# Patient Record
Sex: FEMALE | Race: OTHER | HISPANIC OR LATINO | URBAN - METROPOLITAN AREA
[De-identification: names, ages, dates, MRNs, and addresses within clinical notes are randomized per-mention and may not be internally consistent; named-entity substitution may affect disease eponyms.]

---

## 2017-11-02 ENCOUNTER — EMERGENCY (EMERGENCY)
Facility: HOSPITAL | Age: 67
LOS: 1 days | Discharge: HOME | End: 2017-11-02
Attending: EMERGENCY MEDICINE
Payer: MEDICARE

## 2017-11-02 VITALS
DIASTOLIC BLOOD PRESSURE: 113 MMHG | OXYGEN SATURATION: 99 % | RESPIRATION RATE: 20 BRPM | HEART RATE: 76 BPM | TEMPERATURE: 98 F | WEIGHT: 149.91 LBS | SYSTOLIC BLOOD PRESSURE: 193 MMHG

## 2017-11-02 PROCEDURE — 99284 EMERGENCY DEPT VISIT MOD MDM: CPT

## 2017-11-02 RX ORDER — TETANUS TOXOID, REDUCED DIPHTHERIA TOXOID AND ACELLULAR PERTUSSIS VACCINE, ADSORBED 5; 2.5; 8; 8; 2.5 [IU]/.5ML; [IU]/.5ML; UG/.5ML; UG/.5ML; UG/.5ML
0.5 SUSPENSION INTRAMUSCULAR ONCE
Qty: 0 | Refills: 0 | Status: COMPLETED | OUTPATIENT
Start: 2017-11-02 | End: 2017-11-02

## 2017-11-02 RX ORDER — OXYCODONE AND ACETAMINOPHEN 5; 325 MG/1; MG/1
1 TABLET ORAL EVERY 4 HOURS
Qty: 0 | Refills: 0 | Status: DISCONTINUED | OUTPATIENT
Start: 2017-11-02 | End: 2017-11-02

## 2017-11-02 RX ADMIN — OXYCODONE AND ACETAMINOPHEN 1 TABLET(S): 5; 325 TABLET ORAL at 23:13

## 2017-11-02 RX ADMIN — TETANUS TOXOID, REDUCED DIPHTHERIA TOXOID AND ACELLULAR PERTUSSIS VACCINE, ADSORBED 0.5 MILLILITER(S): 5; 2.5; 8; 8; 2.5 SUSPENSION INTRAMUSCULAR at 22:17

## 2017-11-02 RX ADMIN — OXYCODONE AND ACETAMINOPHEN 1 TABLET(S): 5; 325 TABLET ORAL at 23:28

## 2017-11-02 NOTE — ED STATDOCS - OBJECTIVE STATEMENT
This is a 68 y/o F pt a pertinent PMHx of HTN presents to the ED c/o L hand lacerations s/p dog bite today. PT reports that the location of the wound is "numb." She did not know the dog, it attacked her from the street. She did not see or look whether or not the dog had a collar on. Admits that she has not taken her HTN medication for tonight, cannot report the medication. Pt is R hand dominant. She has pain when removing the dressing and on movement. Her Tetanus shot is not UTD. Allergic to Penicillin.

## 2017-11-02 NOTE — ED ADULT NURSE NOTE - OBJECTIVE STATEMENT
pt a&ox3 c/o pain to left wrist s/p reported dog bite tonight on street, bandage noted to Left wrist, bleeding controlled. MAEx4, rr even and unlabored, in no apparent distress. Unknown last tetanus, updated on POC, will continue to monitor and reassess

## 2017-11-02 NOTE — ED STATDOCS - ATTENDING CONTRIBUTION TO CARE
I, Flavia Lynch, performed the initial face to face bedside interview with this patient regarding history of present illness, review of symptoms and relevant past medical, social and family history.  I completed an independent physical examination.  I was the initial provider who evaluated this patient.  The history, relevant review of systems, past medical and surgical history, medical decision making, and physical examination was documented by the scribe in my presence and I attest to the accuracy of the documentation.  I have signed out the follow up of any pending tests (i.e. labs, radiological studies) to the ACP.  I have communicated the patient’s plan of care and disposition with the ACP.

## 2017-11-02 NOTE — ED STATDOCS - LACERATION LENGTH DESCRIPTION FT
abrasion at the 2nd and 3rd MCP, with a puncture wound. 2cm laceration to the palm of her hand. Large 5cm laceration to the dorsal aspect of the hand with the exposure of fat and muscle.

## 2017-11-03 PROCEDURE — 99283 EMERGENCY DEPT VISIT LOW MDM: CPT | Mod: 25

## 2017-11-03 PROCEDURE — 90471 IMMUNIZATION ADMIN: CPT

## 2017-11-03 PROCEDURE — 90715 TDAP VACCINE 7 YRS/> IM: CPT

## 2017-11-03 PROCEDURE — T1013: CPT

## 2017-11-03 RX ORDER — CIPROFLOXACIN LACTATE 400MG/40ML
500 VIAL (ML) INTRAVENOUS ONCE
Qty: 0 | Refills: 0 | Status: COMPLETED | OUTPATIENT
Start: 2017-11-03 | End: 2017-11-03

## 2017-11-03 RX ORDER — ONDANSETRON 8 MG/1
4 TABLET, FILM COATED ORAL ONCE
Qty: 0 | Refills: 0 | Status: COMPLETED | OUTPATIENT
Start: 2017-11-03 | End: 2017-11-03

## 2017-11-03 RX ORDER — CIPROFLOXACIN LACTATE 400MG/40ML
1 VIAL (ML) INTRAVENOUS
Qty: 20 | Refills: 0 | OUTPATIENT
Start: 2017-11-03 | End: 2017-11-13

## 2017-11-03 RX ADMIN — Medication 300 MILLIGRAM(S): at 01:17

## 2017-11-03 RX ADMIN — ONDANSETRON 4 MILLIGRAM(S): 8 TABLET, FILM COATED ORAL at 01:19

## 2017-11-03 RX ADMIN — Medication 500 MILLIGRAM(S): at 01:17

## 2017-11-04 ENCOUNTER — INPATIENT (INPATIENT)
Facility: HOSPITAL | Age: 67
LOS: 3 days | Discharge: HOME | DRG: 603 | End: 2017-11-08
Attending: INTERNAL MEDICINE | Admitting: INTERNAL MEDICINE
Payer: MEDICARE

## 2017-11-04 VITALS — WEIGHT: 149.91 LBS | HEIGHT: 64 IN

## 2017-11-04 DIAGNOSIS — I10 ESSENTIAL (PRIMARY) HYPERTENSION: ICD-10-CM

## 2017-11-04 DIAGNOSIS — S41.152S: ICD-10-CM

## 2017-11-04 DIAGNOSIS — L03.114 CELLULITIS OF LEFT UPPER LIMB: ICD-10-CM

## 2017-11-04 DIAGNOSIS — L03.90 CELLULITIS, UNSPECIFIED: ICD-10-CM

## 2017-11-04 LAB
ANION GAP SERPL CALC-SCNC: 11 MMOL/L — SIGNIFICANT CHANGE UP (ref 5–17)
BUN SERPL-MCNC: 14 MG/DL — SIGNIFICANT CHANGE UP (ref 8–20)
CALCIUM SERPL-MCNC: 9 MG/DL — SIGNIFICANT CHANGE UP (ref 8.6–10.2)
CHLORIDE SERPL-SCNC: 104 MMOL/L — SIGNIFICANT CHANGE UP (ref 98–107)
CO2 SERPL-SCNC: 29 MMOL/L — SIGNIFICANT CHANGE UP (ref 22–29)
CREAT SERPL-MCNC: 0.74 MG/DL — SIGNIFICANT CHANGE UP (ref 0.5–1.3)
GLUCOSE SERPL-MCNC: 113 MG/DL — SIGNIFICANT CHANGE UP (ref 70–115)
HCT VFR BLD CALC: 37 % — SIGNIFICANT CHANGE UP (ref 37–47)
HGB BLD-MCNC: 12.1 G/DL — SIGNIFICANT CHANGE UP (ref 12–16)
LACTATE BLDV-MCNC: 1.1 MMOL/L — SIGNIFICANT CHANGE UP (ref 0.5–2)
MCHC RBC-ENTMCNC: 32.1 PG — HIGH (ref 27–31)
MCHC RBC-ENTMCNC: 32.7 G/DL — SIGNIFICANT CHANGE UP (ref 32–36)
MCV RBC AUTO: 98.1 FL — SIGNIFICANT CHANGE UP (ref 81–99)
PLATELET # BLD AUTO: 156 K/UL — SIGNIFICANT CHANGE UP (ref 150–400)
POTASSIUM SERPL-MCNC: 3.9 MMOL/L — SIGNIFICANT CHANGE UP (ref 3.5–5.3)
POTASSIUM SERPL-SCNC: 3.9 MMOL/L — SIGNIFICANT CHANGE UP (ref 3.5–5.3)
RBC # BLD: 3.77 M/UL — LOW (ref 4.4–5.2)
RBC # FLD: 12.8 % — SIGNIFICANT CHANGE UP (ref 11–15.6)
SODIUM SERPL-SCNC: 144 MMOL/L — SIGNIFICANT CHANGE UP (ref 135–145)
WBC # BLD: 7.9 K/UL — SIGNIFICANT CHANGE UP (ref 4.8–10.8)
WBC # FLD AUTO: 7.9 K/UL — SIGNIFICANT CHANGE UP (ref 4.8–10.8)

## 2017-11-04 PROCEDURE — 99223 1ST HOSP IP/OBS HIGH 75: CPT

## 2017-11-04 PROCEDURE — 99284 EMERGENCY DEPT VISIT MOD MDM: CPT

## 2017-11-04 RX ORDER — OXYCODONE HYDROCHLORIDE 5 MG/1
5 TABLET ORAL
Qty: 0 | Refills: 0 | Status: DISCONTINUED | OUTPATIENT
Start: 2017-11-04 | End: 2017-11-08

## 2017-11-04 RX ORDER — ACETAMINOPHEN 500 MG
650 TABLET ORAL EVERY 6 HOURS
Qty: 0 | Refills: 0 | Status: DISCONTINUED | OUTPATIENT
Start: 2017-11-04 | End: 2017-11-08

## 2017-11-04 RX ORDER — LOSARTAN POTASSIUM 100 MG/1
100 TABLET, FILM COATED ORAL DAILY
Qty: 0 | Refills: 0 | Status: DISCONTINUED | OUTPATIENT
Start: 2017-11-04 | End: 2017-11-08

## 2017-11-04 RX ORDER — METRONIDAZOLE 500 MG
TABLET ORAL
Qty: 0 | Refills: 0 | Status: DISCONTINUED | OUTPATIENT
Start: 2017-11-04 | End: 2017-11-04

## 2017-11-04 RX ORDER — HYDROCHLOROTHIAZIDE 25 MG
12.5 TABLET ORAL DAILY
Qty: 0 | Refills: 0 | Status: DISCONTINUED | OUTPATIENT
Start: 2017-11-04 | End: 2017-11-08

## 2017-11-04 RX ORDER — SODIUM CHLORIDE 9 MG/ML
3 INJECTION INTRAMUSCULAR; INTRAVENOUS; SUBCUTANEOUS ONCE
Qty: 0 | Refills: 0 | Status: COMPLETED | OUTPATIENT
Start: 2017-11-04 | End: 2017-11-04

## 2017-11-04 RX ORDER — MEROPENEM 1 G/30ML
1000 INJECTION INTRAVENOUS EVERY 8 HOURS
Qty: 0 | Refills: 0 | Status: DISCONTINUED | OUTPATIENT
Start: 2017-11-04 | End: 2017-11-08

## 2017-11-04 RX ORDER — VANCOMYCIN HCL 1 G
1000 VIAL (EA) INTRAVENOUS ONCE
Qty: 0 | Refills: 0 | Status: COMPLETED | OUTPATIENT
Start: 2017-11-04 | End: 2017-11-04

## 2017-11-04 RX ORDER — METRONIDAZOLE 500 MG
500 TABLET ORAL EVERY 8 HOURS
Qty: 0 | Refills: 0 | Status: DISCONTINUED | OUTPATIENT
Start: 2017-11-04 | End: 2017-11-04

## 2017-11-04 RX ORDER — METRONIDAZOLE 500 MG
500 TABLET ORAL ONCE
Qty: 0 | Refills: 0 | Status: DISCONTINUED | OUTPATIENT
Start: 2017-11-04 | End: 2017-11-04

## 2017-11-04 RX ADMIN — OXYCODONE HYDROCHLORIDE 5 MILLIGRAM(S): 5 TABLET ORAL at 20:37

## 2017-11-04 RX ADMIN — OXYCODONE HYDROCHLORIDE 5 MILLIGRAM(S): 5 TABLET ORAL at 19:47

## 2017-11-04 RX ADMIN — Medication 250 MILLIGRAM(S): at 16:28

## 2017-11-04 RX ADMIN — SODIUM CHLORIDE 3 MILLILITER(S): 9 INJECTION INTRAMUSCULAR; INTRAVENOUS; SUBCUTANEOUS at 15:48

## 2017-11-04 RX ADMIN — MEROPENEM 200 MILLIGRAM(S): 1 INJECTION INTRAVENOUS at 18:42

## 2017-11-04 RX ADMIN — MEROPENEM 200 MILLIGRAM(S): 1 INJECTION INTRAVENOUS at 21:51

## 2017-11-04 NOTE — ED ADULT TRIAGE NOTE - CHIEF COMPLAINT QUOTE
swelling to left hand. patient here 3 days ago for a dog bite had stiches, on antibiotics and today her hand is swollen

## 2017-11-04 NOTE — ED STATDOCS - ATTENDING CONTRIBUTION TO CARE
I, Sissy Arora, performed the initial face to face bedside interview with this patient regarding history of present illness, review of symptoms and relevant past medical, social and family history.  I completed an independent physical examination.  I was the initial provider who evaluated this patient. I have signed out the follow up of any pending tests (i.e. labs, radiological studies) to the ACP.  I have communicated the patient’s plan of care and disposition with the ACP.  The history, relevant review of systems, past medical and surgical history, medical decision making, and physical examination was documented by the scribe in my presence and I attest to the accuracy of the documentation.

## 2017-11-04 NOTE — ED ADULT NURSE NOTE - ED STAT RN HANDOFF DETAILS
patient awake, alert, and oriented times 3, breathing unlabored.  Ortho PA at bedside wrapping left hand.  patient aware of plan of care.  IV flushes without difficulty.

## 2017-11-04 NOTE — H&P ADULT - HISTORY OF PRESENT ILLNESS
66 yo F W/ hx HTN presents to ER after worsening of left hand swelling and erythema.  Patient was bit by stray dog 3 days prior with lacerations to hand. Seen in ER, lacerations sutured and discharged home on cipro/clinda.  Per patient, swelling worsened with decreased range of motion of hand/fingers along with subjective fevers without chills.

## 2017-11-04 NOTE — ED STATDOCS - PROGRESS NOTE DETAILS
Pt with infected left hand s/p dog bite x 2 days ago presents with worsening pain/swelling. Pt failed outpt PO abx. D/w  D/w Pt-- states that she received first shot of rabies vaccine upon last visit on 11/2/17. Pt will be due for her next shot on 11/5/17.

## 2017-11-04 NOTE — H&P ADULT - ASSESSMENT
68 yo F W/ hx HTN presents to ER after worsening of left hand swelling and erythema from dog bite.  Failed outpatient cipro/clindamycin therapy.

## 2017-11-04 NOTE — H&P ADULT - PROBLEM SELECTOR PLAN 2
see above  prophylaxis rabies not given in ER. see above  prophylaxis rabies not given in ER but tetanus given see above  prophylaxis rabies given per ER PA and next injection due 11/5  tetanus ppx given on last ER visit

## 2017-11-04 NOTE — H&P ADULT - EXTREMITIES COMMENTS
Left hand/forearm: swelling along with erythema. no fluctuance or abscess noted    unable to close hand. unable to abduct fingers but adduction intact. Left hand/forearm: swelling along with erythema. no fluctuance or abscess noted    unable to close hand. unable to abduct fingers but adduction intact.   sutures present palmar region and dorsal aspect of hand

## 2017-11-04 NOTE — ED ADULT NURSE NOTE - OBJECTIVE STATEMENT
Patient found laying in stretcher, awake, alert, and oriented times 3, breathing unlabored.  Patient states bite by a stray dog on thursday, Patient states swelling, reddness, and heat started yesterday.  Swelling, reddness, heat to left hand and wrist noted.  stitches done on thursday.

## 2017-11-04 NOTE — CONSULT NOTE ADULT - SUBJECTIVE AND OBJECTIVE BOX
Pt Name: JULIA UMANA    MRN: [id number]    Patient is a 67y Female presenting to the emergency department with a chief complaint of worsening left hand swelling (04 Nov 2017 17:02)  Patient was seen in ED after dog bite 2 days ago  Her wound were repaired and she was sent home with oral antibiotics  She returns today with worsening pain and swelling  no other complaints    HEALTH ISSUES - PROBLEM Dx:  Essential hypertension: Essential hypertension  Dog bite of arm, left, sequela: Dog bite of arm, left, sequela  Cellulitis of left upper extremity: Cellulitis of left upper extremity    REVIEW OF SYSTEMS    Musculoskeletal:	 left hand pain and swelling    ROS is otherwise negative.    PAST MEDICAL & SURGICAL HISTORY:  HTN (hypertension)  No significant past surgical history    Allergies: penicillin (Hives)    Medications: acetaminophen   Tablet 650 milliGRAM(s) Oral every 6 hours PRN  acetaminophen   Tablet. 650 milliGRAM(s) Oral every 6 hours PRN  hydrochlorothiazide 12.5 milliGRAM(s) Oral daily  losartan 100 milliGRAM(s) Oral daily  meropenem IVPB 1000 milliGRAM(s) IV Intermittent every 8 hours  oxyCODONE    IR 5 milliGRAM(s) Oral three times a day with meals PRN    FAMILY HISTORY:  No pertinent family history in first degree relatives  : non-contributory    Ambulation: Walking independently                           12.1   7.9   )-----------( 156      ( 04 Nov 2017 15:57 )             37.0     11-04    144  |  104  |  14.0  ----------------------------<  113  3.9   |  29.0  |  0.74    Ca    9.0      04 Nov 2017 15:57    PHYSICAL EXAM:    Vital Signs Last 24 Hrs  T(C): 37.1 (04 Nov 2017 18:03), Max: 37.2 (04 Nov 2017 17:37)  T(F): 98.8 (04 Nov 2017 18:03), Max: 99 (04 Nov 2017 17:37)  HR: 58 (04 Nov 2017 18:03) (58 - 66)  BP: 136/80 (04 Nov 2017 18:03) (136/80 - 188/103)  BP(mean): --  RR: 18 (04 Nov 2017 18:03) (18 - 18)  SpO2: 98% (04 Nov 2017 18:03) (98% - 100%)  Daily Height in cm: 162.56 (04 Nov 2017 13:38)    Daily     Appearance: Alert, responsive, in no acute distress.    Neurological: Sensation is grossly intact to light touch. 5/5 motor function of all extremities. No focal deficits or weaknesses found.    Vascular: 2+ distal pulses. Cap refill < 2 sec. No signs of venous insufficiency or stasis. No extremity ulcerations. No cyanosis.    Musculoskeletal:         Left Upper Extremity:  diffuse hand and digit swelling, decreased sensation to light touch palmar aspect of left 5th digit, multiple lacerations with sutures intact, no active drainage or bleeding, radial pulse intact, cap refill brisk, new xeroform and dressing applied    Imaging Studies:    left hand xrays pending    A/P:   Patient is a 67y old  Female who presents with a chief complaint of worsening hand swelling (04 Nov 2017 17:02)   found to have left hand cellulitis s/p dog bite 2 days ago    PLAN:   Discussed with Dr. Saba   * admit to medicine  * IV Abx  * Strict elevation  * No surgical intervention at this time  * Ortho to follow

## 2017-11-04 NOTE — H&P ADULT - PROBLEM SELECTOR PLAN 1
blood cultures ordered (vancomycin given by ER already)  meropenem  elevate extremity.  orthopedics consulted  ? remove sutures

## 2017-11-04 NOTE — ED STATDOCS - OBJECTIVE STATEMENT
68 y/o F pt presents to ED c/o worsening swelling  and pain to left hand. Pt was seen 2 days ago for laceration s/p  bog bite to left hand had sutures placed and given clindamycin and cipro.

## 2017-11-05 PROCEDURE — 99233 SBSQ HOSP IP/OBS HIGH 50: CPT

## 2017-11-05 PROCEDURE — 73130 X-RAY EXAM OF HAND: CPT | Mod: 26,LT

## 2017-11-05 RX ADMIN — LOSARTAN POTASSIUM 100 MILLIGRAM(S): 100 TABLET, FILM COATED ORAL at 05:31

## 2017-11-05 RX ADMIN — OXYCODONE HYDROCHLORIDE 5 MILLIGRAM(S): 5 TABLET ORAL at 01:45

## 2017-11-05 RX ADMIN — MEROPENEM 200 MILLIGRAM(S): 1 INJECTION INTRAVENOUS at 05:31

## 2017-11-05 RX ADMIN — Medication 650 MILLIGRAM(S): at 05:30

## 2017-11-05 RX ADMIN — OXYCODONE HYDROCHLORIDE 5 MILLIGRAM(S): 5 TABLET ORAL at 22:57

## 2017-11-05 RX ADMIN — OXYCODONE HYDROCHLORIDE 5 MILLIGRAM(S): 5 TABLET ORAL at 00:50

## 2017-11-05 RX ADMIN — Medication 650 MILLIGRAM(S): at 06:20

## 2017-11-05 RX ADMIN — MEROPENEM 200 MILLIGRAM(S): 1 INJECTION INTRAVENOUS at 14:47

## 2017-11-05 RX ADMIN — Medication 12.5 MILLIGRAM(S): at 05:31

## 2017-11-05 RX ADMIN — MEROPENEM 200 MILLIGRAM(S): 1 INJECTION INTRAVENOUS at 21:57

## 2017-11-05 RX ADMIN — OXYCODONE HYDROCHLORIDE 5 MILLIGRAM(S): 5 TABLET ORAL at 11:43

## 2017-11-05 RX ADMIN — OXYCODONE HYDROCHLORIDE 5 MILLIGRAM(S): 5 TABLET ORAL at 21:57

## 2017-11-05 RX ADMIN — OXYCODONE HYDROCHLORIDE 5 MILLIGRAM(S): 5 TABLET ORAL at 12:30

## 2017-11-05 NOTE — PROGRESS NOTE ADULT - SUBJECTIVE AND OBJECTIVE BOX
seen for hand cellulitis/dog bite    improving swelling/pain.  increased ROM  ros otherwise negative     MEDICATIONS  (STANDING):  hydrochlorothiazide 12.5 milliGRAM(s) Oral daily  losartan 100 milliGRAM(s) Oral daily  meropenem IVPB 1000 milliGRAM(s) IV Intermittent every 8 hours    MEDICATIONS  (PRN):  acetaminophen   Tablet 650 milliGRAM(s) Oral every 6 hours PRN For Temp greater than 38 C (100.4 F)  acetaminophen   Tablet. 650 milliGRAM(s) Oral every 6 hours PRN Mild Pain (1 - 3)  oxyCODONE    IR 5 milliGRAM(s) Oral three times a day with meals PRN Severe Pain (7 - 10)      Allergies    penicillin (Hives)    Vital Signs Last 24 Hrs  T(C): 37.1 (05 Nov 2017 08:07), Max: 37.2 (04 Nov 2017 17:37)  T(F): 98.8 (05 Nov 2017 08:07), Max: 99 (04 Nov 2017 17:37)  HR: 65 (05 Nov 2017 08:07) (55 - 66)  BP: 110/68 (05 Nov 2017 08:07) (105/68 - 188/103)  BP(mean): --  RR: 19 (05 Nov 2017 08:07) (18 - 19)  SpO2: 99% (04 Nov 2017 23:32) (98% - 100%)    PHYSICAL EXAM:    GENERAL: NAD  EXTREMITIES:  no LE edema  left hand:  intact sutures at lacerations, no drainage, minimal erythema. improving ROM (adduction/abduction of fingers along with hand grasp). improving swelling.  NERVOUS SYSTEM:  Alert & Oriented X nonfocal  PSYCH: normal mood, appropriate response.    LABS:                        12.1   7.9   )-----------( 156      ( 04 Nov 2017 15:57 )             37.0     11-04    144  |  104  |  14.0  ----------------------------<  113  3.9   |  29.0  |  0.74    Ca    9.0      04 Nov 2017 15:57            CAPILLARY BLOOD GLUCOSE            RADIOLOGY & ADDITIONAL TESTS:

## 2017-11-05 NOTE — PROGRESS NOTE ADULT - SUBJECTIVE AND OBJECTIVE BOX
Pt Name: JULIA UMANA    MRN: [id number]    Patient is a 67y Female admitted for worsening left hand swelling after dog bite 3 days ago(04 Nov 2017 17:02)  Patient seen and evaluated, resting comfortably in bed  states her left hand pain and swelling are improving  no new complaints    Vital Signs Last 24 Hrs  T(C): 37.1 (05 Nov 2017 08:07), Max: 37.2 (04 Nov 2017 17:37)  T(F): 98.8 (05 Nov 2017 08:07), Max: 99 (04 Nov 2017 17:37)  HR: 65 (05 Nov 2017 08:07) (55 - 66)  BP: 110/68 (05 Nov 2017 08:07) (105/68 - 188/103)  BP(mean): --  RR: 19 (05 Nov 2017 08:07) (18 - 19)  SpO2: 99% (04 Nov 2017 23:32) (98% - 100%)    Appearance: Alert, responsive, in no acute distress.    Left Upper Extremity:  diffuse hand and digit swelling improving, multiple lacerations with sutures intact- healing well, no active drainage or bleeding, sensation to light touch intact to hand and all digits, radial pulse intact, cap refill brisk, new dressing applied    Imaging Studies:    left hand xrays pending    A/P:   Patient is a 67y old  Female who presents with a chief complaint of worsening hand swelling (04 Nov 2017 17:02)   found to have left hand cellulitis s/p dog bite 2 days ago- improving    PLAN:     * Continue IV Abx  * continue strict elevation   * Ortho to follow

## 2017-11-05 NOTE — PROGRESS NOTE ADULT - PROBLEM SELECTOR PLAN 1
blood cultures ordered (vancomycin given by ER already)  meropenem  elevate extremity.  orthopedics following  SLOW IMPROVEMENT

## 2017-11-06 PROCEDURE — 99232 SBSQ HOSP IP/OBS MODERATE 35: CPT

## 2017-11-06 RX ADMIN — MEROPENEM 200 MILLIGRAM(S): 1 INJECTION INTRAVENOUS at 21:12

## 2017-11-06 RX ADMIN — Medication 650 MILLIGRAM(S): at 21:12

## 2017-11-06 RX ADMIN — MEROPENEM 200 MILLIGRAM(S): 1 INJECTION INTRAVENOUS at 15:11

## 2017-11-06 RX ADMIN — Medication 650 MILLIGRAM(S): at 11:02

## 2017-11-06 RX ADMIN — OXYCODONE HYDROCHLORIDE 5 MILLIGRAM(S): 5 TABLET ORAL at 21:43

## 2017-11-06 RX ADMIN — LOSARTAN POTASSIUM 100 MILLIGRAM(S): 100 TABLET, FILM COATED ORAL at 05:16

## 2017-11-06 RX ADMIN — Medication 650 MILLIGRAM(S): at 13:12

## 2017-11-06 RX ADMIN — Medication 12.5 MILLIGRAM(S): at 05:16

## 2017-11-06 RX ADMIN — OXYCODONE HYDROCHLORIDE 5 MILLIGRAM(S): 5 TABLET ORAL at 15:22

## 2017-11-06 RX ADMIN — MEROPENEM 200 MILLIGRAM(S): 1 INJECTION INTRAVENOUS at 05:16

## 2017-11-06 RX ADMIN — Medication 650 MILLIGRAM(S): at 21:42

## 2017-11-06 RX ADMIN — OXYCODONE HYDROCHLORIDE 5 MILLIGRAM(S): 5 TABLET ORAL at 21:13

## 2017-11-06 NOTE — PROGRESS NOTE ADULT - SUBJECTIVE AND OBJECTIVE BOX
Pt Name: JULIA UMANA    MRN: [id number]      Patient is a 67F being followed for left hand erythema/swelling s/p dog bite x 4 days ago. Pt found resting comfortably in bed in NAD at this time. Pts pain controlled with medications, no issues over night. Pt reports improvement of pain/swelling. Pt denies fever, chills, c/p, sob, abdominal pain, n/v, numbness/tingling and has no other complaints.      Allergies: penicillin (Hives)      Medications: acetaminophen   Tablet 650 milliGRAM(s) Oral every 6 hours PRN  acetaminophen   Tablet. 650 milliGRAM(s) Oral every 6 hours PRN  hydrochlorothiazide 12.5 milliGRAM(s) Oral daily  losartan 100 milliGRAM(s) Oral daily  meropenem IVPB 1000 milliGRAM(s) IV Intermittent every 8 hours  oxyCODONE    IR 5 milliGRAM(s) Oral three times a day with meals PRN                              12.1   7.9   )-----------( 156      ( 04 Nov 2017 15:57 )             37.0     11-04    144  |  104  |  14.0  ----------------------------<  113  3.9   |  29.0  |  0.74    Ca    9.0      04 Nov 2017 15:57        PHYSICAL EXAM:    Vital Signs Last 24 Hrs  T(C): 37.2 (05 Nov 2017 19:06), Max: 37.2 (05 Nov 2017 19:06)  T(F): 98.9 (05 Nov 2017 19:06), Max: 98.9 (05 Nov 2017 19:06)  HR: 54 (05 Nov 2017 15:40) (54 - 65)  BP: 115/74 (05 Nov 2017 15:40) (110/68 - 115/74)  BP(mean): --  RR: 18 (05 Nov 2017 15:40) (18 - 19)  SpO2: 99% (05 Nov 2017 15:40) (99% - 99%)  Daily     Daily     Appearance: Alert, responsive, in no acute distress.    Neurological: Sensation is grossly intact to light touch.    Skin: +mild redness/swelling of the left hand. No streaking redness up arm at this time. + sutured lacerations of the left hand dorsum/palm with no active bleeding/drainage noted.    Dressing: Clean, dry and intact without staining.    Vascular: 2+ distal pulses. Cap refill < 2 sec. No signs of venous   insufficiency   or stasis. No extremity ulcerations. No cyanosis.    Musculoskeletal:         Left Upper Extremity: Limited ROM of all digits/hand  secondary to pain/swelling. Elevation pillow in place.       Right Upper Extremity:  + NROM. Non-tender. No signs of trauma.        Left Lower Extremity:  + NROM. Non-tender. No signs of trauma.        Right Lower Extremity:  + NROM. Non-tender. No signs of trauma.       A/P:  Pt is a  67y Female with left hand redness/swelling s/p dog bite x 4 days ago    PLAN:   * Continue IV Abx  * Strict elevation   * Ortho to follow Pt Name: JULIA UMANA    MRN: [id number]      Patient is a 67F being followed for left hand erythema/swelling s/p dog bite x 4 days ago. Pt found resting comfortably in bed in NAD at this time. Pts pain controlled with medications, no issues over night. Pt reports improvement of pain/swelling. Pt denies fever, chills, c/p, sob, abdominal pain, n/v, numbness/tingling and has no other complaints.      Allergies: penicillin (Hives)      Medications: acetaminophen   Tablet 650 milliGRAM(s) Oral every 6 hours PRN  acetaminophen   Tablet. 650 milliGRAM(s) Oral every 6 hours PRN  hydrochlorothiazide 12.5 milliGRAM(s) Oral daily  losartan 100 milliGRAM(s) Oral daily  meropenem IVPB 1000 milliGRAM(s) IV Intermittent every 8 hours  oxyCODONE    IR 5 milliGRAM(s) Oral three times a day with meals PRN                              12.1   7.9   )-----------( 156      ( 04 Nov 2017 15:57 )             37.0     11-04    144  |  104  |  14.0  ----------------------------<  113  3.9   |  29.0  |  0.74    Ca    9.0      04 Nov 2017 15:57        PHYSICAL EXAM:    Vital Signs Last 24 Hrs  T(C): 37.2 (05 Nov 2017 19:06), Max: 37.2 (05 Nov 2017 19:06)  T(F): 98.9 (05 Nov 2017 19:06), Max: 98.9 (05 Nov 2017 19:06)  HR: 54 (05 Nov 2017 15:40) (54 - 65)  BP: 115/74 (05 Nov 2017 15:40) (110/68 - 115/74)  BP(mean): --  RR: 18 (05 Nov 2017 15:40) (18 - 19)  SpO2: 99% (05 Nov 2017 15:40) (99% - 99%)  Daily     Daily     Appearance: Alert, responsive, in no acute distress.    Neurological: Sensation is grossly intact to light touch.    Skin: +mild redness/swelling of the left hand. No streaking redness up arm at this time. + sutured lacerations of the left hand dorsum/palm with no active bleeding/drainage noted.    Dressing: Clean, dry and intact without staining.    Vascular: 2+ distal pulses. Cap refill < 2 sec. No signs of venous   insufficiency   or stasis. No extremity ulcerations. No cyanosis.    Musculoskeletal:         Left Upper Extremity: Limited ROM of all digits/hand  secondary to pain/swelling. Elevation pillow in place.       Right Upper Extremity:  + NROM. Non-tender. No signs of trauma.        Left Lower Extremity:  + NROM. Non-tender. No signs of trauma.        Right Lower Extremity:  + NROM. Non-tender. No signs of trauma.     Imaging:  < from: Xray Hand 3 Views, Left (11.05.17 @ 11:24) >   EXAM:  HAND-LEFT                          PROCEDURE DATE:  11/05/2017          INTERPRETATION:  Radiographs of the left hand         CLINICAL INFORMATION:  Injury with  Pain.    TECHNIQUE:  Frontal, oblique and lateral views of the hand were obtained.    FINDINGS:   No prior examinations are available for review.    The osseous structures of the hand are intact, without fracture or   malalignment.   Joint spaces appear intact.   Dorsal soft tissue swelling   noted without underlying radiopaqueforeign body. Air lucency within the   dorsum of the hand percutaneous soft tissues following dogbite injury.   IMPRESSION: Soft tissue swelling.   No acute radiographic osseous   pathology..                         TRACEY DE LA ROSA M.D., ATTENDING RADIOLOGIST  This document has been electronically signed. Nov 5 2017 12:46PM        < end of copied text >        A/P:  Pt is a  67y Female with left hand redness/swelling s/p dog bite x 4 days ago    PLAN:   * Continue IV Abx  * Strict elevation   * Ortho to follow

## 2017-11-06 NOTE — PROGRESS NOTE ADULT - SUBJECTIVE AND OBJECTIVE BOX
seen for dog bite/hand cellulitis    improved swelling but increased pain. advised tylenol use  ROS otherwise negative    MEDICATIONS  (STANDING):  hydrochlorothiazide 12.5 milliGRAM(s) Oral daily  losartan 100 milliGRAM(s) Oral daily  meropenem IVPB 1000 milliGRAM(s) IV Intermittent every 8 hours    MEDICATIONS  (PRN):  acetaminophen   Tablet 650 milliGRAM(s) Oral every 6 hours PRN For Temp greater than 38 C (100.4 F)  acetaminophen   Tablet. 650 milliGRAM(s) Oral every 6 hours PRN Mild Pain (1 - 3)  oxyCODONE    IR 5 milliGRAM(s) Oral three times a day with meals PRN Severe Pain (7 - 10)      Allergies    penicillin (Hives)    Vital Signs Last 24 Hrs  T(C): 37.1 (06 Nov 2017 08:59), Max: 37.2 (05 Nov 2017 19:06)  T(F): 98.7 (06 Nov 2017 08:59), Max: 98.9 (05 Nov 2017 19:06)  HR: 58 (06 Nov 2017 08:59) (54 - 58)  BP: 106/62 (06 Nov 2017 08:59) (106/62 - 115/74)  BP(mean): --  RR: 18 (06 Nov 2017 08:59) (18 - 18)  SpO2: 96% (06 Nov 2017 08:59) (96% - 99%)    PHYSICAL EXAM:    GENERAL: NAD  MSK: left hand bandaged. improved swelling and ROM.    dressing in place, wounds not examined (see ortho note for exam)  NERVOUS SYSTEM:  Alert & Oriented X3, nonfocal  PSYCH: normal mood, appropriate response.    LABS:                        12.1   7.9   )-----------( 156      ( 04 Nov 2017 15:57 )             37.0     11-04    144  |  104  |  14.0  ----------------------------<  113  3.9   |  29.0  |  0.74    Ca    9.0      04 Nov 2017 15:57            CAPILLARY BLOOD GLUCOSE            RADIOLOGY & ADDITIONAL TESTS:

## 2017-11-06 NOTE — PROGRESS NOTE ADULT - PROBLEM SELECTOR PLAN 1
blood cultures ordered (vancomycin given by ER already)  meropenem x 24 -48 hrs more then levaquin/flagyl  elevate extremity.  orthopedics following  SLOW IMPROVEMENT

## 2017-11-06 NOTE — PROGRESS NOTE ADULT - ASSESSMENT
66 yo F W/ hx HTN presents to ER after worsening of left hand swelling and erythema from dog bite.  Failed outpatient cipro/clindamycin therapy.

## 2017-11-07 PROCEDURE — 99233 SBSQ HOSP IP/OBS HIGH 50: CPT

## 2017-11-07 RX ADMIN — Medication 12.5 MILLIGRAM(S): at 06:48

## 2017-11-07 RX ADMIN — Medication 650 MILLIGRAM(S): at 15:57

## 2017-11-07 RX ADMIN — Medication 650 MILLIGRAM(S): at 16:45

## 2017-11-07 RX ADMIN — MEROPENEM 200 MILLIGRAM(S): 1 INJECTION INTRAVENOUS at 06:50

## 2017-11-07 RX ADMIN — MEROPENEM 200 MILLIGRAM(S): 1 INJECTION INTRAVENOUS at 22:00

## 2017-11-07 RX ADMIN — Medication 650 MILLIGRAM(S): at 06:49

## 2017-11-07 RX ADMIN — LOSARTAN POTASSIUM 100 MILLIGRAM(S): 100 TABLET, FILM COATED ORAL at 06:48

## 2017-11-07 RX ADMIN — MEROPENEM 200 MILLIGRAM(S): 1 INJECTION INTRAVENOUS at 13:41

## 2017-11-07 NOTE — PROGRESS NOTE ADULT - SUBJECTIVE AND OBJECTIVE BOX
seen for dog bite/cellulitis    no acute complaints  via     + poor sleep.  pain better  ROS negative     MEDICATIONS  (STANDING):  hydrochlorothiazide 12.5 milliGRAM(s) Oral daily  losartan 100 milliGRAM(s) Oral daily  meropenem IVPB 1000 milliGRAM(s) IV Intermittent every 8 hours    MEDICATIONS  (PRN):  acetaminophen   Tablet 650 milliGRAM(s) Oral every 6 hours PRN For Temp greater than 38 C (100.4 F)  acetaminophen   Tablet. 650 milliGRAM(s) Oral every 6 hours PRN Mild Pain (1 - 3)  oxyCODONE    IR 5 milliGRAM(s) Oral three times a day with meals PRN Severe Pain (7 - 10)      Allergies    penicillin (Hives)    Vital Signs Last 24 Hrs  T(C): 37.1 (07 Nov 2017 08:45), Max: 37.1 (07 Nov 2017 08:45)  T(F): 98.8 (07 Nov 2017 08:45), Max: 98.8 (07 Nov 2017 08:45)  HR: 45 (07 Nov 2017 08:45) (45 - 56)  BP: 112/64 (07 Nov 2017 08:45) (112/64 - 129/75)  BP(mean): --  RR: 18 (07 Nov 2017 08:45) (18 - 18)  SpO2: 98% (07 Nov 2017 08:45) (98% - 100%)    PHYSICAL EXAM:    GENERAL: NAD  CHEST/LUNG: Clear to percussion bilaterally  HEART: Regular rate and rhythm; S1 S2  ABDOMEN: Soft, Nontender, Nondistended; Bowel sounds present  EXTREMITIES:  no LE edema    left hand bandged. see ortho note for exam      LABS:                CAPILLARY BLOOD GLUCOSE            RADIOLOGY & ADDITIONAL TESTS:

## 2017-11-07 NOTE — PROGRESS NOTE ADULT - SUBJECTIVE AND OBJECTIVE BOX
Ortho Post Op Check    Name: JULIA UMANA    MR #: [id number]    Procedure: Er sutured left hand and wrist dog bite lacerations 11/2/2017  Patient returned to ER 2 days later for left hand increase pain and swelling    PAST MEDICAL & SURGICAL HISTORY:  HTN (hypertension)  No significant past surgical history      Denies CP, SOB, N/V, numbness/tingling     General Exam:  Vital Signs Last 24 Hrs  T(C): 36.9 (06 Nov 2017 23:19), Max: 37.1 (06 Nov 2017 08:59)  T(F): 98.5 (06 Nov 2017 23:19), Max: 98.7 (06 Nov 2017 08:59)  HR: 45 (06 Nov 2017 23:19) (45 - 58)  BP: 129/75 (06 Nov 2017 23:19) (106/62 - 129/75)  BP(mean): --  RR: 18 (06 Nov 2017 23:19) (18 - 18)  SpO2: 99% (06 Nov 2017 23:19) (96% - 100%)    General: Pt Alert and oriented, NAD, controlled pain.  Left hand Dressings falling off. I removed the remaining  to reveal lacerations x 3 sutures intact. No bleeding or drainage noted.   Left hand dorsally has erythema and swelling. ROM all digits limited due to swelling and pain. Full extension, flexion of MCPs, PIPs limited to 30 degrees   Pulses: 2+ radial pulse. Cap refill < 2 sec.  Sensation: Grossly intact to light touch without deficit.        MEDICATIONS  (STANDING):  hydrochlorothiazide 12.5 milliGRAM(s) Oral daily  losartan 100 milliGRAM(s) Oral daily  meropenem IVPB 1000 milliGRAM(s) IV Intermittent every 8 hours    MEDICATIONS  (PRN):  acetaminophen   Tablet 650 milliGRAM(s) Oral every 6 hours PRN For Temp greater than 38 C (100.4 F)  acetaminophen   Tablet. 650 milliGRAM(s) Oral every 6 hours PRN Mild Pain (1 - 3)  oxyCODONE    IR 5 milliGRAM(s) Oral three times a day with meals PRN Severe Pain (7 - 10)      A/P: 67yFemale s/p dog bite. Sutured by ER 11/2/2017   - Stable  - Pain Control  - abx: as per primary care team  - Weight bearing status: Ambulate as tolerated   - Will continue to follow

## 2017-11-08 ENCOUNTER — TRANSCRIPTION ENCOUNTER (OUTPATIENT)
Age: 67
End: 2017-11-08

## 2017-11-08 VITALS
DIASTOLIC BLOOD PRESSURE: 83 MMHG | SYSTOLIC BLOOD PRESSURE: 138 MMHG | OXYGEN SATURATION: 96 % | TEMPERATURE: 99 F | HEART RATE: 61 BPM | RESPIRATION RATE: 18 BRPM

## 2017-11-08 PROCEDURE — 96374 THER/PROPH/DIAG INJ IV PUSH: CPT

## 2017-11-08 PROCEDURE — 99285 EMERGENCY DEPT VISIT HI MDM: CPT | Mod: 25

## 2017-11-08 PROCEDURE — 83605 ASSAY OF LACTIC ACID: CPT

## 2017-11-08 PROCEDURE — 80048 BASIC METABOLIC PNL TOTAL CA: CPT

## 2017-11-08 PROCEDURE — 73130 X-RAY EXAM OF HAND: CPT

## 2017-11-08 PROCEDURE — T1013: CPT

## 2017-11-08 PROCEDURE — 99233 SBSQ HOSP IP/OBS HIGH 50: CPT

## 2017-11-08 PROCEDURE — 36415 COLL VENOUS BLD VENIPUNCTURE: CPT

## 2017-11-08 PROCEDURE — 85027 COMPLETE CBC AUTOMATED: CPT

## 2017-11-08 RX ORDER — ACETAMINOPHEN 500 MG
2 TABLET ORAL
Qty: 0 | Refills: 0 | COMMUNITY
Start: 2017-11-08

## 2017-11-08 RX ORDER — METRONIDAZOLE 500 MG
1 TABLET ORAL
Qty: 15 | Refills: 0 | OUTPATIENT
Start: 2017-11-08 | End: 2017-11-13

## 2017-11-08 RX ADMIN — MEROPENEM 200 MILLIGRAM(S): 1 INJECTION INTRAVENOUS at 05:35

## 2017-11-08 RX ADMIN — Medication 12.5 MILLIGRAM(S): at 05:35

## 2017-11-08 RX ADMIN — LOSARTAN POTASSIUM 100 MILLIGRAM(S): 100 TABLET, FILM COATED ORAL at 05:35

## 2017-11-08 NOTE — DISCHARGE NOTE ADULT - CARE PLAN
Principal Discharge DX:	Dog bite of arm, left, sequela  Goal:	resolve infection  Instructions for follow-up, activity and diet:	Call Dr Saba for follow-up within 5-7 days. Call MD for any temperature greater than 101, any numbness or tingling in operated extremity, any reddness or drainage from wound. Continue ABX as per primary care team/ Continue ROM all digits. May wash hand with antibacterial soap  Secondary Diagnosis:	Cellulitis Principal Discharge DX:	Dog bite of arm, left, sequela  Goal:	resolve infection  Instructions for follow-up, activity and diet:	Call Dr Saba for follow-up within 5-7 days. Call MD for any temperature greater than 101, any numbness or tingling in operated extremity, any redness or drainage from wound. Continue antibiotics as per primary care team/ Continue range of motion all digits. May wash hand with antibacterial soap  Secondary Diagnosis:	Cellulitis  Instructions for follow-up, activity and diet:	see above  Secondary Diagnosis:	Essential hypertension  Instructions for follow-up, activity and diet:	home medications  stable

## 2017-11-08 NOTE — PROGRESS NOTE ADULT - SUBJECTIVE AND OBJECTIVE BOX
Ortho Post Op Check    Name: JULIA UMANA    MR #: [id number]    Procedure: Dog Bites to left hand and wrist. Sutured by ER 11/2/2017    PAST MEDICAL & SURGICAL HISTORY:  HTN (hypertension)  No significant past surgical history    Pt comfortable without complaints, pain controlled  Denies CP, SOB, N/V, numbness/tingling     General Exam:  Vital Signs Last 24 Hrs  T(C): 36.8 (08 Nov 2017 00:23), Max: 37.2 (07 Nov 2017 16:41)  T(F): 98.3 (08 Nov 2017 00:23), Max: 99 (07 Nov 2017 16:41)  HR: 56 (08 Nov 2017 00:23) (55 - 56)  BP: 133/81 (08 Nov 2017 00:23) (133/81 - 153/98)  BP(mean): --  RR: 18 (08 Nov 2017 00:23) (18 - 18)  SpO2: 98% (08 Nov 2017 00:23) (98% - 98%)    General: Pt Alert and oriented, NAD, controlled pain.  Left hand/ wrist Dressings removed to reveal sutured areas to be C/D/I. No bleeding or drainage noted. No erythema  Pulses: 2+ radial pulse. Cap refill < 2 sec.  Sensation: Grossly intact to light touch without deficit.  Motor: FUll ROM of left wrist. Improved ROM all digits left hand. Swelling has decreased. Sensation intact. Brisk cap refill       MEDICATIONS  (STANDING):  hydrochlorothiazide 12.5 milliGRAM(s) Oral daily  losartan 100 milliGRAM(s) Oral daily  meropenem IVPB 1000 milliGRAM(s) IV Intermittent every 8 hours    MEDICATIONS  (PRN):  acetaminophen   Tablet 650 milliGRAM(s) Oral every 6 hours PRN For Temp greater than 38 C (100.4 F)  acetaminophen   Tablet. 650 milliGRAM(s) Oral every 6 hours PRN Mild Pain (1 - 3)  oxyCODONE    IR 5 milliGRAM(s) Oral three times a day with meals PRN Severe Pain (7 - 10)    A/P: 67yFemale with multiple dog bites to left hand and wrist sutured by ER on 11/2/2017  - Stable  - Pain Control  - DVT ppx: SCDs and ambulation   - abx: Meropenem  - Weight bearing status: WBAT  - Ortho stable for Dc with PO abx if ok with med team   - Follow up with Dr Saba within 5 days

## 2017-11-08 NOTE — DISCHARGE NOTE ADULT - MEDICATION SUMMARY - MEDICATIONS TO STOP TAKING
I will STOP taking the medications listed below when I get home from the hospital:    clindamycin 300 mg oral capsule  -- 1 cap(s) by mouth 4 times a day   -- Finish all this medication unless otherwise directed by prescriber.  Medication should be taken with plenty of water.    ciprofloxacin 500 mg oral tablet  -- 1 tab(s) by mouth 2 times a day   -- Avoid prolonged or excessive exposure to direct and/or artificial sunlight while taking this medication.  Check with your doctor before becoming pregnant.  Do not take dairy products, antacids, or iron preparations within one hour of this medication.  Finish all this medication unless otherwise directed by prescriber.  Medication should be taken with plenty of water.

## 2017-11-08 NOTE — DISCHARGE NOTE ADULT - HOSPITAL COURSE
66 yo F W/ hx HTN presents to ER after worsening of left hand swelling and erythema from dog bite.  Failed outpatient cipro/clindamycin therapy.  Patient treated with meropenem with improvement in swelling, range of motion and erythema of left hand. seen by orthopedics with no surgical intervention warranted.  stable for discharge home.  finish course of oral antibiotics.  d/c planning 35 min.

## 2017-11-08 NOTE — DISCHARGE NOTE ADULT - MEDICATION SUMMARY - MEDICATIONS TO TAKE
I will START or STAY ON the medications listed below when I get home from the hospital:    Flagyl 500 mg oral tablet  -- 1 tab(s) by mouth 3 times a day   -- Do not drink alcoholic beverages when taking this medication.  Finish all this medication unless otherwise directed by prescriber.  May discolor urine or feces.    -- Indication: For Dog bite of arm, left, sequela    acetaminophen 325 mg oral tablet  -- 2 tab(s) by mouth every 6 hours, As needed, Mild Pain (1 - 3)  -- Indication: For pain    acetaminophen 325 mg oral tablet  -- 2 tab(s) by mouth every 6 hours, As needed, For Temp greater than 38 C (100.4 F)  -- Indication: For fevert    losartan-hydrochlorothiazide 100mg-12.5mg oral tablet  -- 1 tab(s) by mouth once a day  -- Indication: For HTN (hypertension)    Levaquin 750 mg oral tablet  -- 1 tab(s) by mouth once a day   -- Avoid prolonged or excessive exposure to direct and/or artificial sunlight while taking this medication.  Do not take dairy products, antacids, or iron preparations within one hour of this medication.  Finish all this medication unless otherwise directed by prescriber.  May cause drowsiness or dizziness.  Medication should be taken with plenty of water.    -- Indication: For Dog bite of arm, left, sequela

## 2017-11-08 NOTE — DISCHARGE NOTE ADULT - CARE PROVIDER_API CALL
Sha Saba), Plastic Surgery  [address] [place] [address]  Phone: [phone]  Fax: [phone] hSa Saba), Plastic Surgery  [address] [place] [address]  Phone: [phone]  Fax: [phone]    Edda Fair), Family Medicine  [address] [place] [address]  Phone: [phone]  Fax: [phone]

## 2017-11-08 NOTE — DISCHARGE NOTE ADULT - PATIENT PORTAL LINK FT
“You can access the Follow[employer] Patient Portal, offered by [facility], by registering with the following website: [email or web address]”

## 2017-11-08 NOTE — DISCHARGE NOTE ADULT - PLAN OF CARE
resolve infection Call Dr Saba for follow-up within 5-7 days. Call MD for any temperature greater than 101, any numbness or tingling in operated extremity, any reddness or drainage from wound. Continue ABX as per primary care team/ Continue ROM all digits. May wash hand with antibacterial soap Call Dr Saba for follow-up within 5-7 days. Call MD for any temperature greater than 101, any numbness or tingling in operated extremity, any redness or drainage from wound. Continue antibiotics as per primary care team/ Continue range of motion all digits. May wash hand with antibacterial soap see above home medications  stable

## 2017-12-05 ENCOUNTER — OUTPATIENT (OUTPATIENT)
Dept: OUTPATIENT SERVICES | Facility: HOSPITAL | Age: 67
LOS: 1 days | End: 2017-12-05
Payer: MEDICARE

## 2017-12-05 PROBLEM — I10 ESSENTIAL (PRIMARY) HYPERTENSION: Chronic | Status: ACTIVE | Noted: 2017-11-04

## 2017-12-07 DIAGNOSIS — M79.642 PAIN IN LEFT HAND: ICD-10-CM

## 2017-12-07 DIAGNOSIS — S61.459D: ICD-10-CM

## 2017-12-07 DIAGNOSIS — M25.642 STIFFNESS OF LEFT HAND, NOT ELSEWHERE CLASSIFIED: ICD-10-CM

## 2018-02-08 ENCOUNTER — RESULT REVIEW (OUTPATIENT)
Age: 68
End: 2018-02-08

## 2018-02-13 PROCEDURE — 97750 PHYSICAL PERFORMANCE TEST: CPT

## 2018-02-13 PROCEDURE — 97110 THERAPEUTIC EXERCISES: CPT

## 2018-02-13 PROCEDURE — 97140 MANUAL THERAPY 1/> REGIONS: CPT

## 2018-02-13 PROCEDURE — 97167 OT EVAL HIGH COMPLEX 60 MIN: CPT

## 2018-02-16 ENCOUNTER — RESULT REVIEW (OUTPATIENT)
Age: 68
End: 2018-02-16

## 2018-03-19 ENCOUNTER — OUTPATIENT (OUTPATIENT)
Dept: OUTPATIENT SERVICES | Facility: HOSPITAL | Age: 68
LOS: 1 days | Discharge: HOME | End: 2018-03-19

## 2018-03-19 DIAGNOSIS — C49.9 MALIGNANT NEOPLASM OF CONNECTIVE AND SOFT TISSUE, UNSPECIFIED: ICD-10-CM

## 2018-03-22 ENCOUNTER — APPOINTMENT (OUTPATIENT)
Dept: HEMATOLOGY ONCOLOGY | Facility: CLINIC | Age: 68
End: 2018-03-22

## 2018-03-24 ENCOUNTER — OUTPATIENT (OUTPATIENT)
Dept: OUTPATIENT SERVICES | Facility: HOSPITAL | Age: 68
LOS: 1 days | End: 2018-03-24
Payer: MEDICARE

## 2018-03-24 DIAGNOSIS — Z51.89 ENCOUNTER FOR OTHER SPECIFIED AFTERCARE: ICD-10-CM

## 2018-03-24 DIAGNOSIS — M79.642 PAIN IN LEFT HAND: ICD-10-CM

## 2018-03-24 DIAGNOSIS — S61.459D: ICD-10-CM

## 2018-05-03 ENCOUNTER — OUTPATIENT (OUTPATIENT)
Dept: OUTPATIENT SERVICES | Facility: HOSPITAL | Age: 68
LOS: 1 days | Discharge: HOME | End: 2018-05-03

## 2018-05-03 DIAGNOSIS — C49.9 MALIGNANT NEOPLASM OF CONNECTIVE AND SOFT TISSUE, UNSPECIFIED: ICD-10-CM

## 2018-05-10 ENCOUNTER — OUTPATIENT (OUTPATIENT)
Dept: OUTPATIENT SERVICES | Facility: HOSPITAL | Age: 68
LOS: 1 days | Discharge: HOME | End: 2018-05-10

## 2018-05-10 DIAGNOSIS — C49.9 MALIGNANT NEOPLASM OF CONNECTIVE AND SOFT TISSUE, UNSPECIFIED: ICD-10-CM

## 2018-05-14 ENCOUNTER — APPOINTMENT (OUTPATIENT)
Dept: HEMATOLOGY ONCOLOGY | Facility: CLINIC | Age: 68
End: 2018-05-14
Payer: MEDICARE

## 2018-05-14 VITALS
DIASTOLIC BLOOD PRESSURE: 102 MMHG | OXYGEN SATURATION: 99 % | HEIGHT: 60.16 IN | TEMPERATURE: 99.1 F | WEIGHT: 144.4 LBS | BODY MASS INDEX: 27.98 KG/M2 | HEART RATE: 63 BPM | SYSTOLIC BLOOD PRESSURE: 182 MMHG | RESPIRATION RATE: 16 BRPM

## 2018-05-14 DIAGNOSIS — Z78.9 OTHER SPECIFIED HEALTH STATUS: ICD-10-CM

## 2018-05-14 PROCEDURE — 99205 OFFICE O/P NEW HI 60 MIN: CPT

## 2018-05-15 ENCOUNTER — APPOINTMENT (OUTPATIENT)
Dept: HEMATOLOGY ONCOLOGY | Facility: CLINIC | Age: 68
End: 2018-05-15

## 2018-06-15 ENCOUNTER — APPOINTMENT (OUTPATIENT)
Dept: ORTHOPEDIC SURGERY | Facility: CLINIC | Age: 68
End: 2018-06-15
Payer: MEDICARE

## 2018-06-15 VITALS
SYSTOLIC BLOOD PRESSURE: 174 MMHG | BODY MASS INDEX: 24.75 KG/M2 | DIASTOLIC BLOOD PRESSURE: 89 MMHG | HEART RATE: 48 BPM | WEIGHT: 145 LBS | HEIGHT: 64 IN

## 2018-06-15 PROCEDURE — 99205 OFFICE O/P NEW HI 60 MIN: CPT

## 2018-06-21 ENCOUNTER — OUTPATIENT (OUTPATIENT)
Dept: OUTPATIENT SERVICES | Facility: HOSPITAL | Age: 68
LOS: 1 days | Discharge: HOME | End: 2018-06-21

## 2018-06-21 DIAGNOSIS — C49.9 MALIGNANT NEOPLASM OF CONNECTIVE AND SOFT TISSUE, UNSPECIFIED: ICD-10-CM

## 2018-06-25 ENCOUNTER — APPOINTMENT (OUTPATIENT)
Dept: HEMATOLOGY ONCOLOGY | Facility: CLINIC | Age: 68
End: 2018-06-25
Payer: MEDICARE

## 2018-07-02 PROCEDURE — 97110 THERAPEUTIC EXERCISES: CPT

## 2018-07-02 PROCEDURE — 97140 MANUAL THERAPY 1/> REGIONS: CPT

## 2018-07-02 PROCEDURE — 97750 PHYSICAL PERFORMANCE TEST: CPT

## 2018-07-05 ENCOUNTER — APPOINTMENT (OUTPATIENT)
Dept: HEMATOLOGY ONCOLOGY | Facility: CLINIC | Age: 68
End: 2018-07-05
Payer: MEDICARE

## 2018-07-05 VITALS
DIASTOLIC BLOOD PRESSURE: 82 MMHG | HEART RATE: 50 BPM | RESPIRATION RATE: 17 BRPM | OXYGEN SATURATION: 97 % | SYSTOLIC BLOOD PRESSURE: 130 MMHG | BODY MASS INDEX: 24.6 KG/M2 | WEIGHT: 143.3 LBS

## 2018-07-05 PROCEDURE — 99215 OFFICE O/P EST HI 40 MIN: CPT

## 2018-07-09 ENCOUNTER — OUTPATIENT (OUTPATIENT)
Dept: OUTPATIENT SERVICES | Facility: HOSPITAL | Age: 68
LOS: 1 days | Discharge: HOME | End: 2018-07-09

## 2018-07-09 ENCOUNTER — APPOINTMENT (OUTPATIENT)
Dept: ORTHOPEDIC SURGERY | Facility: CLINIC | Age: 68
End: 2018-07-09
Payer: MEDICARE

## 2018-07-09 PROCEDURE — 99214 OFFICE O/P EST MOD 30 MIN: CPT

## 2018-07-11 ENCOUNTER — APPOINTMENT (OUTPATIENT)
Dept: RADIATION ONCOLOGY | Facility: CLINIC | Age: 68
End: 2018-07-11
Payer: MEDICARE

## 2018-07-11 VITALS
BODY MASS INDEX: 24.33 KG/M2 | OXYGEN SATURATION: 100 % | SYSTOLIC BLOOD PRESSURE: 173 MMHG | HEIGHT: 64 IN | DIASTOLIC BLOOD PRESSURE: 81 MMHG | WEIGHT: 142.53 LBS | HEART RATE: 47 BPM | TEMPERATURE: 98.2 F | RESPIRATION RATE: 17 BRPM

## 2018-07-11 PROCEDURE — 99204 OFFICE O/P NEW MOD 45 MIN: CPT | Mod: 25

## 2018-07-12 ENCOUNTER — FORM ENCOUNTER (OUTPATIENT)
Age: 68
End: 2018-07-12

## 2018-07-13 ENCOUNTER — APPOINTMENT (OUTPATIENT)
Dept: CT IMAGING | Facility: CLINIC | Age: 68
End: 2018-07-13
Payer: MEDICARE

## 2018-07-13 ENCOUNTER — OUTPATIENT (OUTPATIENT)
Dept: OUTPATIENT SERVICES | Facility: HOSPITAL | Age: 68
LOS: 1 days | End: 2018-07-13
Payer: MEDICARE

## 2018-07-13 DIAGNOSIS — C49.9 MALIGNANT NEOPLASM OF CONNECTIVE AND SOFT TISSUE, UNSPECIFIED: ICD-10-CM

## 2018-07-13 PROCEDURE — 82565 ASSAY OF CREATININE: CPT

## 2018-07-13 PROCEDURE — 73706 CT ANGIO LWR EXTR W/O&W/DYE: CPT | Mod: 26,LT

## 2018-07-13 PROCEDURE — 73706 CT ANGIO LWR EXTR W/O&W/DYE: CPT

## 2018-07-25 ENCOUNTER — APPOINTMENT (OUTPATIENT)
Dept: PLASTIC SURGERY | Facility: CLINIC | Age: 68
End: 2018-07-25
Payer: MEDICARE

## 2018-07-25 VITALS — DIASTOLIC BLOOD PRESSURE: 91 MMHG | HEART RATE: 51 BPM | SYSTOLIC BLOOD PRESSURE: 158 MMHG | TEMPERATURE: 98.1 F

## 2018-07-25 VITALS — HEIGHT: 59 IN | BODY MASS INDEX: 28.63 KG/M2 | WEIGHT: 142 LBS

## 2018-07-25 PROCEDURE — 99203 OFFICE O/P NEW LOW 30 MIN: CPT

## 2018-07-26 ENCOUNTER — APPOINTMENT (OUTPATIENT)
Dept: VASCULAR SURGERY | Facility: CLINIC | Age: 68
End: 2018-07-26

## 2018-08-02 ENCOUNTER — APPOINTMENT (OUTPATIENT)
Dept: VASCULAR SURGERY | Facility: CLINIC | Age: 68
End: 2018-08-02
Payer: MEDICARE

## 2018-08-02 VITALS
HEART RATE: 41 BPM | DIASTOLIC BLOOD PRESSURE: 66 MMHG | SYSTOLIC BLOOD PRESSURE: 166 MMHG | TEMPERATURE: 99.1 F | BODY MASS INDEX: 27.01 KG/M2 | WEIGHT: 134 LBS | HEIGHT: 59 IN

## 2018-08-02 VITALS — DIASTOLIC BLOOD PRESSURE: 96 MMHG | HEART RATE: 42 BPM | SYSTOLIC BLOOD PRESSURE: 168 MMHG

## 2018-08-02 PROCEDURE — 99203 OFFICE O/P NEW LOW 30 MIN: CPT

## 2018-08-02 PROCEDURE — 93970 EXTREMITY STUDY: CPT

## 2018-08-06 ENCOUNTER — APPOINTMENT (OUTPATIENT)
Dept: ORTHOPEDIC SURGERY | Facility: CLINIC | Age: 68
End: 2018-08-06
Payer: MEDICARE

## 2018-08-06 DIAGNOSIS — Z78.9 OTHER SPECIFIED HEALTH STATUS: ICD-10-CM

## 2018-08-06 PROCEDURE — 99214 OFFICE O/P EST MOD 30 MIN: CPT

## 2018-08-16 PROBLEM — Z78.9 EXERCISES OCCASIONALLY: Status: ACTIVE | Noted: 2018-06-15

## 2018-08-16 PROBLEM — Z78.9 DOES NOT USE ILLICIT DRUGS: Status: ACTIVE | Noted: 2018-06-15

## 2018-08-16 PROBLEM — Z78.9 DENIES ALCOHOL CONSUMPTION: Status: ACTIVE | Noted: 2018-06-15

## 2018-09-05 ENCOUNTER — OUTPATIENT (OUTPATIENT)
Dept: OUTPATIENT SERVICES | Facility: HOSPITAL | Age: 68
LOS: 1 days | End: 2018-09-05
Payer: MEDICARE

## 2018-09-05 VITALS
TEMPERATURE: 97 F | HEIGHT: 59.5 IN | HEART RATE: 56 BPM | DIASTOLIC BLOOD PRESSURE: 70 MMHG | OXYGEN SATURATION: 98 % | SYSTOLIC BLOOD PRESSURE: 138 MMHG | WEIGHT: 136.03 LBS | RESPIRATION RATE: 14 BRPM

## 2018-09-05 DIAGNOSIS — C49.9 MALIGNANT NEOPLASM OF CONNECTIVE AND SOFT TISSUE, UNSPECIFIED: ICD-10-CM

## 2018-09-05 DIAGNOSIS — I10 ESSENTIAL (PRIMARY) HYPERTENSION: ICD-10-CM

## 2018-09-05 LAB
ALBUMIN SERPL ELPH-MCNC: 4 G/DL — SIGNIFICANT CHANGE UP (ref 3.3–5)
ALP SERPL-CCNC: 60 U/L — SIGNIFICANT CHANGE UP (ref 40–120)
ALT FLD-CCNC: 12 U/L — SIGNIFICANT CHANGE UP (ref 4–33)
AST SERPL-CCNC: 15 U/L — SIGNIFICANT CHANGE UP (ref 4–32)
BILIRUB SERPL-MCNC: 0.5 MG/DL — SIGNIFICANT CHANGE UP (ref 0.2–1.2)
BLD GP AB SCN SERPL QL: NEGATIVE — SIGNIFICANT CHANGE UP
BUN SERPL-MCNC: 13 MG/DL — SIGNIFICANT CHANGE UP (ref 7–23)
CALCIUM SERPL-MCNC: 9.8 MG/DL — SIGNIFICANT CHANGE UP (ref 8.4–10.5)
CHLORIDE SERPL-SCNC: 103 MMOL/L — SIGNIFICANT CHANGE UP (ref 98–107)
CO2 SERPL-SCNC: 27 MMOL/L — SIGNIFICANT CHANGE UP (ref 22–31)
CREAT SERPL-MCNC: 0.83 MG/DL — SIGNIFICANT CHANGE UP (ref 0.5–1.3)
GLUCOSE SERPL-MCNC: 83 MG/DL — SIGNIFICANT CHANGE UP (ref 70–99)
HCT VFR BLD CALC: 40 % — SIGNIFICANT CHANGE UP (ref 34.5–45)
HGB BLD-MCNC: 13.3 G/DL — SIGNIFICANT CHANGE UP (ref 11.5–15.5)
MCHC RBC-ENTMCNC: 32.7 PG — SIGNIFICANT CHANGE UP (ref 27–34)
MCHC RBC-ENTMCNC: 33.3 % — SIGNIFICANT CHANGE UP (ref 32–36)
MCV RBC AUTO: 98.3 FL — SIGNIFICANT CHANGE UP (ref 80–100)
NRBC # FLD: 0 — SIGNIFICANT CHANGE UP
PLATELET # BLD AUTO: 178 K/UL — SIGNIFICANT CHANGE UP (ref 150–400)
PMV BLD: 11.2 FL — SIGNIFICANT CHANGE UP (ref 7–13)
POTASSIUM SERPL-MCNC: 3.7 MMOL/L — SIGNIFICANT CHANGE UP (ref 3.5–5.3)
POTASSIUM SERPL-SCNC: 3.7 MMOL/L — SIGNIFICANT CHANGE UP (ref 3.5–5.3)
PROT SERPL-MCNC: 7.2 G/DL — SIGNIFICANT CHANGE UP (ref 6–8.3)
RBC # BLD: 4.07 M/UL — SIGNIFICANT CHANGE UP (ref 3.8–5.2)
RBC # FLD: 12.7 % — SIGNIFICANT CHANGE UP (ref 10.3–14.5)
RH IG SCN BLD-IMP: POSITIVE — SIGNIFICANT CHANGE UP
SODIUM SERPL-SCNC: 142 MMOL/L — SIGNIFICANT CHANGE UP (ref 135–145)
WBC # BLD: 4.59 K/UL — SIGNIFICANT CHANGE UP (ref 3.8–10.5)
WBC # FLD AUTO: 4.59 K/UL — SIGNIFICANT CHANGE UP (ref 3.8–10.5)

## 2018-09-05 PROCEDURE — 93010 ELECTROCARDIOGRAM REPORT: CPT

## 2018-09-05 NOTE — H&P PST ADULT - NSANTHOSAYNRD_GEN_A_CORE
No. HOSSEIN screening performed.  STOP BANG Legend: 0-2 = LOW Risk; 3-4 = INTERMEDIATE Risk; 5-8 = HIGH Risk

## 2018-09-05 NOTE — H&P PST ADULT - NEGATIVE NEUROLOGICAL SYMPTOMS
no no paresthesias/no syncope/no tremors/no generalized seizures/no transient paralysis/no weakness/no headache/no focal seizures/no loss of sensation/no difficulty walking/no facial palsy/no vertigo/no loss of consciousness

## 2018-09-05 NOTE — H&P PST ADULT - NEGATIVE GENERAL GENITOURINARY SYMPTOMS
no urinary hesitancy/no flank pain R/no bladder infections/no dysuria/no flank pain L/no urine discoloration/no hematuria/no incontinence/no nocturia

## 2018-09-05 NOTE — H&P PST ADULT - NEGATIVE ENMT SYMPTOMS
no sinus symptoms/no ear pain/no tinnitus/no vertigo/no nasal congestion/no nasal obstruction/no nasal discharge/no nose bleeds/no dry mouth/no dysphagia/no hearing difficulty/no post-nasal discharge/no gum bleeding/no recurrent cold sores/no throat pain

## 2018-09-05 NOTE — H&P PST ADULT - NEGATIVE BREAST SYMPTOMS
no breast lump R/no breast tenderness R/no nipple discharge R/no breast tenderness L/no breast lump L/no nipple discharge L

## 2018-09-05 NOTE — H&P PST ADULT - RS GEN PE MLT RESP DETAILS PC
no rhonchi/airway patent/breath sounds equal/clear to auscultation bilaterally/no rales/no wheezes/respirations non-labored/good air movement

## 2018-09-05 NOTE — H&P PST ADULT - NEGATIVE GENERAL SYMPTOMS
no anorexia/no polydipsia/no chills/no weight loss/no fatigue/no weight gain/no polyphagia/no polyuria/no fever/no sweating

## 2018-09-05 NOTE — H&P PST ADULT - DENTITION
Denies loose teeth, denies missing teeth , denies dentures Denies loose teeth, denies missing teeth , denies dentures/normal

## 2018-09-05 NOTE — H&P PST ADULT - NEGATIVE OPHTHALMOLOGIC SYMPTOMS
no discharge L/no irritation L/no blurred vision R/no pain L/no blurred vision L/no lacrimation R/no pain R/no discharge R/no irritation R/no loss of vision L/no lacrimation L/no diplopia/no photophobia/no loss of vision R

## 2018-09-05 NOTE — H&P PST ADULT - NEGATIVE MUSCULOSKELETAL SYMPTOMS
no muscle weakness/no arthritis/no myalgia/no back pain/no arthralgia/no stiffness/no neck pain/no arm pain L/no arm pain R/no leg pain R/no joint swelling/no muscle cramps/no leg pain L

## 2018-09-05 NOTE — H&P PST ADULT - PROBLEM SELECTOR PLAN 1
Patient is scheduled for Radical resection left  thigh mass scheduled on 9/17/2018.    Preop instructions, pepcid, surgical scrub provided. Pt stated understanding.    Patient has a left mass- ( Puff states - Radical resection right thigh mass) Contacted Surgical Coordinator Bharti and Lynn to inform of the correct Sight .Left thigh mass.  [phone]    EKG- abnormal from today 9/5/2018- Comparison EKG  in chart, last Echo in chart.   Pending last stress, pending labs.   Pending Medical Evaluation - Patient with history of Hypertension.

## 2018-09-05 NOTE — H&P PST ADULT - ATTENDING COMMENTS
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  Mina Hankins MD  Musculoskeletal Oncology  [phone]

## 2018-09-05 NOTE — H&P PST ADULT - NEGATIVE GASTROINTESTINAL SYMPTOMS
no nausea/no melena/no jaundice/no constipation/no change in bowel habits/no diarrhea/no abdominal pain/no vomiting

## 2018-09-05 NOTE — H&P PST ADULT - PROBLEM SELECTOR PLAN 2
Patient was instructed to take Losartan - Hydrochlorothiazide in the Am of surgery with a sip of water,

## 2018-09-05 NOTE — H&P PST ADULT - HISTORY OF PRESENT ILLNESS
67 year old female with a history of Hypertension. Patient with a right thigh mass, patient is s/p biopsey with abnormal findings. Patient was referred to Dr. Hankins for an evaluation. Patient is scheduled for Radical resection right thigh mass scheduled on 9/17/2018.     - ID number [id number]- Destiny reed 67 year old female with a history of Hypertension. Patient with a right thigh mass, patient is s/p biopsey with abnormal findings. Patient was referred to Dr. Hankins for an evaluation. Patient is scheduled for Radical resection left  thigh mass scheduled on 9/17/2018.     - ID number [id number]- Destiny reed 67 year old female with a history of Hypertension. Patient with a left thigh mass, patient is s/p biopsey with abnormal findings. Patient was referred to Dr. Hankins for an evaluation. Patient is scheduled for Radical resection left  thigh mass scheduled on 9/17/2018.     - ID number [id number]- Destiny reed

## 2018-09-05 NOTE — H&P PST ADULT - PMH
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

## 2018-09-14 NOTE — ASU PATIENT PROFILE, ADULT - LANGUAGE ASSISTANCE NEEDED
Yes-Patient/Caregiver accepts free interpretation services... Yes-Patient/Caregiver accepts free interpretation services.../in ASU sunday Pierre

## 2018-09-16 ENCOUNTER — TRANSCRIPTION ENCOUNTER (OUTPATIENT)
Age: 68
End: 2018-09-16

## 2018-09-16 PROBLEM — C49.9 MALIGNANT NEOPLASM OF CONNECTIVE AND SOFT TISSUE, UNSPECIFIED: Chronic | Status: ACTIVE | Noted: 2018-09-05

## 2018-09-17 ENCOUNTER — INPATIENT (INPATIENT)
Facility: HOSPITAL | Age: 68
LOS: 35 days | Discharge: NURSING, REHAB OR LONG-TERM CARE | End: 2018-10-23
Attending: PLASTIC SURGERY | Admitting: PLASTIC SURGERY
Payer: MEDICARE

## 2018-09-17 ENCOUNTER — APPOINTMENT (OUTPATIENT)
Dept: ORTHOPEDIC SURGERY | Facility: HOSPITAL | Age: 68
End: 2018-09-17

## 2018-09-17 VITALS
DIASTOLIC BLOOD PRESSURE: 90 MMHG | RESPIRATION RATE: 16 BRPM | TEMPERATURE: 98 F | OXYGEN SATURATION: 100 % | WEIGHT: 136.03 LBS | SYSTOLIC BLOOD PRESSURE: 174 MMHG | HEART RATE: 47 BPM | HEIGHT: 59.5 IN

## 2018-09-17 DIAGNOSIS — I83.93 ASYMPTOMATIC VARICOSE VEINS OF BILATERAL LOWER EXTREMITIES: ICD-10-CM

## 2018-09-17 LAB
ALBUMIN SERPL ELPH-MCNC: 1.8 G/DL — LOW (ref 3.3–5)
ALP SERPL-CCNC: 25 U/L — LOW (ref 40–120)
ALT FLD-CCNC: 5 U/L — SIGNIFICANT CHANGE UP (ref 4–33)
APTT BLD: 135.8 SEC — CRITICAL HIGH (ref 27.5–37.4)
AST SERPL-CCNC: 11 U/L — SIGNIFICANT CHANGE UP (ref 4–32)
BASE EXCESS BLDA CALC-SCNC: -1.1 MMOL/L — SIGNIFICANT CHANGE UP
BASE EXCESS BLDA CALC-SCNC: -2.8 MMOL/L — SIGNIFICANT CHANGE UP
BASE EXCESS BLDA CALC-SCNC: -3.5 MMOL/L — SIGNIFICANT CHANGE UP
BASE EXCESS BLDA CALC-SCNC: -5.2 MMOL/L — SIGNIFICANT CHANGE UP
BASE EXCESS BLDA CALC-SCNC: -5.7 MMOL/L — SIGNIFICANT CHANGE UP
BASE EXCESS BLDA CALC-SCNC: 0.3 MMOL/L — SIGNIFICANT CHANGE UP
BILIRUB SERPL-MCNC: 0.9 MG/DL — SIGNIFICANT CHANGE UP (ref 0.2–1.2)
BUN SERPL-MCNC: 12 MG/DL — SIGNIFICANT CHANGE UP (ref 7–23)
CA-I BLDA-SCNC: 1.13 MMOL/L — LOW (ref 1.15–1.29)
CA-I BLDA-SCNC: 1.13 MMOL/L — LOW (ref 1.15–1.29)
CA-I BLDA-SCNC: 1.14 MMOL/L — LOW (ref 1.15–1.29)
CA-I BLDA-SCNC: 1.15 MMOL/L — SIGNIFICANT CHANGE UP (ref 1.15–1.29)
CA-I BLDA-SCNC: 1.18 MMOL/L — SIGNIFICANT CHANGE UP (ref 1.15–1.29)
CA-I BLDA-SCNC: 1.69 MMOL/L — HIGH (ref 1.15–1.29)
CALCIUM SERPL-MCNC: 7.4 MG/DL — LOW (ref 8.4–10.5)
CHLORIDE SERPL-SCNC: 111 MMOL/L — HIGH (ref 98–107)
CK MB BLD-MCNC: 3.51 NG/ML — SIGNIFICANT CHANGE UP (ref 1–4.7)
CK SERPL-CCNC: 255 U/L — HIGH (ref 25–170)
CO2 SERPL-SCNC: 18 MMOL/L — LOW (ref 22–31)
CREAT SERPL-MCNC: 0.63 MG/DL — SIGNIFICANT CHANGE UP (ref 0.5–1.3)
FIBRINOGEN PPP-MCNC: 199.8 MG/DL — LOW (ref 310–510)
GLUCOSE BLDA-MCNC: 120 MG/DL — HIGH (ref 70–99)
GLUCOSE BLDA-MCNC: 176 MG/DL — HIGH (ref 70–99)
GLUCOSE BLDA-MCNC: 226 MG/DL — HIGH (ref 70–99)
GLUCOSE BLDA-MCNC: 228 MG/DL — HIGH (ref 70–99)
GLUCOSE BLDA-MCNC: 239 MG/DL — HIGH (ref 70–99)
GLUCOSE BLDA-MCNC: 96 MG/DL — SIGNIFICANT CHANGE UP (ref 70–99)
GLUCOSE SERPL-MCNC: 248 MG/DL — HIGH (ref 70–99)
HCO3 BLDA-SCNC: 20 MMOL/L — LOW (ref 22–26)
HCO3 BLDA-SCNC: 20 MMOL/L — LOW (ref 22–26)
HCO3 BLDA-SCNC: 22 MMOL/L — SIGNIFICANT CHANGE UP (ref 22–26)
HCO3 BLDA-SCNC: 22 MMOL/L — SIGNIFICANT CHANGE UP (ref 22–26)
HCO3 BLDA-SCNC: 24 MMOL/L — SIGNIFICANT CHANGE UP (ref 22–26)
HCO3 BLDA-SCNC: 25 MMOL/L — SIGNIFICANT CHANGE UP (ref 22–26)
HCT VFR BLD CALC: 27.2 % — LOW (ref 34.5–45)
HCT VFR BLDA CALC: 27.2 % — LOW (ref 34.5–46.5)
HCT VFR BLDA CALC: 29.9 % — LOW (ref 34.5–46.5)
HCT VFR BLDA CALC: 30.6 % — LOW (ref 34.5–46.5)
HCT VFR BLDA CALC: 32.5 % — LOW (ref 34.5–46.5)
HCT VFR BLDA CALC: 34.9 % — SIGNIFICANT CHANGE UP (ref 34.5–46.5)
HCT VFR BLDA CALC: 37.4 % — SIGNIFICANT CHANGE UP (ref 34.5–46.5)
HGB BLD-MCNC: 9.3 G/DL — LOW (ref 11.5–15.5)
HGB BLDA-MCNC: 10.5 G/DL — LOW (ref 11.5–15.5)
HGB BLDA-MCNC: 11.3 G/DL — LOW (ref 11.5–15.5)
HGB BLDA-MCNC: 12.1 G/DL — SIGNIFICANT CHANGE UP (ref 11.5–15.5)
HGB BLDA-MCNC: 8.8 G/DL — LOW (ref 11.5–15.5)
HGB BLDA-MCNC: 9.7 G/DL — LOW (ref 11.5–15.5)
HGB BLDA-MCNC: 9.9 G/DL — LOW (ref 11.5–15.5)
INR BLD: 1.48 — HIGH (ref 0.88–1.17)
LACTATE BLDA-SCNC: 4.6 MMOL/L — CRITICAL HIGH (ref 0.5–2)
MAGNESIUM SERPL-MCNC: 1.4 MG/DL — LOW (ref 1.6–2.6)
MCHC RBC-ENTMCNC: 31.3 PG — SIGNIFICANT CHANGE UP (ref 27–34)
MCHC RBC-ENTMCNC: 34.2 % — SIGNIFICANT CHANGE UP (ref 32–36)
MCV RBC AUTO: 91.6 FL — SIGNIFICANT CHANGE UP (ref 80–100)
NRBC # FLD: 0 — SIGNIFICANT CHANGE UP
PCO2 BLDA: 33 MMHG — SIGNIFICANT CHANGE UP (ref 32–48)
PCO2 BLDA: 34 MMHG — SIGNIFICANT CHANGE UP (ref 32–48)
PCO2 BLDA: 35 MMHG — SIGNIFICANT CHANGE UP (ref 32–48)
PCO2 BLDA: 35 MMHG — SIGNIFICANT CHANGE UP (ref 32–48)
PCO2 BLDA: 36 MMHG — SIGNIFICANT CHANGE UP (ref 32–48)
PCO2 BLDA: 36 MMHG — SIGNIFICANT CHANGE UP (ref 32–48)
PH BLDA: 7.35 PH — SIGNIFICANT CHANGE UP (ref 7.35–7.45)
PH BLDA: 7.36 PH — SIGNIFICANT CHANGE UP (ref 7.35–7.45)
PH BLDA: 7.39 PH — SIGNIFICANT CHANGE UP (ref 7.35–7.45)
PH BLDA: 7.41 PH — SIGNIFICANT CHANGE UP (ref 7.35–7.45)
PH BLDA: 7.43 PH — SIGNIFICANT CHANGE UP (ref 7.35–7.45)
PH BLDA: 7.45 PH — SIGNIFICANT CHANGE UP (ref 7.35–7.45)
PHOSPHATE SERPL-MCNC: 4 MG/DL — SIGNIFICANT CHANGE UP (ref 2.5–4.5)
PLATELET # BLD AUTO: 108 K/UL — LOW (ref 150–400)
PMV BLD: 11.6 FL — SIGNIFICANT CHANGE UP (ref 7–13)
PO2 BLDA: 147 MMHG — HIGH (ref 83–108)
PO2 BLDA: 213 MMHG — HIGH (ref 83–108)
PO2 BLDA: 232 MMHG — HIGH (ref 83–108)
PO2 BLDA: 275 MMHG — HIGH (ref 83–108)
PO2 BLDA: 278 MMHG — HIGH (ref 83–108)
PO2 BLDA: 282 MMHG — HIGH (ref 83–108)
POTASSIUM BLDA-SCNC: 3.5 MMOL/L — SIGNIFICANT CHANGE UP (ref 3.4–4.5)
POTASSIUM BLDA-SCNC: 3.7 MMOL/L — SIGNIFICANT CHANGE UP (ref 3.4–4.5)
POTASSIUM BLDA-SCNC: 4 MMOL/L — SIGNIFICANT CHANGE UP (ref 3.4–4.5)
POTASSIUM BLDA-SCNC: 4 MMOL/L — SIGNIFICANT CHANGE UP (ref 3.4–4.5)
POTASSIUM BLDA-SCNC: 4.1 MMOL/L — SIGNIFICANT CHANGE UP (ref 3.4–4.5)
POTASSIUM BLDA-SCNC: 4.3 MMOL/L — SIGNIFICANT CHANGE UP (ref 3.4–4.5)
POTASSIUM SERPL-MCNC: 4.6 MMOL/L — SIGNIFICANT CHANGE UP (ref 3.5–5.3)
POTASSIUM SERPL-SCNC: 4.6 MMOL/L — SIGNIFICANT CHANGE UP (ref 3.5–5.3)
PROT SERPL-MCNC: 3.1 G/DL — LOW (ref 6–8.3)
PROTHROM AB SERPL-ACNC: 17.1 SEC — HIGH (ref 9.8–13.1)
RBC # BLD: 2.97 M/UL — LOW (ref 3.8–5.2)
RBC # FLD: 16.1 % — HIGH (ref 10.3–14.5)
RH IG SCN BLD-IMP: POSITIVE — SIGNIFICANT CHANGE UP
SAO2 % BLDA: 98.7 % — SIGNIFICANT CHANGE UP (ref 95–99)
SAO2 % BLDA: 99.4 % — HIGH (ref 95–99)
SAO2 % BLDA: 99.5 % — HIGH (ref 95–99)
SAO2 % BLDA: 99.5 % — HIGH (ref 95–99)
SAO2 % BLDA: 99.6 % — HIGH (ref 95–99)
SAO2 % BLDA: 99.6 % — HIGH (ref 95–99)
SODIUM BLDA-SCNC: 132 MMOL/L — LOW (ref 136–146)
SODIUM BLDA-SCNC: 135 MMOL/L — LOW (ref 136–146)
SODIUM BLDA-SCNC: 136 MMOL/L — SIGNIFICANT CHANGE UP (ref 136–146)
SODIUM BLDA-SCNC: 136 MMOL/L — SIGNIFICANT CHANGE UP (ref 136–146)
SODIUM BLDA-SCNC: 138 MMOL/L — SIGNIFICANT CHANGE UP (ref 136–146)
SODIUM BLDA-SCNC: 139 MMOL/L — SIGNIFICANT CHANGE UP (ref 136–146)
SODIUM SERPL-SCNC: 139 MMOL/L — SIGNIFICANT CHANGE UP (ref 135–145)
TROPONIN T, HIGH SENSITIVITY: 26 NG/L — SIGNIFICANT CHANGE UP (ref ?–14)
WBC # BLD: 14.2 K/UL — HIGH (ref 3.8–10.5)
WBC # FLD AUTO: 14.2 K/UL — HIGH (ref 3.8–10.5)

## 2018-09-17 PROCEDURE — 88309 TISSUE EXAM BY PATHOLOGIST: CPT | Mod: 26

## 2018-09-17 PROCEDURE — 27059 RESECT HIP/PELV TUM 5 CM/>: CPT | Mod: LT

## 2018-09-17 PROCEDURE — 88331 PATH CONSLTJ SURG 1 BLK 1SPC: CPT | Mod: 26

## 2018-09-17 PROCEDURE — 93010 ELECTROCARDIOGRAM REPORT: CPT

## 2018-09-17 PROCEDURE — 88305 TISSUE EXAM BY PATHOLOGIST: CPT | Mod: 26

## 2018-09-17 PROCEDURE — 71045 X-RAY EXAM CHEST 1 VIEW: CPT | Mod: 26

## 2018-09-17 PROCEDURE — 99222 1ST HOSP IP/OBS MODERATE 55: CPT

## 2018-09-17 RX ORDER — CHLORHEXIDINE GLUCONATE 213 G/1000ML
1 SOLUTION TOPICAL
Qty: 0 | Refills: 0 | Status: DISCONTINUED | OUTPATIENT
Start: 2018-09-17 | End: 2018-09-20

## 2018-09-17 RX ORDER — FENTANYL CITRATE 50 UG/ML
25 INJECTION INTRAVENOUS
Qty: 0 | Refills: 0 | Status: DISCONTINUED | OUTPATIENT
Start: 2018-09-17 | End: 2018-09-17

## 2018-09-17 RX ORDER — INSULIN LISPRO 100/ML
VIAL (ML) SUBCUTANEOUS EVERY 6 HOURS
Qty: 0 | Refills: 0 | Status: DISCONTINUED | OUTPATIENT
Start: 2018-09-17 | End: 2018-09-20

## 2018-09-17 RX ORDER — SODIUM CHLORIDE 9 MG/ML
1000 INJECTION, SOLUTION INTRAVENOUS ONCE
Qty: 0 | Refills: 0 | Status: COMPLETED | OUTPATIENT
Start: 2018-09-17 | End: 2018-09-17

## 2018-09-17 RX ORDER — CHLORHEXIDINE GLUCONATE 213 G/1000ML
15 SOLUTION TOPICAL
Qty: 0 | Refills: 0 | Status: DISCONTINUED | OUTPATIENT
Start: 2018-09-17 | End: 2018-09-20

## 2018-09-17 RX ORDER — SODIUM CHLORIDE 9 MG/ML
1000 INJECTION, SOLUTION INTRAVENOUS
Qty: 0 | Refills: 0 | Status: DISCONTINUED | OUTPATIENT
Start: 2018-09-17 | End: 2018-09-20

## 2018-09-17 RX ORDER — HYDROMORPHONE HYDROCHLORIDE 2 MG/ML
0.5 INJECTION INTRAMUSCULAR; INTRAVENOUS; SUBCUTANEOUS
Qty: 0 | Refills: 0 | Status: DISCONTINUED | OUTPATIENT
Start: 2018-09-17 | End: 2018-09-17

## 2018-09-17 RX ORDER — PHENYLEPHRINE HYDROCHLORIDE 10 MG/ML
0.1 INJECTION INTRAVENOUS
Qty: 160 | Refills: 0 | Status: DISCONTINUED | OUTPATIENT
Start: 2018-09-17 | End: 2018-09-18

## 2018-09-17 RX ORDER — FENTANYL CITRATE 50 UG/ML
0.5 INJECTION INTRAVENOUS
Qty: 2500 | Refills: 0 | Status: DISCONTINUED | OUTPATIENT
Start: 2018-09-17 | End: 2018-09-19

## 2018-09-17 RX ORDER — MAGNESIUM SULFATE 500 MG/ML
2 VIAL (ML) INJECTION ONCE
Qty: 0 | Refills: 0 | Status: COMPLETED | OUTPATIENT
Start: 2018-09-17 | End: 2018-09-17

## 2018-09-17 RX ORDER — FENTANYL CITRATE 50 UG/ML
50 INJECTION INTRAVENOUS ONCE
Qty: 0 | Refills: 0 | Status: DISCONTINUED | OUTPATIENT
Start: 2018-09-17 | End: 2018-09-19

## 2018-09-17 RX ORDER — ONDANSETRON 8 MG/1
4 TABLET, FILM COATED ORAL ONCE
Qty: 0 | Refills: 0 | Status: DISCONTINUED | OUTPATIENT
Start: 2018-09-17 | End: 2018-09-17

## 2018-09-17 RX ORDER — MIDAZOLAM HYDROCHLORIDE 1 MG/ML
2 INJECTION, SOLUTION INTRAMUSCULAR; INTRAVENOUS ONCE
Qty: 0 | Refills: 0 | Status: DISCONTINUED | OUTPATIENT
Start: 2018-09-17 | End: 2018-09-18

## 2018-09-17 RX ADMIN — Medication 50 GRAM(S): at 23:45

## 2018-09-17 RX ADMIN — SODIUM CHLORIDE 2000 MILLILITER(S): 9 INJECTION, SOLUTION INTRAVENOUS at 22:20

## 2018-09-17 NOTE — BRIEF OPERATIVE NOTE - PROCEDURE
<<-----Click on this checkbox to enter Procedure Iliac popliteal bypass  09/17/2018    Active  [id number]

## 2018-09-17 NOTE — CHART NOTE - NSCHARTNOTEFT_GEN_A_CORE
67 year old F pt with PMH of HTN presented for L inguinal mass resection. Cardiology was called for urgent consult in the setting of bigeminy with ventricular rate of 30-40s  likely driven 2/2 catecholamine surge s/p left Iliac popliteal bypass in SICU. On phenyl epinephrine drip @ 35mcg with stable vital sign      ROS: pt sedated  PHYSICAL EXAM:    Cardiovascular: Normal S1 S2, No JVD, No murmurs, No edema  Respiratory: b/l lungs clear on mechanical ventilator 		  Skin: cool to touch  Extremities: left leg ace bandage wrap with JEREMY x2    HsT: 26  EKG : NSR with TWI in V3-V5( seen in pervious EKG)    Plan: continue care as per primary team  will continue to follow

## 2018-09-17 NOTE — CONSULT NOTE ADULT - SUBJECTIVE AND OBJECTIVE BOX
Patient seen and evaluated @   Chief Complaint:     HPI: 67 year old F pt with PMH of HTN presented for L inguinal mass resection. Cardiology was called for urgent consult in the setting of bigeminy with ventricular rate of 30-40s. Pt endorsed not taking her BP meds since 9/14 but otherwise noted feeling fine at her baseline. Of note, pt was medically cleared from outpatient office. Last stress test in 8/2016 was noted with normal findings with no signs of ischemia.    EKGs were reviewed showing bigeminy. When seen in the ED, pt is stable in sinus bradycardia with VR of 40-50s.    PMH:   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, unspecified  HTN (hypertension)    PSH:   No significant past surgical history    Medications:     Allergies:  penicillin (Hives)    FAMILY HISTORY:  No pertinent family history in first degree relatives    Social History:  Smoking:  Alcohol:  Drugs:    Review of Systems:  Constitutional: [ ] Fever [ ] Chills [ ] Fatigue [ ] Weight change   HEENT: [ ] Blurred vision [ ] Eye Pain [ ] Headache [ ] Runny nose [ ] Sore Throat   Respiratory: [ ] Cough [ ] Wheezing [ ] Shortness of breath  Cardiovascular: [ ] Chest Pain [ ] Palpitations [ ] SANCHEZ [ ] PND [ ] Orthopnea  Gastrointestinal: [ ] Abdominal Pain [ ] Diarrhea [ ] Constipation [ ] Hemorrhoids [ ] Nausea [ ] Vomiting  Genitourinary: [ ] Nocturia [ ] Dysuria [ ] Incontinence  Extremities: [ ] Swelling [ ] Joint Pain  Neurologic: [ ] Focal deficit [ ] Paresthesias [ ] Syncope  Lymphatic: [ ] Swelling [ ] Lymphadenopathy   Skin: [ ] Rash [ ] Ecchymoses [ ] Wounds [ ] Lesions  Psychiatry: [ ] Depression [ ] Suicidal/Homicidal Ideation [ ] Anxiety [ ] Sleep Disturbances  [ ] 10 point review of systems is otherwise negative except as mentioned above            [ ]Unable to obtain    Physical Exam:  T(C): 36.8 (09-17-18 @ 11:41), Max: 36.8 (09-17-18 @ 11:41)  HR: 47 (09-17-18 @ 11:41) (47 - 47)  BP: 174/90 (09-17-18 @ 11:41) (174/90 - 174/90)  RR: 16 (09-17-18 @ 11:41) (16 - 16)  SpO2: 100% (09-17-18 @ 11:41) (100% - 100%)  Wt(kg): --    Daily Height in cm: 151.13 (17 Sep 2018 11:41)    Daily     Appearance: NAD  Eyes: PERRL, EOMI  HENT: Normal oral muscosa, NC/AT  Cardiovascular: normal S1 and S2, RRR, no m/r/g, no edema, normal JVP  Procedural Access Site:  No hematoma, non-tender to palpation, 2+ pulses distally, no bruit, no ecchymosis  Respiratory: Clear to auscultation bilaterally  Gastrointestinal: Soft, L inguinal mass noted, non-tender, non-distended, BS+  Musculoskeletal: No clubbing, no joint deformity   Neurologic: Non-focal  Lymphatic: No lymphadenopathy  Psychiatry: AAOx3, mood & affect appropriate  Skin: No rashes, no ecchymoses, no cyanosis    Cardiovascular Diagnostic Testing:  ECG:    Echo:    Stress Testing:    Cath:    Interpretation of Telemetry:    Imaging:    Labs:

## 2018-09-17 NOTE — BRIEF OPERATIVE NOTE - PROCEDURE
<<-----Click on this checkbox to enter Procedure Excision of mass from thigh  09/17/2018  left  Active  RSTOCKTON

## 2018-09-17 NOTE — BRIEF OPERATIVE NOTE - COMMENTS
See full operative reports by Orthopedic Surgery for full details and Plastic Surgery for additional details

## 2018-09-17 NOTE — CONSULT NOTE ADULT - ASSESSMENT
A/P: 67 year old F pt with PMH of HTN presented for L inguinal mass resection, consulted for bigeminy.    - clinically stable with no cardiac sx  - bigeminal rhythm likely driven 2/2 catecholamine surge  - no further cardiac intervention at this time  - will continue to follow pt post-op, please call with questions    Bill Gallardo, #[id number]  Cardiology Fellow

## 2018-09-18 LAB
ALBUMIN SERPL ELPH-MCNC: 1.9 G/DL — LOW (ref 3.3–5)
ALP SERPL-CCNC: 26 U/L — LOW (ref 40–120)
ALT FLD-CCNC: 12 U/L — SIGNIFICANT CHANGE UP (ref 4–33)
ANISOCYTOSIS BLD QL: SLIGHT — SIGNIFICANT CHANGE UP
APTT BLD: 30.6 SEC — SIGNIFICANT CHANGE UP (ref 27.5–37.4)
APTT BLD: 31.2 SEC — SIGNIFICANT CHANGE UP (ref 27.5–37.4)
APTT BLD: 39.8 SEC — HIGH (ref 27.5–37.4)
AST SERPL-CCNC: 25 U/L — SIGNIFICANT CHANGE UP (ref 4–32)
BASE EXCESS BLDA CALC-SCNC: -10 MMOL/L — SIGNIFICANT CHANGE UP
BASE EXCESS BLDA CALC-SCNC: -10.2 MMOL/L — SIGNIFICANT CHANGE UP
BASE EXCESS BLDA CALC-SCNC: -12.1 MMOL/L — SIGNIFICANT CHANGE UP
BASE EXCESS BLDA CALC-SCNC: -2 MMOL/L — SIGNIFICANT CHANGE UP
BASE EXCESS BLDA CALC-SCNC: -6.7 MMOL/L — SIGNIFICANT CHANGE UP
BASE EXCESS BLDA CALC-SCNC: -9.4 MMOL/L — SIGNIFICANT CHANGE UP
BASE EXCESS BLDV CALC-SCNC: -9 MMOL/L — SIGNIFICANT CHANGE UP
BASE EXCESS BLDV CALC-SCNC: -9.3 MMOL/L — SIGNIFICANT CHANGE UP
BASOPHILS # BLD AUTO: 0.03 K/UL — SIGNIFICANT CHANGE UP (ref 0–0.2)
BASOPHILS NFR BLD AUTO: 0.2 % — SIGNIFICANT CHANGE UP (ref 0–2)
BASOPHILS NFR SPEC: 0 % — SIGNIFICANT CHANGE UP (ref 0–2)
BILIRUB SERPL-MCNC: 0.8 MG/DL — SIGNIFICANT CHANGE UP (ref 0.2–1.2)
BLASTS # FLD: 0 % — SIGNIFICANT CHANGE UP (ref 0–0)
BUN SERPL-MCNC: 14 MG/DL — SIGNIFICANT CHANGE UP (ref 7–23)
BUN SERPL-MCNC: 17 MG/DL — SIGNIFICANT CHANGE UP (ref 7–23)
BUN SERPL-MCNC: 19 MG/DL — SIGNIFICANT CHANGE UP (ref 7–23)
BUN SERPL-MCNC: 19 MG/DL — SIGNIFICANT CHANGE UP (ref 7–23)
CA-I BLDA-SCNC: 1.05 MMOL/L — LOW (ref 1.15–1.29)
CA-I BLDA-SCNC: 1.07 MMOL/L — LOW (ref 1.15–1.29)
CA-I BLDA-SCNC: 1.24 MMOL/L — SIGNIFICANT CHANGE UP (ref 1.15–1.29)
CALCIUM SERPL-MCNC: 6.6 MG/DL — LOW (ref 8.4–10.5)
CALCIUM SERPL-MCNC: 7.2 MG/DL — LOW (ref 8.4–10.5)
CALCIUM SERPL-MCNC: 7.2 MG/DL — LOW (ref 8.4–10.5)
CALCIUM SERPL-MCNC: 7.4 MG/DL — LOW (ref 8.4–10.5)
CHLORIDE BLDA-SCNC: 112 MMOL/L — HIGH (ref 96–108)
CHLORIDE SERPL-SCNC: 107 MMOL/L — SIGNIFICANT CHANGE UP (ref 98–107)
CHLORIDE SERPL-SCNC: 108 MMOL/L — HIGH (ref 98–107)
CHLORIDE SERPL-SCNC: 108 MMOL/L — HIGH (ref 98–107)
CHLORIDE SERPL-SCNC: 109 MMOL/L — HIGH (ref 98–107)
CK SERPL-CCNC: 1173 U/L — HIGH (ref 25–170)
CK SERPL-CCNC: 1907 U/L — HIGH (ref 25–170)
CK SERPL-CCNC: 2127 U/L — HIGH (ref 25–170)
CK SERPL-CCNC: 916 U/L — HIGH (ref 25–170)
CO2 SERPL-SCNC: 14 MMOL/L — LOW (ref 22–31)
CO2 SERPL-SCNC: 14 MMOL/L — LOW (ref 22–31)
CO2 SERPL-SCNC: 15 MMOL/L — LOW (ref 22–31)
CO2 SERPL-SCNC: 18 MMOL/L — LOW (ref 22–31)
CREAT SERPL-MCNC: 0.96 MG/DL — SIGNIFICANT CHANGE UP (ref 0.5–1.3)
CREAT SERPL-MCNC: 1.24 MG/DL — SIGNIFICANT CHANGE UP (ref 0.5–1.3)
CREAT SERPL-MCNC: 1.44 MG/DL — HIGH (ref 0.5–1.3)
CREAT SERPL-MCNC: 1.66 MG/DL — HIGH (ref 0.5–1.3)
EOSINOPHIL # BLD AUTO: 0.01 K/UL — SIGNIFICANT CHANGE UP (ref 0–0.5)
EOSINOPHIL NFR BLD AUTO: 0.1 % — SIGNIFICANT CHANGE UP (ref 0–6)
EOSINOPHIL NFR FLD: 0 % — SIGNIFICANT CHANGE UP (ref 0–6)
FIBRINOGEN PPP-MCNC: 353.1 MG/DL — SIGNIFICANT CHANGE UP (ref 310–510)
GIANT PLATELETS BLD QL SMEAR: PRESENT — SIGNIFICANT CHANGE UP
GLUCOSE BLDA-MCNC: 137 MG/DL — HIGH (ref 70–99)
GLUCOSE BLDA-MCNC: 183 MG/DL — HIGH (ref 70–99)
GLUCOSE BLDA-MCNC: 220 MG/DL — HIGH (ref 70–99)
GLUCOSE BLDA-MCNC: 226 MG/DL — HIGH (ref 70–99)
GLUCOSE BLDA-MCNC: 232 MG/DL — HIGH (ref 70–99)
GLUCOSE BLDA-MCNC: 99 MG/DL — SIGNIFICANT CHANGE UP (ref 70–99)
GLUCOSE SERPL-MCNC: 137 MG/DL — HIGH (ref 70–99)
GLUCOSE SERPL-MCNC: 220 MG/DL — HIGH (ref 70–99)
GLUCOSE SERPL-MCNC: 228 MG/DL — HIGH (ref 70–99)
GLUCOSE SERPL-MCNC: 237 MG/DL — HIGH (ref 70–99)
HCO3 BLDA-SCNC: 15 MMOL/L — LOW (ref 22–26)
HCO3 BLDA-SCNC: 16 MMOL/L — LOW (ref 22–26)
HCO3 BLDA-SCNC: 16 MMOL/L — LOW (ref 22–26)
HCO3 BLDA-SCNC: 17 MMOL/L — LOW (ref 22–26)
HCO3 BLDA-SCNC: 19 MMOL/L — LOW (ref 22–26)
HCO3 BLDA-SCNC: 23 MMOL/L — SIGNIFICANT CHANGE UP (ref 22–26)
HCO3 BLDV-SCNC: 16 MMOL/L — LOW (ref 20–27)
HCO3 BLDV-SCNC: 16 MMOL/L — LOW (ref 20–27)
HCT VFR BLD CALC: 20.6 % — CRITICAL LOW (ref 34.5–45)
HCT VFR BLD CALC: 25.2 % — LOW (ref 34.5–45)
HCT VFR BLD CALC: 25.5 % — LOW (ref 34.5–45)
HCT VFR BLD CALC: 27 % — LOW (ref 34.5–45)
HCT VFR BLD CALC: 31.5 % — LOW (ref 34.5–45)
HCT VFR BLDA CALC: 23.7 % — LOW (ref 34.5–46.5)
HCT VFR BLDA CALC: 26.9 % — LOW (ref 34.5–46.5)
HCT VFR BLDA CALC: 28.6 % — LOW (ref 34.5–46.5)
HCT VFR BLDA CALC: 28.6 % — LOW (ref 34.5–46.5)
HCT VFR BLDA CALC: 28.8 % — LOW (ref 34.5–46.5)
HCT VFR BLDA CALC: 35.2 % — SIGNIFICANT CHANGE UP (ref 34.5–46.5)
HGB BLD-MCNC: 10.9 G/DL — LOW (ref 11.5–15.5)
HGB BLD-MCNC: 7.5 G/DL — LOW (ref 11.5–15.5)
HGB BLD-MCNC: 8.7 G/DL — LOW (ref 11.5–15.5)
HGB BLD-MCNC: 8.8 G/DL — LOW (ref 11.5–15.5)
HGB BLD-MCNC: 9.1 G/DL — LOW (ref 11.5–15.5)
HGB BLDA-MCNC: 11.4 G/DL — LOW (ref 11.5–15.5)
HGB BLDA-MCNC: 7.6 G/DL — LOW (ref 11.5–15.5)
HGB BLDA-MCNC: 8.7 G/DL — LOW (ref 11.5–15.5)
HGB BLDA-MCNC: 9.2 G/DL — LOW (ref 11.5–15.5)
HGB BLDA-MCNC: 9.2 G/DL — LOW (ref 11.5–15.5)
HGB BLDA-MCNC: 9.3 G/DL — LOW (ref 11.5–15.5)
HYPOCHROMIA BLD QL: SIGNIFICANT CHANGE UP
IMM GRANULOCYTES # BLD AUTO: 0.05 # — SIGNIFICANT CHANGE UP
IMM GRANULOCYTES NFR BLD AUTO: 0.4 % — SIGNIFICANT CHANGE UP (ref 0–1.5)
INR BLD: 1.22 — HIGH (ref 0.88–1.17)
INR BLD: 1.39 — HIGH (ref 0.88–1.17)
INR BLD: 1.44 — HIGH (ref 0.88–1.17)
LACTATE BLDA-SCNC: 2.9 MMOL/L — HIGH (ref 0.5–2)
LACTATE BLDA-SCNC: 4.5 MMOL/L — CRITICAL HIGH (ref 0.5–2)
LACTATE BLDA-SCNC: 5.9 MMOL/L — CRITICAL HIGH (ref 0.5–2)
LACTATE BLDA-SCNC: 6 MMOL/L — CRITICAL HIGH (ref 0.5–2)
LACTATE BLDA-SCNC: 6.4 MMOL/L — CRITICAL HIGH (ref 0.5–2)
LACTATE BLDA-SCNC: 7.1 MMOL/L — CRITICAL HIGH (ref 0.5–2)
LYMPHOCYTES # BLD AUTO: 1.23 K/UL — SIGNIFICANT CHANGE UP (ref 1–3.3)
LYMPHOCYTES # BLD AUTO: 9.6 % — LOW (ref 13–44)
LYMPHOCYTES NFR SPEC AUTO: 5.6 % — LOW (ref 13–44)
MACROCYTES BLD QL: SLIGHT — SIGNIFICANT CHANGE UP
MAGNESIUM SERPL-MCNC: 2.3 MG/DL — SIGNIFICANT CHANGE UP (ref 1.6–2.6)
MAGNESIUM SERPL-MCNC: 2.6 MG/DL — SIGNIFICANT CHANGE UP (ref 1.6–2.6)
MAGNESIUM SERPL-MCNC: 2.7 MG/DL — HIGH (ref 1.6–2.6)
MAGNESIUM SERPL-MCNC: 2.7 MG/DL — HIGH (ref 1.6–2.6)
MCHC RBC-ENTMCNC: 30.3 PG — SIGNIFICANT CHANGE UP (ref 27–34)
MCHC RBC-ENTMCNC: 31 PG — SIGNIFICANT CHANGE UP (ref 27–34)
MCHC RBC-ENTMCNC: 31.4 PG — SIGNIFICANT CHANGE UP (ref 27–34)
MCHC RBC-ENTMCNC: 31.5 PG — SIGNIFICANT CHANGE UP (ref 27–34)
MCHC RBC-ENTMCNC: 31.6 PG — SIGNIFICANT CHANGE UP (ref 27–34)
MCHC RBC-ENTMCNC: 33.7 % — SIGNIFICANT CHANGE UP (ref 32–36)
MCHC RBC-ENTMCNC: 34.1 % — SIGNIFICANT CHANGE UP (ref 32–36)
MCHC RBC-ENTMCNC: 34.6 % — SIGNIFICANT CHANGE UP (ref 32–36)
MCHC RBC-ENTMCNC: 34.9 % — SIGNIFICANT CHANGE UP (ref 32–36)
MCHC RBC-ENTMCNC: 36.4 % — HIGH (ref 32–36)
MCV RBC AUTO: 85.1 FL — SIGNIFICANT CHANGE UP (ref 80–100)
MCV RBC AUTO: 86.9 FL — SIGNIFICANT CHANGE UP (ref 80–100)
MCV RBC AUTO: 91 FL — SIGNIFICANT CHANGE UP (ref 80–100)
MCV RBC AUTO: 92.7 FL — SIGNIFICANT CHANGE UP (ref 80–100)
MCV RBC AUTO: 93.1 FL — SIGNIFICANT CHANGE UP (ref 80–100)
METAMYELOCYTES # FLD: 0 % — SIGNIFICANT CHANGE UP (ref 0–1)
MONOCYTES # BLD AUTO: 0.89 K/UL — SIGNIFICANT CHANGE UP (ref 0–0.9)
MONOCYTES NFR BLD AUTO: 6.9 % — SIGNIFICANT CHANGE UP (ref 2–14)
MONOCYTES NFR BLD: 1.8 % — LOW (ref 2–9)
MYELOCYTES NFR BLD: 0 % — SIGNIFICANT CHANGE UP (ref 0–0)
NEUTROPHIL AB SER-ACNC: 92.6 % — HIGH (ref 43–77)
NEUTROPHILS # BLD AUTO: 10.62 K/UL — HIGH (ref 1.8–7.4)
NEUTROPHILS NFR BLD AUTO: 82.8 % — HIGH (ref 43–77)
NEUTS BAND # BLD: 0 % — SIGNIFICANT CHANGE UP (ref 0–6)
NRBC # FLD: 0 — SIGNIFICANT CHANGE UP
OTHER - HEMATOLOGY %: 0 — SIGNIFICANT CHANGE UP
PCO2 BLDA: 35 MMHG — SIGNIFICANT CHANGE UP (ref 32–48)
PCO2 BLDA: 35 MMHG — SIGNIFICANT CHANGE UP (ref 32–48)
PCO2 BLDA: 37 MMHG — SIGNIFICANT CHANGE UP (ref 32–48)
PCO2 BLDA: 37 MMHG — SIGNIFICANT CHANGE UP (ref 32–48)
PCO2 BLDA: 38 MMHG — SIGNIFICANT CHANGE UP (ref 32–48)
PCO2 BLDA: 42 MMHG — SIGNIFICANT CHANGE UP (ref 32–48)
PCO2 BLDV: 51 MMHG — SIGNIFICANT CHANGE UP (ref 41–51)
PCO2 BLDV: 52 MMHG — HIGH (ref 41–51)
PH BLDA: 7.17 PH — CRITICAL LOW (ref 7.35–7.45)
PH BLDA: 7.25 PH — LOW (ref 7.35–7.45)
PH BLDA: 7.25 PH — LOW (ref 7.35–7.45)
PH BLDA: 7.26 PH — LOW (ref 7.35–7.45)
PH BLDA: 7.33 PH — LOW (ref 7.35–7.45)
PH BLDA: 7.41 PH — SIGNIFICANT CHANGE UP (ref 7.35–7.45)
PH BLDV: 7.17 PH — CRITICAL LOW (ref 7.32–7.43)
PH BLDV: 7.17 PH — CRITICAL LOW (ref 7.32–7.43)
PHOSPHATE SERPL-MCNC: 3.7 MG/DL — SIGNIFICANT CHANGE UP (ref 2.5–4.5)
PHOSPHATE SERPL-MCNC: 4.7 MG/DL — HIGH (ref 2.5–4.5)
PHOSPHATE SERPL-MCNC: 4.9 MG/DL — HIGH (ref 2.5–4.5)
PHOSPHATE SERPL-MCNC: 6.7 MG/DL — HIGH (ref 2.5–4.5)
PLATELET # BLD AUTO: 109 K/UL — LOW (ref 150–400)
PLATELET # BLD AUTO: 54 K/UL — LOW (ref 150–400)
PLATELET # BLD AUTO: 58 K/UL — LOW (ref 150–400)
PLATELET # BLD AUTO: 77 K/UL — LOW (ref 150–400)
PLATELET # BLD AUTO: 87 K/UL — LOW (ref 150–400)
PLATELET COUNT - ESTIMATE: SIGNIFICANT CHANGE UP
PMV BLD: 11.9 FL — SIGNIFICANT CHANGE UP (ref 7–13)
PMV BLD: 12 FL — SIGNIFICANT CHANGE UP (ref 7–13)
PMV BLD: 12.1 FL — SIGNIFICANT CHANGE UP (ref 7–13)
PMV BLD: 12.2 FL — SIGNIFICANT CHANGE UP (ref 7–13)
PMV BLD: 12.5 FL — SIGNIFICANT CHANGE UP (ref 7–13)
PO2 BLDA: 145 MMHG — HIGH (ref 83–108)
PO2 BLDA: 146 MMHG — HIGH (ref 83–108)
PO2 BLDA: 148 MMHG — HIGH (ref 83–108)
PO2 BLDA: 167 MMHG — HIGH (ref 83–108)
PO2 BLDA: 171 MMHG — HIGH (ref 83–108)
PO2 BLDA: 186 MMHG — HIGH (ref 83–108)
PO2 BLDV: 35 MMHG — SIGNIFICANT CHANGE UP (ref 35–40)
PO2 BLDV: 36 MMHG — SIGNIFICANT CHANGE UP (ref 35–40)
POLYCHROMASIA BLD QL SMEAR: SLIGHT — SIGNIFICANT CHANGE UP
POTASSIUM BLDA-SCNC: 3.7 MMOL/L — SIGNIFICANT CHANGE UP (ref 3.4–4.5)
POTASSIUM BLDA-SCNC: 3.8 MMOL/L — SIGNIFICANT CHANGE UP (ref 3.4–4.5)
POTASSIUM BLDA-SCNC: 4 MMOL/L — SIGNIFICANT CHANGE UP (ref 3.4–4.5)
POTASSIUM BLDA-SCNC: 4.1 MMOL/L — SIGNIFICANT CHANGE UP (ref 3.4–4.5)
POTASSIUM BLDA-SCNC: 4.2 MMOL/L — SIGNIFICANT CHANGE UP (ref 3.4–4.5)
POTASSIUM BLDA-SCNC: 4.7 MMOL/L — HIGH (ref 3.4–4.5)
POTASSIUM SERPL-MCNC: 4 MMOL/L — SIGNIFICANT CHANGE UP (ref 3.5–5.3)
POTASSIUM SERPL-MCNC: 4.4 MMOL/L — SIGNIFICANT CHANGE UP (ref 3.5–5.3)
POTASSIUM SERPL-MCNC: 4.5 MMOL/L — SIGNIFICANT CHANGE UP (ref 3.5–5.3)
POTASSIUM SERPL-MCNC: 4.8 MMOL/L — SIGNIFICANT CHANGE UP (ref 3.5–5.3)
POTASSIUM SERPL-SCNC: 4 MMOL/L — SIGNIFICANT CHANGE UP (ref 3.5–5.3)
POTASSIUM SERPL-SCNC: 4.4 MMOL/L — SIGNIFICANT CHANGE UP (ref 3.5–5.3)
POTASSIUM SERPL-SCNC: 4.5 MMOL/L — SIGNIFICANT CHANGE UP (ref 3.5–5.3)
POTASSIUM SERPL-SCNC: 4.8 MMOL/L — SIGNIFICANT CHANGE UP (ref 3.5–5.3)
PROMYELOCYTES # FLD: 0 % — SIGNIFICANT CHANGE UP (ref 0–0)
PROT SERPL-MCNC: 3.4 G/DL — LOW (ref 6–8.3)
PROTHROM AB SERPL-ACNC: 14.1 SEC — HIGH (ref 9.8–13.1)
PROTHROM AB SERPL-ACNC: 15.5 SEC — HIGH (ref 9.8–13.1)
PROTHROM AB SERPL-ACNC: 16.1 SEC — HIGH (ref 9.8–13.1)
RBC # BLD: 2.42 M/UL — LOW (ref 3.8–5.2)
RBC # BLD: 2.75 M/UL — LOW (ref 3.8–5.2)
RBC # BLD: 2.9 M/UL — LOW (ref 3.8–5.2)
RBC # BLD: 2.9 M/UL — LOW (ref 3.8–5.2)
RBC # BLD: 3.46 M/UL — LOW (ref 3.8–5.2)
RBC # FLD: 16.6 % — HIGH (ref 10.3–14.5)
RBC # FLD: 16.9 % — HIGH (ref 10.3–14.5)
RBC # FLD: 17.2 % — HIGH (ref 10.3–14.5)
RBC # FLD: 17.2 % — HIGH (ref 10.3–14.5)
RBC # FLD: 17.4 % — HIGH (ref 10.3–14.5)
SAO2 % BLDA: 98.2 % — SIGNIFICANT CHANGE UP (ref 95–99)
SAO2 % BLDA: 98.3 % — SIGNIFICANT CHANGE UP (ref 95–99)
SAO2 % BLDA: 98.7 % — SIGNIFICANT CHANGE UP (ref 95–99)
SAO2 % BLDA: 99.1 % — HIGH (ref 95–99)
SAO2 % BLDA: 99.2 % — HIGH (ref 95–99)
SAO2 % BLDA: 99.3 % — HIGH (ref 95–99)
SAO2 % BLDV: 54.5 % — LOW (ref 60–85)
SAO2 % BLDV: 57.6 % — LOW (ref 60–85)
SODIUM BLDA-SCNC: 133 MMOL/L — LOW (ref 136–146)
SODIUM BLDA-SCNC: 134 MMOL/L — LOW (ref 136–146)
SODIUM BLDA-SCNC: 135 MMOL/L — LOW (ref 136–146)
SODIUM BLDA-SCNC: 135 MMOL/L — LOW (ref 136–146)
SODIUM SERPL-SCNC: 138 MMOL/L — SIGNIFICANT CHANGE UP (ref 135–145)
SODIUM SERPL-SCNC: 138 MMOL/L — SIGNIFICANT CHANGE UP (ref 135–145)
SODIUM SERPL-SCNC: 140 MMOL/L — SIGNIFICANT CHANGE UP (ref 135–145)
SODIUM SERPL-SCNC: 140 MMOL/L — SIGNIFICANT CHANGE UP (ref 135–145)
TROPONIN T, HIGH SENSITIVITY: 36 NG/L — SIGNIFICANT CHANGE UP (ref ?–14)
VARIANT LYMPHS # BLD: 0 % — SIGNIFICANT CHANGE UP
WBC # BLD: 12.83 K/UL — HIGH (ref 3.8–10.5)
WBC # BLD: 13.39 K/UL — HIGH (ref 3.8–10.5)
WBC # BLD: 16.02 K/UL — HIGH (ref 3.8–10.5)
WBC # BLD: 16.35 K/UL — HIGH (ref 3.8–10.5)
WBC # BLD: 18.05 K/UL — HIGH (ref 3.8–10.5)
WBC # FLD AUTO: 12.83 K/UL — HIGH (ref 3.8–10.5)
WBC # FLD AUTO: 13.39 K/UL — HIGH (ref 3.8–10.5)
WBC # FLD AUTO: 16.02 K/UL — HIGH (ref 3.8–10.5)
WBC # FLD AUTO: 16.35 K/UL — HIGH (ref 3.8–10.5)
WBC # FLD AUTO: 18.05 K/UL — HIGH (ref 3.8–10.5)

## 2018-09-18 PROCEDURE — 71045 X-RAY EXAM CHEST 1 VIEW: CPT | Mod: 26,77

## 2018-09-18 PROCEDURE — 93010 ELECTROCARDIOGRAM REPORT: CPT

## 2018-09-18 PROCEDURE — 99292 CRITICAL CARE ADDL 30 MIN: CPT

## 2018-09-18 PROCEDURE — 93306 TTE W/DOPPLER COMPLETE: CPT | Mod: 26

## 2018-09-18 PROCEDURE — 71045 X-RAY EXAM CHEST 1 VIEW: CPT | Mod: 26,76

## 2018-09-18 PROCEDURE — 99291 CRITICAL CARE FIRST HOUR: CPT

## 2018-09-18 RX ORDER — PANTOPRAZOLE SODIUM 20 MG/1
40 TABLET, DELAYED RELEASE ORAL ONCE
Qty: 0 | Refills: 0 | Status: COMPLETED | OUTPATIENT
Start: 2018-09-18 | End: 2018-09-18

## 2018-09-18 RX ORDER — NOREPINEPHRINE BITARTRATE/D5W 8 MG/250ML
0.05 PLASTIC BAG, INJECTION (ML) INTRAVENOUS
Qty: 16 | Refills: 0 | Status: DISCONTINUED | OUTPATIENT
Start: 2018-09-18 | End: 2018-09-19

## 2018-09-18 RX ORDER — SODIUM CHLORIDE 9 MG/ML
1000 INJECTION, SOLUTION INTRAVENOUS ONCE
Qty: 0 | Refills: 0 | Status: COMPLETED | OUTPATIENT
Start: 2018-09-18 | End: 2018-09-18

## 2018-09-18 RX ORDER — EPINEPHRINE 0.3 MG/.3ML
0.01 INJECTION INTRAMUSCULAR; SUBCUTANEOUS
Qty: 4 | Refills: 0 | Status: DISCONTINUED | OUTPATIENT
Start: 2018-09-18 | End: 2018-09-18

## 2018-09-18 RX ORDER — SODIUM CHLORIDE 9 MG/ML
500 INJECTION, SOLUTION INTRAVENOUS ONCE
Qty: 0 | Refills: 0 | Status: COMPLETED | OUTPATIENT
Start: 2018-09-18 | End: 2018-09-18

## 2018-09-18 RX ORDER — MAGNESIUM SULFATE 500 MG/ML
2 VIAL (ML) INJECTION ONCE
Qty: 0 | Refills: 0 | Status: COMPLETED | OUTPATIENT
Start: 2018-09-18 | End: 2018-09-18

## 2018-09-18 RX ORDER — PANTOPRAZOLE SODIUM 20 MG/1
40 TABLET, DELAYED RELEASE ORAL AT BEDTIME
Qty: 0 | Refills: 0 | Status: DISCONTINUED | OUTPATIENT
Start: 2018-09-18 | End: 2018-10-02

## 2018-09-18 RX ORDER — VASOPRESSIN 20 [USP'U]/ML
0.04 INJECTION INTRAVENOUS
Qty: 100 | Refills: 0 | Status: DISCONTINUED | OUTPATIENT
Start: 2018-09-18 | End: 2018-09-19

## 2018-09-18 RX ORDER — DOBUTAMINE HCL 250MG/20ML
2.5 VIAL (ML) INTRAVENOUS
Qty: 500 | Refills: 0 | Status: DISCONTINUED | OUTPATIENT
Start: 2018-09-18 | End: 2018-09-18

## 2018-09-18 RX ORDER — SODIUM CHLORIDE 9 MG/ML
1000 INJECTION, SOLUTION INTRAVENOUS ONCE
Qty: 0 | Refills: 0 | Status: DISCONTINUED | OUTPATIENT
Start: 2018-09-18 | End: 2018-09-19

## 2018-09-18 RX ORDER — HEPARIN SODIUM 5000 [USP'U]/ML
5000 INJECTION INTRAVENOUS; SUBCUTANEOUS EVERY 8 HOURS
Qty: 0 | Refills: 0 | Status: DISCONTINUED | OUTPATIENT
Start: 2018-09-18 | End: 2018-09-21

## 2018-09-18 RX ADMIN — CHLORHEXIDINE GLUCONATE 15 MILLILITER(S): 213 SOLUTION TOPICAL at 06:02

## 2018-09-18 RX ADMIN — HEPARIN SODIUM 5000 UNIT(S): 5000 INJECTION INTRAVENOUS; SUBCUTANEOUS at 14:25

## 2018-09-18 RX ADMIN — EPINEPHRINE 2.31 MICROGRAM(S)/KG/MIN: 0.3 INJECTION INTRAMUSCULAR; SUBCUTANEOUS at 03:56

## 2018-09-18 RX ADMIN — MIDAZOLAM HYDROCHLORIDE 2 MILLIGRAM(S): 1 INJECTION, SOLUTION INTRAMUSCULAR; INTRAVENOUS at 00:01

## 2018-09-18 RX ADMIN — FENTANYL CITRATE 0.5 MICROGRAM(S)/KG/HR: 50 INJECTION INTRAVENOUS at 00:02

## 2018-09-18 RX ADMIN — SODIUM CHLORIDE 1000 MILLILITER(S): 9 INJECTION, SOLUTION INTRAVENOUS at 12:05

## 2018-09-18 RX ADMIN — SODIUM CHLORIDE 1000 MILLILITER(S): 9 INJECTION, SOLUTION INTRAVENOUS at 11:30

## 2018-09-18 RX ADMIN — FENTANYL CITRATE 3.08 MICROGRAM(S)/KG/HR: 50 INJECTION INTRAVENOUS at 19:36

## 2018-09-18 RX ADMIN — SODIUM CHLORIDE 150 MILLILITER(S): 9 INJECTION, SOLUTION INTRAVENOUS at 18:27

## 2018-09-18 RX ADMIN — PANTOPRAZOLE SODIUM 40 MILLIGRAM(S): 20 TABLET, DELAYED RELEASE ORAL at 22:15

## 2018-09-18 RX ADMIN — HEPARIN SODIUM 5000 UNIT(S): 5000 INJECTION INTRAVENOUS; SUBCUTANEOUS at 22:15

## 2018-09-18 RX ADMIN — CHLORHEXIDINE GLUCONATE 1 APPLICATION(S): 213 SOLUTION TOPICAL at 18:27

## 2018-09-18 RX ADMIN — FENTANYL CITRATE 3.08 MICROGRAM(S)/KG/HR: 50 INJECTION INTRAVENOUS at 00:01

## 2018-09-18 RX ADMIN — SODIUM CHLORIDE 100 MILLILITER(S): 9 INJECTION, SOLUTION INTRAVENOUS at 00:02

## 2018-09-18 RX ADMIN — Medication 2.89 MICROGRAM(S)/KG/MIN: at 19:36

## 2018-09-18 RX ADMIN — SODIUM CHLORIDE 2000 MILLILITER(S): 9 INJECTION, SOLUTION INTRAVENOUS at 01:00

## 2018-09-18 RX ADMIN — Medication 4: at 00:45

## 2018-09-18 RX ADMIN — Medication 50 GRAM(S): at 03:56

## 2018-09-18 RX ADMIN — PHENYLEPHRINE HYDROCHLORIDE 1.16 MICROGRAM(S)/KG/MIN: 10 INJECTION INTRAVENOUS at 00:02

## 2018-09-18 RX ADMIN — HEPARIN SODIUM 5000 UNIT(S): 5000 INJECTION INTRAVENOUS; SUBCUTANEOUS at 06:02

## 2018-09-18 RX ADMIN — VASOPRESSIN 2.4 UNIT(S)/MIN: 20 INJECTION INTRAVENOUS at 03:56

## 2018-09-18 RX ADMIN — PANTOPRAZOLE SODIUM 40 MILLIGRAM(S): 20 TABLET, DELAYED RELEASE ORAL at 12:28

## 2018-09-18 RX ADMIN — SODIUM CHLORIDE 100 MILLILITER(S): 9 INJECTION, SOLUTION INTRAVENOUS at 12:31

## 2018-09-18 RX ADMIN — SODIUM CHLORIDE 150 MILLILITER(S): 9 INJECTION, SOLUTION INTRAVENOUS at 19:36

## 2018-09-18 RX ADMIN — EPINEPHRINE 2.31 MICROGRAM(S)/KG/MIN: 0.3 INJECTION INTRAMUSCULAR; SUBCUTANEOUS at 07:00

## 2018-09-18 RX ADMIN — SODIUM CHLORIDE 2000 MILLILITER(S): 9 INJECTION, SOLUTION INTRAVENOUS at 02:00

## 2018-09-18 RX ADMIN — Medication 4: at 06:58

## 2018-09-18 RX ADMIN — CHLORHEXIDINE GLUCONATE 15 MILLILITER(S): 213 SOLUTION TOPICAL at 18:54

## 2018-09-18 RX ADMIN — Medication 2: at 12:29

## 2018-09-18 NOTE — PROGRESS NOTE ADULT - ASSESSMENT
JULIA UMANA is a 67y Female PMH HTN, presented for scheduled surgical resection of left inguinal mass, intra-op course c/b bigeminy post-operatively pt left intubated, intubated.    Neuro: Sedated on fentanyl gtt  - Continue fentanyl gtt, wean as tolerated  - RASS -1    Resp: Intubated - 16/350/5/40  - F/u ABG, adjust vent as tolerated    Cardiovascular: Bigeminy at start of case  - Seen by EP: NTD  - Post-op echo improved from pre-operative echo  - Cardiac enzymes ordered  - Transition from Ari to Levo  - Vascular checks q1hr    Gastrointestinal:   - Continue NPO  - Await return of bowel function    /Renal:  - Engel in place, monitor I/Os.   - Monitor drain output (JEREMY x3)    Heme:   - Monitor cbc q4h  - Fibrinogen levels decreased, prolonged PTT - f/u repeat labs    ID: No acute issues  - Monitor for fever, leukocytosis    Endo:  - No acute issues JULIA UMANA is a 67y Female PMH HTN, presented for scheduled surgical resection of left inguinal mass, intra-op course c/b bigeminy post-operatively pt left intubated, intubated.    Neuro: Sedated on fentanyl gtt  - Continue fentanyl gtt, wean as tolerated  - RASS -1    Resp: Intubated - 16/350/5/40  -continue to monitor ABGs    Cardiovascular: Bigeminy at start of case, undifferentiated shock requiring pressors  - requiring levo/epi/vaso this AM, will furthur rescuscitate and wean pressors as tolerated  - Seen by EP: NTD  - Post-op echo improved from pre-operative echo  - Cardiac enzymes ordered  - Transition from Ari to Levo  - Vascular checks q1hr    Gastrointestinal:   - Continue NPO  - protonix  - Await return of bowel function    /Renal: Oliguria, worsening DAYTON   - normosol bolus x 2L, c/w LR at 100 cc/hr   - Engel in place, monitor I/Os.   - Monitor drain output (JEREMY x3)    Heme:   - Monitor cbc q4h  - Fibrinogen levels decreased, prolonged PTT - f/u repeat labs      ID: No acute issues  - Monitor for fever, leukocytosis    Endo:  - No acute issues JULIA UMANA is a 67y Female PMH HTN, presented for scheduled surgical resection of left inguinal mass, intra-op course c/b bigeminy post-operatively pt left intubated, intubated.    Neuro: Sedated on fentanyl gtt  - Continue fentanyl gtt, wean as tolerated  - RASS -1    Resp: Intubated - 16/350/5/40  -continue to monitor ABGs    Cardiovascular: Bigeminy at start of case, undifferentiated shock requiring pressors  - requiring levo/epi/vaso this AM, will furthur rescuscitate and wean pressors as tolerated  - Seen by EP: NTD  - Post-op echo improved from pre-operative echo  - Cardiac enzymes uptrending, f/u with TTE and cardiology recommendations  - Post op EKG with q waves III, avF, without ZULLY/ depressions   - Vascular checks q1hr    Gastrointestinal:   - Continue NPO  - protonix  - Await return of bowel function    /Renal: Oliguria, worsening DAYTON   - normosol bolus x 2L, c/w LR at 100 cc/hr   - Engel in place, monitor I/Os.   - Monitor drain output (JEREMY x3)    Heme:   - Monitor cbc q4h  - Fibrinogen levels decreased, prolonged PTT - f/u repeat labs      ID: No acute issues  - Monitor for fever, leukocytosis    Endo:  - No acute issues

## 2018-09-18 NOTE — PROGRESS NOTE ADULT - SUBJECTIVE AND OBJECTIVE BOX
HISTORY OF PRESENT ILLNESS:  JULIA UMANA is a 67y Female PMH HTN, presented for scheduled surgical resection of left inguinal mass. Patient underwent enbloc resection of inguinal mass, left femoral to below knee popliteal bypass, venous resection, reconstruction with rectus abdominus myocutaneous flap. At start of case patient was noted to have bigeminy with ventricular rate of 30s-40s. EBL 1500cc, received 3u pRBC. Post-op patient left intubated, sedated and brought to SICU for close hemodynamic monitoring.     PAST MEDICAL HISTORY: 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, unspecified  HTN (hypertension)      PAST SURGICAL HISTORY: No significant past surgical history      FAMILY HISTORY: No pertinent family history in first degree relatives      SOCIAL HISTORY: Welsh speaking    CODE STATUS: FULL    HOME MEDICATIONS:    ALLERGIES: penicillin (Hives)      VITAL SIGNS:  ICU Vital Signs Last 24 Hrs  T(C): 36.9 (17 Sep 2018 22:00), Max: 36.9 (17 Sep 2018 22:00)  T(F): 98.4 (17 Sep 2018 22:00), Max: 98.4 (17 Sep 2018 22:00)  HR: 56 (18 Sep 2018 00:38) (47 - 93)  BP: 69/50 (17 Sep 2018 22:07) (69/50 - 174/90)  BP(mean): 54 (17 Sep 2018 22:07) (54 - 77)  ABP: 92/60 (18 Sep 2018 00:38) (78/56 - 126/83)  ABP(mean): 72 (18 Sep 2018 00:38) (63 - 98)  RR: 0 (18 Sep 2018 00:38) (0 - 16)  SpO2: 100% (18 Sep 2018 00:38) (100% - 100%)      NEURO  Exam: RASS -1, neurologically intact  Meds:fentaNYL    Injectable 50 MICROGram(s) IV Push once  fentaNYL   Infusion 0.5 MICROgram(s)/kG/Hr IV Continuous <Continuous>      RESPIRATORY  Mechanical Ventilation: Mode: AC/ CMV (Assist Control/ Continuous Mandatory Ventilation), RR (machine): 16, RR (patient): 16, TV (machine): 350, FiO2: 40, PEEP: 5, MAP: 8.3, PIP: 15  ABG - ( 17 Sep 2018 22:20 )  pH: 7.36  /  pCO2: 35    /  pO2: 147   / HCO3: 20    / Base Excess: -5.7  /  SaO2: 98.7    Lactate: 4.6              Exam: Intubated on 350/16/5/40  Meds:  CARDIOVASCULAR    Exam:  Cardiac Rhythm: Regular  Meds:phenylephrine    Infusion 0.1 MICROgram(s)/kG/Min IV Continuous <Continuous>      GI/NUTRITION  Exam: Soft, NT/ND. Incisions dressed, c/d/i. JEREMY x2, serosanguinous output  Diet: NPO  Meds:    GENITOURINARY/RENAL  Meds:lactated ringers. 1000 milliLiter(s) IV Continuous <Continuous>      Weight (kg): 61.7 (09-17 @ 11:41)  09-17    139  |  111<H>  |  12  ----------------------------<  248<H>  4.6   |  18<L>  |  0.63    Ca    7.4<L>      17 Sep 2018 22:20  Phos  4.0     09-17  Mg     1.4     09-17    TPro  3.1<L>  /  Alb  1.8<L>  /  TBili  0.9  /  DBili  x   /  AST  11  /  ALT  5   /  AlkPhos  25<L>  09-17    [X] Engel catheter, indication: urine output monitoring in critically ill patient    HEMATOLOGIC  [ ] VTE Prophylaxis:                          9.3    14.20 )-----------( 108      ( 17 Sep 2018 22:20 )             27.2     PT/INR - ( 17 Sep 2018 22:20 )   PT: 17.1 SEC;   INR: 1.48          PTT - ( 17 Sep 2018 22:20 )  PTT:135.8 SEC  Transfusion: [ ] PRBC	[ ] Platelets	[ ] FFP	[ ] Cryoprecipitate      INFECTIOUS DISEASES  Meds:  RECENT CULTURES:      ENDOCRINE  Meds:insulin lispro (HumaLOG) corrective regimen sliding scale   SubCutaneous every 6 hours    CAPILLARY BLOOD GLUCOSE      POCT Blood Glucose.: 244 mg/dL (17 Sep 2018 23:58)      PATIENT CARE ACCESS DEVICES:  [ ] Peripheral IV  [x] Central Venous Line	[ ] R	[ ] L	[ ] IJ	[ ] Fem	[ ] SC	Placed:   [x] Arterial Line		[ ] R	[ ] L	[ ] Fem	[ ] Rad	[ ] Ax	Placed:   [ ] PICC:					[ ] Mediport  [x] Urinary Catheter, Date Placed:   [x] Necessity of urinary, arterial, and venous catheters discussed    OTHER MEDICATIONS: chlorhexidine 0.12% Liquid 15 milliLiter(s) Swish and Spit two times a day  chlorhexidine 4% Liquid 1 Application(s) Topical <User Schedule>      IMAGING STUDIES:  CT Angio Lower Extremity w/ IV Cont, Left (07.13.18 @ 10:39)  The abdominal aorta shows no evidenceof stenosis or   aneurysm formation.  The iliac arteries show no evidence of   hemodynamically significant stenosis.    There is a large, soft tissue mass located anteriorly in the left groin   arising below the left anterior superior iliac spine andextending down   to the level of the proximal femur. It shows homogeneous attenuation   similar to adjacent muscle and it measures approximately 10.8 x 9.0 x   15.2 cm. Arterial branches of the left profunda femoral artery course   through it. The common femoral artery, profunda femoral artery and   proximal superficial femoral artery are not occluded or narrowed,   although they are encased by the posterior portion of the mass. There is   no evidence of bony erosion of adjacent bony structures nor deep pelvic   lymphadenopathy. However, there is diffuse skin thickening and   subcutaneous edema of the left leg from the groin downward which is   primarily located anteriorly in the upper leg, but then progresses to be   circumferentially distributed in the lower most cuts near the knee.   Integrity of the left leg venous structures cannot be evaluated in this   arterial phase scan.    The visualized right leg runoff shows normal superficial femoral artery   and above-knee popliteal artery. There is decreased density of arterial   contrast from the right knee downward compared with the left. It is   difficult to determine whether this represents compromised distal flow on   the right or enhanced distal flow on the left. Superficial venous   varicosities are seen posterior to the right knee and along the medial   right thigh.    The visualized left leg runoff shows normal superficial femoral artery,   popliteal artery and proximal anterior tibial artery.

## 2018-09-18 NOTE — CHART NOTE - NSCHARTNOTEFT_GEN_A_CORE
Post-operative Check    SUBJECTIVE: No acute events in the immediate post-operative period. Patient remains intubated.  OBJECTIVE:  T(C): 35.6 (09-18-18 @ 04:00), Max: 36.9 (09-17-18 @ 22:00)  HR: 94 (09-18-18 @ 06:10) (47 - 94)  BP: 69/50 (09-17-18 @ 22:07) (69/50 - 174/90)  RR: 20 (09-18-18 @ 06:10) (0 - 20)  SpO2: 100% (09-18-18 @ 06:10) (100% - 100%)      09-17-18 @ 07:01  -  09-18-18 @ 06:40  --------------------------------------------------------  IN: 2396.4 mL / OUT: 700 mL / NET: 1696.4 mL        Physical Exam:   Gen: NAD  Resp: Intubated  LLE dressing c/d/i.  2 LLE drains and 1 RLE drain    ASSESSMENT:   JULIA UMANA is a 67y Female POD#0 s/p Iliac popliteal bypass and excision of mass from thigh.     PLAN:  - c/w mechanical ventilation  - DVT ppx with SQH  -f/u labs  - SICU management

## 2018-09-18 NOTE — PROGRESS NOTE ADULT - SUBJECTIVE AND OBJECTIVE BOX
seen and examined.  requiring mult pressors.  low CO.  The foot remains warm, dopplerable signals.      ICU Vital Signs Last 24 Hrs  T(C): 37.1 (18 Sep 2018 20:00), Max: 37.1 (18 Sep 2018 20:00)  T(F): 98.7 (18 Sep 2018 20:00), Max: 98.7 (18 Sep 2018 20:00)  HR: 54 (18 Sep 2018 23:41) (49 - 97)  BP: 106/57 (18 Sep 2018 23:00) (85/58 - 147/95)  BP(mean): 69 (18 Sep 2018 23:00) (63 - 107)  ABP: 107/57 (18 Sep 2018 23:00) (85/62 - 165/106)  ABP(mean): 74 (18 Sep 2018 23:00) (68 - 115)  RR: 20 (18 Sep 2018 23:00) (0 - 27)  SpO2: 99% (18 Sep 2018 23:41) (99% - 100%)                          7.5    12.83 )-----------( 54       ( 18 Sep 2018 18:57 )             20.6   09-18    138  |  107  |  19  ----------------------------<  137<H>  4.8   |  18<L>  |  1.44<H>    Ca    6.6<L>      18 Sep 2018 14:15  Phos  3.7     09-18  Mg     2.3     09-18    TPro  3.4<L>  /  Alb  1.9<L>  /  TBili  0.8  /  DBili  x   /  AST  25  /  ALT  12  /  AlkPhos  26<L>  09-18

## 2018-09-18 NOTE — PROGRESS NOTE ADULT - SUBJECTIVE AND OBJECTIVE BOX
HOSPITAL DAY: ***  POD #: ***    SUBJECTIVE:  No acute overnight events.  [Pain improved/same]  [N/V/D]  [Flatus/BM]  [Eating]  [Ambulating]    OBJECTIVE:  Vital Signs Last 24 Hrs  T(C): 36.8 (18 Sep 2018 08:00), Max: 36.9 (17 Sep 2018 22:00)  T(F): 98.3 (18 Sep 2018 08:00), Max: 98.4 (17 Sep 2018 22:00)  HR: 83 (18 Sep 2018 09:40) (47 - 97)  BP: 147/95 (18 Sep 2018 09:40) (69/50 - 174/90)  BP(mean): 107 (18 Sep 2018 09:40) (54 - 107)  RR: 20 (18 Sep 2018 09:40) (0 - 20)  SpO2: 100% (18 Sep 2018 09:40) (100% - 100%)    I&O's Detail    17 Sep 2018 07:01  -  18 Sep 2018 07:00  --------------------------------------------------------  IN:    EPINEPHrine Infusion: 18.5 mL    EPINEPHrine Infusion: 120.2 mL    fentaNYL  Infusion: 123 mL    IV PiggyBack: 100 mL    Lactated Ringers IV Bolus: 1000 mL    lactated ringers.: 1000 mL    norepinephrine Infusion: 73.9 mL    phenylephrine   Infusion: 132 mL    vasopressin Infusion: 2.4 mL  Total IN: 2570 mL    OUT:    Bulb: 90 mL    Bulb: 60 mL    Bulb: 410 mL    Indwelling Catheter - Urethral: 140 mL  Total OUT: 700 mL    Total NET: 1870 mL      18 Sep 2018 07:01  -  18 Sep 2018 09:55  --------------------------------------------------------  IN:  Total IN: 0 mL    OUT:    Indwelling Catheter - Urethral: 5 mL  Total OUT: 5 mL    Total NET: -5 mL          Inpatient Medications:  MEDICATIONS  (STANDING):  chlorhexidine 0.12% Liquid 15 milliLiter(s) Swish and Spit two times a day  chlorhexidine 4% Liquid 1 Application(s) Topical <User Schedule>  EPINEPHrine    Infusion 0.01 MICROgram(s)/kG/Min (2.314 mL/Hr) IV Continuous <Continuous>  fentaNYL    Injectable 50 MICROGram(s) IV Push once  fentaNYL   Infusion 0.5 MICROgram(s)/kG/Hr (3.085 mL/Hr) IV Continuous <Continuous>  heparin  Injectable 5000 Unit(s) SubCutaneous every 8 hours  insulin lispro (HumaLOG) corrective regimen sliding scale   SubCutaneous every 6 hours  lactated ringers. 1000 milliLiter(s) (100 mL/Hr) IV Continuous <Continuous>  norepinephrine Infusion 0.05 MICROgram(s)/kG/Min (2.892 mL/Hr) IV Continuous <Continuous>  vasopressin Infusion 0.04 Unit(s)/Min (2.4 mL/Hr) IV Continuous <Continuous>    MEDICATIONS  (PRN):      Physical Examination:  GEN: NAD  NEURO: sedated  PULM: ventilated, symmetric chest rise bilaterally  CV: regular rate, peripheral pulses intact  ABD: soft, ND, dressings in place; JEREMY drains x2 with serosanguinous output  EXTR: LLE wrapped from ankle to thigh; dopplerable left DP signal, right DP palpable    LABS:                        8.7    16.35 )-----------( 87       ( 18 Sep 2018 05:30 )             25.5       09-18    138  |  108<H>  |  17  ----------------------------<  228<H>  4.0   |  14<L>  |  1.24    Ca    7.2<L>      18 Sep 2018 05:30  Phos  4.9     09-18  Mg     2.7     09-18    TPro  3.4<L>  /  Alb  1.9<L>  /  TBili  0.8  /  DBili  x   /  AST  25  /  ALT  12  /  AlkPhos  26<L>  09-18    CULTURES: none    IMAGING:  CXR: ET tube tip 2.4 cm above the mallory on most recent image.    Right IJ line with tip in SVC. No pneumothorax.    Prominent right hilum, of indeterminate nature. Correlation with CT scan   of the chest can be performed for further evaluation, if felt to be   clinically indicated.    Left basilar subsegmental atelectasis. PROGRESS NOTE - VASCULAR SURGERY    POD 1 -     SUBJECTIVE:  No acute overnight events. Increasing pressor requirements.    OBJECTIVE:  Vital Signs Last 24 Hrs  T(C): 36.8 (18 Sep 2018 08:00), Max: 36.9 (17 Sep 2018 22:00)  T(F): 98.3 (18 Sep 2018 08:00), Max: 98.4 (17 Sep 2018 22:00)  HR: 83 (18 Sep 2018 09:40) (47 - 97)  BP: 147/95 (18 Sep 2018 09:40) (69/50 - 174/90)  BP(mean): 107 (18 Sep 2018 09:40) (54 - 107)  RR: 20 (18 Sep 2018 09:40) (0 - 20)  SpO2: 100% (18 Sep 2018 09:40) (100% - 100%)    I&O's Detail    17 Sep 2018 07:01  -  18 Sep 2018 07:00  --------------------------------------------------------  IN:    EPINEPHrine Infusion: 18.5 mL    EPINEPHrine Infusion: 120.2 mL    fentaNYL  Infusion: 123 mL    IV PiggyBack: 100 mL    Lactated Ringers IV Bolus: 1000 mL    lactated ringers.: 1000 mL    norepinephrine Infusion: 73.9 mL    phenylephrine   Infusion: 132 mL    vasopressin Infusion: 2.4 mL  Total IN: 2570 mL    OUT:    Bulb: 90 mL    Bulb: 60 mL    Bulb: 410 mL    Indwelling Catheter - Urethral: 140 mL  Total OUT: 700 mL    Total NET: 1870 mL      18 Sep 2018 07:01  -  18 Sep 2018 09:55  --------------------------------------------------------  IN:  Total IN: 0 mL    OUT:    Indwelling Catheter - Urethral: 5 mL  Total OUT: 5 mL    Total NET: -5 mL          Inpatient Medications:  MEDICATIONS  (STANDING):  chlorhexidine 0.12% Liquid 15 milliLiter(s) Swish and Spit two times a day  chlorhexidine 4% Liquid 1 Application(s) Topical <User Schedule>  EPINEPHrine    Infusion 0.01 MICROgram(s)/kG/Min (2.314 mL/Hr) IV Continuous <Continuous>  fentaNYL    Injectable 50 MICROGram(s) IV Push once  fentaNYL   Infusion 0.5 MICROgram(s)/kG/Hr (3.085 mL/Hr) IV Continuous <Continuous>  heparin  Injectable 5000 Unit(s) SubCutaneous every 8 hours  insulin lispro (HumaLOG) corrective regimen sliding scale   SubCutaneous every 6 hours  lactated ringers. 1000 milliLiter(s) (100 mL/Hr) IV Continuous <Continuous>  norepinephrine Infusion 0.05 MICROgram(s)/kG/Min (2.892 mL/Hr) IV Continuous <Continuous>  vasopressin Infusion 0.04 Unit(s)/Min (2.4 mL/Hr) IV Continuous <Continuous>    MEDICATIONS  (PRN):      Physical Examination:  GEN: NAD  NEURO: sedated  PULM: ventilated, symmetric chest rise bilaterally  CV: regular rate, peripheral pulses intact  ABD: soft, ND, dressings in place; JEREMY drains x2 with serosanguinous output  EXTR: LLE wrapped from ankle to thigh; dopplerable left DP signal, right DP palpable    LABS:                        8.7    16.35 )-----------( 87       ( 18 Sep 2018 05:30 )             25.5       09-18    138  |  108<H>  |  17  ----------------------------<  228<H>  4.0   |  14<L>  |  1.24    Ca    7.2<L>      18 Sep 2018 05:30  Phos  4.9     09-18  Mg     2.7     09-18    TPro  3.4<L>  /  Alb  1.9<L>  /  TBili  0.8  /  DBili  x   /  AST  25  /  ALT  12  /  AlkPhos  26<L>  09-18    CULTURES: none    IMAGING:  CXR: ET tube tip 2.4 cm above the mallory on most recent image.    Right IJ line with tip in SVC. No pneumothorax.    Prominent right hilum, of indeterminate nature. Correlation with CT scan   of the chest can be performed for further evaluation, if felt to be   clinically indicated.    Left basilar subsegmental atelectasis.

## 2018-09-18 NOTE — CONSULT NOTE ADULT - SUBJECTIVE AND OBJECTIVE BOX
HISTORY OF PRESENT ILLNESS:  JULIA UMANA is a 67y Female PMH HTN, presented for scheduled surgical resection of left inguinal mass. Patient underwent enbloc resection of inguinal mass, left femoral to below knee popliteal bypass, venous resection, reconstruction with rectus abdominus myocutaneous flap. At start of case patient was noted to have bigeminy with ventricular rate of 30s-40s. EBL 1500cc, received 3u pRBC. Post-op patient left intubated, sedated and brought to SICU for close hemodynamic monitoring.     PAST MEDICAL HISTORY: 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, unspecified  HTN (hypertension)      PAST SURGICAL HISTORY: No significant past surgical history      FAMILY HISTORY: No pertinent family history in first degree relatives      SOCIAL HISTORY: Slovenian speaking    CODE STATUS: FULL    HOME MEDICATIONS:    ALLERGIES: penicillin (Hives)      VITAL SIGNS:  ICU Vital Signs Last 24 Hrs  T(C): 36.9 (17 Sep 2018 22:00), Max: 36.9 (17 Sep 2018 22:00)  T(F): 98.4 (17 Sep 2018 22:00), Max: 98.4 (17 Sep 2018 22:00)  HR: 56 (18 Sep 2018 00:38) (47 - 93)  BP: 69/50 (17 Sep 2018 22:07) (69/50 - 174/90)  BP(mean): 54 (17 Sep 2018 22:07) (54 - 77)  ABP: 92/60 (18 Sep 2018 00:38) (78/56 - 126/83)  ABP(mean): 72 (18 Sep 2018 00:38) (63 - 98)  RR: 0 (18 Sep 2018 00:38) (0 - 16)  SpO2: 100% (18 Sep 2018 00:38) (100% - 100%)      NEURO  Exam: RASS -1, neurologically intact  Meds:fentaNYL    Injectable 50 MICROGram(s) IV Push once  fentaNYL   Infusion 0.5 MICROgram(s)/kG/Hr IV Continuous <Continuous>      RESPIRATORY  Mechanical Ventilation: Mode: AC/ CMV (Assist Control/ Continuous Mandatory Ventilation), RR (machine): 16, RR (patient): 16, TV (machine): 350, FiO2: 40, PEEP: 5, MAP: 8.3, PIP: 15  ABG - ( 17 Sep 2018 22:20 )  pH: 7.36  /  pCO2: 35    /  pO2: 147   / HCO3: 20    / Base Excess: -5.7  /  SaO2: 98.7    Lactate: 4.6              Exam: Intubated on 350/16/5/40  Meds:  CARDIOVASCULAR    Exam:  Cardiac Rhythm: Regular  Meds:phenylephrine    Infusion 0.1 MICROgram(s)/kG/Min IV Continuous <Continuous>      GI/NUTRITION  Exam: Soft, NT/ND. Incisions dressed, c/d/i. JEREMY x2, serosanguinous output  Diet: NPO  Meds:    GENITOURINARY/RENAL  Meds:lactated ringers. 1000 milliLiter(s) IV Continuous <Continuous>      Weight (kg): 61.7 (09-17 @ 11:41)  09-17    139  |  111<H>  |  12  ----------------------------<  248<H>  4.6   |  18<L>  |  0.63    Ca    7.4<L>      17 Sep 2018 22:20  Phos  4.0     09-17  Mg     1.4     09-17    TPro  3.1<L>  /  Alb  1.8<L>  /  TBili  0.9  /  DBili  x   /  AST  11  /  ALT  5   /  AlkPhos  25<L>  09-17    [X] Engel catheter, indication: urine output monitoring in critically ill patient    HEMATOLOGIC  [ ] VTE Prophylaxis:                          9.3    14.20 )-----------( 108      ( 17 Sep 2018 22:20 )             27.2     PT/INR - ( 17 Sep 2018 22:20 )   PT: 17.1 SEC;   INR: 1.48          PTT - ( 17 Sep 2018 22:20 )  PTT:135.8 SEC  Transfusion: [ ] PRBC	[ ] Platelets	[ ] FFP	[ ] Cryoprecipitate      INFECTIOUS DISEASES  Meds:  RECENT CULTURES:      ENDOCRINE  Meds:insulin lispro (HumaLOG) corrective regimen sliding scale   SubCutaneous every 6 hours    CAPILLARY BLOOD GLUCOSE      POCT Blood Glucose.: 244 mg/dL (17 Sep 2018 23:58)      PATIENT CARE ACCESS DEVICES:  [ ] Peripheral IV  [x] Central Venous Line	[ ] R	[ ] L	[ ] IJ	[ ] Fem	[ ] SC	Placed:   [x] Arterial Line		[ ] R	[ ] L	[ ] Fem	[ ] Rad	[ ] Ax	Placed:   [ ] PICC:					[ ] Mediport  [x] Urinary Catheter, Date Placed:   [x] Necessity of urinary, arterial, and venous catheters discussed    OTHER MEDICATIONS: chlorhexidine 0.12% Liquid 15 milliLiter(s) Swish and Spit two times a day  chlorhexidine 4% Liquid 1 Application(s) Topical <User Schedule>      IMAGING STUDIES:  CT Angio Lower Extremity w/ IV Cont, Left (07.13.18 @ 10:39)  The abdominal aorta shows no evidenceof stenosis or   aneurysm formation.  The iliac arteries show no evidence of   hemodynamically significant stenosis.    There is a large, soft tissue mass located anteriorly in the left groin   arising below the left anterior superior iliac spine andextending down   to the level of the proximal femur. It shows homogeneous attenuation   similar to adjacent muscle and it measures approximately 10.8 x 9.0 x   15.2 cm. Arterial branches of the left profunda femoral artery course   through it. The common femoral artery, profunda femoral artery and   proximal superficial femoral artery are not occluded or narrowed,   although they are encased by the posterior portion of the mass. There is   no evidence of bony erosion of adjacent bony structures nor deep pelvic   lymphadenopathy. However, there is diffuse skin thickening and   subcutaneous edema of the left leg from the groin downward which is   primarily located anteriorly in the upper leg, but then progresses to be   circumferentially distributed in the lower most cuts near the knee.   Integrity of the left leg venous structures cannot be evaluated in this   arterial phase scan.    The visualized right leg runoff shows normal superficial femoral artery   and above-knee popliteal artery. There is decreased density of arterial   contrast from the right knee downward compared with the left. It is   difficult to determine whether this represents compromised distal flow on   the right or enhanced distal flow on the left. Superficial venous   varicosities are seen posterior to the right knee and along the medial   right thigh.    The visualized left leg runoff shows normal superficial femoral artery,   popliteal artery and proximal anterior tibial artery.

## 2018-09-18 NOTE — PROGRESS NOTE ADULT - SUBJECTIVE AND OBJECTIVE BOX
Pt intubated requiring pressors overnight.      PE:  Vital Signs Last 24 Hrs  T(C): 35.6 (18 Sep 2018 04:00), Max: 36.9 (17 Sep 2018 22:00)  T(F): 96 (18 Sep 2018 04:00), Max: 98.4 (17 Sep 2018 22:00)  HR: 94 (18 Sep 2018 06:10) (47 - 94)  BP: 69/50 (17 Sep 2018 22:07) (69/50 - 174/90)  BP(mean): 54 (17 Sep 2018 22:07) (54 - 77)  RR: 20 (18 Sep 2018 06:10) (0 - 20)  SpO2: 100% (18 Sep 2018 06:10) (100% - 100%)  Gen: No acute distress  LLE: dressing c/d/i  HV appropriate  motor/sens unable to assess  Dopp pulses    Labs:                        8.7    16.35 )-----------( 87       ( 18 Sep 2018 05:30 )             25.5   09-18    138  |  108<H>  |  17  ----------------------------<  228<H>  4.0   |  14<L>  |  1.24    Ca    7.2<L>      18 Sep 2018 05:30  Phos  4.9     09-18  Mg     2.7     09-18    TPro  3.4<L>  /  Alb  1.9<L>  /  TBili  0.8  /  DBili  x   /  AST  25  /  ALT  12  /  AlkPhos  26<L>  09-18    Lact 6.0    Assessment/Plan:  67yFemale s/p L thigh excision of liposarcoma, vascular bypass, rectus flap   -FU SICU  -cont pressors  -FU vasc/PRS  -WBAT when able  -FU OR path

## 2018-09-18 NOTE — PROGRESS NOTE ADULT - ASSESSMENT
68 yo F PMH HTN presented for scheduled surgical resection of left inguinal mass with left external iliac to above knee popliteal artery bypass w/ PTFE.    NEURO: sedated on fentanyl drip.  -cont drip for RASS -1    PULM: intubated. Tolerating vent settings.  -cont full mechanical vent support  -daily ABG, CXR    CV: hx HTN, bigeminy with bradycardia during case, resolved. LLE PTFE graft with dopplerable L DP signal. Requiring vasopressors to maintain MAP. Started on epi drip overnight.  -cont levo, epi drips to maintain map >65  -cont q1h vascular checks    GI: Currently NPO.    /Renal:  - Engel in place, monitor I/Os.   - Monitor drain output (JEREMY x3)    Heme:   - Monitor cbc q4h  - Fibrinogen levels decreased, prolonged PTT - f/u repeat labs    ID: No acute issues  - Monitor for fever, leukocytosis    Endo:  - No acute issues 68 yo F PMH HTN presented for scheduled surgical resection of left inguinal mass with left external iliac to above knee popliteal artery bypass w/ PTFE.    NEURO: sedated on fentanyl drip.  -cont drip for RASS -1    PULM: intubated. Tolerating vent settings.  -cont full mechanical vent support  -daily ABG, CXR    CV: hx HTN, bigeminy with bradycardia during case, resolved. LLE PTFE graft with dopplerable L DP signal. Requiring vasopressors to maintain MAP. Started on epi drip overnight.  -cont levo, epi drips to maintain map >65  -cont q1h vascular checks  -cont to monitor JEREMY drain output    GI: Currently NPO.    /Renal: Cr increasing. Borderline urine output.  -strict I/O monitoring    Heme: H/H stable. Will be transfused 1u pRBC due to increasing pressor requirements.  -cont q4h CBC  VTE PPEx: heparin 5000 q8h    ID: afebrile, increasing leukocytosis.   -cont to monitor    Endo: no active issues.    DISPO: SICU  -appreciate SICU care

## 2018-09-18 NOTE — PROGRESS NOTE ADULT - SUBJECTIVE AND OBJECTIVE BOX
JULIA UMANA  [id number]    Subjective:    Patient seen and examined at bedside in SICU with plastic surgery team.   Patient stable in SICU care, no acute events overnight.   Remains intubated on pressor support.       Objective:  T(C): 36.8 (09-18-18 @ 08:00), Max: 36.9 (09-17-18 @ 22:00)  HR: 83 (09-18-18 @ 09:40) (47 - 97)  BP: 147/95 (09-18-18 @ 09:40) (69/50 - 174/90)  RR: 20 (09-18-18 @ 09:40) (0 - 20)  SpO2: 100% (09-18-18 @ 09:40) (100% - 100%)  Wt(kg): --   09-18    138  |  108<H>  |  17  ----------------------------<  228<H>  4.0   |  14<L>  |  1.24    Ca    7.2<L>      18 Sep 2018 05:30  Phos  4.9     09-18  Mg     2.7     09-18    TPro  3.4<L>  /  Alb  1.9<L>  /  TBili  0.8  /  DBili  x   /  AST  25  /  ALT  12  /  AlkPhos  26<L>  09-18                        8.7    16.35 )-----------( 87       ( 18 Sep 2018 05:30 )             25.5       09-17 @ 07:01  -  09-18 @ 07:00  --------------------------------------------------------  IN: 2570 mL / OUT: 700 mL / NET: 1870 mL    09-18 @ 07:01  -  09-18 @ 09:47  --------------------------------------------------------  IN: 0 mL / OUT: 5 mL / NET: -5 mL      PHYSICAL EXAM:    General: No acute distress, sedated on ventilation support.   Abdomen: Aqualcel dressing intact, no strikethrough. Incisions CDI.   LE: Left leg with ace bandage in place, no strikethrough. JEREMY X 2 with SS output.             MEDICATIONS  (STANDING):  chlorhexidine 0.12% Liquid 15 milliLiter(s) Swish and Spit two times a day  chlorhexidine 4% Liquid 1 Application(s) Topical <User Schedule>  EPINEPHrine    Infusion 0.01 MICROgram(s)/kG/Min (2.314 mL/Hr) IV Continuous <Continuous>  fentaNYL    Injectable 50 MICROGram(s) IV Push once  fentaNYL   Infusion 0.5 MICROgram(s)/kG/Hr (3.085 mL/Hr) IV Continuous <Continuous>  heparin  Injectable 5000 Unit(s) SubCutaneous every 8 hours  insulin lispro (HumaLOG) corrective regimen sliding scale   SubCutaneous every 6 hours  lactated ringers. 1000 milliLiter(s) (100 mL/Hr) IV Continuous <Continuous>  norepinephrine Infusion 0.05 MICROgram(s)/kG/Min (2.892 mL/Hr) IV Continuous <Continuous>  vasopressin Infusion 0.04 Unit(s)/Min (2.4 mL/Hr) IV Continuous <Continuous>    MEDICATIONS  (PRN):

## 2018-09-18 NOTE — PROCEDURE NOTE - PROCEDURE
<<-----Click on this checkbox to enter Procedure Central venous catheter insertion  09/18/2018    Active  CLAM4

## 2018-09-19 LAB
ALBUMIN SERPL ELPH-MCNC: 1.8 G/DL — LOW (ref 3.3–5)
ALP SERPL-CCNC: 34 U/L — LOW (ref 40–120)
ALT FLD-CCNC: 43 U/L — HIGH (ref 4–33)
APTT BLD: 29.5 SEC — SIGNIFICANT CHANGE UP (ref 27.5–37.4)
AST SERPL-CCNC: 81 U/L — HIGH (ref 4–32)
BASE EXCESS BLDA CALC-SCNC: -1.2 MMOL/L — SIGNIFICANT CHANGE UP
BASE EXCESS BLDA CALC-SCNC: 0.3 MMOL/L — SIGNIFICANT CHANGE UP
BASE EXCESS BLDA CALC-SCNC: 0.5 MMOL/L — SIGNIFICANT CHANGE UP
BILIRUB SERPL-MCNC: 0.7 MG/DL — SIGNIFICANT CHANGE UP (ref 0.2–1.2)
BLD GP AB SCN SERPL QL: NEGATIVE — SIGNIFICANT CHANGE UP
BUN SERPL-MCNC: 22 MG/DL — SIGNIFICANT CHANGE UP (ref 7–23)
BUN SERPL-MCNC: 23 MG/DL — SIGNIFICANT CHANGE UP (ref 7–23)
CA-I BLDA-SCNC: 1.05 MMOL/L — LOW (ref 1.15–1.29)
CALCIUM SERPL-MCNC: 6.9 MG/DL — LOW (ref 8.4–10.5)
CALCIUM SERPL-MCNC: 6.9 MG/DL — LOW (ref 8.4–10.5)
CHLORIDE BLDA-SCNC: 109 MMOL/L — HIGH (ref 96–108)
CHLORIDE SERPL-SCNC: 107 MMOL/L — SIGNIFICANT CHANGE UP (ref 98–107)
CHLORIDE SERPL-SCNC: 109 MMOL/L — HIGH (ref 98–107)
CK SERPL-CCNC: 2715 U/L — HIGH (ref 25–170)
CK SERPL-CCNC: 3406 U/L — HIGH (ref 25–170)
CO2 SERPL-SCNC: 21 MMOL/L — LOW (ref 22–31)
CO2 SERPL-SCNC: 22 MMOL/L — SIGNIFICANT CHANGE UP (ref 22–31)
CORTIS SERPL-MCNC: 24.5 UG/DL — HIGH (ref 2.7–18.4)
CREAT SERPL-MCNC: 1.12 MG/DL — SIGNIFICANT CHANGE UP (ref 0.5–1.3)
CREAT SERPL-MCNC: 1.19 MG/DL — SIGNIFICANT CHANGE UP (ref 0.5–1.3)
GLUCOSE BLDA-MCNC: 104 MG/DL — HIGH (ref 70–99)
GLUCOSE BLDA-MCNC: 114 MG/DL — HIGH (ref 70–99)
GLUCOSE BLDA-MCNC: 115 MG/DL — HIGH (ref 70–99)
GLUCOSE SERPL-MCNC: 114 MG/DL — HIGH (ref 70–99)
GLUCOSE SERPL-MCNC: 115 MG/DL — HIGH (ref 70–99)
HCO3 BLDA-SCNC: 24 MMOL/L — SIGNIFICANT CHANGE UP (ref 22–26)
HCO3 BLDA-SCNC: 25 MMOL/L — SIGNIFICANT CHANGE UP (ref 22–26)
HCO3 BLDA-SCNC: 25 MMOL/L — SIGNIFICANT CHANGE UP (ref 22–26)
HCT VFR BLD CALC: 25.5 % — LOW (ref 34.5–45)
HCT VFR BLD CALC: 25.8 % — LOW (ref 34.5–45)
HCT VFR BLDA CALC: 27.9 % — LOW (ref 34.5–46.5)
HCT VFR BLDA CALC: 29.3 % — LOW (ref 34.5–46.5)
HCT VFR BLDA CALC: 29.8 % — LOW (ref 34.5–46.5)
HGB BLD-MCNC: 9.1 G/DL — LOW (ref 11.5–15.5)
HGB BLD-MCNC: 9.4 G/DL — LOW (ref 11.5–15.5)
HGB BLDA-MCNC: 9 G/DL — LOW (ref 11.5–15.5)
HGB BLDA-MCNC: 9.4 G/DL — LOW (ref 11.5–15.5)
HGB BLDA-MCNC: 9.6 G/DL — LOW (ref 11.5–15.5)
INR BLD: 1.31 — HIGH (ref 0.88–1.17)
INR BLD: 1.34 — HIGH (ref 0.88–1.17)
LACTATE BLDA-SCNC: 1.2 MMOL/L — SIGNIFICANT CHANGE UP (ref 0.5–2)
LACTATE BLDA-SCNC: 1.3 MMOL/L — SIGNIFICANT CHANGE UP (ref 0.5–2)
MAGNESIUM SERPL-MCNC: 2.2 MG/DL — SIGNIFICANT CHANGE UP (ref 1.6–2.6)
MAGNESIUM SERPL-MCNC: 2.3 MG/DL — SIGNIFICANT CHANGE UP (ref 1.6–2.6)
MCHC RBC-ENTMCNC: 30.7 PG — SIGNIFICANT CHANGE UP (ref 27–34)
MCHC RBC-ENTMCNC: 31.4 PG — SIGNIFICANT CHANGE UP (ref 27–34)
MCHC RBC-ENTMCNC: 35.7 % — SIGNIFICANT CHANGE UP (ref 32–36)
MCHC RBC-ENTMCNC: 36.4 % — HIGH (ref 32–36)
MCV RBC AUTO: 86.1 FL — SIGNIFICANT CHANGE UP (ref 80–100)
MCV RBC AUTO: 86.3 FL — SIGNIFICANT CHANGE UP (ref 80–100)
NRBC # FLD: 0 — SIGNIFICANT CHANGE UP
NRBC # FLD: 0 — SIGNIFICANT CHANGE UP
PCO2 BLDA: 33 MMHG — SIGNIFICANT CHANGE UP (ref 32–48)
PCO2 BLDA: 34 MMHG — SIGNIFICANT CHANGE UP (ref 32–48)
PCO2 BLDA: 38 MMHG — SIGNIFICANT CHANGE UP (ref 32–48)
PH BLDA: 7.4 PH — SIGNIFICANT CHANGE UP (ref 7.35–7.45)
PH BLDA: 7.46 PH — HIGH (ref 7.35–7.45)
PH BLDA: 7.47 PH — HIGH (ref 7.35–7.45)
PHOSPHATE SERPL-MCNC: 2.6 MG/DL — SIGNIFICANT CHANGE UP (ref 2.5–4.5)
PHOSPHATE SERPL-MCNC: 2.8 MG/DL — SIGNIFICANT CHANGE UP (ref 2.5–4.5)
PLATELET # BLD AUTO: 47 K/UL — LOW (ref 150–400)
PLATELET # BLD AUTO: 52 K/UL — LOW (ref 150–400)
PMV BLD: 12.3 FL — SIGNIFICANT CHANGE UP (ref 7–13)
PMV BLD: 13 FL — SIGNIFICANT CHANGE UP (ref 7–13)
PO2 BLDA: 105 MMHG — SIGNIFICANT CHANGE UP (ref 83–108)
PO2 BLDA: 120 MMHG — HIGH (ref 83–108)
PO2 BLDA: 96 MMHG — SIGNIFICANT CHANGE UP (ref 83–108)
POTASSIUM BLDA-SCNC: 3.6 MMOL/L — SIGNIFICANT CHANGE UP (ref 3.4–4.5)
POTASSIUM BLDA-SCNC: 3.7 MMOL/L — SIGNIFICANT CHANGE UP (ref 3.4–4.5)
POTASSIUM BLDA-SCNC: 3.8 MMOL/L — SIGNIFICANT CHANGE UP (ref 3.4–4.5)
POTASSIUM SERPL-MCNC: 4 MMOL/L — SIGNIFICANT CHANGE UP (ref 3.5–5.3)
POTASSIUM SERPL-MCNC: 4.2 MMOL/L — SIGNIFICANT CHANGE UP (ref 3.5–5.3)
POTASSIUM SERPL-SCNC: 4 MMOL/L — SIGNIFICANT CHANGE UP (ref 3.5–5.3)
POTASSIUM SERPL-SCNC: 4.2 MMOL/L — SIGNIFICANT CHANGE UP (ref 3.5–5.3)
PROT SERPL-MCNC: 3.7 G/DL — LOW (ref 6–8.3)
PROTHROM AB SERPL-ACNC: 15.1 SEC — HIGH (ref 9.8–13.1)
PROTHROM AB SERPL-ACNC: 15.5 SEC — HIGH (ref 9.8–13.1)
RBC # BLD: 2.96 M/UL — LOW (ref 3.8–5.2)
RBC # BLD: 2.99 M/UL — LOW (ref 3.8–5.2)
RBC # FLD: 15.8 % — HIGH (ref 10.3–14.5)
RBC # FLD: 15.8 % — HIGH (ref 10.3–14.5)
RH IG SCN BLD-IMP: POSITIVE — SIGNIFICANT CHANGE UP
SAO2 % BLDA: 95.8 % — SIGNIFICANT CHANGE UP (ref 95–99)
SAO2 % BLDA: 98.2 % — SIGNIFICANT CHANGE UP (ref 95–99)
SAO2 % BLDA: 99 % — SIGNIFICANT CHANGE UP (ref 95–99)
SODIUM BLDA-SCNC: 131 MMOL/L — LOW (ref 136–146)
SODIUM BLDA-SCNC: 132 MMOL/L — LOW (ref 136–146)
SODIUM BLDA-SCNC: 135 MMOL/L — LOW (ref 136–146)
SODIUM SERPL-SCNC: 138 MMOL/L — SIGNIFICANT CHANGE UP (ref 135–145)
SODIUM SERPL-SCNC: 138 MMOL/L — SIGNIFICANT CHANGE UP (ref 135–145)
TROPONIN T, HIGH SENSITIVITY: 100 NG/L — CRITICAL HIGH (ref ?–14)
TROPONIN T, HIGH SENSITIVITY: 115 NG/L — CRITICAL HIGH (ref ?–14)
WBC # BLD: 10.61 K/UL — HIGH (ref 3.8–10.5)
WBC # BLD: 11.26 K/UL — HIGH (ref 3.8–10.5)
WBC # FLD AUTO: 10.61 K/UL — HIGH (ref 3.8–10.5)
WBC # FLD AUTO: 11.26 K/UL — HIGH (ref 3.8–10.5)

## 2018-09-19 PROCEDURE — 99292 CRITICAL CARE ADDL 30 MIN: CPT

## 2018-09-19 PROCEDURE — 36620 INSERTION CATHETER ARTERY: CPT

## 2018-09-19 PROCEDURE — 71045 X-RAY EXAM CHEST 1 VIEW: CPT | Mod: 26

## 2018-09-19 PROCEDURE — 99291 CRITICAL CARE FIRST HOUR: CPT

## 2018-09-19 PROCEDURE — 76937 US GUIDE VASCULAR ACCESS: CPT | Mod: 26

## 2018-09-19 RX ORDER — HYDROMORPHONE HYDROCHLORIDE 2 MG/ML
0.5 INJECTION INTRAMUSCULAR; INTRAVENOUS; SUBCUTANEOUS EVERY 4 HOURS
Qty: 0 | Refills: 0 | Status: DISCONTINUED | OUTPATIENT
Start: 2018-09-19 | End: 2018-09-22

## 2018-09-19 RX ORDER — INFLUENZA VIRUS VACCINE 15; 15; 15; 15 UG/.5ML; UG/.5ML; UG/.5ML; UG/.5ML
0.5 SUSPENSION INTRAMUSCULAR ONCE
Qty: 0 | Refills: 0 | Status: COMPLETED | OUTPATIENT
Start: 2018-09-19 | End: 2018-10-23

## 2018-09-19 RX ORDER — HYDROMORPHONE HYDROCHLORIDE 2 MG/ML
1 INJECTION INTRAMUSCULAR; INTRAVENOUS; SUBCUTANEOUS EVERY 4 HOURS
Qty: 0 | Refills: 0 | Status: DISCONTINUED | OUTPATIENT
Start: 2018-09-19 | End: 2018-09-22

## 2018-09-19 RX ORDER — BENZOCAINE AND MENTHOL 5; 1 G/100ML; G/100ML
1 LIQUID ORAL THREE TIMES A DAY
Qty: 0 | Refills: 0 | Status: DISCONTINUED | OUTPATIENT
Start: 2018-09-19 | End: 2018-10-20

## 2018-09-19 RX ORDER — ACETAMINOPHEN 500 MG
1000 TABLET ORAL ONCE
Qty: 0 | Refills: 0 | Status: COMPLETED | OUTPATIENT
Start: 2018-09-19 | End: 2018-09-19

## 2018-09-19 RX ADMIN — CHLORHEXIDINE GLUCONATE 1 APPLICATION(S): 213 SOLUTION TOPICAL at 18:00

## 2018-09-19 RX ADMIN — BENZOCAINE AND MENTHOL 1 LOZENGE: 5; 1 LIQUID ORAL at 19:48

## 2018-09-19 RX ADMIN — HEPARIN SODIUM 5000 UNIT(S): 5000 INJECTION INTRAVENOUS; SUBCUTANEOUS at 15:00

## 2018-09-19 RX ADMIN — Medication 1000 MILLIGRAM(S): at 20:18

## 2018-09-19 RX ADMIN — HEPARIN SODIUM 5000 UNIT(S): 5000 INJECTION INTRAVENOUS; SUBCUTANEOUS at 21:47

## 2018-09-19 RX ADMIN — CHLORHEXIDINE GLUCONATE 15 MILLILITER(S): 213 SOLUTION TOPICAL at 05:05

## 2018-09-19 RX ADMIN — PANTOPRAZOLE SODIUM 40 MILLIGRAM(S): 20 TABLET, DELAYED RELEASE ORAL at 21:47

## 2018-09-19 RX ADMIN — HEPARIN SODIUM 5000 UNIT(S): 5000 INJECTION INTRAVENOUS; SUBCUTANEOUS at 05:05

## 2018-09-19 RX ADMIN — Medication 400 MILLIGRAM(S): at 19:48

## 2018-09-19 NOTE — PROGRESS NOTE ADULT - SUBJECTIVE AND OBJECTIVE BOX
No acute events overnight. Weaning pressors and may wean to extubation.     PE:  Vital Signs Last 24 Hrs  T(C): 37.4 (19 Sep 2018 04:00), Max: 37.4 (19 Sep 2018 04:00)  T(F): 99.4 (19 Sep 2018 04:00), Max: 99.4 (19 Sep 2018 04:00)  HR: 52 (19 Sep 2018 06:00) (49 - 97)  BP: 115/56 (19 Sep 2018 00:00) (85/58 - 147/95)  BP(mean): 71 (19 Sep 2018 00:00) (63 - 107)  RR: 20 (19 Sep 2018 06:00) (18 - 27)  SpO2: 100% (19 Sep 2018 06:00) (98% - 100%)  Gen: No acute distress  LLE dressing c/d/i  JEREMY appropriate  unable to assess motor/sens  toes wwp, cap refill < s sec     Labs:                        9.1    10.61 )-----------( 52       ( 19 Sep 2018 05:00 )             25.5   09-19    138  |  107  |  22  ----------------------------<  114<H>  4.0   |  22  |  1.12    Ca    6.9<L>      19 Sep 2018 05:00  Phos  2.6     09-19  Mg     2.2     09-19    TPro  3.7<L>  /  Alb  1.8<L>  /  TBili  0.7  /  DBili  x   /  AST  81<H>  /  ALT  43<H>  /  AlkPhos  34<L>  09-19        Assessment/Plan:  67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 Weaning pressors and may wean to extubation.     PE:  Vital Signs Last 24 Hrs  T(C): 37.4 (19 Sep 2018 04:00), Max: 37.4 (19 Sep 2018 04:00)  T(F): 99.4 (19 Sep 2018 04:00), Max: 99.4 (19 Sep 2018 04:00)  HR: 52 (19 Sep 2018 06:00) (49 - 97)  BP: 115/56 (19 Sep 2018 00:00) (85/58 - 147/95)  BP(mean): 71 (19 Sep 2018 00:00) (63 - 107)  RR: 20 (19 Sep 2018 06:00) (18 - 27)  SpO2: 100% (19 Sep 2018 06:00) (98% - 100%)  Gen: No acute distress  LLE dressing c/d/i  JEREMY appropriate  unable to assess motor/sens  toes wwp, cap refill < s sec     Labs:                        9.1    10.61 )-----------( 52       ( 19 Sep 2018 05:00 )             25.5   09-19    138  |  107  |  22  ----------------------------<  114<H>  4.0   |  22  |  1.12    Ca    6.9<L>      19 Sep 2018 05:00  Phos  2.6     09-19  Mg     2.2     09-19    TPro  3.7<L>  /  Alb  1.8<L>  /  TBili  0.7  /  DBili  x   /  AST  81<H>  /  ALT  43<H>  /  AlkPhos  34<L>  09-19        Assessment/Plan:  67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

## 2018-09-19 NOTE — PROGRESS NOTE ADULT - SUBJECTIVE AND OBJECTIVE BOX
SICU AM Progress Note :    Overnight Events:    Earlier in the shift pt was given 3 L bolus , 2 units PRBC was transfused . Pt was off Vaso gtt earlier yesterday . 2 of Fent , Coming down on Leophed  0.07----> 0.05 . CI: 1.7 , CVP: 8 ,  SV: 9. Average urine output :  30-40 ml /hr .           HISTORY OF PRESENT ILLNESS:  JULIA UMANA is a 67y Female PMH HTN, presented for scheduled surgical resection of left inguinal mass. Patient underwent enbloc resection of inguinal mass, left femoral to below knee popliteal bypass, venous resection, reconstruction with rectus abdominus myocutaneous flap. At start of case patient was noted to have bigeminy with ventricular rate of 30s-40s. EBL 1500cc, received 3u pRBC. Post-op patient left intubated, sedated and brought to SICU for close hemodynamic monitoring. Now off Vaso gtt. 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, unspecified  HTN (hypertension)      PAST SURGICAL HISTORY: No significant past surgical history      FAMILY HISTORY: No pertinent family history in first degree relatives      SOCIAL HISTORY: Slovenian speaking    CODE STATUS: FULL    HOME MEDICATIONS:    ALLERGIES: penicillin (Hives)    =================================================    Neurologic Medications:  fentaNYL    Injectable 50 MICROGram(s) IV Push once  fentaNYL   Infusion 0.5 MICROgram(s)/kG/Hr IV Continuous <Continuous>    Respiratory Medications:    Cardiovascular Medications:  norepinephrine Infusion 0.05 MICROgram(s)/kG/Min IV Continuous <Continuous>    Gastrointestinal Medications:  lactated ringers Bolus 1000 milliLiter(s) IV Bolus once  lactated ringers. 1000 milliLiter(s) IV Continuous <Continuous>  pantoprazole  Injectable 40 milliGRAM(s) IV Push at bedtime    Genitourinary Medications:    Hematologic/Oncologic Medications:  heparin  Injectable 5000 Unit(s) SubCutaneous every 8 hours    Antimicrobials/Immunologic Medications:    Endocrine/Metabolic Medications:  insulin lispro (HumaLOG) corrective regimen sliding scale   SubCutaneous every 6 hours  vasopressin Infusion 0.04 Unit(s)/Min IV Continuous <Continuous>    Topical/Other Medications:  chlorhexidine 0.12% Liquid 15 milliLiter(s) Swish and Spit two times a day  chlorhexidine 4% Liquid 1 Application(s) Topical <User Schedule>    =================================================================    T(C): 36.6 (09-19-18 @ 00:00), Max: 37.1 (09-18-18 @ 20:00)  HR: 53 (09-19-18 @ 00:00) (49 - 97)  BP: 115/56 (09-19-18 @ 00:00) (85/58 - 147/95)  BP(mean): 71 (09-19-18 @ 00:00) (63 - 107)  ABP: 121/63 (09-19-18 @ 00:00) (85/65 - 165/106)  ABP(mean): 83 (09-19-18 @ 00:00) (68 - 115)  RR: 20 (09-19-18 @ 00:00) (0 - 27)  SpO2: 100% (09-19-18 @ 00:00) (99% - 100%)  Wt(kg): --  CVP(mm Hg): 8 (09-19-18 @ 00:00) (4 - 10)  CI: 1.7 (09-19-18 @ 00:00) (0.9 - 2)  CAPILLARY BLOOD GLUCOSE      POCT Blood Glucose.: 114 mg/dL (18 Sep 2018 23:55)  POCT Blood Glucose.: 111 mg/dL (18 Sep 2018 18:42)   N/A      09-17 @ 07:01  -  09-18 @ 07:00  --------------------------------------------------------  IN:    EPINEPHrine Infusion: 18.5 mL    EPINEPHrine Infusion: 120.2 mL    fentaNYL  Infusion: 123 mL    IV PiggyBack: 100 mL    Lactated Ringers IV Bolus: 1000 mL    lactated ringers.: 1000 mL    norepinephrine Infusion: 73.9 mL    phenylephrine   Infusion: 132 mL    vasopressin Infusion: 2.4 mL  Total IN: 2570 mL    OUT:    Bulb: 90 mL    Bulb: 60 mL    Bulb: 410 mL    Indwelling Catheter - Urethral: 140 mL  Total OUT: 700 mL    Total NET: 1870 mL      09-18 @ 07:01  -  09-19 @ 01:28  --------------------------------------------------------  IN:    0.9% NaCl: 2000 mL    EPINEPHrine Infusion: 141 mL    fentaNYL  Infusion: 221.4 mL    lactated ringers.: 2050 mL    norepinephrine Infusion: 109.6 mL    Packed Red Blood Cells: 300 mL    Plasma: 340 mL    vasopressin Infusion: 9.6 mL  Total IN: 5171.6 mL    OUT:    Bulb: 415 mL    Bulb: 105 mL    Bulb: 50 mL    Indwelling Catheter - Urethral: 570 mL  Total OUT: 1140 mL    Total NET: 4031.6 mL    ===================================================    PHYSICAL EXAM     NEURO  Exam: RASS -1, neurologically intact. Awake     RESPIRATORY  Exam : Coarse breath sounds b/l   Mechanical Ventilation: Mode: AC/ CMV (Assist Control/ Continuous Mandatory Ventilation), RR (machine): 16, RR (patient): 16, TV (machine): 350, FiO2: 40, PEEP: 5, MAP: 8.3, PIP: 15  ABG - ( 17 Sep 2018 22:20 )  pH: 7.36  /  pCO2: 35    /  pO2: 147   / HCO3: 20    / Base Excess: -5.7  /  SaO2: 98.7    Lactate: 4.6      Exam:  Cardiac Rhythm: S1 S 2 heard , RRR     GI/NUTRITION  Exam: Soft, NT/ND. Incisions dressed, c/d/i. JPX 3 : # 1 ( LLQ) , # 2 ( RLQ) , # 3 ( Left Leg) :  serosanguinous output  Diet: NPO      PATIENT CARE ACCESS DEVICES:  [X ] Peripheral IV  [x] Central Venous Line	[X] R	[ ] L	[X ] IJ	[ ] Fem	[ ] SC	Placed:   [x] Arterial Line		[ ] R	[ X] L	[ ] Fem	[ X] Rad	[ ] Ax	Placed: 9/19/18  [ ] PICC:					[ ] Mediport  [x] Urinary Catheter, Date Placed:   [x] Necessity of urinary, arterial, and venous catheters discussed    OTHER MEDICATIONS: chlorhexidine 0.12% Liquid 15 milliLiter(s) Swish and Spit two times a day  chlorhexidine 4% Liquid 1 Application(s) Topical <User Schedule>      IMAGING STUDIES:  CT Angio Lower Extremity w/ IV Cont, Left (07.13.18 @ 10:39)  The abdominal aorta shows no evidenceof stenosis or   aneurysm formation.  The iliac arteries show no evidence of   hemodynamically significant stenosis.    There is a large, soft tissue mass located anteriorly in the left groin   arising below the left anterior superior iliac spine andextending down   to the level of the proximal femur. It shows homogeneous attenuation   similar to adjacent muscle and it measures approximately 10.8 x 9.0 x   15.2 cm. Arterial branches of the left profunda femoral artery course   through it. The common femoral artery, profunda femoral artery and   proximal superficial femoral artery are not occluded or narrowed,   although they are encased by the posterior portion of the mass. There is   no evidence of bony erosion of adjacent bony structures nor deep pelvic   lymphadenopathy. However, there is diffuse skin thickening and   subcutaneous edema of the left leg from the groin downward which is   primarily located anteriorly in the upper leg, but then progresses to be   circumferentially distributed in the lower most cuts near the knee.   Integrity of the left leg venous structures cannot be evaluated in this   arterial phase scan.    The visualized right leg runoff shows normal superficial femoral artery   and above-knee popliteal artery. There is decreased density of arterial   contrast from the right knee downward compared with the left. It is   difficult to determine whether this represents compromised distal flow on   the right or enhanced distal flow on the left. Superficial venous   varicosities are seen posterior to the right knee and along the medial   right thigh.    The visualized left leg runoff shows normal superficial femoral artery,   popliteal artery and proximal anterior tibial artery.

## 2018-09-19 NOTE — PROGRESS NOTE ADULT - ATTENDING COMMENTS
Seen and examined, chart and note reviewed, case discussed with SICU team    Hypotension/shock  a.  Off levo, vaso gtt at this time  b.  Continue IVF resuscitation    Respiratory failure  a.  CPAP weaning trial at this time  b.  Extubate at this time    Anemia  a.  Stable  b.  Received pRBC overnight    At risk for malnutrition  a.  NPO at this time  b.  NGT in place    Spent 75 minutes in critical care
Seen and examined.  Chart and note reviewed.  Case discussed with SICU team    Hypotension/shock  a.  Administer 2L fluid bolus.  Increase maintenance fluid  b.  Wean Epi, levo, vaso (in order) as tolerated  c.  Trend lactate Q 2    Respiratory failure  a.  With adequate gas exchange at this time. Continue vent support  b.  Check ABG    CAD  a.  Trend troponin  b.  Transfuse as needed  c.  Hold ASA, plavix and statin at this time    DAYTON  a.  May require CVVH  b.  Discussed with family    Spent 105 minutes in critical care

## 2018-09-19 NOTE — PROGRESS NOTE ADULT - ASSESSMENT
JULIA UMANA is a 67y Female PMH HTN, presented for scheduled surgical resection of left inguinal mass, intra-op course c/b bigeminy post-operatively admitted to SICU for close monitoring remiqans intubated , Off Vaso , Coming down on Levo     Neuro: Sedated on fentanyl gtt  - Continue fentanyl gtt, wean as tolerated  - RASS -1    Resp: Intubated - 16/350/5/40  -continue to monitor ABGs  -SBT as tolerated     Cardiovascular: Bigeminy at start of case, undifferentiated shock requiring pressors  - requiring levo/epi/vaso this AM, will furthur rescuscitate and wean pressors as tolerated  - Seen by EP: NTD  - Post-op echo improved from pre-operative echo  - Cardiac enzymes uptrending, f/u with TTE and cardiology recommendations  - Post op EKG with q waves III, avF, without ZULLY/ depressions   - Vascular checks q1hr  -Off VAsopressin , COming down on Levo  0.07---> 0.05. Will wean off as tolerated     Gastrointestinal:   - Continue NPO  - protonix  - Await return of bowel function    /Renal: Oliguria, worsening DAYTON   - normosol bolus x 2L, c/w LR at 100 cc/hr   - Engel in place, monitor I/Os.   - Monitor drain output (JEREMY x3)    Heme:   - Monitor cbc q4h  - Fibrinogen levels decreased, prolonged PTT - f/u repeat labs  - follow up post transfusion level     ID: No acute issues  - Monitor for fever, leukocytosis    Endo:  - No acute issues    Dispo: SICU

## 2018-09-19 NOTE — PROGRESS NOTE ADULT - SUBJECTIVE AND OBJECTIVE BOX
Clinically improving  Approaching extubation  Left thigh and foot warm  Palpable DP pulse  Foot grossly neurologically intact    Continue supportive ICU care  Stable from vascular standpoint

## 2018-09-19 NOTE — PROGRESS NOTE ADULT - ASSESSMENT
ASSESSMENT:     67y F s/p L inguinal mass resection, vessel reconstruction with vascular surgery, and VRAM flap reconstruction;     PLAN:   - Continue SICU care   - Continue JEREMY drain/care  - Continue dressing care   - DVT Ppx  - IVF  - Activity per vascular/ortho  - Will follow   - keep dressing on until pod5

## 2018-09-19 NOTE — PROGRESS NOTE ADULT - SUBJECTIVE AND OBJECTIVE BOX
JULIA UMANA  [id number]    Subjective:    Patient seen and examined at bedside in SICU with plastic surgery team.   Patient stable in SICU care, no acute events overnight.   Remains intubated on lessened pressor support.     T(C): 37.4 (09-19-18 @ 04:00), Max: 37.4 (09-19-18 @ 04:00)  HR: 53 (09-19-18 @ 06:51) (49 - 94)  BP: 115/56 (09-19-18 @ 00:00) (85/58 - 147/95)  BP(mean): 71 (09-19-18 @ 00:00) (63 - 107)  ABP: 106/57 (09-19-18 @ 06:00) (90/56 - 165/106)  ABP(mean): 75 (09-19-18 @ 06:00) (68 - 115)  RR: 20 (09-19-18 @ 06:00) (18 - 27)  SpO2: 99% (09-19-18 @ 06:51) (98% - 100%)  Wt(kg): --  CVP(mm Hg): 10 (09-19-18 @ 06:00) (4 - 13)  CI: 1.8 (09-19-18 @ 06:00) (1.2 - 2)  CAPILLARY BLOOD GLUCOSE      POCT Blood Glucose.: 121 mg/dL (19 Sep 2018 04:59)  POCT Blood Glucose.: 114 mg/dL (18 Sep 2018 23:55)  POCT Blood Glucose.: 111 mg/dL (18 Sep 2018 18:42)   N/A      09-18 @ 07:01  -  09-19 @ 07:00  --------------------------------------------------------  IN:    0.9% NaCl: 2000 mL    EPINEPHrine Infusion: 141 mL    fentaNYL  Infusion: 282.9 mL    lactated ringers.: 2800 mL    norepinephrine Infusion: 121.1 mL    Packed Red Blood Cells: 300 mL    Plasma: 340 mL    vasopressin Infusion: 9.6 mL  Total IN: 5994.6 mL    OUT:    Bulb: 465 mL    Bulb: 135 mL    Bulb: 65 mL    Indwelling Catheter - Urethral: 815 mL  Total OUT: 1480 mL    Total NET: 4514.6 mL            PHYSICAL EXAM:    General: No acute distress, sedated on ventilation support.   Abdomen: Aqualcel dressing intact, no strikethrough. Incisions CDI.   LE: Left leg with ace bandage in place, no strikethrough. JEREMY X 2 with SS output.             MEDICATIONS  (STANDING):  chlorhexidine 0.12% Liquid 15 milliLiter(s) Swish and Spit two times a day  chlorhexidine 4% Liquid 1 Application(s) Topical <User Schedule>  EPINEPHrine    Infusion 0.01 MICROgram(s)/kG/Min (2.314 mL/Hr) IV Continuous <Continuous>  fentaNYL    Injectable 50 MICROGram(s) IV Push once  fentaNYL   Infusion 0.5 MICROgram(s)/kG/Hr (3.085 mL/Hr) IV Continuous <Continuous>  heparin  Injectable 5000 Unit(s) SubCutaneous every 8 hours  insulin lispro (HumaLOG) corrective regimen sliding scale   SubCutaneous every 6 hours  lactated ringers. 1000 milliLiter(s) (100 mL/Hr) IV Continuous <Continuous>  norepinephrine Infusion 0.05 MICROgram(s)/kG/Min (2.892 mL/Hr) IV Continuous <Continuous>  vasopressin Infusion 0.04 Unit(s)/Min (2.4 mL/Hr) IV Continuous <Continuous>    MEDICATIONS  (PRN):

## 2018-09-20 LAB
ALBUMIN SERPL ELPH-MCNC: 1.8 G/DL — LOW (ref 3.3–5)
ALP SERPL-CCNC: 46 U/L — SIGNIFICANT CHANGE UP (ref 40–120)
ALT FLD-CCNC: 55 U/L — HIGH (ref 4–33)
APTT BLD: 29.2 SEC — SIGNIFICANT CHANGE UP (ref 27.5–37.4)
AST SERPL-CCNC: 91 U/L — HIGH (ref 4–32)
BILIRUB SERPL-MCNC: 0.5 MG/DL — SIGNIFICANT CHANGE UP (ref 0.2–1.2)
BUN SERPL-MCNC: 15 MG/DL — SIGNIFICANT CHANGE UP (ref 7–23)
CALCIUM SERPL-MCNC: 7.2 MG/DL — LOW (ref 8.4–10.5)
CHLORIDE SERPL-SCNC: 106 MMOL/L — SIGNIFICANT CHANGE UP (ref 98–107)
CO2 SERPL-SCNC: 21 MMOL/L — LOW (ref 22–31)
CREAT SERPL-MCNC: 0.8 MG/DL — SIGNIFICANT CHANGE UP (ref 0.5–1.3)
GLUCOSE SERPL-MCNC: 84 MG/DL — SIGNIFICANT CHANGE UP (ref 70–99)
HCT VFR BLD CALC: 26.3 % — LOW (ref 34.5–45)
HGB BLD-MCNC: 9.1 G/DL — LOW (ref 11.5–15.5)
INR BLD: 1.13 — SIGNIFICANT CHANGE UP (ref 0.88–1.17)
MAGNESIUM SERPL-MCNC: 2.4 MG/DL — SIGNIFICANT CHANGE UP (ref 1.6–2.6)
MCHC RBC-ENTMCNC: 31.6 PG — SIGNIFICANT CHANGE UP (ref 27–34)
MCHC RBC-ENTMCNC: 34.6 % — SIGNIFICANT CHANGE UP (ref 32–36)
MCV RBC AUTO: 91.3 FL — SIGNIFICANT CHANGE UP (ref 80–100)
NRBC # FLD: 0 — SIGNIFICANT CHANGE UP
PHOSPHATE SERPL-MCNC: 2.1 MG/DL — LOW (ref 2.5–4.5)
PLATELET # BLD AUTO: 59 K/UL — LOW (ref 150–400)
PMV BLD: 12.2 FL — SIGNIFICANT CHANGE UP (ref 7–13)
POTASSIUM SERPL-MCNC: 3.9 MMOL/L — SIGNIFICANT CHANGE UP (ref 3.5–5.3)
POTASSIUM SERPL-SCNC: 3.9 MMOL/L — SIGNIFICANT CHANGE UP (ref 3.5–5.3)
PROT SERPL-MCNC: 4.1 G/DL — LOW (ref 6–8.3)
PROTHROM AB SERPL-ACNC: 12.6 SEC — SIGNIFICANT CHANGE UP (ref 9.8–13.1)
RBC # BLD: 2.88 M/UL — LOW (ref 3.8–5.2)
RBC # FLD: 17.2 % — HIGH (ref 10.3–14.5)
SODIUM SERPL-SCNC: 139 MMOL/L — SIGNIFICANT CHANGE UP (ref 135–145)
WBC # BLD: 10.18 K/UL — SIGNIFICANT CHANGE UP (ref 3.8–10.5)
WBC # FLD AUTO: 10.18 K/UL — SIGNIFICANT CHANGE UP (ref 3.8–10.5)

## 2018-09-20 PROCEDURE — 71045 X-RAY EXAM CHEST 1 VIEW: CPT | Mod: 26

## 2018-09-20 RX ORDER — SODIUM CHLORIDE 9 MG/ML
1000 INJECTION, SOLUTION INTRAVENOUS
Qty: 0 | Refills: 0 | Status: DISCONTINUED | OUTPATIENT
Start: 2018-09-20 | End: 2018-09-23

## 2018-09-20 RX ORDER — INSULIN LISPRO 100/ML
VIAL (ML) SUBCUTANEOUS
Qty: 0 | Refills: 0 | Status: DISCONTINUED | OUTPATIENT
Start: 2018-09-20 | End: 2018-09-21

## 2018-09-20 RX ADMIN — HYDROMORPHONE HYDROCHLORIDE 1 MILLIGRAM(S): 2 INJECTION INTRAMUSCULAR; INTRAVENOUS; SUBCUTANEOUS at 16:35

## 2018-09-20 RX ADMIN — PANTOPRAZOLE SODIUM 40 MILLIGRAM(S): 20 TABLET, DELAYED RELEASE ORAL at 21:12

## 2018-09-20 RX ADMIN — HYDROMORPHONE HYDROCHLORIDE 0.5 MILLIGRAM(S): 2 INJECTION INTRAMUSCULAR; INTRAVENOUS; SUBCUTANEOUS at 06:10

## 2018-09-20 RX ADMIN — HEPARIN SODIUM 5000 UNIT(S): 5000 INJECTION INTRAVENOUS; SUBCUTANEOUS at 05:53

## 2018-09-20 RX ADMIN — SODIUM CHLORIDE 50 MILLILITER(S): 9 INJECTION, SOLUTION INTRAVENOUS at 13:46

## 2018-09-20 RX ADMIN — HYDROMORPHONE HYDROCHLORIDE 1 MILLIGRAM(S): 2 INJECTION INTRAMUSCULAR; INTRAVENOUS; SUBCUTANEOUS at 16:10

## 2018-09-20 RX ADMIN — HEPARIN SODIUM 5000 UNIT(S): 5000 INJECTION INTRAVENOUS; SUBCUTANEOUS at 21:12

## 2018-09-20 RX ADMIN — HYDROMORPHONE HYDROCHLORIDE 1 MILLIGRAM(S): 2 INJECTION INTRAMUSCULAR; INTRAVENOUS; SUBCUTANEOUS at 21:27

## 2018-09-20 RX ADMIN — HYDROMORPHONE HYDROCHLORIDE 1 MILLIGRAM(S): 2 INJECTION INTRAMUSCULAR; INTRAVENOUS; SUBCUTANEOUS at 21:12

## 2018-09-20 RX ADMIN — HEPARIN SODIUM 5000 UNIT(S): 5000 INJECTION INTRAVENOUS; SUBCUTANEOUS at 13:47

## 2018-09-20 RX ADMIN — CHLORHEXIDINE GLUCONATE 1 APPLICATION(S): 213 SOLUTION TOPICAL at 12:28

## 2018-09-20 RX ADMIN — HYDROMORPHONE HYDROCHLORIDE 0.5 MILLIGRAM(S): 2 INJECTION INTRAMUSCULAR; INTRAVENOUS; SUBCUTANEOUS at 05:54

## 2018-09-20 NOTE — PROGRESS NOTE ADULT - SUBJECTIVE AND OBJECTIVE BOX
PROGRESS NOTES - VASCULAR SURGERY    SUBJECTIVE:  No acute overnight events. Extubated, not requiring pressors.    OBJECTIVE:  Vital Signs Last 24 Hrs  T(C): 37 (20 Sep 2018 04:00), Max: 37.6 (19 Sep 2018 20:00)  T(F): 98.6 (20 Sep 2018 04:00), Max: 99.7 (19 Sep 2018 20:00)  HR: 63 (20 Sep 2018 07:00) (48 - 67)  BP: 96/59 (20 Sep 2018 07:00) (93/56 - 123/62)  BP(mean): 69 (20 Sep 2018 07:00) (62 - 77)  RR: 23 (20 Sep 2018 07:00) (10 - 27)  SpO2: 94% (20 Sep 2018 07:00) (92% - 100%)    I&O's Detail    19 Sep 2018 07:01  -  20 Sep 2018 07:00  --------------------------------------------------------  IN:    lactated ringers.: 2550 mL    norepinephrine Infusion: 2 mL  Total IN: 2552 mL    OUT:    Bulb: 260 mL    Bulb: 230 mL    Bulb: 1540 mL    Indwelling Catheter - Urethral: 1120 mL  Total OUT: 3150 mL    Total NET: -598 mL          Inpatient Medications:  MEDICATIONS  (STANDING):  chlorhexidine 0.12% Liquid 15 milliLiter(s) Swish and Spit two times a day  chlorhexidine 4% Liquid 1 Application(s) Topical <User Schedule>  heparin  Injectable 5000 Unit(s) SubCutaneous every 8 hours  influenza   Vaccine 0.5 milliLiter(s) IntraMuscular once  insulin lispro (HumaLOG) corrective regimen sliding scale   SubCutaneous every 6 hours  lactated ringers. 1000 milliLiter(s) (125 mL/Hr) IV Continuous <Continuous>  pantoprazole  Injectable 40 milliGRAM(s) IV Push at bedtime    MEDICATIONS  (PRN):  benzocaine 15 mG/menthol 3.6 mG Lozenge 1 Lozenge Oral three times a day PRN Sore Throat  HYDROmorphone  Injectable 0.5 milliGRAM(s) IV Push every 4 hours PRN Moderate Pain (4 - 6)  HYDROmorphone  Injectable 1 milliGRAM(s) IV Push every 4 hours PRN Severe Pain (7 - 10)      Physical Examination:  GEN: NAD, resting quietly; NGT in place with bilious output  NEURO: no focal deficits  PULM: extubated, no increased WOB  CV: regular rate, peripheral pulses intact  ABD: soft, ND, dressings in place; JEREMY drains x3 with serosanguinous output  EXTR: LLE wrapped from ankle to thig; palpable DP pulses bilaterally    LABS:                        9.1    10.18 )-----------( 59       ( 20 Sep 2018 03:30 )             26.3       09-20    139  |  106  |  15  ----------------------------<  84  3.9   |  21<L>  |  0.80    Ca    7.2<L>      20 Sep 2018 03:15  Phos  2.1     09-20  Mg     2.4     09-20    TPro  4.1<L>  /  Alb  1.8<L>  /  TBili  0.5  /  DBili  x   /  AST  91<H>  /  ALT  55<H>  /  AlkPhos  46  09-20      CULTURES:  no new imaging    IMAGING:  CXR 9/19  IMPRESSION:     Improving bilateral small pleural effusions.    Endotracheal tube terminates above the mallory.

## 2018-09-20 NOTE — PROGRESS NOTE ADULT - SUBJECTIVE AND OBJECTIVE BOX
No acute events overnight. Pain controlled. Pt extubated yesterday. Off pressors.     PE:  Vital Signs Last 24 Hrs  T(C): 37 (20 Sep 2018 04:00), Max: 37.6 (19 Sep 2018 20:00)  T(F): 98.6 (20 Sep 2018 04:00), Max: 99.7 (19 Sep 2018 20:00)  HR: 61 (20 Sep 2018 06:00) (48 - 67)  BP: 93/56 (20 Sep 2018 06:00) (93/56 - 123/62)  BP(mean): 65 (20 Sep 2018 06:00) (62 - 77)  RR: 26 (20 Sep 2018 06:00) (10 - 27)  SpO2: 92% (20 Sep 2018 06:00) (92% - 100%)    Gen: No acute distress  LLE: dressing c/d/i  JEREMY 1340, 170,110  Motor intact TA/GCS/EHL/FHL   SILT DP/SP/Tib  cap refill <2 sec, wwp       Labs:                        9.1    10.18 )-----------( 59       ( 20 Sep 2018 03:30 )             26.3   09-20    139  |  106  |  15  ----------------------------<  84  3.9   |  21<L>  |  0.80    Ca    7.2<L>      20 Sep 2018 03:15  Phos  2.1     09-20  Mg     2.4     09-20    TPro  4.1<L>  /  Alb  1.8<L>  /  TBili  0.5  /  DBili  x   /  AST  91<H>  /  ALT  55<H>  /  AlkPhos  46  09-20    Assessment/Plan:  67yFemale s/p L thigh excision liposarcoma, bypass, rectus flap  -pain control  -PT  -WBAT  -OOB  -DVT ppx  -FU drain outputs  -FU PRS/Vasc  -SICU care

## 2018-09-20 NOTE — PROGRESS NOTE ADULT - ASSESSMENT
ASSESSMENT:     67y F s/p L inguinal mass resection, vessel reconstruction with vascular surgery, and VRAM flap reconstruction; now extubated doing well in SICU     PLAN:   - Continue SICU care   - Continue JEREMY drain/care  - Continue dressing care   - Replace aquacel dressing as needed - continue until POD 5   - DVT Ppx  - IVF  - Activity per vascular/ortho  - Will follow   - Pain control

## 2018-09-20 NOTE — PROGRESS NOTE ADULT - ASSESSMENT
66 yo F PMH HTN presented for scheduled surgical resection of left inguinal mass with left external iliac to above knee popliteal artery bypass w/ PTFE. LLE warm with palpable DP pulse, movement intact.    -appreciate SICU care  -vascular surgery will continue to follow

## 2018-09-20 NOTE — PROGRESS NOTE ADULT - SUBJECTIVE AND OBJECTIVE BOX
JULIA UAMNA  [id number]    Subjective:    Patient seen and examined in SICU.   No acute events overnight, patient now extubated, stable.          Objective:  T(C): 37 (09-20-18 @ 04:00), Max: 37.6 (09-19-18 @ 20:00)  HR: 63 (09-20-18 @ 07:00) (48 - 67)  BP: 96/59 (09-20-18 @ 07:00) (93/56 - 123/62)  RR: 23 (09-20-18 @ 07:00) (10 - 27)  SpO2: 94% (09-20-18 @ 07:00) (92% - 100%)  Wt(kg): --   09-20    139  |  106  |  15  ----------------------------<  84  3.9   |  21<L>  |  0.80    Ca    7.2<L>      20 Sep 2018 03:15  Phos  2.1     09-20  Mg     2.4     09-20    TPro  4.1<L>  /  Alb  1.8<L>  /  TBili  0.5  /  DBili  x   /  AST  91<H>  /  ALT  55<H>  /  AlkPhos  46  09-20                        9.1    10.18 )-----------( 59       ( 20 Sep 2018 03:30 )             26.3       09-19 @ 07:01  -  09-20 @ 07:00  --------------------------------------------------------  IN: 2552 mL / OUT: 3150 mL / NET: -598 mL      PHYSICAL EXAM:    General: No acute distress, sedated on ventilation support.   Abdomen: Aqualcel dressing intact, No strikethrough. Incisions CDI.   LE: Left leg with ace bandage in place, no strikethrough. JEREMY X 2 with SS output.               MEDICATIONS  (STANDING):  chlorhexidine 0.12% Liquid 15 milliLiter(s) Swish and Spit two times a day  chlorhexidine 4% Liquid 1 Application(s) Topical <User Schedule>  heparin  Injectable 5000 Unit(s) SubCutaneous every 8 hours  influenza   Vaccine 0.5 milliLiter(s) IntraMuscular once  insulin lispro (HumaLOG) corrective regimen sliding scale   SubCutaneous every 6 hours  lactated ringers. 1000 milliLiter(s) (125 mL/Hr) IV Continuous <Continuous>  pantoprazole  Injectable 40 milliGRAM(s) IV Push at bedtime    MEDICATIONS  (PRN):  benzocaine 15 mG/menthol 3.6 mG Lozenge 1 Lozenge Oral three times a day PRN Sore Throat  HYDROmorphone  Injectable 0.5 milliGRAM(s) IV Push every 4 hours PRN Moderate Pain (4 - 6)  HYDROmorphone  Injectable 1 milliGRAM(s) IV Push every 4 hours PRN Severe Pain (7 - 10)

## 2018-09-20 NOTE — PROGRESS NOTE ADULT - SUBJECTIVE AND OBJECTIVE BOX
Overnight Events: Patient extubated 9/19, oxygenating well on room air with adequate urine output. Afebrile overnight. Off pressors maintaining MAPS > 65.     JULIA UMANA is a 67y Female PMH HTN, presented for scheduled surgical resection of left inguinal mass. Patient underwent enbloc resection of inguinal mass, left femoral to below knee popliteal bypass, venous resection, reconstruction with rectus abdominus myocutaneous flap. At start of case patient was noted to have bigeminy with ventricular rate of 30s-40s. EBL 1500cc, received 3u pRBC. Post-op patient left intubated, sedated and brought to SICU for close hemodynamic monitoring. Now off Vaso gtt. Slowly coming doen on Levo gtt     SUBJECTIVE/ROS:  [X] A ten-point review of systems was otherwise negative except as noted.  [ ] Due to altered mental status/intubation, subjective information were not able to be obtained from the patient. History was obtained, to the extent possible, from review of the chart and collateral sources of information.      NEURO  RASS: 0  Exam: AAOx3, no motor/ sensory deficits  Meds: HYDROmorphone  Injectable 0.5 milliGRAM(s) IV Push every 4 hours PRN Moderate Pain (4 - 6)  HYDROmorphone  Injectable 1 milliGRAM(s) IV Push every 4 hours PRN Severe Pain (7 - 10)    [x] Adequacy of sedation and pain control has been assessed and adjusted      RESPIRATORY  RR: 20 (09-19-18 @ 21:00) (10 - 27)  SpO2: 99% (09-19-18 @ 21:00) (95% - 100%)  Wt(kg): --  Exam: unlabored, clear to auscultation bilaterally  Mechanical Ventilation: Mode: CPAP with PS, RR (patient): 13, FiO2: 40, PEEP: 5, PS: 5, MAP: 6.8, PIP: 12  ABG - ( 19 Sep 2018 15:18 )  pH: 7.40  /  pCO2: 38    /  pO2: 105   / HCO3: 24    / Base Excess: -1.2  /  SaO2: 98.2    Lactate: x                [ ] Extubation Readiness Assessed  Meds:       CARDIOVASCULAR  HR: 62 (09-19-18 @ 21:00) (48 - 67)  BP: 99/54 (09-19-18 @ 21:00) (99/54 - 123/62)  BP(mean): 66 (09-19-18 @ 21:00) (66 - 77)  ABP: 94/80 (09-19-18 @ 19:00) (90/50 - 134/65)  ABP(mean): 86 (09-19-18 @ 19:00) (64 - 91)  Wt(kg): --  CVP(cm H2O): --  VBG - ( 18 Sep 2018 04:50 )  pH: 7.17  /  pCO2: 51    /  pO2: 35    / HCO3: 16    / Base Excess: -9.3  /  SaO2: 54.5   Lactate: x            Exam: RRR  Cardiac Rhythm: sinus  Perfusion     [x]Adequate   [ ]Inadequate  Mentation   [x]Normal       [ ]Reduced  Extremities  [x]Warm         [ ]Cool  Volume Status [ ]Hypervolemic [x]Euvolemic [ ]Hypovolemic  Meds:       GI/NUTRITION  Exam: abd soft, nondistended, mild ttp at wound sites  Diet: NPO  Meds: pantoprazole  Injectable 40 milliGRAM(s) IV Push at bedtime      GENITOURINARY  I&O's Detail    09-18 @ 07:01 - 09-19 @ 07:00  --------------------------------------------------------  IN:    0.9% NaCl: 2000 mL    EPINEPHrine Infusion: 141 mL    fentaNYL  Infusion: 295.2 mL    lactated ringers.: 2950 mL    norepinephrine Infusion: 123.4 mL    Packed Red Blood Cells: 300 mL    Plasma: 340 mL    vasopressin Infusion: 9.6 mL  Total IN: 6159.2 mL    OUT:    Bulb: 465 mL    Bulb: 135 mL    Bulb: 65 mL    Indwelling Catheter - Urethral: 865 mL  Total OUT: 1530 mL    Total NET: 4629.2 mL      09-19 @ 07:01  -  09-20 @ 01:25  --------------------------------------------------------  IN:    lactated ringers.: 1425 mL    norepinephrine Infusion: 2 mL  Total IN: 1427 mL    OUT:    Bulb: 110 mL    Bulb: 170 mL    Bulb: 1140 mL    Indwelling Catheter - Urethral: 620 mL  Total OUT: 2040 mL    Total NET: -613 mL          09-19    138  |  107  |  22  ----------------------------<  114<H>  4.0   |  22  |  1.12    Ca    6.9<L>      19 Sep 2018 05:00  Phos  2.6     09-19  Mg     2.2     09-19    TPro  3.7<L>  /  Alb  1.8<L>  /  TBili  0.7  /  DBili  x   /  AST  81<H>  /  ALT  43<H>  /  AlkPhos  34<L>  09-19    [ ] Engel catheter, indication: N/A  Meds: lactated ringers. 1000 milliLiter(s) IV Continuous <Continuous>        HEMATOLOGIC  Meds: heparin  Injectable 5000 Unit(s) SubCutaneous every 8 hours    [x] VTE Prophylaxis                        9.1    10.61 )-----------( 52       ( 19 Sep 2018 05:00 )             25.5     PT/INR - ( 19 Sep 2018 05:00 )   PT: 15.5 SEC;   INR: 1.34          PTT - ( 19 Sep 2018 05:00 )  PTT:29.5 SEC  Transfusion     [ ] PRBC   [ ] Platelets   [ ] FFP   [ ] Cryoprecipitate      INFECTIOUS DISEASES  T(C): 37.6 (09-19-18 @ 20:00), Max: 37.6 (09-19-18 @ 20:00)  Wt(kg): --  WBC Count: 10.61 K/uL (09-19 @ 05:00)    Recent Cultures:    Meds: influenza   Vaccine 0.5 milliLiter(s) IntraMuscular once        ENDOCRINE  Capillary Blood Glucose    Meds: insulin lispro (HumaLOG) corrective regimen sliding scale   SubCutaneous every 6 hours        ACCESS DEVICES:  [x] Peripheral IV  [ ] Central Venous Line	[ ] R	[ ] L	[ ] IJ	[ ] Fem	[ ] SC	Placed:   [ ] Arterial Line		[ ] R	[ ] L	[ ] Fem	[ ] Rad	[ ] Ax	Placed:   [ ] PICC:					[ ] Mediport  [ ] Urinary Catheter, Date Placed:  [x] 3 drains  [ ] Necessity of urinary, arterial, and venous catheters discussed    OTHER MEDICATIONS:  benzocaine 15 mG/menthol 3.6 mG Lozenge 1 Lozenge Oral three times a day PRN  chlorhexidine 0.12% Liquid 15 milliLiter(s) Swish and Spit two times a day  chlorhexidine 4% Liquid 1 Application(s) Topical <User Schedule>      CODE STATUS:     IMAGING:

## 2018-09-20 NOTE — PROGRESS NOTE ADULT - ASSESSMENT
JULIA UMANA is a 67y Female PMH HTN, presented for scheduled surgical resection of left inguinal mass, intra-op course c/b bigeminy post-operatively admitted to SICU for close monitoring remiqans intubated , Off Vaso , Coming down on Levo     Neuro: Sedated on fentanyl gtt  - Continue fentanyl gtt, wean as tolerated  - RASS -1    Resp: Extubated 9/19  -continue to monitor ABGs  -SBT as tolerated     Cardiovascular: Bigeminy at start of case, undifferentiated shock requiring pressors  - off pressors  - Seen by EP: NTD  - Post-op echo improved from pre-operative echo  - Cardiac enzymes uptrending, poor TTE, will likely repeat   - Post op EKG with q waves III, avF, without ZULLY/ depressions   -  Vascular checks q1hr    Gastrointestinal:   - Continue NPO  - protonix  - Await return of bowel function  - Advance feeds per primary team    /Renal: Oliguria and DAYTON resolved   - LR @ 125 cc/ hr   - Engel in place, monitor I/Os.   - Monitor drain output (JEREMY x3)    Heme:   - H&H stable, continue to trend cbcs    ID: No acute issues  - Monitor for fever, leukocytosis    Endo:  - No acute issues    Dispo: SICU

## 2018-09-21 DIAGNOSIS — C49.9 MALIGNANT NEOPLASM OF CONNECTIVE AND SOFT TISSUE, UNSPECIFIED: ICD-10-CM

## 2018-09-21 DIAGNOSIS — D50.0 IRON DEFICIENCY ANEMIA SECONDARY TO BLOOD LOSS (CHRONIC): ICD-10-CM

## 2018-09-21 DIAGNOSIS — I10 ESSENTIAL (PRIMARY) HYPERTENSION: ICD-10-CM

## 2018-09-21 DIAGNOSIS — E83.39 OTHER DISORDERS OF PHOSPHORUS METABOLISM: ICD-10-CM

## 2018-09-21 DIAGNOSIS — D69.6 THROMBOCYTOPENIA, UNSPECIFIED: ICD-10-CM

## 2018-09-21 LAB
BUN SERPL-MCNC: 14 MG/DL — SIGNIFICANT CHANGE UP (ref 7–23)
CALCIUM SERPL-MCNC: 7.3 MG/DL — LOW (ref 8.4–10.5)
CHLORIDE SERPL-SCNC: 105 MMOL/L — SIGNIFICANT CHANGE UP (ref 98–107)
CO2 SERPL-SCNC: 21 MMOL/L — LOW (ref 22–31)
CREAT SERPL-MCNC: 0.64 MG/DL — SIGNIFICANT CHANGE UP (ref 0.5–1.3)
GLUCOSE SERPL-MCNC: 118 MG/DL — HIGH (ref 70–99)
HCT VFR BLD CALC: 28.8 % — LOW (ref 34.5–45)
HGB BLD-MCNC: 9.7 G/DL — LOW (ref 11.5–15.5)
MAGNESIUM SERPL-MCNC: 2.3 MG/DL — SIGNIFICANT CHANGE UP (ref 1.6–2.6)
MCHC RBC-ENTMCNC: 31 PG — SIGNIFICANT CHANGE UP (ref 27–34)
MCHC RBC-ENTMCNC: 33.7 % — SIGNIFICANT CHANGE UP (ref 32–36)
MCV RBC AUTO: 92 FL — SIGNIFICANT CHANGE UP (ref 80–100)
NRBC # FLD: 0 — SIGNIFICANT CHANGE UP
PHOSPHATE SERPL-MCNC: 2 MG/DL — LOW (ref 2.5–4.5)
PLATELET # BLD AUTO: 88 K/UL — LOW (ref 150–400)
PMV BLD: 12.1 FL — SIGNIFICANT CHANGE UP (ref 7–13)
POTASSIUM SERPL-MCNC: 3.5 MMOL/L — SIGNIFICANT CHANGE UP (ref 3.5–5.3)
POTASSIUM SERPL-SCNC: 3.5 MMOL/L — SIGNIFICANT CHANGE UP (ref 3.5–5.3)
RBC # BLD: 3.13 M/UL — LOW (ref 3.8–5.2)
RBC # FLD: 16.4 % — HIGH (ref 10.3–14.5)
SODIUM SERPL-SCNC: 137 MMOL/L — SIGNIFICANT CHANGE UP (ref 135–145)
WBC # BLD: 13.19 K/UL — HIGH (ref 3.8–10.5)
WBC # FLD AUTO: 13.19 K/UL — HIGH (ref 3.8–10.5)

## 2018-09-21 PROCEDURE — 99223 1ST HOSP IP/OBS HIGH 75: CPT

## 2018-09-21 RX ORDER — ENOXAPARIN SODIUM 100 MG/ML
40 INJECTION SUBCUTANEOUS DAILY
Qty: 0 | Refills: 0 | Status: DISCONTINUED | OUTPATIENT
Start: 2018-09-22 | End: 2018-10-11

## 2018-09-21 RX ORDER — SODIUM,POTASSIUM PHOSPHATES 278-250MG
1 POWDER IN PACKET (EA) ORAL ONCE
Qty: 0 | Refills: 0 | Status: COMPLETED | OUTPATIENT
Start: 2018-09-21 | End: 2018-09-21

## 2018-09-21 RX ADMIN — PANTOPRAZOLE SODIUM 40 MILLIGRAM(S): 20 TABLET, DELAYED RELEASE ORAL at 23:52

## 2018-09-21 RX ADMIN — HYDROMORPHONE HYDROCHLORIDE 1 MILLIGRAM(S): 2 INJECTION INTRAMUSCULAR; INTRAVENOUS; SUBCUTANEOUS at 14:46

## 2018-09-21 RX ADMIN — HEPARIN SODIUM 5000 UNIT(S): 5000 INJECTION INTRAVENOUS; SUBCUTANEOUS at 14:01

## 2018-09-21 RX ADMIN — HEPARIN SODIUM 5000 UNIT(S): 5000 INJECTION INTRAVENOUS; SUBCUTANEOUS at 05:50

## 2018-09-21 RX ADMIN — HYDROMORPHONE HYDROCHLORIDE 1 MILLIGRAM(S): 2 INJECTION INTRAMUSCULAR; INTRAVENOUS; SUBCUTANEOUS at 19:40

## 2018-09-21 RX ADMIN — HYDROMORPHONE HYDROCHLORIDE 1 MILLIGRAM(S): 2 INJECTION INTRAMUSCULAR; INTRAVENOUS; SUBCUTANEOUS at 15:28

## 2018-09-21 RX ADMIN — Medication 1 PACKET(S): at 19:40

## 2018-09-21 RX ADMIN — HYDROMORPHONE HYDROCHLORIDE 1 MILLIGRAM(S): 2 INJECTION INTRAMUSCULAR; INTRAVENOUS; SUBCUTANEOUS at 01:20

## 2018-09-21 RX ADMIN — HYDROMORPHONE HYDROCHLORIDE 0.5 MILLIGRAM(S): 2 INJECTION INTRAMUSCULAR; INTRAVENOUS; SUBCUTANEOUS at 11:04

## 2018-09-21 RX ADMIN — SODIUM CHLORIDE 50 MILLILITER(S): 9 INJECTION, SOLUTION INTRAVENOUS at 23:52

## 2018-09-21 RX ADMIN — HYDROMORPHONE HYDROCHLORIDE 1 MILLIGRAM(S): 2 INJECTION INTRAMUSCULAR; INTRAVENOUS; SUBCUTANEOUS at 01:35

## 2018-09-21 RX ADMIN — HYDROMORPHONE HYDROCHLORIDE 1 MILLIGRAM(S): 2 INJECTION INTRAMUSCULAR; INTRAVENOUS; SUBCUTANEOUS at 06:10

## 2018-09-21 RX ADMIN — HYDROMORPHONE HYDROCHLORIDE 0.5 MILLIGRAM(S): 2 INJECTION INTRAMUSCULAR; INTRAVENOUS; SUBCUTANEOUS at 10:31

## 2018-09-21 RX ADMIN — HYDROMORPHONE HYDROCHLORIDE 1 MILLIGRAM(S): 2 INJECTION INTRAMUSCULAR; INTRAVENOUS; SUBCUTANEOUS at 05:55

## 2018-09-21 NOTE — CONSULT NOTE ADULT - SUBJECTIVE AND OBJECTIVE BOX
CHIEF COMPLAINT: Patient is a 67y old  Female who presents with a chief complaint of Resection of inguinal tumor with vascular reconstruction (20 Sep 2018 08:15)      HPI:     History limited due to: [ ] Dementia  [ ] Delirium  [ ] Condition    Pain Symptoms if applicable:             	                         none	   mild         moderate         severe  	                            0	    1-3	     4-6	         7-10  Pain:  Location:	  Modifying factors:	  Associated symptoms:	    Allergies    penicillin (Hives)    Intolerances        HOME MEDICATIONS: [x] Reviewed    MEDICATIONS  (STANDING):  dextrose 5% + sodium chloride 0.45%. 1000 milliLiter(s) (50 mL/Hr) IV Continuous <Continuous>  heparin  Injectable 5000 Unit(s) SubCutaneous every 8 hours  influenza   Vaccine 0.5 milliLiter(s) IntraMuscular once  insulin lispro (HumaLOG) corrective regimen sliding scale   SubCutaneous Before meals and at bedtime  pantoprazole  Injectable 40 milliGRAM(s) IV Push at bedtime    MEDICATIONS  (PRN):  benzocaine 15 mG/menthol 3.6 mG Lozenge 1 Lozenge Oral three times a day PRN Sore Throat  HYDROmorphone  Injectable 0.5 milliGRAM(s) IV Push every 4 hours PRN Moderate Pain (4 - 6)  HYDROmorphone  Injectable 1 milliGRAM(s) IV Push every 4 hours PRN Severe Pain (7 - 10)      PAST MEDICAL & SURGICAL HISTORY:  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, unspecified  HTN (hypertension)  No significant past surgical history  [x ] Reviewed     SOCIAL HISTORY:  [x] Substance abuse:   [x] Alcohol use:   [x] Tobacco:     FAMILY HISTORY:  No pertinent family history in first degree relatives  [x] No pertinent family history in first degree relatives     REVIEW OF SYSTEMS:  [x] All other ROS negative  [  ] Unable to obtain due to poor mental status    Vital Signs Last 24 Hrs  T(C): 36.9 (21 Sep 2018 08:56), Max: 37.3 (21 Sep 2018 01:07)  T(F): 98.5 (21 Sep 2018 08:56), Max: 99.2 (21 Sep 2018 01:07)  HR: 60 (21 Sep 2018 08:56) (60 - 69)  BP: 110/66 (21 Sep 2018 08:56) (96/59 - 129/67)  BP(mean): 74 (20 Sep 2018 13:00) (69 - 74)  RR: 16 (21 Sep 2018 08:56) (16 - 24)  SpO2: 97% (21 Sep 2018 08:56) (95% - 98%)    PHYSICAL EXAM:  GENERAL: NAD, well-groomed, well-developed  HEAD:  Atraumatic, Normocephalic  EYES: EOMI, PERRLA, conjunctiva and sclera clear  ENMT: Moist mucous membranes  NECK: Supple, No JVD  RESPIRATORY: Clear to auscultation bilaterally; No rales, rhonchi, wheezing, or rubs  CARDIOVASCULAR: Regular rate and rhythm; No murmurs, rubs, or gallops  GASTROINTESTINAL: Soft, Nontender, Nondistended; Bowel sounds present  GENITOURINARY: Not examined  EXTREMITIES:  2+ Peripheral Pulses, No clubbing, cyanosis, or edema  NERVOUS SYSTEM:  Alert & Oriented X3; Moving all 4 extremities; No gross sensory deficits  HEME/LYMPH: No lymphadenopathy noted  SKIN: No rashes or lesions; Incisions C/D/I    LABS:                        9.7    13.19 )-----------( 88       ( 21 Sep 2018 07:00 )             28.8     Hemoglobin: 9.7 g/dL (09-21 @ 07:00)  Hemoglobin: 9.1 g/dL (09-20 @ 03:30)  Hemoglobin: 9.1 g/dL (09-19 @ 05:00)  Hemoglobin: 9.4 g/dL (09-19 @ 01:16)  Hemoglobin: 7.5 g/dL (09-18 @ 18:57)  Hemoglobin: 8.8 g/dL (09-18 @ 14:15)  Hemoglobin: 10.9 g/dL (09-18 @ 10:06)  Hemoglobin: 8.7 g/dL (09-18 @ 05:30)  Hemoglobin: 9.1 g/dL (09-18 @ 01:36)  Hemoglobin: 9.3 g/dL (09-17 @ 22:20)    09-21    137  |  105  |  14  ----------------------------<  118<H>  3.5   |  21<L>  |  0.64    Ca    7.3<L>      21 Sep 2018 07:00  Phos  2.0     09-21  Mg     2.3     09-21    TPro  4.1<L>  /  Alb  1.8<L>  /  TBili  0.5  /  DBili  x   /  AST  91<H>  /  ALT  55<H>  /  AlkPhos  46  09-20    PT/INR - ( 20 Sep 2018 03:30 )   PT: 12.6 SEC;   INR: 1.13          PTT - ( 20 Sep 2018 03:30 )  PTT:29.2 SEC    Microbiology     RADIOLOGY & ADDITIONAL STUDIES:    EKG:   Personally Reviewed:  [x] YES     Imaging:   Personally Reviewed:  [x] YES               [ ] Consultant(s) Notes Reviewed  [x] Care Discussed with Consultants/Other Providers: Ortho PA - discussed CHIEF COMPLAINT: Patient is a 67y old  Female who presents with a chief complaint of Resection of inguinal tumor with vascular reconstruction (20 Sep 2018 08:15)      HPI:   Translation services utilized for German, ID# [id number]  Ms. Fleming is a 66 yo woman with PMHx of HTN admitted for scheduled surgical resection of left inguinal mass. Patient underwent enbloc resection of inguinal mass, left femoral to below knee popliteal bypass, venous resection, and reconstruction with rectus abdominus myocutaneous flap on 9/17. Hospital course notable for asymptomatic bigeminy with bradycardia deemed 2/2 catecholamine surge as per cardiology, now resolved. Patient seen and evaluated this morning. Patient is drowsy and complains of pain in her abdomen. Patient states pain medication does help with pain. Patient without chest pain or SOB. Tolerating clear liquid diet.      Allergies  penicillin (Hives)      HOME MEDICATIONS: [x] Reviewed    MEDICATIONS  (STANDING):  dextrose 5% + sodium chloride 0.45%. 1000 milliLiter(s) (50 mL/Hr) IV Continuous <Continuous>  heparin  Injectable 5000 Unit(s) SubCutaneous every 8 hours  influenza   Vaccine 0.5 milliLiter(s) IntraMuscular once  insulin lispro (HumaLOG) corrective regimen sliding scale   SubCutaneous Before meals and at bedtime  pantoprazole  Injectable 40 milliGRAM(s) IV Push at bedtime    MEDICATIONS  (PRN):  benzocaine 15 mG/menthol 3.6 mG Lozenge 1 Lozenge Oral three times a day PRN Sore Throat  HYDROmorphone  Injectable 0.5 milliGRAM(s) IV Push every 4 hours PRN Moderate Pain (4 - 6)  HYDROmorphone  Injectable 1 milliGRAM(s) IV Push every 4 hours PRN Severe Pain (7 - 10)      PAST MEDICAL & SURGICAL HISTORY:  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, unspecified  HTN (hypertension)  No significant past surgical history  [x ] Reviewed     SOCIAL HISTORY:  [x] Substance abuse: Unknown  [x] Alcohol use: Unknown  [x] Tobacco: Unknown    FAMILY HISTORY:  Patient unable to provide family history at this time due to drowsiness.    REVIEW OF SYSTEMS:  [x] All other ROS negative  [  ] Unable to obtain due to poor mental status    Vital Signs Last 24 Hrs  T(C): 36.9 (21 Sep 2018 08:56), Max: 37.3 (21 Sep 2018 01:07)  T(F): 98.5 (21 Sep 2018 08:56), Max: 99.2 (21 Sep 2018 01:07)  HR: 60 (21 Sep 2018 08:56) (60 - 69)  BP: 110/66 (21 Sep 2018 08:56) (96/59 - 129/67)  BP(mean): 74 (20 Sep 2018 13:00) (69 - 74)  RR: 16 (21 Sep 2018 08:56) (16 - 24)  SpO2: 97% (21 Sep 2018 08:56) (95% - 98%)    PHYSICAL EXAM:  GENERAL: Obese woman in NAD,   NECK: Prominent pulsating neck veins  RESPIRATORY: Clear to auscultation bilaterally anteriorly at mid to upper lung fields; No rales, rhonchi, wheezing, or rubs  CARDIOVASCULAR: Regular rate and rhythm; No murmurs, rubs, or gallops  GASTROINTESTINAL: Soft, Surgical incision sites with dressings intact. 3 JEREMY drains in place.   GENITOURINARY: Engel catheter in place  EXTREMITIES:  2+ Peripheral Pulses, No clubbing or cyanosis. LE swelling of left leg. Surgical staples intact in medial aspect of thigh  NERVOUS SYSTEM:  Drowsy; Moving all 4 extremities; No gross sensory deficits      LABS:                        9.7    13.19 )-----------( 88       ( 21 Sep 2018 07:00 )             28.8     Hemoglobin: 9.7 g/dL (09-21 @ 07:00)  Hemoglobin: 9.1 g/dL (09-20 @ 03:30)  Hemoglobin: 9.1 g/dL (09-19 @ 05:00)  Hemoglobin: 9.4 g/dL (09-19 @ 01:16)  Hemoglobin: 7.5 g/dL (09-18 @ 18:57)  Hemoglobin: 8.8 g/dL (09-18 @ 14:15)  Hemoglobin: 10.9 g/dL (09-18 @ 10:06)  Hemoglobin: 8.7 g/dL (09-18 @ 05:30)  Hemoglobin: 9.1 g/dL (09-18 @ 01:36)  Hemoglobin: 9.3 g/dL (09-17 @ 22:20)    09-21    137  |  105  |  14  ----------------------------<  118<H>  3.5   |  21<L>  |  0.64    Ca    7.3<L>      21 Sep 2018 07:00  Phos  2.0     09-21  Mg     2.3     09-21    TPro  4.1<L>  /  Alb  1.8<L>  /  TBili  0.5  /  DBili  x   /  AST  91<H>  /  ALT  55<H>  /  AlkPhos  46  09-20    PT/INR - ( 20 Sep 2018 03:30 )   PT: 12.6 SEC;   INR: 1.13          PTT - ( 20 Sep 2018 03:30 )  PTT:29.2 SEC                  [ ] Consultant(s) Notes Reviewed  [x] Care Discussed with Consultants/Other Providers: Ortho PA - discussed

## 2018-09-21 NOTE — PHYSICAL THERAPY INITIAL EVALUATION ADULT - ADDITIONAL COMMENTS
Patient lethargic and with delayed responses. Patient lives with daughter in a home with steps to enter

## 2018-09-21 NOTE — PHYSICAL THERAPY INITIAL EVALUATION ADULT - MD ORDER
s/p left thigh excision of liposarcoma, revascularization and rectus flap. WBAT with knee immobilizer

## 2018-09-21 NOTE — PROGRESS NOTE ADULT - SUBJECTIVE AND OBJECTIVE BOX
JULIA UMANA  [id number]    Subjective:    Patient seen and examined in SICU.   No acute events overnight, patient now extubated, stable.        T(C): 36.6 (09-21-18 @ 05:49), Max: 37.3 (09-21-18 @ 01:07)  HR: 64 (09-21-18 @ 05:49) (60 - 69)  BP: 124/70 (09-21-18 @ 05:49) (96/59 - 129/67)  BP(mean): 74 (09-20-18 @ 13:00) (66 - 74)  ABP: --  ABP(mean): --  RR: 16 (09-21-18 @ 05:49) (14 - 24)  SpO2: 98% (09-21-18 @ 05:49) (94% - 98%)  Wt(kg): --  CVP(mm Hg): --  CI: --  CAPILLARY BLOOD GLUCOSE      POCT Blood Glucose.: 93 mg/dL (20 Sep 2018 21:52)  POCT Blood Glucose.: 86 mg/dL (20 Sep 2018 17:23)  POCT Blood Glucose.: 70 mg/dL (20 Sep 2018 12:49)   N/A      09-20 @ 07:01  -  09-21 @ 07:00  --------------------------------------------------------  IN:    dextrose 5% + sodium chloride 0.45%.: 100 mL    lactated ringers.: 500 mL  Total IN: 600 mL    OUT:    Bulb: 317.5 mL    Bulb: 500 mL    Bulb: 227.5 mL    Indwelling Catheter - Urethral: 1140 mL  Total OUT: 2185 mL    Total NET: -1585 mL            PHYSICAL EXAM:    General: No acute distress, sedated on ventilation support.   Abdomen: Aqualcel dressing intact, No strikethrough. Incisions CDI.   LE: Left leg with ace bandage in place, no strikethrough. JEREMY X 3 with SS output.               MEDICATIONS  (STANDING):  chlorhexidine 0.12% Liquid 15 milliLiter(s) Swish and Spit two times a day  chlorhexidine 4% Liquid 1 Application(s) Topical <User Schedule>  heparin  Injectable 5000 Unit(s) SubCutaneous every 8 hours  influenza   Vaccine 0.5 milliLiter(s) IntraMuscular once  insulin lispro (HumaLOG) corrective regimen sliding scale   SubCutaneous every 6 hours  lactated ringers. 1000 milliLiter(s) (125 mL/Hr) IV Continuous <Continuous>  pantoprazole  Injectable 40 milliGRAM(s) IV Push at bedtime    MEDICATIONS  (PRN):  benzocaine 15 mG/menthol 3.6 mG Lozenge 1 Lozenge Oral three times a day PRN Sore Throat  HYDROmorphone  Injectable 0.5 milliGRAM(s) IV Push every 4 hours PRN Moderate Pain (4 - 6)  HYDROmorphone  Injectable 1 milliGRAM(s) IV Push every 4 hours PRN Severe Pain (7 - 10)

## 2018-09-21 NOTE — CONSULT NOTE ADULT - PROBLEM SELECTOR RECOMMENDATION 2
As per chart review, pt on losartan-HCTZ 100mg/12.5mg daily  - continue to hold losartan and HCTZ  - if SBP consistently  >140, would resume losartan 100mg first

## 2018-09-21 NOTE — CONSULT NOTE ADULT - PROBLEM SELECTOR RECOMMENDATION 3
Anticipated blood loss from intraoperative bleeding. Hb/Hct stable of past couple days.   - monitor blood counts

## 2018-09-21 NOTE — CONSULT NOTE ADULT - PROBLEM SELECTOR RECOMMENDATION 5
Unclear etiology, but noticeable decrease in platelets while inpatient. Platelet count improved today compared to yesterday. Possible adverse effect of meds. Patient without clinical signs of bleeding of petechiae  - closely monitor platelet counts  - if platelet count downtrends, will do further w/u

## 2018-09-21 NOTE — PROGRESS NOTE ADULT - SUBJECTIVE AND OBJECTIVE BOX
No acute events overnight. First day on floors yesterday. Abdominal pain moderately controlled.    PE:  ICU Vital Signs Last 24 Hrs  T(C): 36.6 (21 Sep 2018 05:49), Max: 37.3 (21 Sep 2018 01:07)  T(F): 97.8 (21 Sep 2018 05:49), Max: 99.2 (21 Sep 2018 01:07)  HR: 64 (21 Sep 2018 05:49) (60 - 69)  BP: 124/70 (21 Sep 2018 05:49) (96/59 - 129/67)  BP(mean): 74 (20 Sep 2018 13:00) (66 - 74)  ABP: --  ABP(mean): --  RR: 16 (21 Sep 2018 05:49) (14 - 24)  SpO2: 98% (21 Sep 2018 05:49) (94% - 98%)    Gen: No acute distress  LLE: dressing c/d/i  JEREMY 430, 267.5, 202.5  Motor intact TA/GCS/EHL/FHL   SILT DP/SP/Tib  cap refill <2 sec, wwp       Labs:                        9.1    10.18 )-----------( 59       ( 20 Sep 2018 03:30 )             26.3   09-20    139  |  106  |  15  ----------------------------<  84  3.9   |  21<L>  |  0.80    Ca    7.2<L>      20 Sep 2018 03:15  Phos  2.1     09-20  Mg     2.4     09-20    TPro  4.1<L>  /  Alb  1.8<L>  /  TBili  0.5  /  DBili  x   /  AST  91<H>  /  ALT  55<H>  /  AlkPhos  46  09-20    Assessment/Plan:  67yFemale s/p L thigh excision liposarcoma, bypass, rectus flap POD4  -pain control  -PT/WBAT/OOB  -DVT ppx  -FU drain outputs  -FU PRS/Vasc

## 2018-09-21 NOTE — CONSULT NOTE ADULT - ASSESSMENT
Ms. Fleming is a 66 yo woman with PMHx of HTN admitted for scheduled surgical resection of left inguinal mass. Patient underwent enbloc resection of inguinal mass, left femoral to below knee popliteal bypass, venous resection, and reconstruction with rectus abdominus myocutaneous flap on 9/17. Hospital course notable for asymptomatic bigeminy with bradycardia deemed 2/2 catecholamine surge, now resolve. Now transferred to surgical floor from SICU, where she stayed post-op for closer hemodynamic monitoring.

## 2018-09-21 NOTE — PROGRESS NOTE ADULT - SUBJECTIVE AND OBJECTIVE BOX
PROGRESS NOTE - VASCULAR SURGERY    SUBJECTIVE:  No acute overnight events. Transferred to floor.    OBJECTIVE:  Vital Signs Last 24 Hrs  T(C): 37.1 (21 Sep 2018 14:46), Max: 37.3 (21 Sep 2018 01:07)  T(F): 98.7 (21 Sep 2018 14:46), Max: 99.2 (21 Sep 2018 01:07)  HR: 70 (21 Sep 2018 14:46) (57 - 70)  BP: 147/77 (21 Sep 2018 14:46) (102/56 - 147/77)  BP(mean): --  RR: 16 (21 Sep 2018 14:46) (16 - 17)  SpO2: 95% (21 Sep 2018 14:46) (92% - 98%)    I&O's Detail    20 Sep 2018 07:01  -  21 Sep 2018 07:00  --------------------------------------------------------  IN:    dextrose 5% + sodium chloride 0.45%.: 100 mL    lactated ringers.: 500 mL  Total IN: 600 mL    OUT:    Bulb: 317.5 mL    Bulb: 500 mL    Bulb: 227.5 mL    Indwelling Catheter - Urethral: 1140 mL  Total OUT: 2185 mL    Total NET: -1585 mL      21 Sep 2018 07:01  -  21 Sep 2018 14:50  --------------------------------------------------------  IN:    dextrose 5% + sodium chloride 0.45%.: 100 mL  Total IN: 100 mL    OUT:    Bulb: 190 mL    Bulb: 15 mL    Bulb: 20 mL    Indwelling Catheter - Urethral: 200 mL  Total OUT: 425 mL    Total NET: -325 mL          Inpatient Medications:  MEDICATIONS  (STANDING):  dextrose 5% + sodium chloride 0.45%. 1000 milliLiter(s) (50 mL/Hr) IV Continuous <Continuous>  heparin  Injectable 5000 Unit(s) SubCutaneous every 8 hours  influenza   Vaccine 0.5 milliLiter(s) IntraMuscular once  pantoprazole  Injectable 40 milliGRAM(s) IV Push at bedtime    MEDICATIONS  (PRN):  benzocaine 15 mG/menthol 3.6 mG Lozenge 1 Lozenge Oral three times a day PRN Sore Throat  HYDROmorphone  Injectable 0.5 milliGRAM(s) IV Push every 4 hours PRN Moderate Pain (4 - 6)  HYDROmorphone  Injectable 1 milliGRAM(s) IV Push every 4 hours PRN Severe Pain (7 - 10)      Physical Examination:  GEN: NAD, resting quietly  NEURO: AAOx3, CN II-XII grossly intact, no focal deficits  PULM: symmetric chest rise bilaterally, no increased WOB  CV: regular rate, peripheral pulses intact  ABD: soft, ND, incisions covered with aquacel c/d/i; JEREMY drains x3 with serosanguinous output  EXTR: no cyanosis, moving all extremities, 2+ DP pulses bilaterally; L medial thigh incision approximated with staples c/d/i    LABS:                        9.7    13.19 )-----------( 88       ( 21 Sep 2018 07:00 )             28.8       09-21    137  |  105  |  14  ----------------------------<  118<H>  3.5   |  21<L>  |  0.64    Ca    7.3<L>      21 Sep 2018 07:00  Phos  2.0     09-21  Mg     2.3     09-21    TPro  4.1<L>  /  Alb  1.8<L>  /  TBili  0.5  /  DBili  x   /  AST  91<H>  /  ALT  55<H>  /  AlkPhos  46  09-20      CULTURES:  none    IMAGING:  no new imaging

## 2018-09-21 NOTE — PROGRESS NOTE ADULT - ASSESSMENT
66 yo F PMH HTN presented for scheduled surgical resection of left inguinal mass with left external iliac to above knee popliteal artery bypass w/ PTFE. LLE warm with palpable DP pulse, movement intact.    -cont q4h vascular checks  -appreciate medicine care  -vascular surgery will cont to follow

## 2018-09-22 DIAGNOSIS — D72.828 OTHER ELEVATED WHITE BLOOD CELL COUNT: ICD-10-CM

## 2018-09-22 DIAGNOSIS — E44.0 MODERATE PROTEIN-CALORIE MALNUTRITION: ICD-10-CM

## 2018-09-22 DIAGNOSIS — M62.82 RHABDOMYOLYSIS: ICD-10-CM

## 2018-09-22 DIAGNOSIS — R94.5 ABNORMAL RESULTS OF LIVER FUNCTION STUDIES: ICD-10-CM

## 2018-09-22 LAB
APPEARANCE UR: SIGNIFICANT CHANGE UP
BACTERIA # UR AUTO: HIGH
BILIRUB UR-MCNC: SIGNIFICANT CHANGE UP
BLOOD UR QL VISUAL: HIGH
COLOR SPEC: SIGNIFICANT CHANGE UP
GLUCOSE UR-MCNC: 50 — SIGNIFICANT CHANGE UP
HCT VFR BLD CALC: 28.6 % — LOW (ref 34.5–45)
HGB BLD-MCNC: 9.6 G/DL — LOW (ref 11.5–15.5)
HYALINE CASTS # UR AUTO: NEGATIVE — SIGNIFICANT CHANGE UP
KETONES UR-MCNC: NEGATIVE — SIGNIFICANT CHANGE UP
LEUKOCYTE ESTERASE UR-ACNC: SIGNIFICANT CHANGE UP
MCHC RBC-ENTMCNC: 30.7 PG — SIGNIFICANT CHANGE UP (ref 27–34)
MCHC RBC-ENTMCNC: 33.6 % — SIGNIFICANT CHANGE UP (ref 32–36)
MCV RBC AUTO: 91.4 FL — SIGNIFICANT CHANGE UP (ref 80–100)
NITRITE UR-MCNC: POSITIVE — HIGH
NRBC # FLD: 0.03 — SIGNIFICANT CHANGE UP
PH UR: 6 — SIGNIFICANT CHANGE UP (ref 5–8)
PLATELET # BLD AUTO: 133 K/UL — LOW (ref 150–400)
PMV BLD: 11 FL — SIGNIFICANT CHANGE UP (ref 7–13)
PROT UR-MCNC: 100 — HIGH
RBC # BLD: 3.13 M/UL — LOW (ref 3.8–5.2)
RBC # FLD: 15.8 % — HIGH (ref 10.3–14.5)
RBC CASTS # UR COMP ASSIST: HIGH (ref 0–?)
SP GR SPEC: 1.03 — SIGNIFICANT CHANGE UP (ref 1–1.04)
SQUAMOUS # UR AUTO: SIGNIFICANT CHANGE UP
UROBILINOGEN FLD QL: HIGH
WBC # BLD: 14.05 K/UL — HIGH (ref 3.8–10.5)
WBC # FLD AUTO: 14.05 K/UL — HIGH (ref 3.8–10.5)
WBC UR QL: >50 — HIGH (ref 0–?)

## 2018-09-22 PROCEDURE — 99233 SBSQ HOSP IP/OBS HIGH 50: CPT

## 2018-09-22 RX ORDER — ONDANSETRON 8 MG/1
4 TABLET, FILM COATED ORAL ONCE
Qty: 0 | Refills: 0 | Status: COMPLETED | OUTPATIENT
Start: 2018-09-22 | End: 2018-09-22

## 2018-09-22 RX ORDER — OXYCODONE HYDROCHLORIDE 5 MG/1
10 TABLET ORAL ONCE
Qty: 0 | Refills: 0 | Status: DISCONTINUED | OUTPATIENT
Start: 2018-09-22 | End: 2018-09-22

## 2018-09-22 RX ORDER — ASPIRIN/CALCIUM CARB/MAGNESIUM 324 MG
81 TABLET ORAL DAILY
Qty: 0 | Refills: 0 | Status: DISCONTINUED | OUTPATIENT
Start: 2018-09-22 | End: 2018-10-23

## 2018-09-22 RX ORDER — ONDANSETRON 8 MG/1
4 TABLET, FILM COATED ORAL EVERY 6 HOURS
Qty: 0 | Refills: 0 | Status: DISCONTINUED | OUTPATIENT
Start: 2018-09-22 | End: 2018-10-15

## 2018-09-22 RX ORDER — ACETAMINOPHEN 500 MG
1000 TABLET ORAL ONCE
Qty: 0 | Refills: 0 | Status: COMPLETED | OUTPATIENT
Start: 2018-09-22 | End: 2018-09-22

## 2018-09-22 RX ORDER — CEFTRIAXONE 500 MG/1
INJECTION, POWDER, FOR SOLUTION INTRAMUSCULAR; INTRAVENOUS
Qty: 0 | Refills: 0 | Status: DISCONTINUED | OUTPATIENT
Start: 2018-09-22 | End: 2018-09-25

## 2018-09-22 RX ORDER — HYDROMORPHONE HYDROCHLORIDE 2 MG/ML
0.5 INJECTION INTRAMUSCULAR; INTRAVENOUS; SUBCUTANEOUS ONCE
Qty: 0 | Refills: 0 | Status: DISCONTINUED | OUTPATIENT
Start: 2018-09-22 | End: 2018-09-23

## 2018-09-22 RX ORDER — OXYCODONE HYDROCHLORIDE 5 MG/1
5 TABLET ORAL EVERY 4 HOURS
Qty: 0 | Refills: 0 | Status: DISCONTINUED | OUTPATIENT
Start: 2018-09-22 | End: 2018-09-22

## 2018-09-22 RX ORDER — CEFTRIAXONE 500 MG/1
1 INJECTION, POWDER, FOR SOLUTION INTRAMUSCULAR; INTRAVENOUS ONCE
Qty: 0 | Refills: 0 | Status: COMPLETED | OUTPATIENT
Start: 2018-09-22 | End: 2018-09-22

## 2018-09-22 RX ORDER — OXYCODONE HYDROCHLORIDE 5 MG/1
5 TABLET ORAL EVERY 4 HOURS
Qty: 0 | Refills: 0 | Status: DISCONTINUED | OUTPATIENT
Start: 2018-09-22 | End: 2018-09-29

## 2018-09-22 RX ORDER — ACETAMINOPHEN 500 MG
650 TABLET ORAL EVERY 6 HOURS
Qty: 0 | Refills: 0 | Status: DISCONTINUED | OUTPATIENT
Start: 2018-09-22 | End: 2018-10-02

## 2018-09-22 RX ORDER — CEFTRIAXONE 500 MG/1
1 INJECTION, POWDER, FOR SOLUTION INTRAMUSCULAR; INTRAVENOUS EVERY 24 HOURS
Qty: 0 | Refills: 0 | Status: DISCONTINUED | OUTPATIENT
Start: 2018-09-23 | End: 2018-09-25

## 2018-09-22 RX ADMIN — CEFTRIAXONE 100 GRAM(S): 500 INJECTION, POWDER, FOR SOLUTION INTRAMUSCULAR; INTRAVENOUS at 20:42

## 2018-09-22 RX ADMIN — OXYCODONE HYDROCHLORIDE 5 MILLIGRAM(S): 5 TABLET ORAL at 23:58

## 2018-09-22 RX ADMIN — OXYCODONE HYDROCHLORIDE 10 MILLIGRAM(S): 5 TABLET ORAL at 05:40

## 2018-09-22 RX ADMIN — HYDROMORPHONE HYDROCHLORIDE 0.5 MILLIGRAM(S): 2 INJECTION INTRAMUSCULAR; INTRAVENOUS; SUBCUTANEOUS at 01:06

## 2018-09-22 RX ADMIN — OXYCODONE HYDROCHLORIDE 5 MILLIGRAM(S): 5 TABLET ORAL at 19:28

## 2018-09-22 RX ADMIN — Medication 1000 MILLIGRAM(S): at 05:55

## 2018-09-22 RX ADMIN — SODIUM CHLORIDE 75 MILLILITER(S): 9 INJECTION, SOLUTION INTRAVENOUS at 13:15

## 2018-09-22 RX ADMIN — PANTOPRAZOLE SODIUM 40 MILLIGRAM(S): 20 TABLET, DELAYED RELEASE ORAL at 22:57

## 2018-09-22 RX ADMIN — OXYCODONE HYDROCHLORIDE 5 MILLIGRAM(S): 5 TABLET ORAL at 20:02

## 2018-09-22 RX ADMIN — HYDROMORPHONE HYDROCHLORIDE 0.5 MILLIGRAM(S): 2 INJECTION INTRAMUSCULAR; INTRAVENOUS; SUBCUTANEOUS at 00:52

## 2018-09-22 RX ADMIN — ONDANSETRON 4 MILLIGRAM(S): 8 TABLET, FILM COATED ORAL at 15:47

## 2018-09-22 RX ADMIN — SODIUM CHLORIDE 75 MILLILITER(S): 9 INJECTION, SOLUTION INTRAVENOUS at 20:41

## 2018-09-22 RX ADMIN — ENOXAPARIN SODIUM 40 MILLIGRAM(S): 100 INJECTION SUBCUTANEOUS at 13:14

## 2018-09-22 RX ADMIN — ONDANSETRON 4 MILLIGRAM(S): 8 TABLET, FILM COATED ORAL at 00:52

## 2018-09-22 RX ADMIN — SODIUM CHLORIDE 75 MILLILITER(S): 9 INJECTION, SOLUTION INTRAVENOUS at 05:40

## 2018-09-22 RX ADMIN — ONDANSETRON 4 MILLIGRAM(S): 8 TABLET, FILM COATED ORAL at 22:17

## 2018-09-22 RX ADMIN — OXYCODONE HYDROCHLORIDE 5 MILLIGRAM(S): 5 TABLET ORAL at 15:22

## 2018-09-22 RX ADMIN — OXYCODONE HYDROCHLORIDE 10 MILLIGRAM(S): 5 TABLET ORAL at 06:25

## 2018-09-22 RX ADMIN — Medication 400 MILLIGRAM(S): at 05:40

## 2018-09-22 RX ADMIN — OXYCODONE HYDROCHLORIDE 5 MILLIGRAM(S): 5 TABLET ORAL at 16:08

## 2018-09-22 RX ADMIN — SODIUM CHLORIDE 75 MILLILITER(S): 9 INJECTION, SOLUTION INTRAVENOUS at 00:52

## 2018-09-22 NOTE — PROVIDER CONTACT NOTE (OTHER) - BACKGROUND
Pt admitted on 9/17/18 for surgical resection of left inguinal mass. PMH of HTN and malignant neoplasm of connective and soft tissue.

## 2018-09-22 NOTE — PROGRESS NOTE ADULT - ASSESSMENT
67y F s/p L inguinal mass resection, vessel reconstruction with vascular surgery, and VRAM flap reconstruction; now extubated doing well in SICU     PLAN:   - Continue SICU care   - Continue JEREMY drain/care  - Continue dressing care   - Replace aquacel dressing as needed - continue until POD #5   - DVT Ppx  - IVF  - Activity per vascular/ortho  - Will follow   - Pain control

## 2018-09-22 NOTE — PROGRESS NOTE ADULT - PROBLEM SELECTOR PLAN 9
improving, likely in setting of large ecchymosis/hematoma form surgery and platelet consumption, continue to monitor.

## 2018-09-22 NOTE — PROGRESS NOTE ADULT - SUBJECTIVE AND OBJECTIVE BOX
Plastic Surgery  Daily Progress Note     Subjective/24 Hour Events: Pt was hypotensive over night, doing ok this morning    Objective:    Vitals:  T(C): 36.6 (09-22-18 @ 05:34), Max: 37.2 (09-21-18 @ 14:02)  HR: 58 (09-22-18 @ 05:34) (57 - 82)  BP: 125/67 (09-22-18 @ 05:34) (101/57 - 147/77)  RR: 16 (09-22-18 @ 05:34) (16 - 16)  SpO2: 100% (09-22-18 @ 05:34) (92% - 100%)    I/O:     09-21-18 @ 07:01  -  09-22-18 @ 07:00  --------------------------------------------------------  IN: 100 mL / OUT: 1245 mL / NET: -1145 mL      Physical Exam:   General: No acute distress, sedated on ventilation support.   Abdomen: Aqualcel dressing intact, No strikethrough. Incisions CDI.   LE: Left leg with ace bandage in place, no strikethrough. JEREMY X 3 with SS output.

## 2018-09-22 NOTE — PROVIDER CONTACT NOTE (OTHER) - ASSESSMENT
Pt has been experiencing low urine output throughout the night. VS has been stable. 2300 9/21/18  aware of urine output of 85cc within 4 hours.  suggested to increase the pt's D5NS1/2 from 50cc to 75cc an hour. At 3am 9/22/18 the urine output picked up a little but still was low. Pt was bladder scanned to determine if she is retaining urine. Bladder scan detected as much as 155cc of urine in bladder. Dr. Mancuso made aware during morning rounds. He then suggested again to increase the rate of the IVF to 100cc an hour.

## 2018-09-22 NOTE — PROGRESS NOTE ADULT - PROBLEM SELECTOR PLAN 2
POD#5 of surgical excision with reconstruction.  -post-op care as per ortho and plastics  -PT/OT as tolerated  pain control/bowel regimen

## 2018-09-22 NOTE — PROGRESS NOTE ADULT - PROBLEM SELECTOR PLAN 3
patient with CK of 3400 rising from 2715, not rechecked since 9/19  -recommend repeat CK to ensure downtrending .  -if uptrending, will need fluid resuscitation to ensure no renal damage

## 2018-09-22 NOTE — PROGRESS NOTE ADULT - SUBJECTIVE AND OBJECTIVE BOX
Authored by Estrada Hu, DO: Beeper#[phone]    JULIA UMANA  67y  Female      Patient is a 67y old  Female who presents with a chief complaint of Resection of inguinal tumor with vascular reconstruction (21 Sep 2018 14:48)      INTERVAL HPI/OVERNIGHT EVENTS:  No acute events overnight. Patient lethargic upon examination, stating abdominal pain.  No fever, chils, chest pain.            T(C): 37.3 (09-22-18 @ 17:03), Max: 37.3 (09-22-18 @ 17:03)  HR: 64 (09-22-18 @ 17:03) (58 - 82)  BP: 104/69 (09-22-18 @ 17:03) (101/57 - 128/72)  RR: 16 (09-22-18 @ 17:03) (16 - 17)  SpO2: 96% (09-22-18 @ 17:03) (94% - 100%)  Wt(kg): --Vital Signs Last 24 Hrs  T(C): 37.3 (22 Sep 2018 17:03), Max: 37.3 (22 Sep 2018 17:03)  T(F): 99.1 (22 Sep 2018 17:03), Max: 99.1 (22 Sep 2018 17:03)  HR: 64 (22 Sep 2018 17:03) (58 - 82)  BP: 104/69 (22 Sep 2018 17:03) (101/57 - 128/72)  BP(mean): --  RR: 16 (22 Sep 2018 17:03) (16 - 17)  SpO2: 96% (22 Sep 2018 17:03) (94% - 100%)    PHYSICAL EXAM:  GENERAL: lethargic, in bed.  HEENT: Atraumatic, normocephalic.  Anicteric sclera. moist mucous membranes.  CHEST/LUNG: diminished bs at bases.  HEART: Regular rate and rhythm; No murmurs, rubs, or gallops  ABDOMEN: large hematoma left lateral portion of abdomen with surgical scars covered. +BS.  : belcher in place with pink urine with thick sediment.    EXTREMITIES:  left leg with sutures at site o bypass.  +1 DP pulse in that leg, 2 edema b/l.   SKIN: No rashes or lesions  PSYCH: Alert & Oriented x3  NERVOUS SYSTEM:  ; Motor Strength 5/5 B/L upper and lower extremities.  Sensory intact    Consultant(s) Notes Reviewed:  [x ] YES  [ ] NO: Vascular, plastics  Care Discussed with Consultants/Other Providers [ x] YES  [ ] NO orthopedic resident    LABS:                        9.6    14.05 )-----------( 133      ( 22 Sep 2018 14:00 )             28.6     09-21    137  |  105  |  14  ----------------------------<  118<H>  3.5   |  21<L>  |  0.64    Ca    7.3<L>      21 Sep 2018 07:00  Phos  2.0     09-21  Mg     2.3     09-21          CAPILLARY BLOOD GLUCOSE                RADIOLOGY & ADDITIONAL TESTS:    Imaging Personally Reviewed:  [ ] YES  [ ] NO

## 2018-09-22 NOTE — PROGRESS NOTE ADULT - SUBJECTIVE AND OBJECTIVE BOX
No acute events overnight. Abdominal pain moderately controlled.    PE:  ICU Vital Signs Last 24 Hrs  T(C): 36.6 (22 Sep 2018 05:34), Max: 37.2 (21 Sep 2018 14:02)  T(F): 97.9 (22 Sep 2018 05:34), Max: 98.9 (21 Sep 2018 14:02)  HR: 58 (22 Sep 2018 05:34) (57 - 82)  BP: 125/67 (22 Sep 2018 05:34) (101/57 - 147/77)  BP(mean): --  ABP: --  ABP(mean): --  RR: 16 (22 Sep 2018 05:34) (16 - 16)  SpO2: 100% (22 Sep 2018 05:34) (92% - 100%)      Gen: No acute distress  LLE: dressing c/d/i  JEREMY serosanguinous  Motor intact TA/GCS/EHL/FHL   SILT DP/SP/Tib  cap refill <2 sec, wwp       Labs:                        9.1    10.18 )-----------( 59       ( 20 Sep 2018 03:30 )             26.3   09-20    139  |  106  |  15  ----------------------------<  84  3.9   |  21<L>  |  0.80    Ca    7.2<L>      20 Sep 2018 03:15  Phos  2.1     09-20  Mg     2.4     09-20    TPro  4.1<L>  /  Alb  1.8<L>  /  TBili  0.5  /  DBili  x   /  AST  91<H>  /  ALT  55<H>  /  AlkPhos  46  09-20    Assessment/Plan:  67yFemale s/p L thigh excision liposarcoma, bypass, rectus flap POD5  -pain control  -PT/WBAT/OOB  -DVT ppx  -FU drain outputs  -FU PRS/Vasc

## 2018-09-22 NOTE — PROGRESS NOTE ADULT - PROBLEM SELECTOR PLAN 1
patient does not appear well, has rising WBC with purulent belcher drainage  -recommend UA/UCx as concerned for brewing infection, would have low threshold for treatment.  -recommend removal of belcher catheter if ok with surgical services.  -continue to monitor for signs of infection.

## 2018-09-23 LAB
BUN SERPL-MCNC: 11 MG/DL — SIGNIFICANT CHANGE UP (ref 7–23)
CALCIUM SERPL-MCNC: 7.4 MG/DL — LOW (ref 8.4–10.5)
CHLORIDE SERPL-SCNC: 99 MMOL/L — SIGNIFICANT CHANGE UP (ref 98–107)
CO2 SERPL-SCNC: 24 MMOL/L — SIGNIFICANT CHANGE UP (ref 22–31)
CREAT SERPL-MCNC: 0.58 MG/DL — SIGNIFICANT CHANGE UP (ref 0.5–1.3)
GLUCOSE SERPL-MCNC: 118 MG/DL — HIGH (ref 70–99)
HCT VFR BLD CALC: 28.2 % — LOW (ref 34.5–45)
HGB BLD-MCNC: 9.3 G/DL — LOW (ref 11.5–15.5)
MCHC RBC-ENTMCNC: 30.3 PG — SIGNIFICANT CHANGE UP (ref 27–34)
MCHC RBC-ENTMCNC: 33 % — SIGNIFICANT CHANGE UP (ref 32–36)
MCV RBC AUTO: 91.9 FL — SIGNIFICANT CHANGE UP (ref 80–100)
NRBC # FLD: 0 — SIGNIFICANT CHANGE UP
PLATELET # BLD AUTO: 144 K/UL — LOW (ref 150–400)
PMV BLD: 12 FL — SIGNIFICANT CHANGE UP (ref 7–13)
POTASSIUM SERPL-MCNC: 3.9 MMOL/L — SIGNIFICANT CHANGE UP (ref 3.5–5.3)
POTASSIUM SERPL-SCNC: 3.9 MMOL/L — SIGNIFICANT CHANGE UP (ref 3.5–5.3)
RBC # BLD: 3.07 M/UL — LOW (ref 3.8–5.2)
RBC # FLD: 15.4 % — HIGH (ref 10.3–14.5)
SODIUM SERPL-SCNC: 133 MMOL/L — LOW (ref 135–145)
WBC # BLD: 10.91 K/UL — HIGH (ref 3.8–10.5)
WBC # FLD AUTO: 10.91 K/UL — HIGH (ref 3.8–10.5)

## 2018-09-23 PROCEDURE — 99233 SBSQ HOSP IP/OBS HIGH 50: CPT

## 2018-09-23 RX ORDER — HYDROMORPHONE HYDROCHLORIDE 2 MG/ML
0.5 INJECTION INTRAMUSCULAR; INTRAVENOUS; SUBCUTANEOUS EVERY 4 HOURS
Qty: 0 | Refills: 0 | Status: DISCONTINUED | OUTPATIENT
Start: 2018-09-23 | End: 2018-09-26

## 2018-09-23 RX ORDER — VANCOMYCIN HCL 1 G
1000 VIAL (EA) INTRAVENOUS ONCE
Qty: 0 | Refills: 0 | Status: COMPLETED | OUTPATIENT
Start: 2018-09-23 | End: 2018-09-23

## 2018-09-23 RX ORDER — VANCOMYCIN HCL 1 G
1000 VIAL (EA) INTRAVENOUS EVERY 12 HOURS
Qty: 0 | Refills: 0 | Status: DISCONTINUED | OUTPATIENT
Start: 2018-09-23 | End: 2018-09-25

## 2018-09-23 RX ORDER — OXYCODONE HYDROCHLORIDE 5 MG/1
10 TABLET ORAL EVERY 4 HOURS
Qty: 0 | Refills: 0 | Status: DISCONTINUED | OUTPATIENT
Start: 2018-09-23 | End: 2018-09-30

## 2018-09-23 RX ORDER — VANCOMYCIN HCL 1 G
VIAL (EA) INTRAVENOUS
Qty: 0 | Refills: 0 | Status: DISCONTINUED | OUTPATIENT
Start: 2018-09-23 | End: 2018-09-25

## 2018-09-23 RX ADMIN — ENOXAPARIN SODIUM 40 MILLIGRAM(S): 100 INJECTION SUBCUTANEOUS at 13:26

## 2018-09-23 RX ADMIN — HYDROMORPHONE HYDROCHLORIDE 0.5 MILLIGRAM(S): 2 INJECTION INTRAMUSCULAR; INTRAVENOUS; SUBCUTANEOUS at 01:47

## 2018-09-23 RX ADMIN — Medication 81 MILLIGRAM(S): at 13:26

## 2018-09-23 RX ADMIN — OXYCODONE HYDROCHLORIDE 5 MILLIGRAM(S): 5 TABLET ORAL at 18:30

## 2018-09-23 RX ADMIN — Medication 250 MILLIGRAM(S): at 22:27

## 2018-09-23 RX ADMIN — OXYCODONE HYDROCHLORIDE 10 MILLIGRAM(S): 5 TABLET ORAL at 06:22

## 2018-09-23 RX ADMIN — PANTOPRAZOLE SODIUM 40 MILLIGRAM(S): 20 TABLET, DELAYED RELEASE ORAL at 22:27

## 2018-09-23 RX ADMIN — OXYCODONE HYDROCHLORIDE 5 MILLIGRAM(S): 5 TABLET ORAL at 17:53

## 2018-09-23 RX ADMIN — Medication 250 MILLIGRAM(S): at 10:30

## 2018-09-23 RX ADMIN — HYDROMORPHONE HYDROCHLORIDE 0.5 MILLIGRAM(S): 2 INJECTION INTRAMUSCULAR; INTRAVENOUS; SUBCUTANEOUS at 02:02

## 2018-09-23 RX ADMIN — OXYCODONE HYDROCHLORIDE 10 MILLIGRAM(S): 5 TABLET ORAL at 07:07

## 2018-09-23 RX ADMIN — SODIUM CHLORIDE 75 MILLILITER(S): 9 INJECTION, SOLUTION INTRAVENOUS at 17:52

## 2018-09-23 RX ADMIN — OXYCODONE HYDROCHLORIDE 5 MILLIGRAM(S): 5 TABLET ORAL at 00:43

## 2018-09-23 RX ADMIN — CEFTRIAXONE 100 GRAM(S): 500 INJECTION, POWDER, FOR SOLUTION INTRAMUSCULAR; INTRAVENOUS at 19:27

## 2018-09-23 NOTE — PROGRESS NOTE ADULT - SUBJECTIVE AND OBJECTIVE BOX
Authored by Estrada Hu, DO: Beeper#[phone]    JULIA UMANA  67y  Female      Patient is a 67y old  Female who presents with a chief complaint of Resection of inguinal tumor with vascular reconstruction (21 Sep 2018 14:48)      INTERVAL HPI/OVERNIGHT EVENTS:  No acute events overnight. Patient awake, alert.  States feels well today.  No pain.  Eating lunch at time of interview.       Vital Signs Last 24 Hrs  T(C): 37.2 (23 Sep 2018 13:25), Max: 37.3 (22 Sep 2018 17:03)  T(F): 99 (23 Sep 2018 13:25), Max: 99.1 (22 Sep 2018 17:03)  HR: 66 (23 Sep 2018 13:25) (59 - 66)  BP: 106/63 (23 Sep 2018 13:25) (95/61 - 126/61)  BP(mean): --  RR: 18 (23 Sep 2018 13:25) (16 - 18)  SpO2: 99% (23 Sep 2018 13:25) (96% - 99%)      PHYSICAL EXAM:  GENERAL: in bed eating lunch, NAD.  HEENT: Atraumatic, normocephalic.  Anicteric sclera. moist mucous membranes.  CHEST/LUNG: CTA b/l.  HEART: Regular rate and rhythm; No murmurs, rubs, or gallops  ABDOMEN: large hematoma left lateral portion of abdomen with surgical scars covered. +BS.  : no belcher, removed.   EXTREMITIES:  left leg with sutures at site o bypass.  +1 DP pulse in that leg, 2 edema b/l.   SKIN: erythema around suture area in left leg.   PSYCH: Alert & Oriented x3      Consultant(s) Notes Reviewed:  [x ] YES  [ ] NO: Vascular, plastics  Care Discussed with Consultants/Other Providers [ x] YES  [ ] NO orthopedic resident    LABS:                                   9.3    10.91 )-----------( 144      ( 23 Sep 2018 05:21 )             28.2   09-23    133<L>  |  99  |  11  ----------------------------<  118<H>  3.9   |  24  |  0.58    Ca    7.4<L>      23 Sep 2018 05:21    Urinalysis Basic - ( 22 Sep 2018 18:45 )    Color: DARK YELLOW / Appearance: Lt TURBID / S.032 / pH: 6.0  Gluc: 50 / Ketone: NEGATIVE  / Bili: TRACE / Urobili: MODERATE   Blood: MODERATE / Protein: 100 / Nitrite: POSITIVE   Leuk Esterase: LARGE / RBC: 11-25 / WBC >50   Sq Epi: OCC / Non Sq Epi: x / Bacteria: MANY          CAPILLARY BLOOD GLUCOSE                RADIOLOGY & ADDITIONAL TESTS:    Imaging Personally Reviewed:  [ ] YES  [ ] NO

## 2018-09-23 NOTE — PROGRESS NOTE ADULT - ASSESSMENT
67y F s/p L inguinal mass resection, vessel reconstruction with vascular surgery, and VRAM flap reconstruction; now extubated doing well in SICU     PLAN:    - pt with cellulitis, recommend starting vancomycin  - Continue JEREMY drain/care  - dressings removed - leave open to air  - DVT Ppx  - IVF  - Activity per vascular/ortho  - Will follow   - Pain control     [id number]

## 2018-09-23 NOTE — PROGRESS NOTE ADULT - PROBLEM SELECTOR PLAN 1
likely in setting of UTI  -agree with ceftraixone, downtrended WBC today, improved clinical apperance.   -await UCx and adjust Abx accordingly, to treat 7 days given catheter.  -plasics apprecaited, continue with vancomycin for concern for cellultis (?non-purulent, could use ceftriaxone for both?)

## 2018-09-23 NOTE — PROGRESS NOTE ADULT - ASSESSMENT
Ms. Fleming is a 68 yo woman with PMHx of HTN admitted for scheduled surgical resection of left inguinal mass. Patient underwent enbloc resection of inguinal mass, left femoral to below knee popliteal bypass, venous resection, and reconstruction with rectus abdominus myocutaneous flap on 9/17. Hospital course notable for asymptomatic bigeminy with bradycardia deemed 2/2 catecholamine surge, now resolve. Now transferred to surgical floor from SICU, where she stayed post-op for closer hemodynamic monitoring c/b cellulitis and UTI

## 2018-09-23 NOTE — PROGRESS NOTE ADULT - SUBJECTIVE AND OBJECTIVE BOX
Plastic Surgery Progress Note    S: Patient seen and examined.  being treated for UTI    Vital Signs Last 24 Hrs  T(C): 37.2 (23 Sep 2018 05:03), Max: 37.3 (22 Sep 2018 17:03)  T(F): 98.9 (23 Sep 2018 05:03), Max: 99.1 (22 Sep 2018 17:03)  HR: 63 (23 Sep 2018 05:03) (59 - 64)  BP: 126/61 (23 Sep 2018 05:03) (104/69 - 126/61)  BP(mean): --  RR: 18 (23 Sep 2018 05:03) (16 - 18)  SpO2: 96% (23 Sep 2018 05:03) (95% - 98%)  I&O's Detail    22 Sep 2018 07:01  -  23 Sep 2018 07:00  --------------------------------------------------------  IN:  Total IN: 0 mL    OUT:    Bulb: 67 mL    Bulb: 222.5 mL    Bulb: 380 mL    Indwelling Catheter - Urethral: 575 mL  Total OUT: 1244.5 mL    Total NET: -1244.5 mL      23 Sep 2018 07:01  -  23 Sep 2018 08:34  --------------------------------------------------------  IN:  Total IN: 0 mL    OUT:    Bulb: 50 mL    Bulb: 50 mL  Total OUT: 100 mL    Total NET: -100 mL          Physical Exam:  General: Awake and alert  Abd: dressing CDI, drain high output serous  Groin/Thigh: VRAM flap viable, appropriate color, soft.  surrounding groin and thigh skin erythematous

## 2018-09-23 NOTE — PROGRESS NOTE ADULT - SUBJECTIVE AND OBJECTIVE BOX
No acute events overnight. UTI found during day, started on course of Rocephin.     PE:  Vital Signs Last 24 Hrs  T(C): 36.9 (22 Sep 2018 22:53), Max: 37.3 (22 Sep 2018 17:03)  T(F): 98.5 (22 Sep 2018 22:53), Max: 99.1 (22 Sep 2018 17:03)  HR: 64 (22 Sep 2018 17:03) (58 - 65)  BP: 109/62 (22 Sep 2018 22:53) (104/69 - 125/67)  BP(mean): --  RR: 18 (22 Sep 2018 22:53) (16 - 18)  SpO2: 98% (22 Sep 2018 22:53) (95% - 100%)      Gen: No acute distress  LLE: dressing c/d/i  JEREMY serosanguinous  Motor intact TA/GCS/EHL/FHL   SILT DP/SP/Tib  cap refill <2 sec, wwp       Labs:                                 9.6    14.05 )-----------( 133      ( 22 Sep 2018 14:00 )             28.6     09-21    137  |  105  |  14  ----------------------------<  118<H>  3.5   |  21<L>  |  0.64    Ca    7.3<L>      21 Sep 2018 07:00  Phos  2.0     09-21  Mg     2.3     09-21                  Assessment/Plan:  67yFemale s/p L thigh excision liposarcoma, bypass, rectus flap POD6  -pain control  -Rocephin for UTI  -PT/WBAT/OOB  -DVT ppx  -FU drain outputs  -FU PRS/Vasc

## 2018-09-24 LAB
BUN SERPL-MCNC: 12 MG/DL — SIGNIFICANT CHANGE UP (ref 7–23)
CALCIUM SERPL-MCNC: 7.5 MG/DL — LOW (ref 8.4–10.5)
CHLORIDE SERPL-SCNC: 98 MMOL/L — SIGNIFICANT CHANGE UP (ref 98–107)
CK SERPL-CCNC: 409 U/L — HIGH (ref 25–170)
CO2 SERPL-SCNC: 24 MMOL/L — SIGNIFICANT CHANGE UP (ref 22–31)
CREAT SERPL-MCNC: 0.74 MG/DL — SIGNIFICANT CHANGE UP (ref 0.5–1.3)
GLUCOSE SERPL-MCNC: 104 MG/DL — HIGH (ref 70–99)
HCT VFR BLD CALC: 26.4 % — LOW (ref 34.5–45)
HGB BLD-MCNC: 8.7 G/DL — LOW (ref 11.5–15.5)
MCHC RBC-ENTMCNC: 30.4 PG — SIGNIFICANT CHANGE UP (ref 27–34)
MCHC RBC-ENTMCNC: 33 % — SIGNIFICANT CHANGE UP (ref 32–36)
MCV RBC AUTO: 92.3 FL — SIGNIFICANT CHANGE UP (ref 80–100)
NRBC # FLD: 0.02 — SIGNIFICANT CHANGE UP
PLATELET # BLD AUTO: 193 K/UL — SIGNIFICANT CHANGE UP (ref 150–400)
PMV BLD: 10.8 FL — SIGNIFICANT CHANGE UP (ref 7–13)
POTASSIUM SERPL-MCNC: 4 MMOL/L — SIGNIFICANT CHANGE UP (ref 3.5–5.3)
POTASSIUM SERPL-SCNC: 4 MMOL/L — SIGNIFICANT CHANGE UP (ref 3.5–5.3)
RBC # BLD: 2.86 M/UL — LOW (ref 3.8–5.2)
RBC # FLD: 15.1 % — HIGH (ref 10.3–14.5)
SODIUM SERPL-SCNC: 131 MMOL/L — LOW (ref 135–145)
SPECIMEN SOURCE: SIGNIFICANT CHANGE UP
VANCOMYCIN TROUGH SERPL-MCNC: 10.6 UG/ML — SIGNIFICANT CHANGE UP (ref 10–20)
WBC # BLD: 14.55 K/UL — HIGH (ref 3.8–10.5)
WBC # FLD AUTO: 14.55 K/UL — HIGH (ref 3.8–10.5)

## 2018-09-24 PROCEDURE — 99233 SBSQ HOSP IP/OBS HIGH 50: CPT

## 2018-09-24 RX ADMIN — CEFTRIAXONE 100 GRAM(S): 500 INJECTION, POWDER, FOR SOLUTION INTRAMUSCULAR; INTRAVENOUS at 20:35

## 2018-09-24 RX ADMIN — Medication 81 MILLIGRAM(S): at 12:24

## 2018-09-24 RX ADMIN — PANTOPRAZOLE SODIUM 40 MILLIGRAM(S): 20 TABLET, DELAYED RELEASE ORAL at 22:02

## 2018-09-24 RX ADMIN — Medication 250 MILLIGRAM(S): at 23:35

## 2018-09-24 RX ADMIN — OXYCODONE HYDROCHLORIDE 10 MILLIGRAM(S): 5 TABLET ORAL at 14:03

## 2018-09-24 RX ADMIN — Medication 250 MILLIGRAM(S): at 10:21

## 2018-09-24 RX ADMIN — OXYCODONE HYDROCHLORIDE 10 MILLIGRAM(S): 5 TABLET ORAL at 01:13

## 2018-09-24 RX ADMIN — BENZOCAINE AND MENTHOL 1 LOZENGE: 5; 1 LIQUID ORAL at 05:09

## 2018-09-24 RX ADMIN — OXYCODONE HYDROCHLORIDE 10 MILLIGRAM(S): 5 TABLET ORAL at 09:16

## 2018-09-24 RX ADMIN — OXYCODONE HYDROCHLORIDE 10 MILLIGRAM(S): 5 TABLET ORAL at 15:00

## 2018-09-24 RX ADMIN — OXYCODONE HYDROCHLORIDE 10 MILLIGRAM(S): 5 TABLET ORAL at 10:00

## 2018-09-24 RX ADMIN — ENOXAPARIN SODIUM 40 MILLIGRAM(S): 100 INJECTION SUBCUTANEOUS at 12:24

## 2018-09-24 RX ADMIN — OXYCODONE HYDROCHLORIDE 10 MILLIGRAM(S): 5 TABLET ORAL at 00:28

## 2018-09-24 NOTE — PROGRESS NOTE ADULT - SUBJECTIVE AND OBJECTIVE BOX
No acute events overnight.     ICU Vital Signs Last 24 Hrs  T(C): 37.2 (24 Sep 2018 05:05), Max: 37.2 (23 Sep 2018 13:25)  T(F): 99 (24 Sep 2018 05:05), Max: 99 (23 Sep 2018 13:25)  HR: 65 (24 Sep 2018 05:05) (60 - 66)  BP: 95/56 (24 Sep 2018 05:05) (93/51 - 108/60)  BP(mean): --  ABP: --  ABP(mean): --  RR: 18 (24 Sep 2018 05:05) (18 - 18)  SpO2: 95% (24 Sep 2018 05:05) (95% - 99%)    Gen: No acute distress  LLE: dressing c/d/i; HV: 232, 362, 322  JEREMY serosanguinous  Motor intact TA/GCS/EHL/FHL   SILT DP/SP/Tib  cap refill <2 sec, wwp       Labs:                                 9.6    14.05 )-----------( 133      ( 22 Sep 2018 14:00 )             28.6     09-21    137  |  105  |  14  ----------------------------<  118<H>  3.5   |  21<L>  |  0.64    Ca    7.3<L>      21 Sep 2018 07:00  Phos  2.0     09-21  Mg     2.3     09-21                Assessment/Plan:  67yFemale s/p L thigh excision liposarcoma, bypass, rectus flap POD7  -pain control  -Rocephin for UTI  -PT/WBAT/OOB  -DVT ppx  -FU drain outputs  -FU PRS/Vasc

## 2018-09-24 NOTE — PROGRESS NOTE ADULT - SUBJECTIVE AND OBJECTIVE BOX
CHIEF COMPLAINT: f/u     SUBJECTIVE / OVERNIGHT EVENTS:   [facility] services utilized for [place], ID# [id number]  Patient seen and examined this morning. No acute events overnight. Patient reports feeling better. Pain is controlled with current pain meds she is receiving. Patient participated with PT. As per PT, left thigh surgical site noted to be oozing onto brace. Patient not eating much. She is on clear liquid diet - tolerating it. She denies n/v. No chest pain or SOB.      MEDICATIONS  (STANDING):  aspirin enteric coated 81 milliGRAM(s) Oral daily  cefTRIAXone   IVPB 1 Gram(s) IV Intermittent every 24 hours  cefTRIAXone   IVPB      enoxaparin Injectable 40 milliGRAM(s) SubCutaneous daily  influenza   Vaccine 0.5 milliLiter(s) IntraMuscular once  pantoprazole  Injectable 40 milliGRAM(s) IV Push at bedtime  vancomycin  IVPB      vancomycin  IVPB 1000 milliGRAM(s) IV Intermittent every 12 hours    MEDICATIONS  (PRN):  acetaminophen   Tablet .. 650 milliGRAM(s) Oral every 6 hours PRN Temp greater or equal to 38C (100.4F), Mild Pain (1 - 3)  benzocaine 15 mG/menthol 3.6 mG Lozenge 1 Lozenge Oral three times a day PRN Sore Throat  HYDROmorphone  Injectable 0.5 milliGRAM(s) IV Push every 4 hours PRN breakthrough pain  ondansetron Injectable 4 milliGRAM(s) IV Push every 6 hours PRN Nausea and/or Vomiting  oxyCODONE    IR 5 milliGRAM(s) Oral every 4 hours PRN Mild and Moderate Pain  oxyCODONE    IR 10 milliGRAM(s) Oral every 4 hours PRN Severe Pain (7 - 10)      VITALS:  T(F): 98.5 (09-24-18 @ 13:03), Max: 99 (09-24-18 @ 05:05)  HR: 60 (09-24-18 @ 13:03) (60 - 65)  BP: 101/70 (09-24-18 @ 13:03) (91/55 - 108/60)  RR: 16 (09-24-18 @ 13:03) (15 - 18)  SpO2: 96% (09-24-18 @ 13:03)      PHYSICAL EXAM:  GENERAL: Laying in bed in NAD  CHEST/LUNG: CTA b/l on anterior surface  HEART: Regular rate and rhythm; No murmurs, rubs, or gallops  ABDOMEN: large hematoma left lateral portion of abdomen. Surgical staples intact.  with surgical scars covered. +BS.  : no belcher, removed.   EXTREMITIES:  left leg with sutures at site of bypass. +1 DP pulse in that leg, 2 edema b/l.  Hematoma on left upper thigh lateral to surgical incision. Surgical staples intact. Serous drainage noted at site of JEREMY drain in left thigh. No purulence expressed.  SKIN: erythema around suture area in left leg.   PSYCH: Alert & Oriented x3      LABS:              8.7                  131  | 24   | 12           14.55 >-----------< 193     ------------------------< 104                   26.4                 4.0  | 98   | 0.74                                         Ca 7.5   Mg x     Ph x              [ ] Consultant(s) Notes Reviewed:  [x] Care Discussed with Consultants/Other Providers: Orthopedic PA - discussed

## 2018-09-24 NOTE — PROGRESS NOTE ADULT - ASSESSMENT
67F s/p left inguinal mass with left external iliac to above knee popliteal artery bypass w/ PTFE.  Now POD 7. LLE remains warm with palpable DP pulse, sensorymotor intact. Ambulated with PT for the first time the day prior 9/23/2018. On empiric vancomycin to prevent infection spreading to graft.     - agree with continued Abx therapy  - recommend imaging for Abd / Pelvis / Left thigh: CTA vs US  - recommend sending urine and blood cultures  - will continue to follow    Charli Valdovinos PGY3  [id number] 67F s/p left inguinal mass with left external iliac to above knee popliteal artery bypass w/ PTFE.  Now POD 7. LLE remains warm with palpable DP pulse, sensorymotor intact. Ambulated with PT for the first time the day prior 9/23/2018. On empiric vancomycin to prevent infection spreading to graft.     - agree with continued Abx therapy  - recommend imaging for Abd / Pelvis / Left thigh: CTA vs US  - recommend sending urine and blood cultures  - OOB / weight-bearing as tolerated  - will continue to follow    Charli Valdovinos PGY3  [id number]

## 2018-09-24 NOTE — PROGRESS NOTE ADULT - PROBLEM SELECTOR PLAN 1
likely in setting of UTI. 9/22 Urine cx growing 100K colonies of E. coli.  - continue ceftriaxone 1g daily, recommend 7 day abx course since pt with catheter associated UTI.  - recommend discontinuing vancomycin. If cellulitis, anticipate adequate coverage with ceftriaxone.

## 2018-09-24 NOTE — PROGRESS NOTE ADULT - ASSESSMENT
67y F s/p L inguinal mass resection, vessel reconstruction with vascular surgery, and VRAM flap reconstruction, possible cellulitis, should continue empiric vancomycin to treat to prevent infection of area spreading to graft    PLAN:    - continue vancomycin  - Continue JEREMY drain/care  - dressings removed - leave open to air  - xeroform to macerated skin  - DVT Ppx  - IVF  - Activity per vascular/ortho  - Will follow   - Pain control     [id number]

## 2018-09-24 NOTE — PROGRESS NOTE ADULT - SUBJECTIVE AND OBJECTIVE BOX
Vascular Surgery  CC: resection of inguinal tumor with vascular reconstruction  HPI: 67F s/p left inguinal mass with left external iliac to above knee popliteal artery bypass w/ PTFE.   24/Overnight events: Patient seen and examined at bedside. Doing well. Now POD 7. LLE remains warm with palpable DP pulse, sensorymotor intact. Ambulated with PT for the first time the day prior 2018. On empiric vancomycin to prevent infection spreading to graft.      Objective:  Vital Signs Last 24 Hrs  T(C): 37.2 (24 Sep 2018 05:05), Max: 37.2 (23 Sep 2018 13:25)  T(F): 99 (24 Sep 2018 05:05), Max: 99 (23 Sep 2018 13:25)  HR: 65 (24 Sep 2018 05:05) (60 - 66)  BP: 95/56 (24 Sep 2018 05:05) (93/51 - 108/60)  BP(mean): --  RR: 18 (24 Sep 2018 05:05) (18 - 18)  SpO2: 95% (24 Sep 2018 05:05) (95% - 99%)    Physical Exam:  General: NAD  Respiratory: non-labored on room air  Abdomen: dressing remains clear, dry and intact; drainage remains serous, although high output  Groin: slightly resolving erythema, indurated, tender       I&O's Detail    23 Sep 2018 07:01  -  24 Sep 2018 07:00  --------------------------------------------------------  IN:  Total IN: 0 mL    OUT:    Bulb: 232 mL    Bulb: 322 mL    Bulb: 362 mL  Total OUT: 916 mL    Total NET: -916 mL        Daily     Daily Weight in k.1 (24 Sep 2018 02:03)    LABS:                        8.7    14.55 )-----------( 193      ( 24 Sep 2018 06:29 )             26.4     09-24    131<L>  |  98  |  12  ----------------------------<  104<H>  4.0   |  24  |  0.74    Ca    7.5<L>      24 Sep 2018 06:29        Urinalysis Basic - ( 22 Sep 2018 18:45 )    Color: DARK YELLOW / Appearance: Lt TURBID / S.032 / pH: 6.0  Gluc: 50 / Ketone: NEGATIVE  / Bili: TRACE / Urobili: MODERATE   Blood: MODERATE / Protein: 100 / Nitrite: POSITIVE   Leuk Esterase: LARGE / RBC: 11-25 / WBC >50   Sq Epi: OCC / Non Sq Epi: x / Bacteria: MANY

## 2018-09-24 NOTE — PROGRESS NOTE ADULT - PROBLEM SELECTOR PLAN 2
POD#7 of surgical excision with reconstruction.  -post-op care as per ortho and plastics  -PT/OT as tolerated  pain control/bowel regimen

## 2018-09-24 NOTE — PROGRESS NOTE ADULT - SUBJECTIVE AND OBJECTIVE BOX
Plastic Surgery Progress Note    S: Patient seen and examined.  Started on vancomycin for possible cellulitis of left groin    T(C): 37.2 (09-24-18 @ 05:05), Max: 37.2 (09-23-18 @ 13:25)  HR: 65 (09-24-18 @ 05:05) (60 - 66)  BP: 95/56 (09-24-18 @ 05:05) (93/51 - 108/60)  BP(mean): --  ABP: --  ABP(mean): --  RR: 18 (09-24-18 @ 05:05) (18 - 18)  SpO2: 95% (09-24-18 @ 05:05) (95% - 99%)  Wt(kg): --  CVP(mm Hg): --  CI: --  CAPILLARY BLOOD GLUCOSE       N/A      09-23 @ 07:01  -  09-24 @ 07:00  --------------------------------------------------------  IN:  Total IN: 0 mL    OUT:    Bulb: 232 mL    Bulb: 322 mL    Bulb: 362 mL  Total OUT: 916 mL    Total NET: -916 mL                Physical Exam:  General: Awake and alert  Abd: dressing CDI, drain high output serous  Groin/Thigh: VRAM flap viable, appropriate color, soft.  surrounding groin and thigh skin erythematous, some maceration of medial skin

## 2018-09-25 ENCOUNTER — TRANSCRIPTION ENCOUNTER (OUTPATIENT)
Age: 68
End: 2018-09-25

## 2018-09-25 DIAGNOSIS — D72.829 ELEVATED WHITE BLOOD CELL COUNT, UNSPECIFIED: ICD-10-CM

## 2018-09-25 DIAGNOSIS — E87.1 HYPO-OSMOLALITY AND HYPONATREMIA: ICD-10-CM

## 2018-09-25 DIAGNOSIS — R32 UNSPECIFIED URINARY INCONTINENCE: ICD-10-CM

## 2018-09-25 LAB
-  AMIKACIN: SIGNIFICANT CHANGE UP
-  AMPICILLIN/SULBACTAM: SIGNIFICANT CHANGE UP
-  AMPICILLIN: SIGNIFICANT CHANGE UP
-  AZTREONAM: SIGNIFICANT CHANGE UP
-  CEFAZOLIN: SIGNIFICANT CHANGE UP
-  CEFEPIME: SIGNIFICANT CHANGE UP
-  CEFOXITIN: SIGNIFICANT CHANGE UP
-  CEFTAZIDIME: SIGNIFICANT CHANGE UP
-  CEFTRIAXONE: SIGNIFICANT CHANGE UP
-  CIPROFLOXACIN: SIGNIFICANT CHANGE UP
-  ERTAPENEM: SIGNIFICANT CHANGE UP
-  GENTAMICIN: SIGNIFICANT CHANGE UP
-  IMIPENEM: SIGNIFICANT CHANGE UP
-  LEVOFLOXACIN: SIGNIFICANT CHANGE UP
-  MEROPENEM: SIGNIFICANT CHANGE UP
-  NITROFURANTOIN: SIGNIFICANT CHANGE UP
-  PIPERACILLIN/TAZOBACTAM: SIGNIFICANT CHANGE UP
-  TIGECYCLINE: SIGNIFICANT CHANGE UP
-  TOBRAMYCIN: SIGNIFICANT CHANGE UP
-  TRIMETHOPRIM/SULFAMETHOXAZOLE: SIGNIFICANT CHANGE UP
BACTERIA UR CULT: SIGNIFICANT CHANGE UP
BLD GP AB SCN SERPL QL: NEGATIVE — SIGNIFICANT CHANGE UP
BUN SERPL-MCNC: 13 MG/DL — SIGNIFICANT CHANGE UP (ref 7–23)
CALCIUM SERPL-MCNC: 7.6 MG/DL — LOW (ref 8.4–10.5)
CHLORIDE SERPL-SCNC: 95 MMOL/L — LOW (ref 98–107)
CO2 SERPL-SCNC: 25 MMOL/L — SIGNIFICANT CHANGE UP (ref 22–31)
CREAT SERPL-MCNC: 0.69 MG/DL — SIGNIFICANT CHANGE UP (ref 0.5–1.3)
GLUCOSE SERPL-MCNC: 105 MG/DL — HIGH (ref 70–99)
HCT VFR BLD CALC: 25.5 % — LOW (ref 34.5–45)
HGB BLD-MCNC: 8.6 G/DL — LOW (ref 11.5–15.5)
LACTATE SERPL-SCNC: 1.4 MMOL/L — SIGNIFICANT CHANGE UP (ref 0.5–2)
MCHC RBC-ENTMCNC: 30.6 PG — SIGNIFICANT CHANGE UP (ref 27–34)
MCHC RBC-ENTMCNC: 33.7 % — SIGNIFICANT CHANGE UP (ref 32–36)
MCV RBC AUTO: 90.7 FL — SIGNIFICANT CHANGE UP (ref 80–100)
METHOD TYPE: SIGNIFICANT CHANGE UP
NRBC # FLD: 0.02 — SIGNIFICANT CHANGE UP
ORGANISM # SPEC MICROSCOPIC CNT: SIGNIFICANT CHANGE UP
ORGANISM # SPEC MICROSCOPIC CNT: SIGNIFICANT CHANGE UP
OSMOLALITY SERPL: 268 MOSMO/KG — LOW (ref 275–295)
OSMOLALITY UR: 409 MOSMO/KG — SIGNIFICANT CHANGE UP (ref 50–1200)
PLATELET # BLD AUTO: 234 K/UL — SIGNIFICANT CHANGE UP (ref 150–400)
PMV BLD: 10.6 FL — SIGNIFICANT CHANGE UP (ref 7–13)
POTASSIUM SERPL-MCNC: 3.9 MMOL/L — SIGNIFICANT CHANGE UP (ref 3.5–5.3)
POTASSIUM SERPL-SCNC: 3.9 MMOL/L — SIGNIFICANT CHANGE UP (ref 3.5–5.3)
RBC # BLD: 2.81 M/UL — LOW (ref 3.8–5.2)
RBC # FLD: 15.1 % — HIGH (ref 10.3–14.5)
RH IG SCN BLD-IMP: POSITIVE — SIGNIFICANT CHANGE UP
SODIUM SERPL-SCNC: 131 MMOL/L — LOW (ref 135–145)
SODIUM UR-SCNC: < 20 MMOL/L — SIGNIFICANT CHANGE UP
WBC # BLD: 19.69 K/UL — HIGH (ref 3.8–10.5)
WBC # FLD AUTO: 19.69 K/UL — HIGH (ref 3.8–10.5)

## 2018-09-25 PROCEDURE — 99233 SBSQ HOSP IP/OBS HIGH 50: CPT

## 2018-09-25 PROCEDURE — 74177 CT ABD & PELVIS W/CONTRAST: CPT | Mod: 26

## 2018-09-25 RX ORDER — SODIUM CHLORIDE 9 MG/ML
1000 INJECTION, SOLUTION INTRAVENOUS
Qty: 0 | Refills: 0 | Status: DISCONTINUED | OUTPATIENT
Start: 2018-09-25 | End: 2018-09-27

## 2018-09-25 RX ORDER — CEFEPIME 1 G/1
2000 INJECTION, POWDER, FOR SOLUTION INTRAMUSCULAR; INTRAVENOUS EVERY 12 HOURS
Qty: 0 | Refills: 0 | Status: DISCONTINUED | OUTPATIENT
Start: 2018-09-25 | End: 2018-09-29

## 2018-09-25 RX ORDER — VANCOMYCIN HCL 1 G
1250 VIAL (EA) INTRAVENOUS EVERY 12 HOURS
Qty: 0 | Refills: 0 | Status: DISCONTINUED | OUTPATIENT
Start: 2018-09-25 | End: 2018-09-29

## 2018-09-25 RX ADMIN — OXYCODONE HYDROCHLORIDE 10 MILLIGRAM(S): 5 TABLET ORAL at 02:48

## 2018-09-25 RX ADMIN — OXYCODONE HYDROCHLORIDE 10 MILLIGRAM(S): 5 TABLET ORAL at 03:45

## 2018-09-25 RX ADMIN — PANTOPRAZOLE SODIUM 40 MILLIGRAM(S): 20 TABLET, DELAYED RELEASE ORAL at 21:43

## 2018-09-25 RX ADMIN — Medication 81 MILLIGRAM(S): at 15:02

## 2018-09-25 RX ADMIN — CEFEPIME 100 MILLIGRAM(S): 1 INJECTION, POWDER, FOR SOLUTION INTRAMUSCULAR; INTRAVENOUS at 19:41

## 2018-09-25 RX ADMIN — OXYCODONE HYDROCHLORIDE 10 MILLIGRAM(S): 5 TABLET ORAL at 11:36

## 2018-09-25 RX ADMIN — ENOXAPARIN SODIUM 40 MILLIGRAM(S): 100 INJECTION SUBCUTANEOUS at 15:03

## 2018-09-25 RX ADMIN — Medication 166.67 MILLIGRAM(S): at 21:35

## 2018-09-25 RX ADMIN — OXYCODONE HYDROCHLORIDE 10 MILLIGRAM(S): 5 TABLET ORAL at 18:19

## 2018-09-25 RX ADMIN — Medication 166.67 MILLIGRAM(S): at 11:09

## 2018-09-25 RX ADMIN — OXYCODONE HYDROCHLORIDE 10 MILLIGRAM(S): 5 TABLET ORAL at 10:40

## 2018-09-25 NOTE — PROGRESS NOTE ADULT - ASSESSMENT
67F s/p left inguinal mass with left external iliac to above knee popliteal artery bypass w/ PTFE.  Now POD 7. LLE remains warm with palpable DP pulse, sensorymotor intact. Ambulated with PT second time  9/24/2018. On empiric vancomycin      - agree with continued Abx therapy  - recommend CTA imaging for Abd / Pelvis / Left thigh    - recommend sending blood cultures  - OOB / weight-bearing as tolerated  - will continue to follow    Charli Valdovinos PGY3  [id number] F25.9 F25.9 F25.9 F25.9 F25.9 F25.9 F25.9 F25.9 F25.9 F25.9 F25.9 F25.9 F25.9 F25.9 F25.9 F25.9 F25.9 F25.9

## 2018-09-25 NOTE — PROGRESS NOTE ADULT - SUBJECTIVE AND OBJECTIVE BOX
CHIEF COMPLAINT: f/u     SUBJECTIVE / OVERNIGHT EVENTS:   Patient seen and examined this morning. Patient has appetite. Patient has pain at surgical sites. However, she states pain meds help alleviate her pain. Patient still with significant output from JEREMY drains. Patient without fever or chills. No n/v. No chest pain or SOB.      MEDICATIONS  (STANDING):  aspirin enteric coated 81 milliGRAM(s) Oral daily  cefTRIAXone   IVPB 1 Gram(s) IV Intermittent every 24 hours  cefTRIAXone   IVPB      enoxaparin Injectable 40 milliGRAM(s) SubCutaneous daily  influenza   Vaccine 0.5 milliLiter(s) IntraMuscular once  pantoprazole  Injectable 40 milliGRAM(s) IV Push at bedtime  vancomycin  IVPB 1250 milliGRAM(s) IV Intermittent every 12 hours    MEDICATIONS  (PRN):  acetaminophen   Tablet .. 650 milliGRAM(s) Oral every 6 hours PRN Temp greater or equal to 38C (100.4F), Mild Pain (1 - 3)  benzocaine 15 mG/menthol 3.6 mG Lozenge 1 Lozenge Oral three times a day PRN Sore Throat  HYDROmorphone  Injectable 0.5 milliGRAM(s) IV Push every 4 hours PRN breakthrough pain  ondansetron Injectable 4 milliGRAM(s) IV Push every 6 hours PRN Nausea and/or Vomiting  oxyCODONE    IR 5 milliGRAM(s) Oral every 4 hours PRN Mild and Moderate Pain  oxyCODONE    IR 10 milliGRAM(s) Oral every 4 hours PRN Severe Pain (7 - 10)      VITALS:  T(F): 99 (09-25-18 @ 13:27), Max: 99.8 (09-24-18 @ 16:58)  HR: 82 (09-25-18 @ 13:27) (64 - 83)  BP: 106/72 (09-25-18 @ 13:27) (98/64 - 136/76)  RR: 16 (09-25-18 @ 13:27) (16 - 16)  SpO2: 94% (09-25-18 @ 13:27)      PHYSICAL EXAM:  GENERAL: Laying in bed in NAD  CHEST/LUNG: CTA b/l on anterior surface  HEART: Regular rate and rhythm; No murmurs, rubs, or gallops  ABDOMEN: large hematoma left lateral portion of abdomen. Surgical staples intact. Blanchable erythema left side/flank. RLQ JEREMY drain. mid/left lower quadrant JEREMY drain.  EXTREMITIES:  left leg with sutures at site of bypass. +1 DP pulse in that leg. B/l LE edema, L>R. Blanchable erythematous/slightly mottled skin on left upper thigh lateral to surgical incision site with blackening of skin on lateral side of staple. Surgical staples intact. Serous drainage noted at site of JEREMY drain in left thigh. No purulence expressed.  PSYCH: Alert & Oriented x3      LABS:              8.6                  131  | 25   | 13           19.69 >-----------< 234     ------------------------< 105                   25.5                 3.9  | 95   | 0.69                                         Ca 7.6   Mg x     Ph x            RADIOLOGY & ADDITIONAL TESTS:  Imaging Personally Reviewed: [x] Yes    [ ] Consultant(s) Notes Reviewed:  [x] Care Discussed with Consultants/Other Providers: Orthopedic PA, vascular surgery, and plastic surgery - discussed

## 2018-09-25 NOTE — CONSULT NOTE ADULT - SUBJECTIVE AND OBJECTIVE BOX
HPI:   Patient is a 67y female with a past history of HTN who was admitted 9/17 for elective excision of left groin mass.She underwent enbloc resection with left external iliac to politeal bypass(PTFE) along with a rectus muscle flap.She developed bigeminy intraop, had an uneventful early post op course.She has developed a rising wbc count, was started on vanco and CTX after a urine culture was submitted.No fever or chills.CT sccan today shows a collection,a return to OR is planned.    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 650 milliGRAM(s) Oral every 6 hours PRN  aspirin enteric coated 81 milliGRAM(s) Oral daily  benzocaine 15 mG/menthol 3.6 mG Lozenge 1 Lozenge Oral three times a day PRN  enoxaparin Injectable 40 milliGRAM(s) SubCutaneous daily  HYDROmorphone  Injectable 0.5 milliGRAM(s) IV Push every 4 hours PRN  influenza   Vaccine 0.5 milliLiter(s) IntraMuscular once  ondansetron Injectable 4 milliGRAM(s) IV Push every 6 hours PRN  oxyCODONE    IR 5 milliGRAM(s) Oral every 4 hours PRN  oxyCODONE    IR 10 milliGRAM(s) Oral every 4 hours PRN  pantoprazole  Injectable 40 milliGRAM(s) IV Push at bedtime      FAMILY HISTORY:  No pertinent family history in first degree relatives      SOCIAL HISTORY:  Smoking:   no  ETOH: no    Drug Use: no      T(F): 97.6 (09-25-18 @ 17:25), Max: 99 (09-25-18 @ 13:27)  HR: 75 (09-25-18 @ 17:25)  BP: 115/73 (09-25-18 @ 17:25)  RR: 17 (09-25-18 @ 17:25)  SpO2: 94% (09-25-18 @ 17:25)  Wt(kg): --    PHYSICAL EXAM:  General: alert, no acute distress  Eyes:  anicteric, no conjunctival injection, no discharge  Oropharynx: no lesions or injection 	  Neck: supple, without adenopathy  Lungs: clear to auscultation  Heart: regular rate and rhythm; no murmur, rubs or gallops  Abdomen: soft, nondistended, nontender, without mass or organomegaly  Abdominal incisions and muscle flap both intact and viable.Left flank with indurated and tender tissues,extending to lateral thigh. JEREMY with serous drainage.  Skin: no lesions  Extremities: no clubbing, cyanosis, + edema left leg.Left foot warm  Neurologic: alert, oriented, moves all extremities    LAB RESULTS:                        8.6    19.69 )-----------( 234      ( 25 Sep 2018 09:15 )             25.5     09-25    131<L>  |  95<L>  |  13  ----------------------------<  105<H>  3.9   |  25  |  0.69    Ca    7.6<L>      25 Sep 2018 09:15            MICROBIOLOGY:  RECENT CULTURES:  09-22 @ 21:58 URINE CATHETER Escherichia coli              RADIOLOGY REVIEWED:  < from: CT Abdomen and Pelvis w/ IV Cont (09.25.18 @ 12:37) >  IMPRESSION: Status post resection of previously noted large mass in the   left groin. A large collection containing fluid and gas is noted in the   left groin with infiltration of the musculature of the left upper thigh,   concerning for infection.    Large defect in the musculature of the left lower anterior abdomen with   herniation of nonobstructed large and small bowel.    Small bilateral pleural effusions with bibasilar atelectasis.    A 0.9 cm rounded soft tissue density is noted in the fundus of the   gallbladder. The differential diagnosis includes a polyp and mass.   Ultrasound is suggested for further characterization.    Central low attenuation in the endometrial canal, measuring up to 1 cm,   an abnormal finding in a postmenopausal female. When the patient is   clinically able, pelvic ultrasound is suggested for further evaluation.    These findings were discussed with VALENTIN Barba of orthopedics on 9/25/2018   at 1:58 PM by Dr. Ma with read back confirmation.     < end of copied text >

## 2018-09-25 NOTE — PROGRESS NOTE ADULT - PROBLEM SELECTOR PLAN 1
Pt being treated for E. coli UTI with ceftriaxone and on vancomycin for possible cellulitis. Noted subtherapeutic vancomycin trough. However, concern for underlying collection/abscess at surgical site or possible ischemic tissue around surgical incision due to worsening leukocytosis. Patient remains afebrile and BP within acceptable range though low normal. Pt is at high risk of declining clinically due to septic shock  - agree with increasing vancomyin 1250mg BID and check vanco trough (started on 9/23)  - continue ceftriaxone 1g IV daily for CAUTI (started on 9/22; day 4 of 7 today)  - recommend ID consult  - closely monitor VS and blood count  - f/u 9/22 urine cx sensitivities  - agree with obtaining blood cx. Check lactate level

## 2018-09-25 NOTE — PROGRESS NOTE ADULT - PROBLEM SELECTOR PLAN 2
POD#8 of surgical excision with reconstruction. Based on clinical exam, concern for possible post-op collection/infection and/or ischemic cutaneous tissue.  -post-op care as per ortho, plastics, and vascular surgery  -PT as tolerated  - pain control/bowel regimen. Stool counts to ensure adequate BMs  - f/u CT abd/pelvis with contrast report

## 2018-09-25 NOTE — CONSULT NOTE ADULT - ASSESSMENT
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  CT and exam suspicious for infected collection.  Given vascular graft I would like to exclude a bacteremia and given location in groin I would favor pseudomonal coverage pending drainage.  Suggest:  1.Blood cultures x 2 sets  2.Continue vanco, will substitute cefepime for CTX  3.Favor drainage  4.Will try to clarify if true PCN allergy or just intolerance.

## 2018-09-25 NOTE — PROGRESS NOTE ADULT - SUBJECTIVE AND OBJECTIVE BOX
Plastic Surgery Progress Note    S: Patient seen and examined.  Started on vancomycin for possible cellulitis of left groin  T(C): 37 (09-25-18 @ 05:33), Max: 37.7 (09-24-18 @ 16:58)  HR: 66 (09-25-18 @ 05:33) (60 - 83)  BP: 101/61 (09-25-18 @ 05:33) (91/55 - 136/76)  BP(mean): --  ABP: --  ABP(mean): --  RR: 16 (09-25-18 @ 05:33) (15 - 16)  SpO2: 97% (09-25-18 @ 05:33) (94% - 99%)  Wt(kg): --  CVP(mm Hg): --  CI: --  CAPILLARY BLOOD GLUCOSE       N/A      09-23 @ 07:01  -  09-24 @ 07:00  --------------------------------------------------------  IN:  Total IN: 0 mL    OUT:    Bulb: 232 mL    Bulb: 322 mL    Bulb: 362 mL  Total OUT: 916 mL    Total NET: -916 mL      09-24 @ 07:01  -  09-25 @ 06:43  --------------------------------------------------------  IN:    IV PiggyBack: 250 mL  Total IN: 250 mL    OUT:    Bulb: 50 mL    Bulb: 10.5 mL    Bulb: 150 mL  Total OUT: 210.5 mL    Total NET: 39.5 mL                Physical Exam:  General: Awake and alert  Abd: dressing CDI, drain output serous  Groin/Thigh: VRAM flap viable, appropriate color, soft.  surrounding groin and thigh skin erythematous and indurated, some maceration of medial skin

## 2018-09-25 NOTE — PROGRESS NOTE ADULT - ASSESSMENT
Ms. Fleming is a 68 yo woman with PMHx of HTN admitted for scheduled surgical resection of left inguinal mass. Patient underwent enbloc resection of inguinal mass, left femoral to below knee popliteal bypass, venous resection, and reconstruction with rectus abdominus myocutaneous flap on 9/17. Hospital course notable for asymptomatic bigeminy with bradycardia deemed 2/2 catecholamine surge, non-traumatic rhabdomyolysis - both resolved. Transferred to surgical floor on 9/21 from SICU, where she stayed post-op for closer hemodynamic monitoring. Now concern for post-surgical cellulitis around incision/graft site and E. coli UTI. Patient appears clinically stable, but with uptrending WBC despite IV abx.

## 2018-09-25 NOTE — PROGRESS NOTE ADULT - ASSESSMENT
67y F s/p L inguinal mass resection, vessel reconstruction with vascular surgery, and VRAM flap reconstruction, possible cellulitis, recommend continuing empiric vancomycin will follow up CT scan results    PLAN:    - continue vancomycin  - Continue JEREMY drain/care  - dressings removed - leave open to air  - xeroform to macerated skin  - DVT Ppx  - IVF  - Activity per vascular/ortho  - Will follow   - Pain control     [id number]

## 2018-09-25 NOTE — PROGRESS NOTE ADULT - SUBJECTIVE AND OBJECTIVE BOX
No acute events overnight.     ICU Vital Signs Last 24 Hrs  T(C): 37 (25 Sep 2018 05:33), Max: 37.7 (24 Sep 2018 16:58)  T(F): 98.6 (25 Sep 2018 05:33), Max: 99.8 (24 Sep 2018 16:58)  HR: 66 (25 Sep 2018 05:33) (60 - 83)  BP: 101/61 (25 Sep 2018 05:33) (91/55 - 136/76)  BP(mean): --  ABP: --  ABP(mean): --  RR: 16 (25 Sep 2018 05:33) (15 - 16)  SpO2: 97% (25 Sep 2018 05:33) (94% - 99%)    Gen: No acute distress  LLE: dressing c/d/i; HV: 50, 10.5, 150  - all serosanguinous   Motor intact TA/GCS/EHL/FHL   SILT DP/SP/Tib  cap refill <2 sec, wwp       Labs:                                 9.6    14.05 )-----------( 133      ( 22 Sep 2018 14:00 )             28.6     09-21    137  |  105  |  14  ----------------------------<  118<H>  3.5   |  21<L>  |  0.64    Ca    7.3<L>      21 Sep 2018 07:00  Phos  2.0     09-21  Mg     2.3     09-21              Assessment/Plan:  67yFemale s/p L thigh excision liposarcoma, bypass, rectus flap POD8  - FU CT abdomen  -pain control  -Rocephin for UTI  -PT/WBAT/OOB  -DVT ppx - Lov  -FU drain outputs  -FU PRS/Vasc

## 2018-09-25 NOTE — PROGRESS NOTE ADULT - PROBLEM SELECTOR PLAN 3
Suspect hypovolemic hyponatremia. Pt with ongoing fluid loss via drains and urinary incontinence. Pt not eating well. Pt previously only on liquid diet  - would not recommend fluid restriction  - encourage oral intake  - strict I&Os  - check serum osmolality, urine lytes

## 2018-09-25 NOTE — PROGRESS NOTE ADULT - SUBJECTIVE AND OBJECTIVE BOX
Vascular Surgery  CC: resection of inguinal tumor with vascular reconstruction  HPI: 67F s/p left inguinal mass with left external iliac to above knee popliteal artery bypass w/ PTFE.   24/Overnight events: Patient seen and examined at bedside. Doing well. Now POD 8. LLE remains warm with palpable DP pulse, sensorymotor intact. Ambulated with PT again, 2018. On empiric vancomycin. Would still recommend obtaining repeat blood cultures and CTA abdomen / pelvis / left thigh    Objective:  Vital Signs Last 24 Hrs  T(C): 37.1 (25 Sep 2018 09:05), Max: 37.7 (24 Sep 2018 16:58)  T(F): 98.7 (25 Sep 2018 09:05), Max: 99.8 (24 Sep 2018 16:58)  HR: 64 (25 Sep 2018 09:05) (60 - 83)  BP: 98/64 (25 Sep 2018 09:05) (98/64 - 136/76)  BP(mean): --  RR: 16 (25 Sep 2018 09:05) (16 - 16)  SpO2: 96% (25 Sep 2018 09:05) (94% - 99%)    Physical Exam:  General: NAD  Respiratory: non-labored on room air  Abdomen: incision dry and intact; drainage remains serous   Groin: slightly resolving erythema, indurated, tender       I&O's Detail    24 Sep 2018 07:01  -  25 Sep 2018 07:00  --------------------------------------------------------  IN:    IV PiggyBack: 250 mL  Total IN: 250 mL    OUT:    Bulb: 50 mL    Bulb: 10.5 mL    Bulb: 150 mL  Total OUT: 210.5 mL    Total NET: 39.5 mL      25 Sep 2018 07:  -  25 Sep 2018 10:33  --------------------------------------------------------  IN:  Total IN: 0 mL    OUT:    Bulb: 10 mL    Bulb: 80 mL    Bulb: 2.5 mL  Total OUT: 92.5 mL    Total NET: -92.5 mL        Daily     Daily Weight in k.8 (25 Sep 2018 01:44)    LABS:                        8.6    19.69 )-----------( 234      ( 25 Sep 2018 09:15 )             25.5     09-    131<L>  |  95<L>  |  13  ----------------------------<  105<H>  3.9   |  25  |  0.69    Ca    7.6<L>      25 Sep 2018 09:15

## 2018-09-26 LAB
APTT BLD: 20.1 SEC — LOW (ref 27.5–37.4)
BUN SERPL-MCNC: 19 MG/DL — SIGNIFICANT CHANGE UP (ref 7–23)
CALCIUM SERPL-MCNC: 7.9 MG/DL — LOW (ref 8.4–10.5)
CHLORIDE SERPL-SCNC: 96 MMOL/L — LOW (ref 98–107)
CO2 SERPL-SCNC: 22 MMOL/L — SIGNIFICANT CHANGE UP (ref 22–31)
CREAT SERPL-MCNC: 0.8 MG/DL — SIGNIFICANT CHANGE UP (ref 0.5–1.3)
GLUCOSE SERPL-MCNC: 108 MG/DL — HIGH (ref 70–99)
GRAM STN WND: SIGNIFICANT CHANGE UP
GRAM STN WND: SIGNIFICANT CHANGE UP
HCT VFR BLD CALC: 26.9 % — LOW (ref 34.5–45)
HGB BLD-MCNC: 9.1 G/DL — LOW (ref 11.5–15.5)
INR BLD: 1.17 — SIGNIFICANT CHANGE UP (ref 0.88–1.17)
MCHC RBC-ENTMCNC: 31.4 PG — SIGNIFICANT CHANGE UP (ref 27–34)
MCHC RBC-ENTMCNC: 33.8 % — SIGNIFICANT CHANGE UP (ref 32–36)
MCV RBC AUTO: 92.8 FL — SIGNIFICANT CHANGE UP (ref 80–100)
NRBC # FLD: 0 — SIGNIFICANT CHANGE UP
PLATELET # BLD AUTO: 279 K/UL — SIGNIFICANT CHANGE UP (ref 150–400)
PMV BLD: 11.2 FL — SIGNIFICANT CHANGE UP (ref 7–13)
POTASSIUM SERPL-MCNC: 4.1 MMOL/L — SIGNIFICANT CHANGE UP (ref 3.5–5.3)
POTASSIUM SERPL-SCNC: 4.1 MMOL/L — SIGNIFICANT CHANGE UP (ref 3.5–5.3)
PROTHROM AB SERPL-ACNC: 13.5 SEC — HIGH (ref 9.8–13.1)
RBC # BLD: 2.9 M/UL — LOW (ref 3.8–5.2)
RBC # FLD: 15.4 % — HIGH (ref 10.3–14.5)
SODIUM SERPL-SCNC: 131 MMOL/L — LOW (ref 135–145)
SPECIMEN SOURCE: SIGNIFICANT CHANGE UP
WBC # BLD: 20.7 K/UL — HIGH (ref 3.8–10.5)
WBC # FLD AUTO: 20.7 K/UL — HIGH (ref 3.8–10.5)

## 2018-09-26 PROCEDURE — 99233 SBSQ HOSP IP/OBS HIGH 50: CPT

## 2018-09-26 RX ORDER — HYDROMORPHONE HYDROCHLORIDE 2 MG/ML
0.5 INJECTION INTRAMUSCULAR; INTRAVENOUS; SUBCUTANEOUS
Qty: 0 | Refills: 0 | Status: DISCONTINUED | OUTPATIENT
Start: 2018-09-26 | End: 2018-09-26

## 2018-09-26 RX ADMIN — OXYCODONE HYDROCHLORIDE 10 MILLIGRAM(S): 5 TABLET ORAL at 23:14

## 2018-09-26 RX ADMIN — ENOXAPARIN SODIUM 40 MILLIGRAM(S): 100 INJECTION SUBCUTANEOUS at 14:21

## 2018-09-26 RX ADMIN — HYDROMORPHONE HYDROCHLORIDE 0.5 MILLIGRAM(S): 2 INJECTION INTRAMUSCULAR; INTRAVENOUS; SUBCUTANEOUS at 17:50

## 2018-09-26 RX ADMIN — OXYCODONE HYDROCHLORIDE 10 MILLIGRAM(S): 5 TABLET ORAL at 17:10

## 2018-09-26 RX ADMIN — Medication 166.67 MILLIGRAM(S): at 14:24

## 2018-09-26 RX ADMIN — OXYCODONE HYDROCHLORIDE 10 MILLIGRAM(S): 5 TABLET ORAL at 04:06

## 2018-09-26 RX ADMIN — Medication 166.67 MILLIGRAM(S): at 23:03

## 2018-09-26 RX ADMIN — PANTOPRAZOLE SODIUM 40 MILLIGRAM(S): 20 TABLET, DELAYED RELEASE ORAL at 23:03

## 2018-09-26 RX ADMIN — HYDROMORPHONE HYDROCHLORIDE 0.5 MILLIGRAM(S): 2 INJECTION INTRAMUSCULAR; INTRAVENOUS; SUBCUTANEOUS at 18:20

## 2018-09-26 RX ADMIN — Medication 81 MILLIGRAM(S): at 14:21

## 2018-09-26 RX ADMIN — SODIUM CHLORIDE 125 MILLILITER(S): 9 INJECTION, SOLUTION INTRAVENOUS at 00:25

## 2018-09-26 RX ADMIN — OXYCODONE HYDROCHLORIDE 10 MILLIGRAM(S): 5 TABLET ORAL at 17:47

## 2018-09-26 RX ADMIN — Medication 1 APPLICATION(S): at 18:21

## 2018-09-26 RX ADMIN — OXYCODONE HYDROCHLORIDE 10 MILLIGRAM(S): 5 TABLET ORAL at 03:36

## 2018-09-26 RX ADMIN — CEFEPIME 100 MILLIGRAM(S): 1 INJECTION, POWDER, FOR SOLUTION INTRAMUSCULAR; INTRAVENOUS at 18:54

## 2018-09-26 RX ADMIN — CEFEPIME 100 MILLIGRAM(S): 1 INJECTION, POWDER, FOR SOLUTION INTRAMUSCULAR; INTRAVENOUS at 06:22

## 2018-09-26 NOTE — PROGRESS NOTE ADULT - PROBLEM SELECTOR PLAN 8
improving, likely in setting of large ecchymosis/hematoma form surgery and platelet consumption, continue to monitor.
R/o overflow incontinence.   - check bladder scan. If elevated, recommend straight cath protocol.
improving, likely in setting of large ecchymosis/hematoma form surgery and platelet consumption, continue to monitor.
would continue to trend and replete daily as necessary
- monitor I&Os.

## 2018-09-26 NOTE — PROGRESS NOTE ADULT - SUBJECTIVE AND OBJECTIVE BOX
CHIEF COMPLAINT: f/u     SUBJECTIVE / OVERNIGHT EVENTS: Patient seen and examined. No acute events overnight. Pain well controlled and patient without any complaints.    MEDICATIONS  (STANDING):  aspirin enteric coated 81 milliGRAM(s) Oral daily  cefepime   IVPB 2000 milliGRAM(s) IV Intermittent every 12 hours  enoxaparin Injectable 40 milliGRAM(s) SubCutaneous daily  influenza   Vaccine 0.5 milliLiter(s) IntraMuscular once  lactated ringers. 1000 milliLiter(s) (125 mL/Hr) IV Continuous <Continuous>  pantoprazole  Injectable 40 milliGRAM(s) IV Push at bedtime  silver sulfADIAZINE 1% Cream 1 Application(s) Topical two times a day  vancomycin  IVPB 1250 milliGRAM(s) IV Intermittent every 12 hours    MEDICATIONS  (PRN):  acetaminophen   Tablet .. 650 milliGRAM(s) Oral every 6 hours PRN Temp greater or equal to 38C (100.4F), Mild Pain (1 - 3)  benzocaine 15 mG/menthol 3.6 mG Lozenge 1 Lozenge Oral three times a day PRN Sore Throat  HYDROmorphone  Injectable 0.5 milliGRAM(s) IV Push every 4 hours PRN breakthrough pain  HYDROmorphone  Injectable 0.5 milliGRAM(s) IV Push every 10 minutes PRN Moderate Pain (4 - 6)  ondansetron Injectable 4 milliGRAM(s) IV Push every 6 hours PRN Nausea and/or Vomiting  oxyCODONE    IR 5 milliGRAM(s) Oral every 4 hours PRN Mild and Moderate Pain  oxyCODONE    IR 10 milliGRAM(s) Oral every 4 hours PRN Severe Pain (7 - 10)      VITALS:  T(F): 99.4 (09-26-18 @ 14:59), Max: 99.4 (09-26-18 @ 14:59)  HR: 83 (09-26-18 @ 14:59) (65 - 83)  BP: 95/65 (09-26-18 @ 14:59) (92/61 - 116/76)  RR: 17 (09-26-18 @ 14:59) (12 - 17)  SpO2: 93% (09-26-18 @ 14:59)  Wt(kg): --  Height (cm): 151 (08:12)  Weight (kg): 61 (08:12)  BMI (kg/m2): 26.8 (08:12)    CAPILLARY BLOOD GLUCOSE    PHYSICAL EXAM:  GENERAL: NAD, well-developed  HEAD:  Atraumatic, Normocephalic  EYES: EOMI, PERRLA, conjunctiva and sclera clear  NECK: Supple, No JVD  CHEST/LUNG: Clear to auscultation bilaterally; No wheeze  HEART: Regular rate and rhythm; No murmurs, rubs, or gallops  ABDOMEN: Soft, Nontender, Nondistended; Bowel sounds present  EXTREMITIES:  2+ Peripheral Pulses, No clubbing, cyanosis, or edema  PSYCH: AAOx3  NEUROLOGY: non-focal  SKIN: No rashes or lesions    LABS:              9.1                  131  | 22   | 19           20.70 >-----------< 279     ------------------------< 108                   26.9                 4.1  | 96   | 0.80                                         Ca 7.9   Mg x     Ph x            INR: 1.17 ;    PT: 13.5 SEC<H>;    PTT: 20.1 SEC<L>            RADIOLOGY & ADDITIONAL TESTS:  Imaging Personally Reviewed: [x] Yes    [ ] Consultant(s) Notes Reviewed:  [x] Care Discussed with Consultants/Other Providers: Orthopedic PA - discussed CHIEF COMPLAINT: f/u     SUBJECTIVE / OVERNIGHT EVENTS:   Patient seen and examined this afternoon post-op. Patient taken to OR this morning for washout of left thigh fluid collection. Patient reports pain controlled. She ate bread and butter today with juice w/o nausea or vomiting. Patient reports BM today. No SOB or chest pain.     MEDICATIONS  (STANDING):  aspirin enteric coated 81 milliGRAM(s) Oral daily  cefepime   IVPB 2000 milliGRAM(s) IV Intermittent every 12 hours  enoxaparin Injectable 40 milliGRAM(s) SubCutaneous daily  influenza   Vaccine 0.5 milliLiter(s) IntraMuscular once  lactated ringers. 1000 milliLiter(s) (125 mL/Hr) IV Continuous <Continuous>  pantoprazole  Injectable 40 milliGRAM(s) IV Push at bedtime  silver sulfADIAZINE 1% Cream 1 Application(s) Topical two times a day  vancomycin  IVPB 1250 milliGRAM(s) IV Intermittent every 12 hours    MEDICATIONS  (PRN):  acetaminophen   Tablet .. 650 milliGRAM(s) Oral every 6 hours PRN Temp greater or equal to 38C (100.4F), Mild Pain (1 - 3)  benzocaine 15 mG/menthol 3.6 mG Lozenge 1 Lozenge Oral three times a day PRN Sore Throat  HYDROmorphone  Injectable 0.5 milliGRAM(s) IV Push every 4 hours PRN breakthrough pain  HYDROmorphone  Injectable 0.5 milliGRAM(s) IV Push every 10 minutes PRN Moderate Pain (4 - 6)  ondansetron Injectable 4 milliGRAM(s) IV Push every 6 hours PRN Nausea and/or Vomiting  oxyCODONE    IR 5 milliGRAM(s) Oral every 4 hours PRN Mild and Moderate Pain  oxyCODONE    IR 10 milliGRAM(s) Oral every 4 hours PRN Severe Pain (7 - 10)      VITALS:  T(F): 99.4 (09-26-18 @ 14:59), Max: 99.4 (09-26-18 @ 14:59)  HR: 83 (09-26-18 @ 14:59) (65 - 83)  BP: 95/65 (09-26-18 @ 14:59) (92/61 - 116/76)  RR: 17 (09-26-18 @ 14:59) (12 - 17)  SpO2: 93% (09-26-18 @ 14:59)  Wt(kg): --  Height (cm): 151 (08:12)  Weight (kg): 61 (08:12)  BMI (kg/m2): 26.8 (08:12)        PHYSICAL EXAM:  GENERAL: Laying in bed in NAD  CHEST/LUNG: CTA b/l on anterior surface  HEART: Regular rate and rhythm; No murmurs, rubs, or gallops  ABDOMEN: large hematoma left lateral portion of abdomen. Surgical staples intact. Blanchable erythema left side/flank. RLQ JEREMY drain. mid/left lower quadrant JEREMY drain. Dressing over abdominal surgical site c/d/i  EXTREMITIES:  Serous drainage noted at site of JEREMY drain in left thigh. B/l LE edema	  PSYCH: Alert & Oriented x3    LABS:              9.1                  131  | 22   | 19           20.70 >-----------< 279     ------------------------< 108                   26.9                 4.1  | 96   | 0.80                                         Ca 7.9   Mg x     Ph x            INR: 1.17 ;    PT: 13.5 SEC<H>;    PTT: 20.1 SEC<L>            RADIOLOGY & ADDITIONAL TESTS:  Imaging Personally Reviewed: [x] Yes    [ ] Consultant(s) Notes Reviewed:  [x] Care Discussed with Consultants/Other Providers: Orthopedic PA - discussed

## 2018-09-26 NOTE — PROGRESS NOTE ADULT - ASSESSMENT
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  CT and exam suspicious for infected collection.  Given vascular graft I would like to exclude a bacteremia and given location in groin I would favor pseudomonal coverage pending drainage.  She is s/p drainage of infected thigh collection earlier  Suggest:  1.Blood cultures x 2 sets sent,pending  2.Continue vanco and cefepime  3.Drainage should serve as "source control"  4.follow fever curve,WBC, cultures,exam

## 2018-09-26 NOTE — PROGRESS NOTE ADULT - SUBJECTIVE AND OBJECTIVE BOX
Vascular Surgery  CC: resection of inguinal tumor with vascular reconstruction  HPI: 67F s/p left inguinal mass with left external iliac to above knee popliteal artery bypass w/ PTFE.   24/Overnight events: CTA revealed large gas-containing fluid collection in left groin. Awaiting drainage by plastics.    Objective:  Vital Signs Last 24 Hrs  T(C): 37.3 (26 Sep 2018 08:27), Max: 37.3 (26 Sep 2018 08:27)  T(F): 99.1 (26 Sep 2018 08:27), Max: 99.1 (26 Sep 2018 08:27)  HR: 74 (26 Sep 2018 08:27) (64 - 82)  BP: 116/76 (26 Sep 2018 08:27) (95/66 - 116/76)  BP(mean): --  RR: 16 (26 Sep 2018 08:27) (16 - 17)  SpO2: 95% (26 Sep 2018 08:27) (94% - 99%)      I&O's Detail    25 Sep 2018 07:01  -  26 Sep 2018 07:00  --------------------------------------------------------  IN:    IV PiggyBack: 250 mL  Total IN: 250 mL    OUT:    Bulb: 232.5 mL    Bulb: 5 mL    Bulb: 195 mL    Indwelling Catheter - Urethral: 1800 mL    Voided: 150 mL  Total OUT: 2382.5 mL    Total NET: -2132.5 mL          Physical Exam:  General: NAD  Respiratory: non-labored on room air  Abdomen: Incision closed with staples  Groin: Indurated, tender       Labs:  CBC Full  -  ( 26 Sep 2018 06:21 )  WBC Count : 20.70 K/uL  Hemoglobin : 9.1 g/dL  Hematocrit : 26.9 %  Platelet Count - Automated : 279 K/uL  Mean Cell Volume : 92.8 fL  Mean Cell Hemoglobin : 31.4 pg  Mean Cell Hemoglobin Concentration : 33.8 %  Auto Neutrophil # : x  Auto Lymphocyte # : x  Auto Monocyte # : x  Auto Eosinophil # : x  Auto Basophil # : x  Auto Neutrophil % : x  Auto Lymphocyte % : x  Auto Monocyte % : x  Auto Eosinophil % : x  Auto Basophil % : x    09-26    131<L>  |  96<L>  |  19  ----------------------------<  108<H>  4.1   |  22  |  0.80    Ca    7.9<L>      26 Sep 2018 06:21        PT/INR - ( 26 Sep 2018 06:21 )   PT: 13.5 SEC;   INR: 1.17          PTT - ( 26 Sep 2018 06:21 )  PTT:20.1 SEC

## 2018-09-26 NOTE — PROGRESS NOTE ADULT - PROBLEM SELECTOR PLAN 1
Found to have pansensitive E. coli UTI. Evaluated by ID, IV abx broadened to cefepime after noted infected left thigh fluid collection. Now s/p OR washout today. Pt hemodynamically stable. Patient remains afebrile.  Ceftriaxone 9/22-2/25  Vancomycin 9/23  - continue IV cefepime 2g daily and vancomycin 1250mg BID, f/u vanco trough  - f/u ID recs  - closely monitor VS and blood count  - f/u 9/25 blood cx --> NGTD  - f/u 9/26 OR thigh cx

## 2018-09-26 NOTE — PROGRESS NOTE ADULT - PROBLEM SELECTOR PLAN 3
Hypoosmolar hypovolemic hyponatremia. Pt with ongoing fluid loss via drains and urinary incontinence. Pt not eating well. Pt previously only on liquid diet  - would not recommend fluid restriction  - encourage oral intake  - strict I&Os  - can give IV fluids, such as normal saline to match output loss

## 2018-09-26 NOTE — PROGRESS NOTE ADULT - ASSESSMENT
Ms. Fleming is a 66 yo woman with PMHx of HTN admitted for scheduled surgical resection of left inguinal mass. Patient underwent enbloc resection of inguinal mass, left femoral to below knee popliteal bypass, venous resection, and reconstruction with rectus abdominus myocutaneous flap on 9/17. Hospital course notable for asymptomatic bigeminy with bradycardia deemed 2/2 catecholamine surge, non-traumatic rhabdomyolysis - both resolved. Transferred to surgical floor on 9/21 from SICU, where she stayed post-op for closer hemodynamic monitoring. Now concern for post-surgical cellulitis around incision/graft site and E. coli UTI. Patient appears clinically stable, but with uptrending WBC despite IV abx. Now s/p washout of left thigh fluid collection in the OR today.

## 2018-09-26 NOTE — PROGRESS NOTE ADULT - SUBJECTIVE AND OBJECTIVE BOX
CC: f/u for left thigh post op abscess    Patient reports:she is comfortable post op.S/P drainage of left thigh abscess and flap revision.Seropurulent fluid drained    REVIEW OF SYSTEMS:  All other review of systems negative (Comprehensive ROS)    Antimicrobials Day #  :POD 0  cefepime   IVPB 2000 milliGRAM(s) IV Intermittent every 12 hours  vancomycin  IVPB 1250 milliGRAM(s) IV Intermittent every 12 hours    Other Medications Reviewed    T(F): 99.4 (09-26-18 @ 14:59), Max: 99.4 (09-26-18 @ 14:59)  HR: 83 (09-26-18 @ 14:59)  BP: 95/65 (09-26-18 @ 14:59)  RR: 17 (09-26-18 @ 14:59)  SpO2: 93% (09-26-18 @ 14:59)  Wt(kg): --    PHYSICAL EXAM:  General: alert, no acute distress  Eyes:  anicteric, no conjunctival injection, no discharge  Oropharynx: no lesions or injection 	  Neck: supple, without adenopathy  Lungs: clear to auscultation  Heart: regular rate and rhythm; no murmur, rubs or gallops  Abdomen: soft, nondistended, nontender, without mass or organomegaly  Skin: no lesions  Extremities: no clubbing, cyanosis, or edema  Neurologic: alert, oriented, moves all extremities  Abdominal wounds dressed, multiple drains in place, new drains with seropurulent drainage  LAB RESULTS:                        9.1    20.70 )-----------( 279      ( 26 Sep 2018 06:21 )             26.9     09-26    131<L>  |  96<L>  |  19  ----------------------------<  108<H>  4.1   |  22  |  0.80    Ca    7.9<L>      26 Sep 2018 06:21          MICROBIOLOGY:  RECENT CULTURES:  09-26 @ 11:46 THIGH         09-26 @ 11:42 THIGH         09-22 @ 21:58 URINE CATHETER Escherichia coli            RADIOLOGY REVIEWED:    < from: CT Abdomen and Pelvis w/ IV Cont (09.25.18 @ 12:37) >  IMPRESSION: Status post resection of previously noted large mass in the   left groin. A large collection containing fluid and gas is noted in the   left groin with infiltration of the musculature of the left upper thigh,   concerning for infection.    Large defect in the musculature of the left lower anterior abdomen with   herniation of nonobstructed large and small bowel.    Small bilateral pleural effusions with bibasilar atelectasis.    A 0.9 cm rounded soft tissue density is noted in the fundus of the   gallbladder. The differential diagnosis includes a polyp and mass.   Ultrasound is suggested for further characterization.    Central low attenuation in the endometrial canal, measuring up to 1 cm,   an abnormal finding in a postmenopausal female. When the patient is   clinically able, pelvic ultrasound is suggested for further evaluation.    These findings were discussed with VALENTIN Barba of orthopedics on 9/25/2018   at 1:58 PM by Dr. Ma with read back confirmation.     < end of copied text >

## 2018-09-26 NOTE — PROGRESS NOTE ADULT - ASSESSMENT
67y F s/p L inguinal mass resection, vessel reconstruction with vascular surgery, and VRAM flap reconstruction, possible cellulitis, OR today for washout of left groin    PLAN:    - OR today  - continue vancomycin  - Continue JEREMY drain/care  - dressings removed - leave open to air  - xeroform to macerated skin  - DVT Ppx  - IVF  - Activity per vascular/ortho  - Will follow   - Pain control     [id number]

## 2018-09-26 NOTE — PROGRESS NOTE ADULT - ASSESSMENT
67F s/p left inguinal mass with left external iliac to above knee popliteal artery bypass w/ PTFE.  Now POD 7. LLE remains warm with palpable DP pulse, sensorymotor intact. Ambulated with PT second time  9/24/2018. On empiric vancomycin      - Awaiting OR drainage   - Continue with antibiotics  - No vascular intervention at this time in regards to the graft    [id number]

## 2018-09-26 NOTE — PROGRESS NOTE ADULT - PROBLEM SELECTOR PLAN 2
POD#9 of surgical excision with reconstruction. C/b post-op left thigh abscess s/p OR washout today, POD#0.   -post-op care as per ortho, plastics, and vascular surgery  -PT as tolerated  - pain control/bowel regimen. Stool counts to ensure adequate BMs  - IV abx as noted above

## 2018-09-26 NOTE — PROGRESS NOTE ADULT - PROBLEM SELECTOR PROBLEM 8
Thrombocytopenia
Hypophosphatemia
Thrombocytopenia
Urinary incontinence, unspecified type
Urinary incontinence, unspecified type

## 2018-09-26 NOTE — CONSULT NOTE ADULT - SUBJECTIVE AND OBJECTIVE BOX
General Surgery Consult Note  Pager [phone]    HPI:  67y Female PMH HTN, presented for scheduled surgical resection of left inguinal mass. Patient underwent enbloc resection of inguinal mass, left femoral to below knee popliteal bypass with PTFE, venous resection with Vascular, reconstruction with rectus abdominus myocutaneous flap with Plastics on 9/17. Patient found to have a large gas-containing fluid on the left side as well as herniation of small and large bowel in the LLQ on POD#8.   Bowel appears nonobstructed. Patient denies nausea/vomiting. Had been tolerating a regular diet.    PAST MEDICAL & SURGICAL HISTORY:  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, unspecified  HTN (hypertension)  No significant past surgical history      ALLERGIES:  penicillin (Hives)        HOME MEDICATIONS:  Calcium with D 1 tab orally daily:  (17 Sep 2018 12:19)  losartan-hydrochlorothiazide 100mg-12.5mg oral tablet: 1 tab(s) orally once a day (17 Sep 2018 12:19)  MVI 1 tab orally daily last dose 9/10:  (17 Sep 2018 12:19)    MEDICATIONS  (STANDING):  aspirin enteric coated 81 milliGRAM(s) Oral daily  cefepime   IVPB 2000 milliGRAM(s) IV Intermittent every 12 hours  enoxaparin Injectable 40 milliGRAM(s) SubCutaneous daily  influenza   Vaccine 0.5 milliLiter(s) IntraMuscular once  lactated ringers. 1000 milliLiter(s) (125 mL/Hr) IV Continuous <Continuous>  pantoprazole  Injectable 40 milliGRAM(s) IV Push at bedtime  vancomycin  IVPB 1250 milliGRAM(s) IV Intermittent every 12 hours      SOCIAL HISTORY:  Denies smoking and ETOH use.     FAMILY HISTORY:  No pertinent history in first degree relatives.  ___________________________________________  REVIEW OF SYSTEMS:  Constitutional: No fevers, chills, no recent weight loss  ENMT: No changes in hearing, no changes in vision, no sore throat, no cough  Respiratory: No shortness of breath  Cardiovascular: No chest pain, palpitations  Gastrointestinal: No abdominal pain, no diarrhea/constipation  Genitourinary: No dysuria, frequency, or urgency    Extremities: No joint swelling, no limited range of movement  Neurological: No paresthesia  Skin: No rashes  ___________________________________________  PHYSICAL EXAM:  Vital Signs Last 24 Hrs  T(C): 37.2 (26 Sep 2018 07:24), Max: 37.2 (25 Sep 2018 13:27)  T(F): 98.9 (26 Sep 2018 07:24), Max: 99 (25 Sep 2018 13:27)  HR: 80 (26 Sep 2018 07:24) (64 - 82)  BP: 115/76 (26 Sep 2018 07:24) (95/66 - 115/76)  BP(mean): --  RR: 17 (26 Sep 2018 07:24) (16 - 17)  SpO2: 99% (26 Sep 2018 07:24) (94% - 99%)CAPILLARY BLOOD GLUCOSE        I&O's Detail    25 Sep 2018 07:01  -  26 Sep 2018 07:00  --------------------------------------------------------  IN:    IV PiggyBack: 250 mL  Total IN: 250 mL    OUT:    Bulb: 232.5 mL    Bulb: 5 mL    Bulb: 195 mL    Indwelling Catheter - Urethral: 1800 mL    Voided: 150 mL  Total OUT: 2382.5 mL    Total NET: -2132.5 mL        General: A&Ox3, NAD.  Neuro: Motor and sensory grossly intact with no focal deficits.  HEENT: Anicteric sclerae.  Respiratory: Unlabored breathing.   CVS: Regular rate and rhythm.  Abdomen: Soft, non-distended. Left groin incisions with staples with large area of induration.   Extremities: Warm bilaterally w/ palpable pulses.   MSK: Intact ROM.  ____________________________________________  LABS:  CBC Full  -  ( 26 Sep 2018 06:21 )  WBC Count : 20.70 K/uL  Hemoglobin : 9.1 g/dL  Hematocrit : 26.9 %  Platelet Count - Automated : 279 K/uL  Mean Cell Volume : 92.8 fL  Mean Cell Hemoglobin : 31.4 pg  Mean Cell Hemoglobin Concentration : 33.8 %  Auto Neutrophil # : x  Auto Lymphocyte # : x  Auto Monocyte # : x  Auto Eosinophil # : x  Auto Basophil # : x  Auto Neutrophil % : x  Auto Lymphocyte % : x  Auto Monocyte % : x  Auto Eosinophil % : x  Auto Basophil % : x    09-26    131<L>  |  96<L>  |  19  ----------------------------<  108<H>  4.1   |  22  |  0.80    Ca    7.9<L>      26 Sep 2018 06:21        PT/INR - ( 26 Sep 2018 06:21 )   PT: 13.5 SEC;   INR: 1.17          PTT - ( 26 Sep 2018 06:21 )  PTT:20.1 SEC        ____________________________________________  RADIOLOGY:  CT Abdomen and Pelvis w/ IV Cont (09.25.18 @ 12:37)   IMPRESSION: Status post resection of previously noted large mass in the   left groin. A large collection containing fluid and gas is noted in the   left groin with infiltration of the musculature of the left upper thigh,   concerning for infection.    Large defect in the musculature of the left lower anterior abdomen with   herniation of nonobstructed large and small bowel.    Small bilateral pleural effusions with bibasilar atelectasis.    A 0.9 cm rounded soft tissue density is noted in the fundus of the   gallbladder. The differential diagnosis includes a polyp and mass.   Ultrasound is suggested for further characterization.    Central low attenuation in the endometrial canal, measuring up to 1 cm,   an abnormal finding in a postmenopausal female. When the patient is   clinically able, pelvic ultrasound is suggested for further evaluation.

## 2018-09-26 NOTE — PROGRESS NOTE ADULT - SUBJECTIVE AND OBJECTIVE BOX
Plastic Surgery Progress Note    S: Patient seen and examined.  Started on vancomycin for possible cellulitis of left groin. CT scan showed hernia and possible collection around left groin    T(C): 36.9 (09-26-18 @ 03:35), Max: 37.2 (09-25-18 @ 13:27)  HR: 77 (09-26-18 @ 03:35) (64 - 82)  BP: 102/69 (09-26-18 @ 03:35) (95/66 - 115/73)  BP(mean): --  ABP: --  ABP(mean): --  RR: 17 (09-26-18 @ 03:35) (16 - 17)  SpO2: 98% (09-26-18 @ 03:35) (94% - 99%)  Wt(kg): --  CVP(mm Hg): --  CI: --  CAPILLARY BLOOD GLUCOSE       N/A      09-24 @ 07:01  -  09-25 @ 07:00  --------------------------------------------------------  IN:    IV PiggyBack: 250 mL  Total IN: 250 mL    OUT:    Bulb: 50 mL    Bulb: 10.5 mL    Bulb: 150 mL  Total OUT: 210.5 mL    Total NET: 39.5 mL      09-25 @ 07:01  -  09-26 @ 06:50  --------------------------------------------------------  IN:    IV PiggyBack: 250 mL  Total IN: 250 mL    OUT:    Bulb: 232.5 mL    Bulb: 5 mL    Bulb: 195 mL    Indwelling Catheter - Urethral: 1800 mL    Voided: 150 mL  Total OUT: 2382.5 mL    Total NET: -2132.5 mL                Physical Exam:  General: Awake and alert  Abd: dressing CDI, drain output serous  Groin/Thigh: VRAM flap viable, appropriate color, soft.  surrounding groin and thigh skin erythematous and indurated, some maceration of medial skin stable

## 2018-09-26 NOTE — BRIEF OPERATIVE NOTE - PROCEDURE
<<-----Click on this checkbox to enter Procedure Incision and debridement of left lower extremity  09/26/2018    Active  ABRAHAM

## 2018-09-26 NOTE — PROGRESS NOTE ADULT - SUBJECTIVE AND OBJECTIVE BOX
No acute events overnight. Pain controlled.     PE:  Vital Signs Last 24 Hrs  T(C): 36.9 (26 Sep 2018 03:35), Max: 37.2 (25 Sep 2018 13:27)  T(F): 98.4 (26 Sep 2018 03:35), Max: 99 (25 Sep 2018 13:27)  HR: 77 (26 Sep 2018 03:35) (64 - 82)  BP: 102/69 (26 Sep 2018 03:35) (95/66 - 115/73)  RR: 17 (26 Sep 2018 03:35) (16 - 17)  SpO2: 98% (26 Sep 2018 03:35) (94% - 99%)  Gen: No acute distress  LLE: Incision c/d/i  mildly dusky skin around incision  Motor intact TA/GCS/EHL/FHL   SILT DP/SP/Tib  cap refill <2 sec, wwp     Labs:                        8.6    19.69 )-----------( 234      ( 25 Sep 2018 09:15 )             25.5   09-25    131<L>  |  95<L>  |  13  ----------------------------<  105<H>  3.9   |  25  |  0.69    Ca    7.6<L>      25 Sep 2018 09:15      Assessment/Plan:  67yFemale s/p L thigh excision of liposarcoma, rectus flap  -FU PRS re RTOR  -NPO  -pain control  -FU JPs  -dvt ppx  -wbat  -CT appreciated

## 2018-09-27 LAB
BUN SERPL-MCNC: 17 MG/DL — SIGNIFICANT CHANGE UP (ref 7–23)
CALCIUM SERPL-MCNC: 7.7 MG/DL — LOW (ref 8.4–10.5)
CHLORIDE SERPL-SCNC: 95 MMOL/L — LOW (ref 98–107)
CO2 SERPL-SCNC: 24 MMOL/L — SIGNIFICANT CHANGE UP (ref 22–31)
CREAT SERPL-MCNC: 0.79 MG/DL — SIGNIFICANT CHANGE UP (ref 0.5–1.3)
GLUCOSE SERPL-MCNC: 99 MG/DL — SIGNIFICANT CHANGE UP (ref 70–99)
HCT VFR BLD CALC: 24.9 % — LOW (ref 34.5–45)
HGB BLD-MCNC: 8.3 G/DL — LOW (ref 11.5–15.5)
MCHC RBC-ENTMCNC: 30.1 PG — SIGNIFICANT CHANGE UP (ref 27–34)
MCHC RBC-ENTMCNC: 33.3 % — SIGNIFICANT CHANGE UP (ref 32–36)
MCV RBC AUTO: 90.2 FL — SIGNIFICANT CHANGE UP (ref 80–100)
NRBC # FLD: 0 — SIGNIFICANT CHANGE UP
PLATELET # BLD AUTO: 298 K/UL — SIGNIFICANT CHANGE UP (ref 150–400)
PMV BLD: 10.6 FL — SIGNIFICANT CHANGE UP (ref 7–13)
POTASSIUM SERPL-MCNC: 4 MMOL/L — SIGNIFICANT CHANGE UP (ref 3.5–5.3)
POTASSIUM SERPL-SCNC: 4 MMOL/L — SIGNIFICANT CHANGE UP (ref 3.5–5.3)
RBC # BLD: 2.76 M/UL — LOW (ref 3.8–5.2)
RBC # FLD: 15.3 % — HIGH (ref 10.3–14.5)
SODIUM SERPL-SCNC: 130 MMOL/L — LOW (ref 135–145)
WBC # BLD: 16.79 K/UL — HIGH (ref 3.8–10.5)
WBC # FLD AUTO: 16.79 K/UL — HIGH (ref 3.8–10.5)

## 2018-09-27 PROCEDURE — 99233 SBSQ HOSP IP/OBS HIGH 50: CPT

## 2018-09-27 RX ORDER — SODIUM CHLORIDE 9 MG/ML
1000 INJECTION INTRAMUSCULAR; INTRAVENOUS; SUBCUTANEOUS
Qty: 0 | Refills: 0 | Status: DISCONTINUED | OUTPATIENT
Start: 2018-09-27 | End: 2018-09-29

## 2018-09-27 RX ADMIN — CEFEPIME 100 MILLIGRAM(S): 1 INJECTION, POWDER, FOR SOLUTION INTRAMUSCULAR; INTRAVENOUS at 17:40

## 2018-09-27 RX ADMIN — Medication 81 MILLIGRAM(S): at 14:11

## 2018-09-27 RX ADMIN — OXYCODONE HYDROCHLORIDE 10 MILLIGRAM(S): 5 TABLET ORAL at 15:40

## 2018-09-27 RX ADMIN — Medication 166.67 MILLIGRAM(S): at 21:52

## 2018-09-27 RX ADMIN — Medication 1 APPLICATION(S): at 17:40

## 2018-09-27 RX ADMIN — PANTOPRAZOLE SODIUM 40 MILLIGRAM(S): 20 TABLET, DELAYED RELEASE ORAL at 21:52

## 2018-09-27 RX ADMIN — SODIUM CHLORIDE 100 MILLILITER(S): 9 INJECTION INTRAMUSCULAR; INTRAVENOUS; SUBCUTANEOUS at 15:40

## 2018-09-27 RX ADMIN — Medication 166.67 MILLIGRAM(S): at 10:02

## 2018-09-27 RX ADMIN — OXYCODONE HYDROCHLORIDE 10 MILLIGRAM(S): 5 TABLET ORAL at 07:20

## 2018-09-27 RX ADMIN — Medication 1 APPLICATION(S): at 07:46

## 2018-09-27 RX ADMIN — ENOXAPARIN SODIUM 40 MILLIGRAM(S): 100 INJECTION SUBCUTANEOUS at 14:10

## 2018-09-27 RX ADMIN — OXYCODONE HYDROCHLORIDE 10 MILLIGRAM(S): 5 TABLET ORAL at 06:36

## 2018-09-27 RX ADMIN — CEFEPIME 100 MILLIGRAM(S): 1 INJECTION, POWDER, FOR SOLUTION INTRAMUSCULAR; INTRAVENOUS at 06:22

## 2018-09-27 RX ADMIN — OXYCODONE HYDROCHLORIDE 10 MILLIGRAM(S): 5 TABLET ORAL at 23:37

## 2018-09-27 RX ADMIN — OXYCODONE HYDROCHLORIDE 10 MILLIGRAM(S): 5 TABLET ORAL at 16:40

## 2018-09-27 RX ADMIN — OXYCODONE HYDROCHLORIDE 10 MILLIGRAM(S): 5 TABLET ORAL at 00:10

## 2018-09-27 NOTE — PROGRESS NOTE ADULT - SUBJECTIVE AND OBJECTIVE BOX
ANESTHESIA POSTOP CHECK    67y Female POSTOP DAY 1     No COMPLAINTS    NO APPARENT ANESTHESIA COMPLICATIONS

## 2018-09-27 NOTE — PROGRESS NOTE ADULT - SUBJECTIVE AND OBJECTIVE BOX
Plastic Surgery  Daily Progress Note     Subjective/24 Hour Events: Went to OR yesterday for washout. Doing well this morning    Objective:    Vitals:  T(C): 36.8 (09-27-18 @ 06:17), Max: 37.8 (09-26-18 @ 16:43)  HR: 63 (09-27-18 @ 06:17) (63 - 85)  BP: 98/57 (09-27-18 @ 06:17) (92/61 - 116/76)  RR: 17 (09-27-18 @ 06:17) (12 - 17)  SpO2: 95% (09-27-18 @ 06:17) (93% - 100%)    I/O:     09-26-18 @ 07:01  -  09-27-18 @ 07:00  --------------------------------------------------------  IN: 1760 mL / OUT: 1266.5 mL / NET: 493.5 mL      Physical Exam:   General: Awake and alert  Abd: dressing CDI, drain output serous  Groin/Thigh: VRAM flap viable, appropriate color, soft. Surrounding groin and thigh skin erythematous. Silvadene applied to erythematous skin

## 2018-09-27 NOTE — PROGRESS NOTE ADULT - PROBLEM SELECTOR PLAN 3
Hypoosmolar hypovolemic hyponatremia. Pt with ongoing fluid loss via drains and urinary incontinence. Pt not eating well.  - start IV normal saline at 100cc/hr for at least 12 hours, then check Na level  - encourage oral intake  - strict I&Os  - nutrition consult

## 2018-09-27 NOTE — PROGRESS NOTE ADULT - ASSESSMENT
Ms. Fleming is a 68 yo woman with PMHx of HTN admitted for scheduled surgical resection of left inguinal mass. Patient underwent enbloc resection of inguinal mass, left femoral to below knee popliteal bypass, venous resection, and reconstruction with rectus abdominus myocutaneous flap on 9/17. Hospital course notable for asymptomatic bigeminy with bradycardia deemed 2/2 catecholamine surge, non-traumatic rhabdomyolysis - both resolved. Transferred to surgical floor on 9/21 from SICU, where she stayed post-op for closer hemodynamic monitoring. Now concern for post-surgical cellulitis around incision/graft site and E. coli UTI. Patient appears clinically stable. Now s/p washout of left thigh abscess done on 9/26.

## 2018-09-27 NOTE — PROGRESS NOTE ADULT - SUBJECTIVE AND OBJECTIVE BOX
CC: f/u for left thigh collecton    Patient reports: pain in operative region.No respiratory or GI complaints    REVIEW OF SYSTEMS:  All other review of systems negative (Comprehensive ROS)  POD   Antimicrobials Day #  :POD #1  cefepime   IVPB 2000 milliGRAM(s) IV Intermittent every 12 hours  vancomycin  IVPB 1250 milliGRAM(s) IV Intermittent every 12 hours    Other Medications Reviewed    T(F): 98.5 (09-27-18 @ 14:19), Max: 100 (09-26-18 @ 16:43)  HR: 69 (09-27-18 @ 14:19)  BP: 108/55 (09-27-18 @ 14:19)  RR: 17 (09-27-18 @ 14:19)  SpO2: 97% (09-27-18 @ 14:19)  Wt(kg): --    PHYSICAL EXAM:  General: alert, no acute distress  Eyes:  anicteric, no conjunctival injection, no discharge  Oropharynx: no lesions or injection 	  Neck: supple, without adenopathy  Lungs: clear to auscultation  Heart: regular rate and rhythm; no murmur, rubs or gallops  Abdomen: soft, nondistended, nontender, left thigh with edema, faint erythema lateral thigh, muscle flap viable.JPx2 with serous output  Skin: no lesions  Extremities: no clubbing, cyanosis, + edema.Left foot is warm  Neurologic: alert, oriented, moves all extremities    LAB RESULTS:                        8.3    16.79 )-----------( 298      ( 27 Sep 2018 06:39 )             24.9     09-27    130<L>  |  95<L>  |  17  ----------------------------<  99  4.0   |  24  |  0.79    Ca    7.7<L>      27 Sep 2018 06:39          MICROBIOLOGY:  RECENT CULTURES:  Operative cultures no growth at 24 hours, gram stain with wbc's and no organisms         09-25 @ 19:23 BLOOD PERIPHERAL         NO ORGANISMS ISOLATED  NO ORGANISMS ISOLATED AT 24 HOURS  09-25 @ 15:46 BLOOD PERIPHERAL         NO ORGANISMS ISOLATED  NO ORGANISMS ISOLATED AT 24 HOURS  09-22 @ 21:58 URINE CATHETER Escherichia coli            RADIOLOGY REVIEWED:  < from: CT Abdomen and Pelvis w/ IV Cont (09.25.18 @ 12:37) >  IMPRESSION: Status post resection of previously noted large mass in the   left groin. A large collection containing fluid and gas is noted in the   left groin with infiltration of the musculature of the left upper thigh,   concerning for infection.    Large defect in the musculature of the left lower anterior abdomen with   herniation of nonobstructed large and small bowel.    Small bilateral pleural effusions with bibasilar atelectasis.    A 0.9 cm rounded soft tissue density is noted in the fundus of the   gallbladder. The differential diagnosis includes a polyp and mass.   Ultrasound is suggested for further characterization.    Central low attenuation in the endometrial canal, measuring up to 1 cm,   an abnormal finding in a postmenopausal female. When the patient is   clinically able, pelvic ultrasound is suggested for further evaluation.    < end of copied text >

## 2018-09-27 NOTE — PROGRESS NOTE ADULT - ASSESSMENT
- continue vancomycin  - Continue JEREMY drain/care  - dressings removed - leave open to air  - xeroform to macerated skin  - DVT Ppx  - IVF  - Activity per vascular/ortho  - Will follow   - Pain control

## 2018-09-27 NOTE — PROGRESS NOTE ADULT - ASSESSMENT
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  CT and exam suspicious for infected collection.  Given vascular graft I would like to exclude a bacteremia and given location in groin I would favor pseudomonal coverage pending drainage.  She is s/p drainage of infected thigh collection 9/26.  Gram stain of fluid is without organisms, cultures negative at 24 hours  Blood cultures are NGSF, leukocytosis is moderating  Suggest:  1.Continue vanco and cefepime  2.Drainage should serve as "source control"  3.follow fever curve,WBC, cultures,exam.  4.will narrow coverage in 24-48 hours based on cultures.

## 2018-09-27 NOTE — PROGRESS NOTE ADULT - SUBJECTIVE AND OBJECTIVE BOX
No acute events overnight. Pain controlled. Pt went to OR with PRS yesterday for wash out of wound bed.     PE:  Vital Signs Last 24 Hrs  T(C): 36.8 (27 Sep 2018 06:17), Max: 37.8 (26 Sep 2018 16:43)  T(F): 98.3 (27 Sep 2018 06:17), Max: 100 (26 Sep 2018 16:43)  HR: 63 (27 Sep 2018 06:17) (63 - 85)  BP: 98/57 (27 Sep 2018 06:17) (92/61 - 116/76)  RR: 17 (27 Sep 2018 06:17) (12 - 17)  SpO2: 95% (27 Sep 2018 06:17) (93% - 100%)  Gen: No acute distress  LLE: dressing c/d/i  JPs appropriate, serous   Motor intact TA/GCS/EHL/FHL   SILT DP/SP/Tib  cap refill <2 sec, wwp     Labs:                        9.1    20.70 )-----------( 279      ( 26 Sep 2018 06:21 )             26.9   09-26    131<L>  |  96<L>  |  19  ----------------------------<  108<H>  4.1   |  22  |  0.80    Ca    7.9<L>      26 Sep 2018 06:21      Assessment/Plan:  67yFemale s/p Liposarcoma excision POD 10  -pain control  -PT  -WBAT in KI  -abx  -FU ID  -FU PRS  -OOB  -DVT ppx  -dispo plan

## 2018-09-27 NOTE — PROGRESS NOTE ADULT - PROBLEM SELECTOR PLAN 2
POD#10 of surgical excision with reconstruction. C/b post-op left thigh abscess s/p OR washout today, POD#1.   -post-op care as per ortho, plastics, and vascular surgery  -PT as tolerated  - pain control/bowel regimen. Stool counts to ensure adequate BMs  - IV abx as noted above  - f/u vascular surg and plastic surg recs

## 2018-09-27 NOTE — PROGRESS NOTE ADULT - PROBLEM SELECTOR PLAN 1
Found to have pansensitive E. coli UTI. Evaluated by ID, IV abx broadened to cefepime after noted left thigh abscess on CT scan, currently s/p OR washout done on 9/26, POD#1 with improvement in WBC. Pt hemodynamically stable. Patient remains afebrile.  Ceftriaxone 9/22-2/25  Vancomycin 9/23 - present  - continue IV cefepime 2g daily (started on 9/25) and vancomycin 1250mg BID, f/u vanco trough  - f/u ID recs  - closely monitor VS and blood count  - f/u 9/25 blood cx --> NGTD  - f/u 9/26 OR thigh cx --> no organisms seen thus far

## 2018-09-28 LAB
BUN SERPL-MCNC: 15 MG/DL — SIGNIFICANT CHANGE UP (ref 7–23)
CALCIUM SERPL-MCNC: 7.6 MG/DL — LOW (ref 8.4–10.5)
CHLORIDE SERPL-SCNC: 96 MMOL/L — LOW (ref 98–107)
CO2 SERPL-SCNC: 23 MMOL/L — SIGNIFICANT CHANGE UP (ref 22–31)
CREAT SERPL-MCNC: 0.75 MG/DL — SIGNIFICANT CHANGE UP (ref 0.5–1.3)
GLUCOSE SERPL-MCNC: 116 MG/DL — HIGH (ref 70–99)
HCT VFR BLD CALC: 26.4 % — LOW (ref 34.5–45)
HGB BLD-MCNC: 8.7 G/DL — LOW (ref 11.5–15.5)
MAGNESIUM SERPL-MCNC: 2 MG/DL — SIGNIFICANT CHANGE UP (ref 1.6–2.6)
MCHC RBC-ENTMCNC: 30.4 PG — SIGNIFICANT CHANGE UP (ref 27–34)
MCHC RBC-ENTMCNC: 33 % — SIGNIFICANT CHANGE UP (ref 32–36)
MCV RBC AUTO: 92.3 FL — SIGNIFICANT CHANGE UP (ref 80–100)
NRBC # FLD: 0 — SIGNIFICANT CHANGE UP
PHOSPHATE SERPL-MCNC: 2.6 MG/DL — SIGNIFICANT CHANGE UP (ref 2.5–4.5)
PLATELET # BLD AUTO: 336 K/UL — SIGNIFICANT CHANGE UP (ref 150–400)
PMV BLD: 10.5 FL — SIGNIFICANT CHANGE UP (ref 7–13)
POTASSIUM SERPL-MCNC: 3.6 MMOL/L — SIGNIFICANT CHANGE UP (ref 3.5–5.3)
POTASSIUM SERPL-SCNC: 3.6 MMOL/L — SIGNIFICANT CHANGE UP (ref 3.5–5.3)
RBC # BLD: 2.86 M/UL — LOW (ref 3.8–5.2)
RBC # FLD: 15.1 % — HIGH (ref 10.3–14.5)
SODIUM SERPL-SCNC: 130 MMOL/L — LOW (ref 135–145)
VANCOMYCIN TROUGH SERPL-MCNC: 23.1 UG/ML — HIGH (ref 10–20)
WBC # BLD: 14.77 K/UL — HIGH (ref 3.8–10.5)
WBC # FLD AUTO: 14.77 K/UL — HIGH (ref 3.8–10.5)

## 2018-09-28 PROCEDURE — 99233 SBSQ HOSP IP/OBS HIGH 50: CPT

## 2018-09-28 RX ADMIN — Medication 81 MILLIGRAM(S): at 12:56

## 2018-09-28 RX ADMIN — ENOXAPARIN SODIUM 40 MILLIGRAM(S): 100 INJECTION SUBCUTANEOUS at 12:56

## 2018-09-28 RX ADMIN — OXYCODONE HYDROCHLORIDE 5 MILLIGRAM(S): 5 TABLET ORAL at 22:40

## 2018-09-28 RX ADMIN — OXYCODONE HYDROCHLORIDE 10 MILLIGRAM(S): 5 TABLET ORAL at 07:00

## 2018-09-28 RX ADMIN — OXYCODONE HYDROCHLORIDE 5 MILLIGRAM(S): 5 TABLET ORAL at 23:40

## 2018-09-28 RX ADMIN — Medication 1 APPLICATION(S): at 06:04

## 2018-09-28 RX ADMIN — PANTOPRAZOLE SODIUM 40 MILLIGRAM(S): 20 TABLET, DELAYED RELEASE ORAL at 22:39

## 2018-09-28 RX ADMIN — CEFEPIME 100 MILLIGRAM(S): 1 INJECTION, POWDER, FOR SOLUTION INTRAMUSCULAR; INTRAVENOUS at 17:48

## 2018-09-28 RX ADMIN — OXYCODONE HYDROCHLORIDE 10 MILLIGRAM(S): 5 TABLET ORAL at 00:30

## 2018-09-28 RX ADMIN — CEFEPIME 100 MILLIGRAM(S): 1 INJECTION, POWDER, FOR SOLUTION INTRAMUSCULAR; INTRAVENOUS at 05:59

## 2018-09-28 RX ADMIN — Medication 166.67 MILLIGRAM(S): at 10:40

## 2018-09-28 RX ADMIN — OXYCODONE HYDROCHLORIDE 10 MILLIGRAM(S): 5 TABLET ORAL at 06:00

## 2018-09-28 NOTE — PROVIDER CONTACT NOTE (OTHER) - ACTION/TREATMENT ORDERED:
no need for further vanco troughs at this point since pt is therapeutic, okay to hang vanco dose at this time as ordered
Dr. Mancuso suggested that he is not comfortable giving the pt a liter of fluid at this time. He would like to wait to have the hospitalist to make a decision for a bolus or not.

## 2018-09-28 NOTE — PROGRESS NOTE ADULT - ASSESSMENT
Ms. Fleming is a 66 yo woman with PMHx of HTN admitted for scheduled surgical resection of left inguinal mass. Patient underwent enbloc resection of inguinal mass, left femoral to below knee popliteal bypass, venous resection, and reconstruction with rectus abdominus myocutaneous flap on 9/17. Hospital course notable for asymptomatic bigeminy with bradycardia deemed 2/2 catecholamine surge, non-traumatic rhabdomyolysis - both resolved. Transferred to surgical floor on 9/21 from SICU, where she stayed post-op for closer hemodynamic monitoring. Now concern for post-surgical cellulitis around incision/graft site and E. coli UTI. Patient appears clinically stable. Now s/p washout of left thigh abscess done on 9/26. Ms. Fleming is a 66 yo woman with PMHx of HTN admitted for scheduled surgical resection of left inguinal mass. Patient underwent enbloc resection of inguinal mass, left femoral to below knee popliteal bypass, venous resection, and reconstruction with rectus abdominus myocutaneous flap on 9/17. Hospital course notable for asymptomatic bigeminy with bradycardia deemed 2/2 catecholamine surge, non-traumatic rhabdomyolysis - both resolved. Transferred to surgical floor on 9/21 from SICU, where she stayed post-op for closer hemodynamic monitoring. Patient found to have post-surgical left thigh abscess s/p washout of left thigh abscess done on 9/26 in addition to CAUTI due to E. coli. Patient appears clinically stable and WBC improving on IV vancomycin and cefepime. Tissue culture now growing Enterococcus faecalis

## 2018-09-28 NOTE — PROGRESS NOTE ADULT - SUBJECTIVE AND OBJECTIVE BOX
Plastic Surgery  Daily Progress Note     Subjective/24 Hour Events: No acute events overnight,    T(C): 36.7 (09-28-18 @ 05:48), Max: 37.5 (09-27-18 @ 21:47)  HR: 64 (09-28-18 @ 05:48) (63 - 90)  BP: 103/61 (09-28-18 @ 05:48) (101/68 - 110/65)  BP(mean): --  ABP: --  ABP(mean): --  RR: 16 (09-28-18 @ 05:48) (16 - 17)  SpO2: 95% (09-28-18 @ 05:48) (95% - 97%)  Wt(kg): --  CVP(mm Hg): --  CI: --  CAPILLARY BLOOD GLUCOSE       N/A      09-27 @ 07:01  -  09-28 @ 07:00  --------------------------------------------------------  IN:  Total IN: 0 mL    OUT:    Bulb: 11 mL    Bulb: 16 mL    Bulb: 9 mL    Bulb: 41 mL    Bulb: 182 mL    Indwelling Catheter - Urethral: 100 mL  Total OUT: 359 mL    Total NET: -359 mL            Physical Exam:   General: Awake and alert  Abd: dressing CDI, drain output serous  Groin/Thigh: VRAM flap viable, appropriate color, soft. Surrounding groin and thigh skin erythematous, marginal improvement. xeroform to macerated skin

## 2018-09-28 NOTE — PROGRESS NOTE ADULT - PROBLEM SELECTOR PLAN 2
POD#10 of surgical excision with reconstruction. C/b post-op left thigh abscess s/p OR washout today, POD#1.   -post-op care as per ortho, plastics, and vascular surgery  -PT as tolerated  - pain control/bowel regimen. Stool counts to ensure adequate BMs  - IV abx as noted above  - f/u vascular surg and plastic surg recs POD#11 of surgical excision with reconstruction. C/b post-op left thigh abscess s/p OR washout today, POD#2.   -post-op care as per ortho, plastics, and vascular surgery  -PT as tolerated  - pain control/bowel regimen. Stool counts to ensure adequate BMs  - IV abx as noted above  - f/u vascular surg and plastic surg recs

## 2018-09-28 NOTE — PROGRESS NOTE ADULT - SUBJECTIVE AND OBJECTIVE BOX
CHIEF COMPLAINT: f/u     SUBJECTIVE / OVERNIGHT EVENTS: Patient seen and examined. No acute events overnight. Pain well controlled and patient without any complaints.    MEDICATIONS  (STANDING):  aspirin enteric coated 81 milliGRAM(s) Oral daily  cefepime   IVPB 2000 milliGRAM(s) IV Intermittent every 12 hours  enoxaparin Injectable 40 milliGRAM(s) SubCutaneous daily  influenza   Vaccine 0.5 milliLiter(s) IntraMuscular once  pantoprazole  Injectable 40 milliGRAM(s) IV Push at bedtime  sodium chloride 0.9%. 1000 milliLiter(s) (100 mL/Hr) IV Continuous <Continuous>  vancomycin  IVPB 1250 milliGRAM(s) IV Intermittent every 12 hours    MEDICATIONS  (PRN):  acetaminophen   Tablet .. 650 milliGRAM(s) Oral every 6 hours PRN Temp greater or equal to 38C (100.4F), Mild Pain (1 - 3)  benzocaine 15 mG/menthol 3.6 mG Lozenge 1 Lozenge Oral three times a day PRN Sore Throat  HYDROmorphone  Injectable 0.5 milliGRAM(s) IV Push every 4 hours PRN breakthrough pain  HYDROmorphone  Injectable 0.5 milliGRAM(s) IV Push every 10 minutes PRN Moderate Pain (4 - 6)  ondansetron Injectable 4 milliGRAM(s) IV Push every 6 hours PRN Nausea and/or Vomiting  oxyCODONE    IR 5 milliGRAM(s) Oral every 4 hours PRN Mild and Moderate Pain  oxyCODONE    IR 10 milliGRAM(s) Oral every 4 hours PRN Severe Pain (7 - 10)      VITALS:  T(F): 98.3 (09-28-18 @ 09:56), Max: 99.5 (09-27-18 @ 21:47)  HR: 67 (09-28-18 @ 09:56) (63 - 90)  BP: 116/73 (09-28-18 @ 09:56) (101/68 - 116/73)  RR: 17 (09-28-18 @ 09:56) (16 - 17)  SpO2: 99% (09-28-18 @ 09:56)  Wt(kg): --      CAPILLARY BLOOD GLUCOSE    PHYSICAL EXAM:  GENERAL: NAD, well-developed  HEAD:  Atraumatic, Normocephalic  EYES: EOMI, PERRLA, conjunctiva and sclera clear  NECK: Supple, No JVD  CHEST/LUNG: Clear to auscultation bilaterally; No wheeze  HEART: Regular rate and rhythm; No murmurs, rubs, or gallops  ABDOMEN: Soft, Nontender, Nondistended; Bowel sounds present  EXTREMITIES:  2+ Peripheral Pulses, No clubbing, cyanosis, or edema  PSYCH: AAOx3  NEUROLOGY: non-focal  SKIN: No rashes or lesions      LABS:                        8.7    14.77 )-----------( 336      ( 28 Sep 2018 06:11 )             26.4     09-28    130<L>  |  96<L>  |  15  ----------------------------<  116<H>  3.6   |  23  |  0.75    Ca    7.6<L>      28 Sep 2018 06:11  Phos  2.6     09-28  Mg     2.0     09-28      Culture - Surg Site Aerob/Anaer w/Gm St (collected 26 Sep 2018 11:46)  Source: THIGH  Preliminary Report (27 Sep 2018 15:36):    FEW    ENTF^Enterococcus faecalis    Culture - Surg Site Aerob/Anaer w/Gm St (collected 26 Sep 2018 11:42)  Source: THIGH    Culture - Blood (collected 25 Sep 2018 19:23)  Source: BLOOD VENOUS  Preliminary Report (27 Sep 2018 19:23):    NO ORGANISMS ISOLATED    NO ORGANISMS ISOLATED AT 48 HRS.    Culture - Blood (collected 25 Sep 2018 19:23)  Source: BLOOD PERIPHERAL  Preliminary Report (27 Sep 2018 19:23):    NO ORGANISMS ISOLATED    NO ORGANISMS ISOLATED AT 48 HRS.    Culture - Blood (collected 25 Sep 2018 15:46)  Source: BLOOD PERIPHERAL  Preliminary Report (27 Sep 2018 15:46):    NO ORGANISMS ISOLATED    NO ORGANISMS ISOLATED AT 48 HRS.        [ ] Consultant(s) Notes Reviewed:  [x] Care Discussed with Consultants/Other Providers: Orthopedic PA - discussed CHIEF COMPLAINT: f/u     SUBJECTIVE / OVERNIGHT EVENTS:   [employer] utilized for [place]ic, ID# [id number]  Patient seen and examined this morning. No acute events overnight. Pain well controlled and patient without any complaints. Patient states she has been eating but she normally does not eat a lot, even prior to being hospitalized. She reports having small BMs over the past 3 days. No chest pain, SOB, fever or chills.     MEDICATIONS  (STANDING):  aspirin enteric coated 81 milliGRAM(s) Oral daily  cefepime   IVPB 2000 milliGRAM(s) IV Intermittent every 12 hours  enoxaparin Injectable 40 milliGRAM(s) SubCutaneous daily  influenza   Vaccine 0.5 milliLiter(s) IntraMuscular once  pantoprazole  Injectable 40 milliGRAM(s) IV Push at bedtime  sodium chloride 0.9%. 1000 milliLiter(s) (100 mL/Hr) IV Continuous <Continuous>  vancomycin  IVPB 1250 milliGRAM(s) IV Intermittent every 12 hours    MEDICATIONS  (PRN):  acetaminophen   Tablet .. 650 milliGRAM(s) Oral every 6 hours PRN Temp greater or equal to 38C (100.4F), Mild Pain (1 - 3)  benzocaine 15 mG/menthol 3.6 mG Lozenge 1 Lozenge Oral three times a day PRN Sore Throat  HYDROmorphone  Injectable 0.5 milliGRAM(s) IV Push every 4 hours PRN breakthrough pain  HYDROmorphone  Injectable 0.5 milliGRAM(s) IV Push every 10 minutes PRN Moderate Pain (4 - 6)  ondansetron Injectable 4 milliGRAM(s) IV Push every 6 hours PRN Nausea and/or Vomiting  oxyCODONE    IR 5 milliGRAM(s) Oral every 4 hours PRN Mild and Moderate Pain  oxyCODONE    IR 10 milliGRAM(s) Oral every 4 hours PRN Severe Pain (7 - 10)      VITALS:  T(F): 98.3 (09-28-18 @ 09:56), Max: 99.5 (09-27-18 @ 21:47)  HR: 67 (09-28-18 @ 09:56) (63 - 90)  BP: 116/73 (09-28-18 @ 09:56) (101/68 - 116/73)  RR: 17 (09-28-18 @ 09:56) (16 - 17)  SpO2: 99% (09-28-18 @ 09:56)      PHYSICAL EXAM:  GENERAL: Laying in bed in NAD, chronically ill, but not toxic appearing  CHEST/LUNG: decreased BS at lung bases b/l but otherwise clear  HEART: Regular rate and rhythm; No murmurs, rubs, or gallops  ABDOMEN/PELVIS: Surgical staples intact. Blanchable erythema left side/flank. RLQ JEREMY drain. mid/left lower quadrant JEREMY drain. Erythema and edema of mons pubis and dependent area of flanks. Viable skin flap.   EXTREMITIES: serous drainage expressed on palpation of medial thigh at surgical incision site. Surgical staples intact with erythematous skin changes laterally on left thigh. 3 JEREMY drains in left thigh. B/l taut LE edema  PSYCH: Alert & Oriented, follows commands      LABS:                        8.7    14.77 )-----------( 336      ( 28 Sep 2018 06:11 )             26.4     09-28    130<L>  |  96<L>  |  15  ----------------------------<  116<H>  3.6   |  23  |  0.75    Ca    7.6<L>      28 Sep 2018 06:11  Phos  2.6     09-28  Mg     2.0     09-28      Culture - Surg Site Aerob/Anaer w/Gm St (collected 26 Sep 2018 11:46)  Source: THIGH  Preliminary Report (27 Sep 2018 15:36):    FEW    ENTF^Enterococcus faecalis    Culture - Surg Site Aerob/Anaer w/Gm St (collected 26 Sep 2018 11:42)  Source: THIGH    Culture - Blood (collected 25 Sep 2018 19:23)  Source: BLOOD VENOUS  Preliminary Report (27 Sep 2018 19:23):    NO ORGANISMS ISOLATED    NO ORGANISMS ISOLATED AT 48 HRS.    Culture - Blood (collected 25 Sep 2018 19:23)  Source: BLOOD PERIPHERAL  Preliminary Report (27 Sep 2018 19:23):    NO ORGANISMS ISOLATED    NO ORGANISMS ISOLATED AT 48 HRS.    Culture - Blood (collected 25 Sep 2018 15:46)  Source: BLOOD PERIPHERAL  Preliminary Report (27 Sep 2018 15:46):    NO ORGANISMS ISOLATED    NO ORGANISMS ISOLATED AT 48 HRS.        [ ] Consultant(s) Notes Reviewed:  [x] Care Discussed with Consultants/Other Providers: Orthopedic PA - discussed

## 2018-09-28 NOTE — PROGRESS NOTE ADULT - ASSESSMENT
- continue abx  - Continue JEREMY drain/care  - dressings removed - leave open to air  - xeroform to macerated skin  - DVT Ppx  - IVF  - Activity per vascular/ortho  - Will follow   - Pain control

## 2018-09-28 NOTE — PROGRESS NOTE ADULT - SUBJECTIVE AND OBJECTIVE BOX
No acute events overnight. Pain controlled.     PE:  Vital Signs Last 24 Hrs  T(C): 36.7 (28 Sep 2018 05:48), Max: 37.5 (27 Sep 2018 21:47)  T(F): 98 (28 Sep 2018 05:48), Max: 99.5 (27 Sep 2018 21:47)  HR: 64 (28 Sep 2018 05:48) (63 - 90)  BP: 103/61 (28 Sep 2018 05:48) (101/68 - 110/65)  RR: 16 (28 Sep 2018 05:48) (16 - 17)  SpO2: 95% (28 Sep 2018 05:48) (95% - 97%)  Gen: No acute distress  LLE: fullness at central portion of incision, compressible  incisions intact with some wound drainage  JPs appropriate - serous drainage   Motor intact TA/GCS/EHL/FHL   SILT DP/SP/Tib  cap refill <2 sec, wwp     Wound cx: E. faecalis     Labs:                        8.3    16.79 )-----------( 298      ( 27 Sep 2018 06:39 )             24.9   09-27    130<L>  |  95<L>  |  17  ----------------------------<  99  4.0   |  24  |  0.79    Ca    7.7<L>      27 Sep 2018 06:39    Assessment/Plan:  67yFemale s/p excision liposarcoma POD 11  -pain control  -PT  -WBAT in [place]  -OOB  -DVT ppx  -dispo plan

## 2018-09-28 NOTE — PROGRESS NOTE ADULT - PROBLEM SELECTOR PLAN 1
Found to have pansensitive E. coli UTI. Evaluated by ID, IV abx broadened to cefepime after noted left thigh abscess on CT scan, currently s/p OR washout done on 9/26, POD#1 with improvement in WBC. Pt hemodynamically stable. Patient remains afebrile.  Ceftriaxone 9/22-2/25  Vancomycin 9/23 - present  - continue IV cefepime 2g daily (started on 9/25) and vancomycin 1250mg BID, f/u vanco trough  - f/u ID recs  - closely monitor VS and blood count  - f/u 9/25 blood cx --> NGTD  - f/u 9/26 OR thigh cx --> no organisms seen thus far Found to have pansensitive E. coli UTI. Evaluated by ID, IV abx broadened to cefepime and vancomycin after noted left thigh abscess on CT scan, currently s/p OR washout done on 9/26, POD#2 with improvement in WBC. Pt hemodynamically stable. Patient remains afebrile.  Ceftriaxone 9/22-2/25  Vancomycin 9/23 - present  - continue IV cefepime 2g daily (started on 9/25) and vancomycin 1250mg BID, f/u vanco trough  - f/u ID recs  - closely monitor VS and blood count  - f/u 9/25 blood cx --> NGTD  - f/u 9/26 OR thigh cx --> noted Enterococcus faecalis

## 2018-09-28 NOTE — PROGRESS NOTE ADULT - SUBJECTIVE AND OBJECTIVE BOX
CC: f/u for infected thigh collection    Patient reports she is ok    REVIEW OF SYSTEMS:  All other review of systems negative (Comprehensive ROS)    Antimicrobials Day #  :3  cefepime   IVPB 2000 milliGRAM(s) IV Intermittent every 12 hours  vancomycin  IVPB 1250 milliGRAM(s) IV Intermittent every 12 hours    Other Medications Reviewed    T(F): 98.3 (09-28-18 @ 09:56), Max: 99.5 (09-27-18 @ 21:47)  HR: 67 (09-28-18 @ 09:56)  BP: 116/73 (09-28-18 @ 09:56)  RR: 17 (09-28-18 @ 09:56)  SpO2: 99% (09-28-18 @ 09:56)  Wt(kg): --    PHYSICAL EXAM:  General: alert, no acute distress  Eyes:  anicteric, no conjunctival injection, no discharge  Oropharynx: no lesions or injection 	  Neck: supple, without adenopathy  Lungs: clear to auscultation  Heart: regular rate and rhythm; no murmur, rubs or gallops  Abdomen:  nontender, without mass or organomegaly. lower abdomen wounds with some weeping and local incisional red  Skin: no lesions  Extremities: thigh with drains, clean incision  Neurologic: alert, oriented, moves all extremities    LAB RESULTS:                        8.7    14.77 )-----------( 336      ( 28 Sep 2018 06:11 )             26.4     09-28    130<L>  |  96<L>  |  15  ----------------------------<  116<H>  3.6   |  23  |  0.75    Ca    7.6<L>      28 Sep 2018 06:11  Phos  2.6     09-28  Mg     2.0     09-28          MICROBIOLOGY:  RECENT CULTURES:  09-26 @ 11:46 THIGH         09-26 @ 11:42 THIGH         09-25 @ 19:23 BLOOD PERIPHERAL         NO ORGANISMS ISOLATED  NO ORGANISMS ISOLATED AT 48 HRS.  09-25 @ 15:46 BLOOD PERIPHERAL         NO ORGANISMS ISOLATED  NO ORGANISMS ISOLATED AT 48 HRS.      RADIOLOGY REVIEWED:    < from: CT Abdomen and Pelvis w/ IV Cont (09.25.18 @ 12:37) >  IMPRESSION: Status post resection of previously noted large mass in the   left groin. A large collection containing fluid and gas is noted in the   left groin with infiltration of the musculature of the left upper thigh,   concerning for infection.    Large defect in the musculature of the left lower anterior abdomen with   herniation of nonobstructed large and small bowel.    Small bilateral pleural effusions with bibasilar atelectasis.    A 0.9 cm rounded soft tissue density is noted in the fundus of the   gallbladder. The differential diagnosis includes a polyp and mass.   Ultrasound is suggested for further characterization.    Central low attenuation in the endometrial canal, measuring up to 1 cm,   an abnormal finding in a postmenopausal female. When the patient is   clinically able, pelvic ultrasound is suggested for further evaluation.    These findings were discussed with VALENTIN Barba of orthopedics on 9/25/2018   at 1:58 PM by Dr. Ma with read back confirmation.     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   Plan: continue cefepime and vanco  will r/w dr Andrade depth of collection and if the graft was involved  will follow up cultures of collection and adjust antibiotics accordingly, duration to be determined. so far just enterococcus growing.

## 2018-09-28 NOTE — PROGRESS NOTE ADULT - PROBLEM SELECTOR PLAN 3
Hypoosmolar hypovolemic hyponatremia. Pt with ongoing fluid loss via drains and urinary incontinence. Pt not eating well.  - start IV normal saline at 100cc/hr for at least 12 hours, then check Na level  - encourage oral intake  - strict I&Os  - nutrition consult Hypoosmolar hypovolemic hyponatremia. Pt with ongoing fluid loss via drains and urinary incontinence. Pt not eating well.  - continue IV normal saline at 100cc/hr, trend sodium level  - encourage oral intake  - strict I&Os  - nutrition consult

## 2018-09-29 LAB
-  AMPICILLIN: SIGNIFICANT CHANGE UP
-  CIPROFLOXACIN: SIGNIFICANT CHANGE UP
-  TETRACYCLINE: SIGNIFICANT CHANGE UP
-  VANCOMYCIN: SIGNIFICANT CHANGE UP
BUN SERPL-MCNC: 13 MG/DL — SIGNIFICANT CHANGE UP (ref 7–23)
BUN SERPL-MCNC: 15 MG/DL — SIGNIFICANT CHANGE UP (ref 7–23)
CALCIUM SERPL-MCNC: 7.5 MG/DL — LOW (ref 8.4–10.5)
CALCIUM SERPL-MCNC: 7.5 MG/DL — LOW (ref 8.4–10.5)
CHLORIDE SERPL-SCNC: 95 MMOL/L — LOW (ref 98–107)
CHLORIDE SERPL-SCNC: 98 MMOL/L — SIGNIFICANT CHANGE UP (ref 98–107)
CHLORIDE UR-SCNC: < 20 MMOL/L — SIGNIFICANT CHANGE UP
CO2 SERPL-SCNC: 20 MMOL/L — LOW (ref 22–31)
CO2 SERPL-SCNC: 24 MMOL/L — SIGNIFICANT CHANGE UP (ref 22–31)
CREAT SERPL-MCNC: 0.64 MG/DL — SIGNIFICANT CHANGE UP (ref 0.5–1.3)
CREAT SERPL-MCNC: 0.74 MG/DL — SIGNIFICANT CHANGE UP (ref 0.5–1.3)
CULTURE - SURGICAL SITE: SIGNIFICANT CHANGE UP
CULTURE - SURGICAL SITE: SIGNIFICANT CHANGE UP
GLUCOSE SERPL-MCNC: 103 MG/DL — HIGH (ref 70–99)
GLUCOSE SERPL-MCNC: 95 MG/DL — SIGNIFICANT CHANGE UP (ref 70–99)
GRAM STN WND: SIGNIFICANT CHANGE UP
HCT VFR BLD CALC: 29.5 % — LOW (ref 34.5–45)
HGB BLD-MCNC: 9.7 G/DL — LOW (ref 11.5–15.5)
MAGNESIUM SERPL-MCNC: 1.9 MG/DL — SIGNIFICANT CHANGE UP (ref 1.6–2.6)
MCHC RBC-ENTMCNC: 30.5 PG — SIGNIFICANT CHANGE UP (ref 27–34)
MCHC RBC-ENTMCNC: 32.9 % — SIGNIFICANT CHANGE UP (ref 32–36)
MCV RBC AUTO: 92.8 FL — SIGNIFICANT CHANGE UP (ref 80–100)
METHOD TYPE: SIGNIFICANT CHANGE UP
NRBC # FLD: 0 — SIGNIFICANT CHANGE UP
ORGANISM # SPEC MICROSCOPIC CNT: SIGNIFICANT CHANGE UP
ORGANISM # SPEC MICROSCOPIC CNT: SIGNIFICANT CHANGE UP
PHOSPHATE SERPL-MCNC: 2.7 MG/DL — SIGNIFICANT CHANGE UP (ref 2.5–4.5)
PLATELET # BLD AUTO: 436 K/UL — HIGH (ref 150–400)
PMV BLD: 10.5 FL — SIGNIFICANT CHANGE UP (ref 7–13)
POTASSIUM SERPL-MCNC: 3.4 MMOL/L — LOW (ref 3.5–5.3)
POTASSIUM SERPL-MCNC: 4.9 MMOL/L — SIGNIFICANT CHANGE UP (ref 3.5–5.3)
POTASSIUM SERPL-SCNC: 3.4 MMOL/L — LOW (ref 3.5–5.3)
POTASSIUM SERPL-SCNC: 4.9 MMOL/L — SIGNIFICANT CHANGE UP (ref 3.5–5.3)
RBC # BLD: 3.18 M/UL — LOW (ref 3.8–5.2)
RBC # FLD: 15.1 % — HIGH (ref 10.3–14.5)
SODIUM SERPL-SCNC: 128 MMOL/L — LOW (ref 135–145)
SODIUM SERPL-SCNC: 132 MMOL/L — LOW (ref 135–145)
SODIUM UR-SCNC: < 20 MMOL/L — SIGNIFICANT CHANGE UP
VANCOMYCIN TROUGH SERPL-MCNC: 17.5 UG/ML — SIGNIFICANT CHANGE UP (ref 10–20)
WBC # BLD: 14.95 K/UL — HIGH (ref 3.8–10.5)
WBC # FLD AUTO: 14.95 K/UL — HIGH (ref 3.8–10.5)

## 2018-09-29 PROCEDURE — 99233 SBSQ HOSP IP/OBS HIGH 50: CPT

## 2018-09-29 RX ORDER — SENNA PLUS 8.6 MG/1
2 TABLET ORAL AT BEDTIME
Qty: 0 | Refills: 0 | Status: DISCONTINUED | OUTPATIENT
Start: 2018-09-29 | End: 2018-10-23

## 2018-09-29 RX ORDER — VANCOMYCIN HCL 1 G
1250 VIAL (EA) INTRAVENOUS EVERY 24 HOURS
Qty: 0 | Refills: 0 | Status: DISCONTINUED | OUTPATIENT
Start: 2018-09-29 | End: 2018-09-29

## 2018-09-29 RX ORDER — DOCUSATE SODIUM 100 MG
100 CAPSULE ORAL
Qty: 0 | Refills: 0 | Status: DISCONTINUED | OUTPATIENT
Start: 2018-09-29 | End: 2018-09-29

## 2018-09-29 RX ORDER — DOCUSATE SODIUM 100 MG
100 CAPSULE ORAL THREE TIMES A DAY
Qty: 0 | Refills: 0 | Status: DISCONTINUED | OUTPATIENT
Start: 2018-09-29 | End: 2018-10-01

## 2018-09-29 RX ORDER — SODIUM CHLORIDE 9 MG/ML
1000 INJECTION INTRAMUSCULAR; INTRAVENOUS; SUBCUTANEOUS
Qty: 0 | Refills: 0 | Status: DISCONTINUED | OUTPATIENT
Start: 2018-09-29 | End: 2018-09-30

## 2018-09-29 RX ORDER — POLYETHYLENE GLYCOL 3350 17 G/17G
17 POWDER, FOR SOLUTION ORAL DAILY
Qty: 0 | Refills: 0 | Status: DISCONTINUED | OUTPATIENT
Start: 2018-09-29 | End: 2018-09-29

## 2018-09-29 RX ORDER — SODIUM CHLORIDE 9 MG/ML
1000 INJECTION INTRAMUSCULAR; INTRAVENOUS; SUBCUTANEOUS ONCE
Qty: 0 | Refills: 0 | Status: COMPLETED | OUTPATIENT
Start: 2018-09-29 | End: 2018-09-29

## 2018-09-29 RX ORDER — VANCOMYCIN HCL 1 G
1250 VIAL (EA) INTRAVENOUS EVERY 24 HOURS
Qty: 0 | Refills: 0 | Status: DISCONTINUED | OUTPATIENT
Start: 2018-09-30 | End: 2018-10-09

## 2018-09-29 RX ORDER — SENNA PLUS 8.6 MG/1
2 TABLET ORAL AT BEDTIME
Qty: 0 | Refills: 0 | Status: DISCONTINUED | OUTPATIENT
Start: 2018-09-29 | End: 2018-09-29

## 2018-09-29 RX ADMIN — Medication 81 MILLIGRAM(S): at 12:21

## 2018-09-29 RX ADMIN — Medication 10 MILLIGRAM(S): at 12:21

## 2018-09-29 RX ADMIN — Medication 100 MILLIGRAM(S): at 22:06

## 2018-09-29 RX ADMIN — OXYCODONE HYDROCHLORIDE 10 MILLIGRAM(S): 5 TABLET ORAL at 19:12

## 2018-09-29 RX ADMIN — ENOXAPARIN SODIUM 40 MILLIGRAM(S): 100 INJECTION SUBCUTANEOUS at 12:21

## 2018-09-29 RX ADMIN — OXYCODONE HYDROCHLORIDE 10 MILLIGRAM(S): 5 TABLET ORAL at 20:10

## 2018-09-29 RX ADMIN — CEFEPIME 100 MILLIGRAM(S): 1 INJECTION, POWDER, FOR SOLUTION INTRAMUSCULAR; INTRAVENOUS at 05:24

## 2018-09-29 RX ADMIN — SODIUM CHLORIDE 1000 MILLILITER(S): 9 INJECTION INTRAMUSCULAR; INTRAVENOUS; SUBCUTANEOUS at 15:45

## 2018-09-29 RX ADMIN — OXYCODONE HYDROCHLORIDE 5 MILLIGRAM(S): 5 TABLET ORAL at 06:20

## 2018-09-29 RX ADMIN — Medication 166.67 MILLIGRAM(S): at 10:44

## 2018-09-29 RX ADMIN — SENNA PLUS 2 TABLET(S): 8.6 TABLET ORAL at 22:06

## 2018-09-29 RX ADMIN — Medication 100 MILLIGRAM(S): at 13:46

## 2018-09-29 RX ADMIN — OXYCODONE HYDROCHLORIDE 5 MILLIGRAM(S): 5 TABLET ORAL at 05:24

## 2018-09-29 RX ADMIN — PANTOPRAZOLE SODIUM 40 MILLIGRAM(S): 20 TABLET, DELAYED RELEASE ORAL at 22:06

## 2018-09-29 NOTE — PROGRESS NOTE ADULT - SUBJECTIVE AND OBJECTIVE BOX
No acute events overnight. Pain controlled.     PE:    ICU Vital Signs Last 24 Hrs  T(C): 37.1 (29 Sep 2018 00:46), Max: 37.2 (28 Sep 2018 12:54)  T(F): 98.7 (29 Sep 2018 00:46), Max: 99 (28 Sep 2018 12:54)  HR: 70 (29 Sep 2018 00:46) (57 - 70)  BP: 103/67 (29 Sep 2018 00:46) (103/61 - 119/69)  BP(mean): --  ABP: --  ABP(mean): --  RR: 17 (29 Sep 2018 00:46) (16 - 17)  SpO2: 97% (29 Sep 2018 00:46) (95% - 99%)      Wound cx: E. faecalis     Labs:                        8.7    14.77 )-----------( 336      ( 28 Sep 2018 06:11 )             26.4     09-28    130<L>  |  96<L>  |  15  ----------------------------<  116<H>  3.6   |  23  |  0.75    Ca    7.6<L>      28 Sep 2018 06:11  Phos  2.6     09-28  Mg     2.0     09-28        Assessment/Plan:  67yFemale s/p excision liposarcoma POD 12  -pain control  -PRS recs appreaciated  -PT  -WBAT in [facility]  -OOB  -DVT ppx  -dispo plan

## 2018-09-29 NOTE — PROGRESS NOTE ADULT - PROBLEM SELECTOR PLAN 2
POD#12 of surgical excision with reconstruction. C/b post-op left thigh abscess s/p OR washout today, POD#3  -post-op care as per ortho, plastics, and vascular surgery  -PT as tolerated  - pain control/bowel regimen --> give bisacodyl suppository today. Stool counts to ensure adequate BMs  - IV abx as noted above  - f/u vascular surg and plastic surg recs

## 2018-09-29 NOTE — PROGRESS NOTE ADULT - PROBLEM SELECTOR PLAN 1
Found to have pansensitive E. coli UTI. Evaluated by ID, IV abx broadened to cefepime and vancomycin after noted left thigh abscess on CT scan, currently s/p OR washout done on 9/26, POD#3 with improvement in WBC. Pt hemodynamically stable. Patient remains afebrile.  Ceftriaxone 9/22-2/25  Vancomycin 9/23 - present  Cefepime 9/25-9/29  - discontinue IV cefepime 2g daily since cx only growing E. faecalis  - change to vancomycin 1250mg daily from BID given elevated vanco trough; f/u repeat vanco trough  - f/u ID recs  - closely monitor VS and blood count  - f/u 9/25 blood cx --> NGTD x 72h  - f/u 9/26 OR thigh cx --> noted Enterococcus faecalis

## 2018-09-29 NOTE — PROGRESS NOTE ADULT - SUBJECTIVE AND OBJECTIVE BOX
CHIEF COMPLAINT: f/u     SUBJECTIVE / OVERNIGHT EVENTS:   Patient seen and examined this afternoon. No acute events overnight. Pain well controlled. 3 drains removed today - 2 abdominal drains and 1 in left thigh. Patient still not eating much and and with inadequate BMs. Patient unable to stand with PT due to feelings of dizziness and feeling shaky in her legs. No chest pain or SOB    MEDICATIONS  (STANDING):  aspirin enteric coated 81 milliGRAM(s) Oral daily  docusate sodium 100 milliGRAM(s) Oral three times a day  enoxaparin Injectable 40 milliGRAM(s) SubCutaneous daily  influenza   Vaccine 0.5 milliLiter(s) IntraMuscular once  pantoprazole  Injectable 40 milliGRAM(s) IV Push at bedtime  senna 2 Tablet(s) Oral at bedtime  sodium chloride 0.9% Bolus 1000 milliLiter(s) IV Bolus once  sodium chloride 0.9%. 1000 milliLiter(s) (100 mL/Hr) IV Continuous <Continuous>  vancomycin  IVPB 1250 milliGRAM(s) IV Intermittent every 24 hours    MEDICATIONS  (PRN):  acetaminophen   Tablet .. 650 milliGRAM(s) Oral every 6 hours PRN Temp greater or equal to 38C (100.4F), Mild Pain (1 - 3)  benzocaine 15 mG/menthol 3.6 mG Lozenge 1 Lozenge Oral three times a day PRN Sore Throat  HYDROmorphone  Injectable 0.5 milliGRAM(s) IV Push every 4 hours PRN breakthrough pain  HYDROmorphone  Injectable 0.5 milliGRAM(s) IV Push every 10 minutes PRN Moderate Pain (4 - 6)  ondansetron Injectable 4 milliGRAM(s) IV Push every 6 hours PRN Nausea and/or Vomiting  oxyCODONE    IR 5 milliGRAM(s) Oral every 4 hours PRN Mild and Moderate Pain  oxyCODONE    IR 10 milliGRAM(s) Oral every 4 hours PRN Severe Pain (7 - 10)      VITALS:  T(F): 98.7 (09-29-18 @ 13:45), Max: 98.8 (09-28-18 @ 16:53)  HR: 62 (09-29-18 @ 13:45) (62 - 70)  BP: 101/64 (09-29-18 @ 13:45) (101/64 - 119/69)  RR: 16 (09-29-18 @ 13:45) (16 - 17)  SpO2: 96% (09-29-18 @ 13:45)      PHYSICAL EXAM:  GENERAL: Laying in bed in NAD, chronically ill, but not toxic appearing;   CHEST/LUNG: decreased BS at lung bases b/l but otherwise clear  HEART: Regular rate and rhythm; No murmurs, rubs, or gallops  ABDOMEN/PELVIS: Surgical staples intact. Erythema and edema of mons pubis, edema of medial aspect of left thigh and dependent area of flanks. Viable skin flap.   EXTREMITIES: serous drainage expressed on palpation at prior thigh drain sites. Surgical staples intact. 2 JEREMY drains in left thigh. B/l taut lower leg edema  PSYCH: Alert & Oriented, follows commands; pleasant        LABS:              9.7                  x    | x    | x            14.95 >-----------< 436     ------------------------< x                     29.5                 x    | x    | x                                            Ca x     Mg x     Ph x              [ ] Consultant(s) Notes Reviewed:  [x] Care Discussed with Consultants/Other Providers: Orthopedic PA - discussed

## 2018-09-29 NOTE — PROGRESS NOTE ADULT - SUBJECTIVE AND OBJECTIVE BOX
VASCULAR SURGERY  Pager: [phone]      INTERVAL EVENTS/SUBJECTIVE:   No acute events overnight. Afebrile.  Reports having some pain in the left groin this AM.   ______________________________________________  OBJECTIVE:   T(C): 36.9 (09-29-18 @ 09:28), Max: 37.2 (09-28-18 @ 12:54)  HR: 64 (09-29-18 @ 09:28) (57 - 70)  BP: 103/61 (09-29-18 @ 09:28) (103/61 - 119/69)  RR: 16 (09-29-18 @ 09:28) (16 - 17)  SpO2: 97% (09-29-18 @ 09:28) (96% - 99%)  Wt(kg): --  CAPILLARY BLOOD GLUCOSE        I&O's Detail    28 Sep 2018 07:01  -  29 Sep 2018 07:00  --------------------------------------------------------  IN:  Total IN: 0 mL    OUT:    Bulb: 15 mL    Bulb: 25 mL    Bulb: 9.5 mL    Bulb: 115 mL    Bulb: 18 mL  Total OUT: 182.5 mL    Total NET: -182.5 mL          Physical exam:  Gen: NAD  Left groin: Wide area of induration. Skin covered with xeroform. Drains with minimal output.  ______________________________________________  LABS:  CBC Full  -  ( 29 Sep 2018 07:00 )  WBC Count : 14.95 K/uL  Hemoglobin : 9.7 g/dL  Hematocrit : 29.5 %  Platelet Count - Automated : 436 K/uL  Mean Cell Volume : 92.8 fL  Mean Cell Hemoglobin : 30.5 pg  Mean Cell Hemoglobin Concentration : 32.9 %  Auto Neutrophil # : x  Auto Lymphocyte # : x  Auto Monocyte # : x  Auto Eosinophil # : x  Auto Basophil # : x  Auto Neutrophil % : x  Auto Lymphocyte % : x  Auto Monocyte % : x  Auto Eosinophil % : x  Auto Basophil % : x    09-29    128<L>  |  95<L>  |  15  ----------------------------<  95  4.9   |  20<L>  |  0.64    Ca    7.5<L>      29 Sep 2018 05:38  Phos  2.7     09-29  Mg     1.9     09-29        Culture - Surg Site Aerob/Anaer w/Gm St (09.26.18 @ 11:46)    Culture - Surgical Site:   FEW  ENTF^Enterococcus faecalis    Gram Stain Wound:   NOS^No Organisms Seen  WBC^White Blood Cells  QNTY CELLS IN GRAM STAIN: FEW (2+)    Specimen Source: THIGH

## 2018-09-29 NOTE — PROGRESS NOTE ADULT - ASSESSMENT
Ms. Fleming is a 66 yo woman with PMHx of HTN admitted for scheduled surgical resection of left inguinal mass. Patient underwent enbloc resection of inguinal mass, left femoral to below knee popliteal bypass, venous resection, and reconstruction with rectus abdominus myocutaneous flap on 9/17. Hospital course notable for asymptomatic bigeminy with bradycardia deemed 2/2 catecholamine surge, non-traumatic rhabdomyolysis - both resolved. Transferred to surgical floor on 9/21 from SICU, where she stayed post-op for closer hemodynamic monitoring. Patient found to have post-surgical left thigh abscess s/p washout done on 9/26 in addition to CAUTI due to E. coli. Patient appears clinically stable and WBC improved on IV vancomycin and cefepime. Tissue culture growing pansensitive Enterococcus faecalis

## 2018-09-29 NOTE — PROGRESS NOTE ADULT - PROBLEM SELECTOR PLAN 3
Hypoosmolar hypovolemic hyponatremia. Pt with ongoing fluid loss via drains and urinary incontinence. Pt not eating well. Repeat urine sodium <20 today. Pt receiving ~600mL daily of free water through IV abx administration which is also contributing to worsening hyponatremia.  - give IV normal saline bolus. Repeat BMP later on this evening. Will likely need to increase rate of IV normal saline.  - discussed with pharmacy, can give vancomycin in NS instead of D5W.   - trend sodium level  - encourage oral intake  - strict I&Os  - nutrition consult

## 2018-09-29 NOTE — PROGRESS NOTE ADULT - ASSESSMENT
67F s/p left inguinal mass with left external iliac to above knee popliteal artery bypass w/ PTFE. LLE remains warm with palpable DP pulse. Returned to OR for drainage of left groin abscess.       - Continue with antibiotics  - Following culture sensitivities   - No vascular intervention at this time in regards to the graft    [id number]

## 2018-09-29 NOTE — PROGRESS NOTE ADULT - SUBJECTIVE AND OBJECTIVE BOX
CC: f/u for infected left leg collection    Patient reports she is ok not hungry now    REVIEW OF SYSTEMS:  All other review of systems negative (Comprehensive ROS)    Antimicrobials Day #  :4  vancomycin  IVPB 1250 milliGRAM(s) IV Intermittent every 24 hours    Other Medications Reviewed    T(F): 98.7 (09-29-18 @ 13:45), Max: 98.8 (09-28-18 @ 16:53)  HR: 62 (09-29-18 @ 13:45)  BP: 101/64 (09-29-18 @ 13:45)  RR: 16 (09-29-18 @ 13:45)  SpO2: 96% (09-29-18 @ 13:45)  Wt(kg): --    PHYSICAL EXAM:  General: alert, no acute distress  Eyes:  anicteric, no conjunctival injection, no discharge  Oropharynx: no lesions or injection 	  Neck: supple, without adenopathy  Lungs: clear to auscultation  Heart: regular rate and rhythm; no murmur, rubs or gallops  Abdomen: wounds are clean but some weep, drains in place, not tender. left leg wounds are clean, drains in place  Skin: no lesions  Extremities: no clubbing, cyanosis,. left legedema  Neurologic: alert, oriented, moves all extremities    LAB RESULTS:                        9.7    14.95 )-----------( 436      ( 29 Sep 2018 07:00 )             29.5     09-29    128<L>  |  95<L>  |  15  ----------------------------<  95  4.9   |  20<L>  |  0.64    Ca    7.5<L>      29 Sep 2018 05:38  Phos  2.7     09-29  Mg     1.9     09-29          MICROBIOLOGY:  RECENT CULTURES:  09-26 @ 11:46 THIGH Enterococcus faecalis        09-26 @ 11:42 THIGH         09-25 @ 19:23 BLOOD PERIPHERAL         NO ORGANISMS ISOLATED  NO ORGANISMS ISOLATED AT 72 HRS.  < from: CT Abdomen and Pelvis w/ IV Cont (09.25.18 @ 12:37) >  IMPRESSION: Status post resection of previously noted large mass in the   left groin. A large collection containing fluid and gas is noted in the   left groin with infiltration of the musculature of the left upper thigh,   concerning for infection.    Large defect in the musculature of the left lower anterior abdomen with   herniation of nonobstructed large and small bowel.    Small bilateral pleural effusions with bibasilar atelectasis.    A 0.9 cm rounded soft tissue density is noted in the fundus of the   gallbladder. The differential diagnosis includes a polyp and mass.   Ultrasound is suggested for further characterization.    Central low attenuation in the endometrial canal, measuring up to 1 cm,   an abnormal finding in a postmenopausal female. When the patient is   clinically able, pelvic ultrasound is suggested for further evaluation.    < end of copied text >  09-25 @ 15:46 BLOOD PERIPHERAL         NO ORGANISMS ISOLATED  NO ORGANISMS ISOLATED AT 72 HRS.          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 Andrade if graft involved in collection

## 2018-09-29 NOTE — PROGRESS NOTE ADULT - SUBJECTIVE AND OBJECTIVE BOX
Plastic Surgery  Daily Progress Note     Subjective/24 Hour Events: No acute events overnight, doing ok this morning, c/o some pain    Objective:    Vitals:  T(C): 36.7 (09-29-18 @ 05:19), Max: 37.2 (09-28-18 @ 12:54)  HR: 63 (09-29-18 @ 05:19) (57 - 70)  BP: 103/62 (09-29-18 @ 05:19) (103/62 - 119/69)  RR: 16 (09-29-18 @ 05:19) (16 - 17)  SpO2: 96% (09-29-18 @ 05:19) (96% - 99%)    I/O:     09-28-18 @ 07:01  -  09-29-18 @ 07:00  --------------------------------------------------------  IN: 0 mL / OUT: 182.5 mL / NET: -182.5 mL      Physical Exam:   General: Awake and alert  Abd: dressing CDI, drain output serous  Groin/Thigh: VRAM flap viable, appropriate color, soft. Surrounding groin & thigh skin erythematous, some improvement. Silvadene to erythematous skin, Xeroform to macerated skin

## 2018-09-29 NOTE — CHART NOTE - NSCHARTNOTEFT_GEN_A_CORE
NUTRITION SERVICES     Upon Nutritional Assessment by the Registered Dietitian your patient was determined to meet criteria/ has evidence of the following diagnosis/diagnoses:  [ ] Mild Protein Calorie Malnutrition   [ ] Moderate Protein Calorie Malnutrition   [ x] Severe Protein Calorie Malnutrition   [ ] Unspecified Protein Calorie Malnutrition   [ ] Underweight / BMI <19  [ ] Morbid Obesity / BMI >40    Findings as based on:  •  Comprehensive nutrition assessment and consultation   •  Calorie Counts (nutrient intake analysis)   •  Food acceptance and intake status from observations by staff  •  Follow up  •  Patient education  •  Intervention secondary to interdisciplinary rounds  •   concerns     Treatment:  The following diet has been recommended: Refer to initial assessment completed under document section.

## 2018-09-29 NOTE — DIETITIAN INITIAL EVALUATION ADULT. - OTHER INFO
Nutrition consult ordered for nutrition assessment, 66 y/o female admitted with the DX of varicose veins of lower extremities, hyponatremia, reported to have poor po since admission 12 days, pt taking food in small quantity, prefers to have soup and rice only, food preferences obtained RD to implement. Pt denies any weight changes, as per medical charts, weight trends are 61 kg admit weight, 82 kg on 9/23/2018, 79 kg on 9/29/2018. 2+ edema also noted in lower extremities. considering poor po <50% in >5 days and moderate fluid accumulation of legs pt meet the criteria for severe malnutrition. Pt encouraged to have small frequent meals as tolerated with ensure clear 1 can po 3 x daily. LABS reviewed, Nutrition education provided to daughter and pt. RD remains available. Nutrition consult ordered for nutrition assessment, 66 y/o female admitted with the DX of varicose veins of lower extremities, hyponatremia, reported to have poor po since admission 12 days, pt taking food in small quantity, prefers to have soup and  rice only, food preferences  obtained RD to implement.  Pt not certain about usual weight however, denies any significant  weight changes PTA.   As per medical charts, weight trends are 61 kg admit weight, 82 kg on 9/23/2018, 79 kg on 9/29/2018. 2+ edema also noted in lower extremities. considering poor po <50% in >5 days and moderate fluid accumulation of legs pt meet the criteria for severe malnutrition. Pt encouraged to have small frequent meals as tolerated with ensure clear 1 can po 3 x daily. LABS reviewed, Nutrition education provided to daughter and pt. RD remains available.

## 2018-09-29 NOTE — PROGRESS NOTE ADULT - ASSESSMENT
- continue abx  - Continue JEREMY drain/care  - dressings removed - leave open to air  - Silvadene to Erythematous Skin  - xeroform to macerated skin  - DVT ppx  - IVF  - Activity per vascular/ortho  - Will follow

## 2018-09-30 DIAGNOSIS — E43 UNSPECIFIED SEVERE PROTEIN-CALORIE MALNUTRITION: ICD-10-CM

## 2018-09-30 LAB
BACTERIA BLD CULT: SIGNIFICANT CHANGE UP
BUN SERPL-MCNC: 12 MG/DL — SIGNIFICANT CHANGE UP (ref 7–23)
CALCIUM SERPL-MCNC: 7.6 MG/DL — LOW (ref 8.4–10.5)
CHLORIDE SERPL-SCNC: 102 MMOL/L — SIGNIFICANT CHANGE UP (ref 98–107)
CO2 SERPL-SCNC: 23 MMOL/L — SIGNIFICANT CHANGE UP (ref 22–31)
CREAT SERPL-MCNC: 0.67 MG/DL — SIGNIFICANT CHANGE UP (ref 0.5–1.3)
GLUCOSE SERPL-MCNC: 99 MG/DL — SIGNIFICANT CHANGE UP (ref 70–99)
HCT VFR BLD CALC: 27.6 % — LOW (ref 34.5–45)
HGB BLD-MCNC: 9 G/DL — LOW (ref 11.5–15.5)
MAGNESIUM SERPL-MCNC: 1.9 MG/DL — SIGNIFICANT CHANGE UP (ref 1.6–2.6)
MCHC RBC-ENTMCNC: 30.6 PG — SIGNIFICANT CHANGE UP (ref 27–34)
MCHC RBC-ENTMCNC: 32.6 % — SIGNIFICANT CHANGE UP (ref 32–36)
MCV RBC AUTO: 93.9 FL — SIGNIFICANT CHANGE UP (ref 80–100)
NRBC # FLD: 0 — SIGNIFICANT CHANGE UP
PHOSPHATE SERPL-MCNC: 2.3 MG/DL — LOW (ref 2.5–4.5)
PLATELET # BLD AUTO: 451 K/UL — HIGH (ref 150–400)
PMV BLD: 10.5 FL — SIGNIFICANT CHANGE UP (ref 7–13)
POTASSIUM SERPL-MCNC: 3.1 MMOL/L — LOW (ref 3.5–5.3)
POTASSIUM SERPL-SCNC: 3.1 MMOL/L — LOW (ref 3.5–5.3)
RBC # BLD: 2.94 M/UL — LOW (ref 3.8–5.2)
RBC # FLD: 15.2 % — HIGH (ref 10.3–14.5)
SODIUM SERPL-SCNC: 136 MMOL/L — SIGNIFICANT CHANGE UP (ref 135–145)
WBC # BLD: 15.96 K/UL — HIGH (ref 3.8–10.5)
WBC # FLD AUTO: 15.96 K/UL — HIGH (ref 3.8–10.5)

## 2018-09-30 PROCEDURE — 99233 SBSQ HOSP IP/OBS HIGH 50: CPT

## 2018-09-30 RX ORDER — POTASSIUM CHLORIDE 20 MEQ
40 PACKET (EA) ORAL EVERY 4 HOURS
Qty: 0 | Refills: 0 | Status: COMPLETED | OUTPATIENT
Start: 2018-09-30 | End: 2018-09-30

## 2018-09-30 RX ORDER — OXYCODONE HYDROCHLORIDE 5 MG/1
10 TABLET ORAL EVERY 4 HOURS
Qty: 0 | Refills: 0 | Status: DISCONTINUED | OUTPATIENT
Start: 2018-09-30 | End: 2018-10-07

## 2018-09-30 RX ORDER — OXYCODONE HYDROCHLORIDE 5 MG/1
5 TABLET ORAL EVERY 4 HOURS
Qty: 0 | Refills: 0 | Status: DISCONTINUED | OUTPATIENT
Start: 2018-09-30 | End: 2018-10-01

## 2018-09-30 RX ADMIN — OXYCODONE HYDROCHLORIDE 10 MILLIGRAM(S): 5 TABLET ORAL at 02:20

## 2018-09-30 RX ADMIN — Medication 166.67 MILLIGRAM(S): at 12:10

## 2018-09-30 RX ADMIN — PANTOPRAZOLE SODIUM 40 MILLIGRAM(S): 20 TABLET, DELAYED RELEASE ORAL at 22:00

## 2018-09-30 RX ADMIN — Medication 81 MILLIGRAM(S): at 13:10

## 2018-09-30 RX ADMIN — Medication 100 MILLIGRAM(S): at 13:10

## 2018-09-30 RX ADMIN — SODIUM CHLORIDE 125 MILLILITER(S): 9 INJECTION INTRAMUSCULAR; INTRAVENOUS; SUBCUTANEOUS at 01:24

## 2018-09-30 RX ADMIN — Medication 650 MILLIGRAM(S): at 12:10

## 2018-09-30 RX ADMIN — OXYCODONE HYDROCHLORIDE 10 MILLIGRAM(S): 5 TABLET ORAL at 01:24

## 2018-09-30 RX ADMIN — OXYCODONE HYDROCHLORIDE 5 MILLIGRAM(S): 5 TABLET ORAL at 19:00

## 2018-09-30 RX ADMIN — Medication 650 MILLIGRAM(S): at 13:00

## 2018-09-30 RX ADMIN — Medication 40 MILLIEQUIVALENT(S): at 18:10

## 2018-09-30 RX ADMIN — Medication 100 MILLIGRAM(S): at 06:14

## 2018-09-30 RX ADMIN — SENNA PLUS 2 TABLET(S): 8.6 TABLET ORAL at 22:00

## 2018-09-30 RX ADMIN — Medication 1 ENEMA: at 15:12

## 2018-09-30 RX ADMIN — OXYCODONE HYDROCHLORIDE 5 MILLIGRAM(S): 5 TABLET ORAL at 18:10

## 2018-09-30 RX ADMIN — Medication 100 MILLIGRAM(S): at 22:00

## 2018-09-30 RX ADMIN — Medication 40 MILLIEQUIVALENT(S): at 15:12

## 2018-09-30 RX ADMIN — ENOXAPARIN SODIUM 40 MILLIGRAM(S): 100 INJECTION SUBCUTANEOUS at 13:10

## 2018-09-30 NOTE — PROGRESS NOTE ADULT - SUBJECTIVE AND OBJECTIVE BOX
No acute events overnight. Pain controlled.     PE:    Vital Signs Last 24 Hrs  T(C): 36.8 (30 Sep 2018 06:09), Max: 37.2 (30 Sep 2018 01:06)  T(F): 98.3 (30 Sep 2018 06:09), Max: 98.9 (30 Sep 2018 01:06)  HR: 60 (30 Sep 2018 06:09) (54 - 64)  BP: 103/60 (30 Sep 2018 06:09) (101/64 - 121/64)  BP(mean): --  RR: 18 (30 Sep 2018 06:09) (16 - 18)  SpO2: 97% (30 Sep 2018 06:09) (95% - 100%)    Wound cx: E. 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

## 2018-09-30 NOTE — PROGRESS NOTE ADULT - PROBLEM SELECTOR PLAN 1
Found to have pansensitive E. coli UTI. Evaluated by ID, IV abx broadened to cefepime and vancomycin after noted left thigh abscess on CT scan, currently s/p OR washout done on 9/26, POD#4 with stable WBC. Pt hemodynamically stable. Patient remains afebrile.  Ceftriaxone 9/22-2/25  Vancomycin 9/23 - present  Cefepime 9/25-9/29  - continue vancomycin 1250mg daily; f/u vanco trough  - f/u ID recs  - closely monitor VS and blood count  - f/u 9/25 blood cx --> NGTD x 72h  - f/u 9/26 OR thigh cx --> noted Enterococcus faecalis

## 2018-09-30 NOTE — PROGRESS NOTE ADULT - PROBLEM SELECTOR PLAN 3
Hypoosmolar hypovolemic hyponatremia. Pt with ongoing fluid loss via drains and urinary incontinence. Repeat urine sodium <20. Now resolved s/p IV normal saline administration.  - discontinue IV normal saline  - trend sodium level  - encourage oral intake

## 2018-09-30 NOTE — PROGRESS NOTE ADULT - PROBLEM SELECTOR PLAN 2
POD#13 of surgical excision with reconstruction. C/b post-op left thigh abscess s/p OR washout today, POD#4  -post-op care as per ortho, plastics, and vascular surgery  -PT as tolerated  - pain control/bowel regimen --> give bisacodyl suppository today. Stool counts to ensure adequate BMs  - IV abx as noted above  - f/u vascular surg and plastic surg recs

## 2018-09-30 NOTE — PROGRESS NOTE ADULT - PROBLEM SELECTOR PLAN 6
Evaluated by nutritionist/dietician on 9/29.  -  ensure clear 1 can po 3 x daily  - encourage increased oral intake

## 2018-09-30 NOTE — PROGRESS NOTE ADULT - ASSESSMENT
Ms. Fleming is a 68 yo woman with PMHx of HTN admitted for scheduled surgical resection of left inguinal mass. Patient underwent enbloc resection of inguinal mass, left femoral to below knee popliteal bypass, venous resection, and reconstruction with rectus abdominus myocutaneous flap on 9/17. Hospital course notable for asymptomatic bigeminy with bradycardia deemed 2/2 catecholamine surge, non-traumatic rhabdomyolysis - both resolved. Transferred to surgical floor on 9/21 from SICU, where she stayed post-op for closer hemodynamic monitoring. Patient found to have post-surgical left thigh abscess s/p washout done on 9/26 in addition to CAUTI due to E. coli. Patient appears clinically stable and WBC improved on IV vancomycin and cefepime. Tissue culture growing pansensitive Enterococcus faecalis

## 2018-09-30 NOTE — PROGRESS NOTE ADULT - SUBJECTIVE AND OBJECTIVE BOX
Plastic Surgery  Daily Progress Note     Subjective/24 Hour Events: Removed Lower Abd drains yesterday, as well as 1 of the leg drains. No acute events overnight    Objective:    Vitals:  T(C): 36.8 (09-30-18 @ 06:09), Max: 37.2 (09-30-18 @ 01:06)  HR: 60 (09-30-18 @ 06:09) (54 - 64)  BP: 103/60 (09-30-18 @ 06:09) (101/64 - 121/64)  RR: 18 (09-30-18 @ 06:09) (16 - 18)  SpO2: 97% (09-30-18 @ 06:09) (95% - 100%)    I/O:     09-29-18 @ 07:01  -  09-30-18 @ 07:00  --------------------------------------------------------  IN: 0 mL / OUT: 364 mL / NET: -364 mL      Physical Exam:   General: Awake and alert  Abd: dressing CDI, drain output serous  Groin/Thigh: VRAM flap viable, appropriate color, soft. Surrounding groin & thigh skin erythematous, some improvement. Silvadene to erythematous skin, Xeroform to macerated skin. Ramsey Drains in place (serous)

## 2018-09-30 NOTE — PROGRESS NOTE ADULT - SUBJECTIVE AND OBJECTIVE BOX
CHIEF COMPLAINT: f/u     SUBJECTIVE / OVERNIGHT EVENTS:   Patient seen and examined this afternoon. Patient reports she ate more food today to try to increase her caloric intake. She also was able to stand with PT today. Pain well controlled. Patient with constipation.    MEDICATIONS  (STANDING):  aspirin enteric coated 81 milliGRAM(s) Oral daily  docusate sodium 100 milliGRAM(s) Oral three times a day  enoxaparin Injectable 40 milliGRAM(s) SubCutaneous daily  influenza   Vaccine 0.5 milliLiter(s) IntraMuscular once  pantoprazole  Injectable 40 milliGRAM(s) IV Push at bedtime  senna 2 Tablet(s) Oral at bedtime  vancomycin  IVPB 1250 milliGRAM(s) IV Intermittent every 24 hours    MEDICATIONS  (PRN):  acetaminophen   Tablet .. 650 milliGRAM(s) Oral every 6 hours PRN Temp greater or equal to 38C (100.4F), Mild Pain (1 - 3)  benzocaine 15 mG/menthol 3.6 mG Lozenge 1 Lozenge Oral three times a day PRN Sore Throat  bisacodyl Suppository 10 milliGRAM(s) Rectal daily PRN Constipation  HYDROmorphone  Injectable 0.5 milliGRAM(s) IV Push every 4 hours PRN breakthrough pain  HYDROmorphone  Injectable 0.5 milliGRAM(s) IV Push every 10 minutes PRN Moderate Pain (4 - 6)  ondansetron Injectable 4 milliGRAM(s) IV Push every 6 hours PRN Nausea and/or Vomiting  oxyCODONE    IR 5 milliGRAM(s) Oral every 4 hours PRN Moderate Pain (4 - 6)  oxyCODONE    IR 10 milliGRAM(s) Oral every 4 hours PRN Severe Pain (7 - 10)      VITALS:  T(F): 98.7 (09-30-18 @ 17:32), Max: 99.2 (09-30-18 @ 13:08)  HR: 64 (09-30-18 @ 17:32) (54 - 81)  BP: 108/66 (09-30-18 @ 17:32) (103/60 - 121/64)  RR: 16 (09-30-18 @ 17:32) (16 - 18)  SpO2: 99% (09-30-18 @ 17:32)      PHYSICAL EXAM:  GENERAL: Laying in bed in NAD, chronically ill, but not toxic appearing;   CHEST/LUNG: decreased BS at lung bases b/l but otherwise clear  HEART: Regular rate and rhythm; No murmurs, rubs, or gallops  ABDOMEN/PELVIS: Surgical staples intact. Erythema and edema of mons pubis, edema of medial aspect of left thigh and dependent area of flanks. Viable skin flap.   EXTREMITIES: serous drainage expressed on palpation at prior thigh drain sites. Surgical staples intact. 2 JEREMY drains in left thigh. B/l taut lower leg edema  PSYCH: Alert & Oriented, follows commands; pleasant      LABS:              9.0                  136  | 23   | 12           15.96 >-----------< 451     ------------------------< 99                    27.6                 3.1  | 102  | 0.67                                         Ca 7.6   Mg 1.9   Ph 2.3            [x] Care Discussed with Consultants/Other Providers: Orthopedic PA - discussed

## 2018-10-01 LAB
BUN SERPL-MCNC: 15 MG/DL — SIGNIFICANT CHANGE UP (ref 7–23)
CALCIUM SERPL-MCNC: 7.2 MG/DL — LOW (ref 8.4–10.5)
CHLORIDE SERPL-SCNC: 102 MMOL/L — SIGNIFICANT CHANGE UP (ref 98–107)
CO2 SERPL-SCNC: 24 MMOL/L — SIGNIFICANT CHANGE UP (ref 22–31)
CREAT SERPL-MCNC: 0.66 MG/DL — SIGNIFICANT CHANGE UP (ref 0.5–1.3)
GLUCOSE SERPL-MCNC: 129 MG/DL — HIGH (ref 70–99)
HCT VFR BLD CALC: 26.5 % — LOW (ref 34.5–45)
HGB BLD-MCNC: 8.6 G/DL — LOW (ref 11.5–15.5)
MAGNESIUM SERPL-MCNC: 1.8 MG/DL — SIGNIFICANT CHANGE UP (ref 1.6–2.6)
MCHC RBC-ENTMCNC: 30.5 PG — SIGNIFICANT CHANGE UP (ref 27–34)
MCHC RBC-ENTMCNC: 32.5 % — SIGNIFICANT CHANGE UP (ref 32–36)
MCV RBC AUTO: 94 FL — SIGNIFICANT CHANGE UP (ref 80–100)
NRBC # FLD: 0 — SIGNIFICANT CHANGE UP
PHOSPHATE SERPL-MCNC: 2.1 MG/DL — LOW (ref 2.5–4.5)
PLATELET # BLD AUTO: 456 K/UL — HIGH (ref 150–400)
PMV BLD: 10.5 FL — SIGNIFICANT CHANGE UP (ref 7–13)
POTASSIUM SERPL-MCNC: 3.7 MMOL/L — SIGNIFICANT CHANGE UP (ref 3.5–5.3)
POTASSIUM SERPL-SCNC: 3.7 MMOL/L — SIGNIFICANT CHANGE UP (ref 3.5–5.3)
RBC # BLD: 2.82 M/UL — LOW (ref 3.8–5.2)
RBC # FLD: 15.4 % — HIGH (ref 10.3–14.5)
SODIUM SERPL-SCNC: 136 MMOL/L — SIGNIFICANT CHANGE UP (ref 135–145)
VANCOMYCIN TROUGH SERPL-MCNC: 12.4 UG/ML — SIGNIFICANT CHANGE UP (ref 10–20)
WBC # BLD: 14.56 K/UL — HIGH (ref 3.8–10.5)
WBC # FLD AUTO: 14.56 K/UL — HIGH (ref 3.8–10.5)

## 2018-10-01 PROCEDURE — 99233 SBSQ HOSP IP/OBS HIGH 50: CPT

## 2018-10-01 RX ORDER — DOCUSATE SODIUM 100 MG
100 CAPSULE ORAL THREE TIMES A DAY
Qty: 0 | Refills: 0 | Status: DISCONTINUED | OUTPATIENT
Start: 2018-10-01 | End: 2018-10-07

## 2018-10-01 RX ADMIN — OXYCODONE HYDROCHLORIDE 10 MILLIGRAM(S): 5 TABLET ORAL at 11:27

## 2018-10-01 RX ADMIN — PANTOPRAZOLE SODIUM 40 MILLIGRAM(S): 20 TABLET, DELAYED RELEASE ORAL at 22:35

## 2018-10-01 RX ADMIN — Medication 81 MILLIGRAM(S): at 14:09

## 2018-10-01 RX ADMIN — OXYCODONE HYDROCHLORIDE 5 MILLIGRAM(S): 5 TABLET ORAL at 05:08

## 2018-10-01 RX ADMIN — OXYCODONE HYDROCHLORIDE 10 MILLIGRAM(S): 5 TABLET ORAL at 21:00

## 2018-10-01 RX ADMIN — Medication 166.67 MILLIGRAM(S): at 11:47

## 2018-10-01 RX ADMIN — ENOXAPARIN SODIUM 40 MILLIGRAM(S): 100 INJECTION SUBCUTANEOUS at 14:09

## 2018-10-01 RX ADMIN — OXYCODONE HYDROCHLORIDE 5 MILLIGRAM(S): 5 TABLET ORAL at 06:00

## 2018-10-01 RX ADMIN — OXYCODONE HYDROCHLORIDE 10 MILLIGRAM(S): 5 TABLET ORAL at 20:22

## 2018-10-01 RX ADMIN — Medication 100 MILLIGRAM(S): at 05:08

## 2018-10-01 RX ADMIN — OXYCODONE HYDROCHLORIDE 10 MILLIGRAM(S): 5 TABLET ORAL at 10:45

## 2018-10-01 NOTE — PROGRESS NOTE ADULT - SUBJECTIVE AND OBJECTIVE BOX
Orthopedic PA Progress Note     Patient seen and examined, no acute events overnight. Pain controlled. Denies any CP, SOB, N/V/D, HA or dizziness.    Vital Signs Last 24 Hrs  T(C): 36.6 (01 Oct 2018 05:07), Max: 37.3 (30 Sep 2018 13:08)  T(F): 97.9 (01 Oct 2018 05:07), Max: 99.2 (30 Sep 2018 13:08)  HR: 60 (01 Oct 2018 05:07) (60 - 81)  BP: 117/62 (01 Oct 2018 05:07) (108/58 - 130/72)  BP(mean): --  RR: 17 (01 Oct 2018 05:07) (16 - 18)  SpO2: 94% (01 Oct 2018 05:07) (94% - 99%)    Wound cx: E. faecalis     PE:   Gen: Alert, NAD  Abdomen: Unchanged, +2 JPs per plastics, +xeroform  LLE: Extremity warm, brisk cap refill, compartments soft, +1 DP pulse, moving toes, uncooperative with motor/sensory exam    Assessment/Plan:  67yFemale s/p excision liposarcoma POD 14  -PRS recs appreaciated  -PT  -WBAT in [facility]I  -OOB  -DVT ppx  -F/u JEREMY output   -Continue vanco as per ID Orthopedic PA Progress Note     Patient seen and examined, no acute events overnight. Pain controlled. Denies any CP, SOB, N/V/D, HA or dizziness.    Vital Signs Last 24 Hrs  T(C): 36.6 (01 Oct 2018 05:07), Max: 37.3 (30 Sep 2018 13:08)  T(F): 97.9 (01 Oct 2018 05:07), Max: 99.2 (30 Sep 2018 13:08)  HR: 60 (01 Oct 2018 05:07) (60 - 81)  BP: 117/62 (01 Oct 2018 05:07) (108/58 - 130/72)  BP(mean): --  RR: 17 (01 Oct 2018 05:07) (16 - 18)  SpO2: 94% (01 Oct 2018 05:07) (94% - 99%)    Wound cx: E. faecalis     PE:   Gen: Alert, NAD  Abdomen: Unchanged, +2 JPs per plastics, +xeroform, fullness with induration around flap   LLE: Extremity warm, brisk cap refill, compartments soft, +1 DP pulse, moving toes, uncooperative with motor/sensory exam    Assessment/Plan:  67yFemale s/p excision liposarcoma POD 14  -PRS recs appreaciated  -PT  -WBAT in [facility]I  -OOB  -DVT ppx  -F/u JEREMY output   -Continue vanco as per ID

## 2018-10-01 NOTE — PROGRESS NOTE ADULT - ASSESSMENT
ASSESSMENT: 67F s/p liposarcoma excision and reconstruction with VRAM POD#14    PLAN:  - continue Abx  - Continue JEREMY drain/care, will remove one drain this afternoon  - dressings removed - leave open to air  - Silvadene to Erythematous Skin  - xeroform to macerated skin  - DVT ppx  - IVF  - Activity per vascular/ortho  - Will follow

## 2018-10-01 NOTE — PROGRESS NOTE ADULT - ASSESSMENT
67y f with a PMH of HTN,   S/p extensive surgery for resection of sarcoma of left groin with need for vascular grafting,   complicated by infected groin collection   S/p I and D on 9/25/18 with recovery of Enterococcus faecalis     Plan:   Continue IV vancomycin 1.25 g every 24 hours   Duration to be determined, will r/w Dr. Andrade if graft involved in collection

## 2018-10-01 NOTE — PROGRESS NOTE ADULT - SUBJECTIVE AND OBJECTIVE BOX
JULIA UMANA  [id number]    Subjective:  Patient is a 67y old  Female who presents with a chief complaint of inguinal mass excision, 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 No acute events overnight. Ambulating with walker. In good spirits.     Objective:  T(C): 36.6 (10-01-18 @ 05:07), Max: 37.3 (09-30-18 @ 13:08)  HR: 60 (10-01-18 @ 05:07) (60 - 81)  BP: 117/62 (10-01-18 @ 05:07) (108/58 - 130/72)  RR: 17 (10-01-18 @ 05:07) (16 - 18)  SpO2: 94% (10-01-18 @ 05:07) (94% - 99%)  Wt(kg): --   09-30    136  |  102  |  12  ----------------------------<  99  3.1<L>   |  23  |  0.67    Ca    7.6<L>      30 Sep 2018 06:30  Phos  2.3     09-30  Mg     1.9     09-30                          9.0    15.96 )-----------( 451      ( 30 Sep 2018 06:30 )             27.6       09-30 @ 07:01  -  10-01 @ 07:00  --------------------------------------------------------  IN: 0 mL / OUT: 30.5 mL / NET: -30.5 mL      PHYSICAL EXAM:    General: NAD  Groin/thigh: VRAM flap viable, appropriate color, soft. Surrounding groin and thigh skin continues to be erythematous. Silvadene applied to skin/covered with xeroform. Ramsey drains in place          MEDICATIONS  (STANDING):  aspirin enteric coated 81 milliGRAM(s) Oral daily  docusate sodium 100 milliGRAM(s) Oral three times a day  enoxaparin Injectable 40 milliGRAM(s) SubCutaneous daily  influenza   Vaccine 0.5 milliLiter(s) IntraMuscular once  pantoprazole  Injectable 40 milliGRAM(s) IV Push at bedtime  senna 2 Tablet(s) Oral at bedtime  vancomycin  IVPB 1250 milliGRAM(s) IV Intermittent every 24 hours    MEDICATIONS  (PRN):  acetaminophen   Tablet .. 650 milliGRAM(s) Oral every 6 hours PRN Temp greater or equal to 38C (100.4F), Mild Pain (1 - 3)  benzocaine 15 mG/menthol 3.6 mG Lozenge 1 Lozenge Oral three times a day PRN Sore Throat  bisacodyl Suppository 10 milliGRAM(s) Rectal daily PRN Constipation  HYDROmorphone  Injectable 0.5 milliGRAM(s) IV Push every 10 minutes PRN Moderate Pain (4 - 6)  ondansetron Injectable 4 milliGRAM(s) IV Push every 6 hours PRN Nausea and/or Vomiting  oxyCODONE    IR 5 milliGRAM(s) Oral every 4 hours PRN Moderate Pain (4 - 6)  oxyCODONE    IR 10 milliGRAM(s) Oral every 4 hours PRN Severe Pain (7 - 10)

## 2018-10-01 NOTE — PROGRESS NOTE ADULT - PROBLEM SELECTOR PLAN 2
POD#14 of surgical excision with reconstruction. C/b post-op left thigh abscess s/p OR washout today, POD#5  -post-op care as per ortho, plastics, and vascular surgery  -PT as tolerated  - pain control/bowel regimen --> will de-escalate bowel regimen for now given multiple BMs yesterday  - IV abx as noted above  - f/u vascular surg and plastic surg recs

## 2018-10-01 NOTE — PROGRESS NOTE ADULT - SUBJECTIVE AND OBJECTIVE BOX
CC: f/u for  infected left leg collection    Patient reports    REVIEW OF SYSTEMS:  All other review of systems negative (Comprehensive ROS)    Antimicrobials Day #  : 6  vancomycin  IVPB 1250 milliGRAM(s) IV Intermittent every 24 hours    Other Medications Reviewed    T(F): 98.1 (10-01-18 @ 09:36), Max: 99.2 (09-30-18 @ 13:08)  HR: 60 (10-01-18 @ 09:36)  BP: 119/80 (10-01-18 @ 09:36)  RR: 16 (10-01-18 @ 09:36)  SpO2: 98% (10-01-18 @ 09:36)  Wt(kg): --    PHYSICAL EXAM:  General: alert, no acute distress  Eyes:  anicteric, no conjunctival injection, no discharge  Oropharynx: no lesions	  Neck: supple, without adenopathy  Lungs: clear to auscultation  Heart: regular rate and rhythm; no murmur, rubs or gallops  Abdomen: wounds are clean & intact with staples in place, drains in place, not tender. Left leg wounds are C/D/I  Skin: no lesions  Extremities: no clubbing, cyanosis,. left leg edema >> RLE edema   Neurologic: alert, oriented, moves all extremities    LAB RESULTS:                        8.6    14.56 )-----------( 456      ( 01 Oct 2018 06:15 )             26.5     10-01    136  |  102  |  15  ----------------------------<  129<H>  3.7   |  24  |  0.66    Ca    7.2<L>      01 Oct 2018 06:15  Phos  2.1     10-01  Mg     1.8     10-01          MICROBIOLOGY:  RECENT CULTURES:    RADIOLOGY REVIEWED:

## 2018-10-01 NOTE — PROGRESS NOTE ADULT - PROBLEM SELECTOR PLAN 1
Found to have pansensitive E. coli UTI. Evaluated by ID, IV abx broadened to cefepime and vancomycin after noted left thigh abscess on CT scan, currently s/p OR washout done on 9/26, POD#5 with stable WBC. Pt hemodynamically stable. Patient remains afebrile.  Ceftriaxone 9/22-2/25  Vancomycin 9/23 - present  Cefepime 9/25-9/29  - continue vancomycin 1250mg daily; vancomcyin trough 12.4 today --> f/u ID recs  - closely monitor VS and blood count  - f/u 9/25 blood cx --> NGTD x 72h  - f/u 9/26 OR thigh cx --> noted Enterococcus faecalis

## 2018-10-02 DIAGNOSIS — M79.89 OTHER SPECIFIED SOFT TISSUE DISORDERS: ICD-10-CM

## 2018-10-02 LAB
BUN SERPL-MCNC: 14 MG/DL — SIGNIFICANT CHANGE UP (ref 7–23)
CALCIUM SERPL-MCNC: 7.5 MG/DL — LOW (ref 8.4–10.5)
CHLORIDE SERPL-SCNC: 101 MMOL/L — SIGNIFICANT CHANGE UP (ref 98–107)
CO2 SERPL-SCNC: 25 MMOL/L — SIGNIFICANT CHANGE UP (ref 22–31)
CREAT SERPL-MCNC: 0.68 MG/DL — SIGNIFICANT CHANGE UP (ref 0.5–1.3)
GLUCOSE SERPL-MCNC: 93 MG/DL — SIGNIFICANT CHANGE UP (ref 70–99)
HCT VFR BLD CALC: 26.2 % — LOW (ref 34.5–45)
HGB BLD-MCNC: 8.4 G/DL — LOW (ref 11.5–15.5)
MAGNESIUM SERPL-MCNC: 1.9 MG/DL — SIGNIFICANT CHANGE UP (ref 1.6–2.6)
MCHC RBC-ENTMCNC: 30.7 PG — SIGNIFICANT CHANGE UP (ref 27–34)
MCHC RBC-ENTMCNC: 32.1 % — SIGNIFICANT CHANGE UP (ref 32–36)
MCV RBC AUTO: 95.6 FL — SIGNIFICANT CHANGE UP (ref 80–100)
NRBC # FLD: 0 — SIGNIFICANT CHANGE UP
PHOSPHATE SERPL-MCNC: 2.2 MG/DL — LOW (ref 2.5–4.5)
PLATELET # BLD AUTO: 416 K/UL — HIGH (ref 150–400)
PMV BLD: 10.6 FL — SIGNIFICANT CHANGE UP (ref 7–13)
POTASSIUM SERPL-MCNC: 3.9 MMOL/L — SIGNIFICANT CHANGE UP (ref 3.5–5.3)
POTASSIUM SERPL-SCNC: 3.9 MMOL/L — SIGNIFICANT CHANGE UP (ref 3.5–5.3)
RBC # BLD: 2.74 M/UL — LOW (ref 3.8–5.2)
RBC # FLD: 15.7 % — HIGH (ref 10.3–14.5)
SODIUM SERPL-SCNC: 136 MMOL/L — SIGNIFICANT CHANGE UP (ref 135–145)
WBC # BLD: 13.19 K/UL — HIGH (ref 3.8–10.5)
WBC # FLD AUTO: 13.19 K/UL — HIGH (ref 3.8–10.5)

## 2018-10-02 PROCEDURE — 99233 SBSQ HOSP IP/OBS HIGH 50: CPT | Mod: 25

## 2018-10-02 PROCEDURE — 74177 CT ABD & PELVIS W/CONTRAST: CPT | Mod: 26

## 2018-10-02 RX ORDER — ACETAMINOPHEN 500 MG
975 TABLET ORAL EVERY 12 HOURS
Qty: 0 | Refills: 0 | Status: DISCONTINUED | OUTPATIENT
Start: 2018-10-02 | End: 2018-10-23

## 2018-10-02 RX ORDER — ACETAMINOPHEN 500 MG
975 TABLET ORAL EVERY 8 HOURS
Qty: 0 | Refills: 0 | Status: DISCONTINUED | OUTPATIENT
Start: 2018-10-02 | End: 2018-10-02

## 2018-10-02 RX ADMIN — Medication 81 MILLIGRAM(S): at 11:47

## 2018-10-02 RX ADMIN — Medication 975 MILLIGRAM(S): at 17:38

## 2018-10-02 RX ADMIN — OXYCODONE HYDROCHLORIDE 10 MILLIGRAM(S): 5 TABLET ORAL at 11:47

## 2018-10-02 RX ADMIN — ENOXAPARIN SODIUM 40 MILLIGRAM(S): 100 INJECTION SUBCUTANEOUS at 11:47

## 2018-10-02 RX ADMIN — Medication 166.67 MILLIGRAM(S): at 11:47

## 2018-10-02 RX ADMIN — OXYCODONE HYDROCHLORIDE 10 MILLIGRAM(S): 5 TABLET ORAL at 17:09

## 2018-10-02 RX ADMIN — OXYCODONE HYDROCHLORIDE 10 MILLIGRAM(S): 5 TABLET ORAL at 06:09

## 2018-10-02 RX ADMIN — OXYCODONE HYDROCHLORIDE 10 MILLIGRAM(S): 5 TABLET ORAL at 16:10

## 2018-10-02 RX ADMIN — OXYCODONE HYDROCHLORIDE 10 MILLIGRAM(S): 5 TABLET ORAL at 12:40

## 2018-10-02 RX ADMIN — SENNA PLUS 2 TABLET(S): 8.6 TABLET ORAL at 21:28

## 2018-10-02 RX ADMIN — OXYCODONE HYDROCHLORIDE 10 MILLIGRAM(S): 5 TABLET ORAL at 07:00

## 2018-10-02 RX ADMIN — OXYCODONE HYDROCHLORIDE 10 MILLIGRAM(S): 5 TABLET ORAL at 01:12

## 2018-10-02 RX ADMIN — OXYCODONE HYDROCHLORIDE 10 MILLIGRAM(S): 5 TABLET ORAL at 02:00

## 2018-10-02 NOTE — PROGRESS NOTE ADULT - PROBLEM SELECTOR PLAN 3
POD#15 of surgical excision with reconstruction. C/b post-op left thigh abscess s/p OR washout today, POD#6  -post-op care as per ortho, plastics, and vascular surgery  -PT as tolerated  - pain control/bowel regimen   - IV abx as noted above  - f/u vascular surg and plastic surg recs

## 2018-10-02 NOTE — PROGRESS NOTE ADULT - SUBJECTIVE AND OBJECTIVE BOX
CHIEF COMPLAINT: f/u     SUBJECTIVE / OVERNIGHT EVENTS:   [employer]  utilized for [employer], ID# [id number]  Patient seen and examined this morning. Patient's son and daughter-in-law present at bedside. No acute events overnight. Patient complaining of increased pain in left leg today. Patient was happy she was able to stand with PT yesterday but had difficulty doing it today due to pain. Patient's left leg is more swollen than prior days. Patient eating well w/o n/v or abdominal pain. No chest pain or SOB    MEDICATIONS  (STANDING):  aspirin enteric coated 81 milliGRAM(s) Oral daily  enoxaparin Injectable 40 milliGRAM(s) SubCutaneous daily  influenza   Vaccine 0.5 milliLiter(s) IntraMuscular once  pantoprazole  Injectable 40 milliGRAM(s) IV Push at bedtime  senna 2 Tablet(s) Oral at bedtime  vancomycin  IVPB 1250 milliGRAM(s) IV Intermittent every 24 hours    MEDICATIONS  (PRN):  acetaminophen   Tablet .. 650 milliGRAM(s) Oral every 6 hours PRN Temp greater or equal to 38C (100.4F), Mild Pain (1 - 3)  benzocaine 15 mG/menthol 3.6 mG Lozenge 1 Lozenge Oral three times a day PRN Sore Throat  bisacodyl Suppository 10 milliGRAM(s) Rectal daily PRN Constipation  docusate sodium 100 milliGRAM(s) Oral three times a day PRN Constipation  HYDROmorphone  Injectable 0.5 milliGRAM(s) IV Push every 10 minutes PRN Moderate Pain (4 - 6)  ondansetron Injectable 4 milliGRAM(s) IV Push every 6 hours PRN Nausea and/or Vomiting  oxyCODONE    IR 5 milliGRAM(s) Oral every 4 hours PRN Moderate Pain (4 - 6)  oxyCODONE    IR 10 milliGRAM(s) Oral every 4 hours PRN Severe Pain (7 - 10)      VITALS:  T(F): 99.2 (10-02-18 @ 09:52), Max: 99.2 (10-02-18 @ 09:52)  HR: 55 (10-02-18 @ 09:52) (55 - 71)  BP: 97/62 (10-02-18 @ 09:52) (97/62 - 124/76)  RR: 18 (10-02-18 @ 09:52) (17 - 18)  SpO2: 98% (10-02-18 @ 09:52)      PHYSICAL EXAM:  GENERAL: Laying in bed in NAD, chronically ill, but not toxic appearing;   CHEST/LUNG: decreased BS at lung bases b/l but otherwise clear  HEART: Regular rate and rhythm; No murmurs, rubs, or gallops  ABDOMEN/PELVIS: Dressing over surgical site. Surgical staples intact. Mild erythema with edema of mons pubis, edema of medial aspect of left thigh and dependent area of flanks. Viable skin flap.   EXTREMITIES: Surgical staples intact. 1 JEREMY drain in left thigh left; B/l taut lower leg edema, L>R - serous fluid noted on dressing gauze over Left upper thigh incision site  PSYCH: Alert & Oriented, follows commands; pleasant        LABS:              8.4                  x    | x    | x            13.19 >-----------< 416     ------------------------< x                     26.2                 x    | x    | x                                            Ca x     Mg x     Ph x              [ ] Consultant(s) Notes Reviewed:  [x] Care Discussed with Consultants/Other Providers: Orthopedic PA - discussed

## 2018-10-02 NOTE — PROGRESS NOTE ADULT - SUBJECTIVE AND OBJECTIVE BOX
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 No acute events overnight. Ambulating with walker. In good spirits.     T(C): 37 (10-02-18 @ 06:07), Max: 37 (10-01-18 @ 14:08)  HR: 57 (10-02-18 @ 06:07) (57 - 82)  BP: 110/60 (10-02-18 @ 06:07) (110/60 - 124/76)  BP(mean): --  ABP: --  ABP(mean): --  RR: 17 (10-02-18 @ 06:07) (17 - 17)  SpO2: 97% (10-02-18 @ 06:07) (96% - 98%)  Wt(kg): --  CVP(mm Hg): --  CI: --  CAPILLARY BLOOD GLUCOSE       N/A      10-01 @ 07:01  -  10-02 @ 07:00  --------------------------------------------------------  IN:  Total IN: 0 mL    OUT:    Bulb: 127 mL  Total OUT: 127 mL    Total NET: -127 mL          PHYSICAL EXAM:    General: NAD  Groin/thigh: VRAM flap viable, appropriate color, soft. Surrounding groin and thigh skin continues to be erythematous. Silvadene applied to skin/covered with xeroform. Ramsey drains in place          MEDICATIONS  (STANDING):  aspirin enteric coated 81 milliGRAM(s) Oral daily  docusate sodium 100 milliGRAM(s) Oral three times a day  enoxaparin Injectable 40 milliGRAM(s) SubCutaneous daily  influenza   Vaccine 0.5 milliLiter(s) IntraMuscular once  pantoprazole  Injectable 40 milliGRAM(s) IV Push at bedtime  senna 2 Tablet(s) Oral at bedtime  vancomycin  IVPB 1250 milliGRAM(s) IV Intermittent every 24 hours    MEDICATIONS  (PRN):  acetaminophen   Tablet .. 650 milliGRAM(s) Oral every 6 hours PRN Temp greater or equal to 38C (100.4F), Mild Pain (1 - 3)  benzocaine 15 mG/menthol 3.6 mG Lozenge 1 Lozenge Oral three times a day PRN Sore Throat  bisacodyl Suppository 10 milliGRAM(s) Rectal daily PRN Constipation  HYDROmorphone  Injectable 0.5 milliGRAM(s) IV Push every 10 minutes PRN Moderate Pain (4 - 6)  ondansetron Injectable 4 milliGRAM(s) IV Push every 6 hours PRN Nausea and/or Vomiting  oxyCODONE    IR 5 milliGRAM(s) Oral every 4 hours PRN Moderate Pain (4 - 6)  oxyCODONE    IR 10 milliGRAM(s) Oral every 4 hours PRN Severe Pain (7 - 10) JULIA UMANA  [id number]    Subjective:  Patient is a 67y old  Female who presents with a chief complaint of inguinal mass excision, 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 No acute events overnight. Ambulating with walker. In good spirits.   Spoke with infectious disease yesterday- collection does not communicate with graft    T(C): 37 (10-02-18 @ 06:07), Max: 37 (10-01-18 @ 14:08)  HR: 57 (10-02-18 @ 06:07) (57 - 82)  BP: 110/60 (10-02-18 @ 06:07) (110/60 - 124/76)  BP(mean): --  ABP: --  ABP(mean): --  RR: 17 (10-02-18 @ 06:07) (17 - 17)  SpO2: 97% (10-02-18 @ 06:07) (96% - 98%)  Wt(kg): --  CVP(mm Hg): --  CI: --  CAPILLARY BLOOD GLUCOSE       N/A      10-01 @ 07:01  -  10-02 @ 07:00  --------------------------------------------------------  IN:  Total IN: 0 mL    OUT:    Bulb: 127 mL  Total OUT: 127 mL    Total NET: -127 mL          PHYSICAL EXAM:    General: NAD  Groin/thigh: VRAM flap viable, appropriate color, soft. Surrounding groin and thigh skin continues to be erythematous. Silvadene applied to skin/covered with xeroform. Ramsey drains in place          MEDICATIONS  (STANDING):  aspirin enteric coated 81 milliGRAM(s) Oral daily  docusate sodium 100 milliGRAM(s) Oral three times a day  enoxaparin Injectable 40 milliGRAM(s) SubCutaneous daily  influenza   Vaccine 0.5 milliLiter(s) IntraMuscular once  pantoprazole  Injectable 40 milliGRAM(s) IV Push at bedtime  senna 2 Tablet(s) Oral at bedtime  vancomycin  IVPB 1250 milliGRAM(s) IV Intermittent every 24 hours    MEDICATIONS  (PRN):  acetaminophen   Tablet .. 650 milliGRAM(s) Oral every 6 hours PRN Temp greater or equal to 38C (100.4F), Mild Pain (1 - 3)  benzocaine 15 mG/menthol 3.6 mG Lozenge 1 Lozenge Oral three times a day PRN Sore Throat  bisacodyl Suppository 10 milliGRAM(s) Rectal daily PRN Constipation  HYDROmorphone  Injectable 0.5 milliGRAM(s) IV Push every 10 minutes PRN Moderate Pain (4 - 6)  ondansetron Injectable 4 milliGRAM(s) IV Push every 6 hours PRN Nausea and/or Vomiting  oxyCODONE    IR 5 milliGRAM(s) Oral every 4 hours PRN Moderate Pain (4 - 6)  oxyCODONE    IR 10 milliGRAM(s) Oral every 4 hours PRN Severe Pain (7 - 10)

## 2018-10-02 NOTE — PROGRESS NOTE ADULT - ASSESSMENT
ASSESSMENT: 67F s/p liposarcoma excision and reconstruction with VRAM POD#15, WBC continuing to decrease    PLAN:  - continue Abx  - Continue JEREMY drain/care  - dressings removed - leave open to air  - Silvadene to Erythematous Skin  - xeroform to macerated skin  - DVT ppx  - IVF  - Activity per vascular/ortho  - Will follow ASSESSMENT: 67F s/p liposarcoma excision and reconstruction with VRAM POD#15, WBC continuing to decrease    PLAN:  - f/u ID abx plan  - Continue JEREMY drain/care  - dressings removed - leave open to air  - Silvadene to Erythematous Skin  - xeroform to macerated skin  - DVT ppx  - IVF  - Activity per vascular/ortho  - Will follow

## 2018-10-02 NOTE — PROGRESS NOTE ADULT - PROBLEM SELECTOR PLAN 4
Hypoosmolar hypovolemic hyponatremia. Pt with ongoing fluid loss via drains and urinary incontinence. Repeat urine sodium <20. Now resolved s/p IV normal saline administration.  - no further w/u or intervention necessary at this time  - encourage oral intake

## 2018-10-02 NOTE — PROGRESS NOTE ADULT - PROBLEM SELECTOR PLAN 1
Improving. Initially found to have pansensitive E. coli UTI (noted on 9/22 urine cx) . Evaluated by ID - IV abx broadened to cefepime and vancomycin after noted left thigh abscess on CT scan, currently s/p OR washout done on 9/26, POD#6 with stable WBC. Pt hemodynamically stable. Patient remains afebrile. Now only on vancomycin  Ceftriaxone 9/22-2/25  Vancomycin 9/23 - present  Cefepime 9/25-9/29  - continue vancomycin 1250mg daily--> f/u ID recs  - closely monitor VS and blood count  - f/u 9/25 blood cx --> NGTD   - f/u 9/26 OR thigh cx --> noted Enterococcus faecalis

## 2018-10-02 NOTE — PROGRESS NOTE ADULT - SUBJECTIVE AND OBJECTIVE BOX
Orthopedic Progress Note     Patient seen and examined, no acute events overnight. Pain controlled. Denies any CP, SOB, N/V/D, HA or dizziness.    ICU Vital Signs Last 24 Hrs  T(C): 37 (02 Oct 2018 06:07), Max: 37 (01 Oct 2018 14:08)  T(F): 98.6 (02 Oct 2018 06:07), Max: 98.6 (01 Oct 2018 14:08)  HR: 57 (02 Oct 2018 06:07) (57 - 82)  BP: 110/60 (02 Oct 2018 06:07) (110/60 - 124/76)  RR: 17 (02 Oct 2018 06:07) (16 - 17)  SpO2: 97% (02 Oct 2018 06:07) (96% - 98%)      Wound cx: E. faecalis     PE:   Gen: Alert, NAD  LLE: fullness with induration around flap, hematoma vs seroma  Incision with some serous drainage  Motor intact TA/GCS/EHL/FHL   SILT DP/SP/Tib  cap refill <2 sec, wwp     Assessment/Plan:  67yFemale s/p excision liposarcoma POD 15  -PRS recs appreaciated  -PT  -WBAT in KI  -OOB  -DVT ppx  -F/u JEREMY output   -Continue vanco as per ID

## 2018-10-02 NOTE — PROGRESS NOTE ADULT - SUBJECTIVE AND OBJECTIVE BOX
CC: f/u for  infected thigh collection  Patient reports some mild pain in the leg and abdomen    REVIEW OF SYSTEMS:  All other review of systems negative (Comprehensive ROS)    Antimicrobials Day #  :7/14  vancomycin  IVPB 1250 milliGRAM(s) IV Intermittent every 24 hours    Other Medications Reviewed    T(F): 98.2 (10-02-18 @ 17:45), Max: 99.7 (10-02-18 @ 16:12)  HR: 64 (10-02-18 @ 17:45)  BP: 113/61 (10-02-18 @ 17:45)  RR: 16 (10-02-18 @ 17:45)  SpO2: 97% (10-02-18 @ 17:45)  Wt(kg): --    PHYSICAL EXAM:  General: alert, no acute distress  Eyes:  anicteric, no conjunctival injection, no discharge  Oropharynx: no lesions or injection 	  Neck: supple, without adenopathy  Lungs: clear to auscultation  Heart: regular rate and rhythm; no murmur, rubs or gallops  Abdomen: left lower quadrant with wounds, some puffiness to flap, some local maceration of wounds,   Skin: no lesions  Extremities: thigh wounds are fairly clean, thigh not hard  Neurologic: alert, oriented, moves all extremities    LAB RESULTS:                        8.4    13.19 )-----------( 416      ( 02 Oct 2018 06:07 )             26.2     10-02    136  |  101  |  14  ----------------------------<  93  3.9   |  25  |  0.68    Ca    7.5<L>      02 Oct 2018 06:04  Phos  2.2     10-02  Mg     1.9     10-02          MICROBIOLOGY:  RECENT CULTURES:      RADIOLOGY REVIEWED:    < from: CT Abdomen and Pelvis w/ IV Cont (10.02.18 @ 15:50) >  EXAM:  CT ABDOMEN AND PELVIS IC        PROCEDURE DATE:  Oct  2 2018         INTERPRETATION:  CLINICAL INFORMATION: 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 Question graft involvement.    COMPARISON: CT abdomen pelvis from 9/25/2018.    PROCEDURE:   CT of the Abdomen and Pelvis was performed with intravenous contrast.   Intravenous contrast: 90 ml Omnipaque 350. 10 ml discarded.  Oral contrast: None.  Sagittal and coronal reformats were performed.    FINDINGS:    LOWER CHEST: Bilateral trace pleural effusions and compressive   atelectasis.    LIVER: Multiple hepatic cysts, stable.  BILE DUCTS: Normal caliber.  GALLBLADDER: Within normal limits.  SPLEEN: Previously identified hypodense lesion is not visualized.  PANCREAS: Within normal limits.  ADRENALS: Within normal limits.  KIDNEYS/URETERS: Left renal cysts, unchanged. Right kidney and ureter   normal.    BLADDER: Within normal limits.  REPRODUCTIVE ORGANS: Unchanged central low attenuation in the uterus. No   adnexal mass.    BOWEL: No bowel obstruction. Appendix normal.  PERITONEUM: No ascites or free air.  VESSELS: Atherosclerotic calcification.  RETROPERITONEUM: No lymphadenopathy.    ABDOMINAL WALL: Status post interval resection of left groin sarcoma.   There is redemonstration of large fluid collection in left groin with   emphysema. The collection is largely unchanged, with its largest   component measuring 12 x 7.5 cm (series 2, image 113). The collection   again surrounds the left femoral bypass graft. Interval removal of 2 of 3   percutaneous drainage catheters. Most inferior drainage catheter remains   unchanged in position with tip in lateral thigh fluid collection. This   collection is unchanged containing fluid and gas measuring 5.5 x 5.3 cm   (series 2, image 115).    Unchanged left anterior abdominal wall complex hernia containing   nonobstructed small and large bowel.    BONES: Within normal limits.    IMPRESSION: No change in left groin and smaller left lateral side fluid   and gas collections.       < end of copied text >            Assessment:  patient s/p resection of left groin sarcoma with flap closure, bypass of femoral artery and now a persistent large collection surrounding the graft.   Plan:continue vanco  will discuss further care with dr lin for collections  d/w dr clifton

## 2018-10-02 NOTE — PROGRESS NOTE ADULT - PROBLEM SELECTOR PLAN 2
Patient with longstanding LLE edema post-op but slightly increased on today's exam than prior. May be secondary to post-surgical inflammatory changes, underlying seroma, or possible DVT. Will defer imaging of LLE to r/o DVT at this time since pt on DVT ppx with enoxaparin. However if pt continues to complain of worsening pain and/or swelling, would recommend obtaining LE venous duplex. Will closely monitor

## 2018-10-03 LAB
BUN SERPL-MCNC: 15 MG/DL — SIGNIFICANT CHANGE UP (ref 7–23)
CALCIUM SERPL-MCNC: 7.3 MG/DL — LOW (ref 8.4–10.5)
CHLORIDE SERPL-SCNC: 102 MMOL/L — SIGNIFICANT CHANGE UP (ref 98–107)
CO2 SERPL-SCNC: 26 MMOL/L — SIGNIFICANT CHANGE UP (ref 22–31)
CREAT SERPL-MCNC: 0.7 MG/DL — SIGNIFICANT CHANGE UP (ref 0.5–1.3)
GLUCOSE SERPL-MCNC: 113 MG/DL — HIGH (ref 70–99)
GRAM STN SPEC: SIGNIFICANT CHANGE UP
HCT VFR BLD CALC: 24.6 % — LOW (ref 34.5–45)
HGB BLD-MCNC: 7.8 G/DL — LOW (ref 11.5–15.5)
MCHC RBC-ENTMCNC: 30.6 PG — SIGNIFICANT CHANGE UP (ref 27–34)
MCHC RBC-ENTMCNC: 31.7 % — LOW (ref 32–36)
MCV RBC AUTO: 96.5 FL — SIGNIFICANT CHANGE UP (ref 80–100)
NRBC # FLD: 0 — SIGNIFICANT CHANGE UP
PLATELET # BLD AUTO: 363 K/UL — SIGNIFICANT CHANGE UP (ref 150–400)
PMV BLD: 10.1 FL — SIGNIFICANT CHANGE UP (ref 7–13)
POTASSIUM SERPL-MCNC: 3.9 MMOL/L — SIGNIFICANT CHANGE UP (ref 3.5–5.3)
POTASSIUM SERPL-SCNC: 3.9 MMOL/L — SIGNIFICANT CHANGE UP (ref 3.5–5.3)
RBC # BLD: 2.55 M/UL — LOW (ref 3.8–5.2)
RBC # FLD: 15.5 % — HIGH (ref 10.3–14.5)
SODIUM SERPL-SCNC: 137 MMOL/L — SIGNIFICANT CHANGE UP (ref 135–145)
SPECIMEN SOURCE: SIGNIFICANT CHANGE UP
VANCOMYCIN TROUGH SERPL-MCNC: 11.8 UG/ML — SIGNIFICANT CHANGE UP (ref 10–20)
WBC # BLD: 10.14 K/UL — SIGNIFICANT CHANGE UP (ref 3.8–10.5)
WBC # FLD AUTO: 10.14 K/UL — SIGNIFICANT CHANGE UP (ref 3.8–10.5)

## 2018-10-03 PROCEDURE — 99233 SBSQ HOSP IP/OBS HIGH 50: CPT | Mod: 25

## 2018-10-03 RX ORDER — HYDROMORPHONE HYDROCHLORIDE 2 MG/ML
1 INJECTION INTRAMUSCULAR; INTRAVENOUS; SUBCUTANEOUS ONCE
Qty: 0 | Refills: 0 | Status: DISCONTINUED | OUTPATIENT
Start: 2018-10-03 | End: 2018-10-03

## 2018-10-03 RX ADMIN — Medication 81 MILLIGRAM(S): at 12:00

## 2018-10-03 RX ADMIN — OXYCODONE HYDROCHLORIDE 10 MILLIGRAM(S): 5 TABLET ORAL at 18:55

## 2018-10-03 RX ADMIN — Medication 166.67 MILLIGRAM(S): at 12:18

## 2018-10-03 RX ADMIN — SENNA PLUS 2 TABLET(S): 8.6 TABLET ORAL at 20:53

## 2018-10-03 RX ADMIN — ENOXAPARIN SODIUM 40 MILLIGRAM(S): 100 INJECTION SUBCUTANEOUS at 12:00

## 2018-10-03 RX ADMIN — Medication 975 MILLIGRAM(S): at 06:41

## 2018-10-03 RX ADMIN — Medication 975 MILLIGRAM(S): at 18:03

## 2018-10-03 RX ADMIN — OXYCODONE HYDROCHLORIDE 10 MILLIGRAM(S): 5 TABLET ORAL at 06:41

## 2018-10-03 RX ADMIN — Medication 100 MILLIGRAM(S): at 18:05

## 2018-10-03 RX ADMIN — OXYCODONE HYDROCHLORIDE 10 MILLIGRAM(S): 5 TABLET ORAL at 01:31

## 2018-10-03 RX ADMIN — HYDROMORPHONE HYDROCHLORIDE 1 MILLIGRAM(S): 2 INJECTION INTRAMUSCULAR; INTRAVENOUS; SUBCUTANEOUS at 14:38

## 2018-10-03 RX ADMIN — OXYCODONE HYDROCHLORIDE 10 MILLIGRAM(S): 5 TABLET ORAL at 18:03

## 2018-10-03 RX ADMIN — OXYCODONE HYDROCHLORIDE 10 MILLIGRAM(S): 5 TABLET ORAL at 11:30

## 2018-10-03 RX ADMIN — OXYCODONE HYDROCHLORIDE 10 MILLIGRAM(S): 5 TABLET ORAL at 10:50

## 2018-10-03 RX ADMIN — OXYCODONE HYDROCHLORIDE 10 MILLIGRAM(S): 5 TABLET ORAL at 02:30

## 2018-10-03 RX ADMIN — OXYCODONE HYDROCHLORIDE 10 MILLIGRAM(S): 5 TABLET ORAL at 07:40

## 2018-10-03 NOTE — PROGRESS NOTE ADULT - PROBLEM SELECTOR PLAN 6
post-op setting and stable. Evaluated by nutritionist/dietician on 9/29.  -  ensure clear 1 can po 3 x daily  - encourage increased oral intake

## 2018-10-03 NOTE — PROGRESS NOTE ADULT - SUBJECTIVE AND OBJECTIVE BOX
CHIEF COMPLAINT: f/u     SUBJECTIVE / OVERNIGHT EVENTS: Patient seen and examined. No acute events overnight. Pain well controlled and patient without any complaints.    MEDICATIONS  (STANDING):  acetaminophen   Tablet .. 975 milliGRAM(s) Oral every 12 hours  aspirin enteric coated 81 milliGRAM(s) Oral daily  enoxaparin Injectable 40 milliGRAM(s) SubCutaneous daily  HYDROmorphone  Injectable 1 milliGRAM(s) IV Push once  influenza   Vaccine 0.5 milliLiter(s) IntraMuscular once  senna 2 Tablet(s) Oral at bedtime  vancomycin  IVPB 1250 milliGRAM(s) IV Intermittent every 24 hours    MEDICATIONS  (PRN):  benzocaine 15 mG/menthol 3.6 mG Lozenge 1 Lozenge Oral three times a day PRN Sore Throat  bisacodyl Suppository 10 milliGRAM(s) Rectal daily PRN Constipation  docusate sodium 100 milliGRAM(s) Oral three times a day PRN Constipation  HYDROmorphone  Injectable 0.5 milliGRAM(s) IV Push every 10 minutes PRN Moderate Pain (4 - 6)  ondansetron Injectable 4 milliGRAM(s) IV Push every 6 hours PRN Nausea and/or Vomiting  oxyCODONE    IR 5 milliGRAM(s) Oral every 4 hours PRN Moderate Pain (4 - 6)  oxyCODONE    IR 10 milliGRAM(s) Oral every 4 hours PRN Severe Pain (7 - 10)      VITALS:  T(F): 98.1 (10-03-18 @ 09:49), Max: 99.7 (10-02-18 @ 16:12)  HR: 65 (10-03-18 @ 09:49) (54 - 65)  BP: 98/54 (10-03-18 @ 09:49) (97/59 - 113/61)  RR: 16 (10-03-18 @ 09:49) (15 - 16)  SpO2: 98% (10-03-18 @ 09:49)      PHYSICAL EXAM:  GENERAL: NAD, well-developed  HEAD:  Atraumatic, Normocephalic  EYES: EOMI, PERRLA, conjunctiva and sclera clear  NECK: Supple, No JVD  CHEST/LUNG: Clear to auscultation bilaterally; No wheeze  HEART: Regular rate and rhythm; No murmurs, rubs, or gallops  ABDOMEN: Soft, Nontender, Nondistended; Bowel sounds present  EXTREMITIES:  2+ Peripheral Pulses, No clubbing, cyanosis, or edema  PSYCH: AAOx3  NEUROLOGY: non-focal  SKIN: No rashes or lesions    LABS:              7.8                  137  | 26   | 15           10.14 >-----------< 363     ------------------------< 113                   24.6                 3.9  | 102  | 0.70                                         Ca 7.3   Mg x     Ph x                      RADIOLOGY & ADDITIONAL TESTS:  Imaging Personally Reviewed: [x] Yes    [ ] Consultant(s) Notes Reviewed:  [x] Care Discussed with Consultants/Other Providers: Orthopedic PA - discussed CHIEF COMPLAINT: f/u     SUBJECTIVE / OVERNIGHT EVENTS:   [employer] utilized for [employer], ID# [id number]  Patient seen and examined this morning. No acute events overnight. Pain is better controlled today. Patient without pain at rest, but has pain upon standing and when working with PT. Patient eating and had BM. No fevers, chills, chest pain, or n/v.     MEDICATIONS  (STANDING):  acetaminophen   Tablet .. 975 milliGRAM(s) Oral every 12 hours  aspirin enteric coated 81 milliGRAM(s) Oral daily  enoxaparin Injectable 40 milliGRAM(s) SubCutaneous daily  HYDROmorphone  Injectable 1 milliGRAM(s) IV Push once  influenza   Vaccine 0.5 milliLiter(s) IntraMuscular once  senna 2 Tablet(s) Oral at bedtime  vancomycin  IVPB 1250 milliGRAM(s) IV Intermittent every 24 hours    MEDICATIONS  (PRN):  benzocaine 15 mG/menthol 3.6 mG Lozenge 1 Lozenge Oral three times a day PRN Sore Throat  bisacodyl Suppository 10 milliGRAM(s) Rectal daily PRN Constipation  docusate sodium 100 milliGRAM(s) Oral three times a day PRN Constipation  HYDROmorphone  Injectable 0.5 milliGRAM(s) IV Push every 10 minutes PRN Moderate Pain (4 - 6)  ondansetron Injectable 4 milliGRAM(s) IV Push every 6 hours PRN Nausea and/or Vomiting  oxyCODONE    IR 5 milliGRAM(s) Oral every 4 hours PRN Moderate Pain (4 - 6)  oxyCODONE    IR 10 milliGRAM(s) Oral every 4 hours PRN Severe Pain (7 - 10)      VITALS:  T(F): 98.1 (10-03-18 @ 09:49), Max: 99.7 (10-02-18 @ 16:12)  HR: 65 (10-03-18 @ 09:49) (54 - 65)  BP: 98/54 (10-03-18 @ 09:49) (97/59 - 113/61)  RR: 16 (10-03-18 @ 09:49) (15 - 16)  SpO2: 98% (10-03-18 @ 09:49)      PHYSICAL EXAM:  GENERAL: Laying in bed in NAD  CHEST/LUNG: decreased BS at lung bases b/l but otherwise clear  HEART: Regular rate and rhythm; No murmurs, rubs, or gallops  ABDOMEN/PELVIS: Dressing over surgical site. Surgical staples intact. Mild erythema with edema of mons pubis, edema of medial aspect of left thigh and dependent area of flanks. Viable skin flap.   EXTREMITIES: Surgical staples intact. 1 JEREMY drain in left thigh left; B/l taut lower leg edema  PSYCH: Alert & Oriented, follows commands; pleasant        LABS:              7.8                  137  | 26   | 15           10.14 >-----------< 363     ------------------------< 113                   24.6                 3.9  | 102  | 0.70                                         Ca 7.3   Mg x     Ph x                RADIOLOGY & ADDITIONAL TESTS:    EXAM:  CT ABDOMEN AND PELVIS IC    PROCEDURE DATE:  Sep 25 2018     COMPARISON: Correlation is made with CT of the left lower extremity dated   7/13/2018.    PROCEDURE:   CT of the Abdomen and Pelvis was performed with intravenous contrast.   Images were obtained through the upper thighs.  Intravenous contrast: 90 ml Omnipaque 350. 10 ml discarded.  Oral contrast: None.  Sagittal and coronal reformats were performed.    FINDINGS:    LOWER CHEST: Small bilateral pleural effusions, with bibasilar   compressive atelectasis.    LIVER: Hepatic cyst and additional subcentimeter hypodense lesions, too   small to characterize.  BILE DUCTS: Normal caliber.  GALLBLADDER: Cholelithiasis. A 0.9 cm soft tissue density is noted in the   fundus of the gallbladder (2:38)  SPLEEN: A subcentimeter hypodense lesion in the spleen is too small to   characterize.  PANCREAS: Within normal limits.  ADRENALS: Within normal limits.  KIDNEYS/URETERS: Mild left hydronephrosis. Subcentimeter hypodense   lesions in the left kidney are too small to characterize.    BLADDER: Within normal limits.  REPRODUCTIVE ORGANS: Central low attenuation in the endometrial canal,   measuring up to 1 cm.    BOWEL: No bowel obstruction.  Small hiatal hernia.  PERITONEUM: No ascites.  VESSELS:  Atherosclerotic calcification.  RETROPERITONEUM: No lymphadenopathy.    ABDOMINAL WALL: Status post interval resection of the previously noted   mass in the left groin. Two surgical drains are present, one entering   from the right lower anterior abdomen, with its tip in the midline   subcutaneous tissues of the upper abdomen. A second drain enters the left   lower quadrant subcutaneous tissues, with its tip anterior to the left   femoral artery graft, which appears patent. Skin staples are noted. There   is a large collection containing fluid and gas in the left groin and   infiltrating the musculature of the left upper thigh, concerning for   infection. The largest component of the collection measures 10.8 x 7.2 cm   (2: 115). Diffuse subcutaneous edema. A large defect is noted in the left   lower anterior abdominal wall, containing multiple loops of nonobstructed   large and small bowel.  BONES: Degenerative changes in the spine.    IMPRESSION: Status post resection of previously noted large mass in the   left groin. A large collection containing fluid and gas is noted in the   left groin with infiltration of the musculature of the left upper thigh, concerning for infection.    Large defect in the musculature of the left lower anterior abdomen with   herniation of nonobstructed large and small bowel.    Small bilateral pleural effusions with bibasilar atelectasis.    A 0.9 cm rounded soft tissue density is noted in the fundus of the   gallbladder. The differential diagnosis includes a polyp and mass.   Ultrasound is suggested for further characterization.    Central low attenuation in the endometrial canal, measuring up to 1 cm,   an abnormal finding in a postmenopausal female. When the patient is   clinically able, pelvic ultrasound is suggested for further evaluation.      [ ] Consultant(s) Notes Reviewed:  [x] Care Discussed with Consultants/Other Providers: Orthopedic PA - discussed

## 2018-10-03 NOTE — PROGRESS NOTE ADULT - PROBLEM SELECTOR PLAN 5
continue to hold home anti-hypertensive agents Initially due to intraoperative bleeding. Pt asymptomatic. No signs of ongoing bleeding on exam  - avoid unnecessary blood draws  - consider transfusing for Hb <7.0

## 2018-10-03 NOTE — PROGRESS NOTE ADULT - ASSESSMENT
ASSESSMENT: 67F s/p liposarcoma excision and reconstruction with VRAM POD#16 now with 12x8cm collection on CT scan    PLAN:  - f/u ID abx plan  - Continue JEREMY drain/care  - ABDs as needed to collect drainage  - Silvadene to Erythematous Skin  - DVT ppx  - IVF  - Activity per vascular/ortho  - IR consulted for fluid collection on CT      Tasha Hoang PGY-1    Plastic Surgery  [id number]

## 2018-10-03 NOTE — PROGRESS NOTE ADULT - SUBJECTIVE AND OBJECTIVE BOX
JULIA UMANA  [id number]    Subjective:  Patient is a 67y old  Female who presents with a chief complaint of tumor (02 Oct 2018 18:09)   Patient was seen and examined at bedside. CT obtained yesterday showing fluid collection around vascular graft. No acute events. Patient still having pain in left groin.    Objective:  T(C): 36.9 (10-03-18 @ 06:37), Max: 37.6 (10-02-18 @ 16:12)  HR: 54 (10-03-18 @ 06:37) (54 - 64)  BP: 103/56 (10-03-18 @ 06:37) (97/59 - 113/61)  RR: 16 (10-03-18 @ 06:37) (15 - 18)  SpO2: 99% (10-03-18 @ 01:56) (95% - 100%)  Wt(kg): --   10-02    136  |  101  |  14  ----------------------------<  93  3.9   |  25  |  0.68    Ca    7.5<L>      02 Oct 2018 06:04  Phos  2.2     10-02  Mg     1.9     10-02                          8.4    13.19 )-----------( 416      ( 02 Oct 2018 06:07 )             26.2       10-02 @ 07:01  -  10-03 @ 07:00  --------------------------------------------------------  IN: 0 mL / OUT: 75 mL / NET: -75 mL      PHYSICAL EXAM:    General: NAD  Abdomen: Erythema stable, increased fullness in left lower abdomen/groin area. Serous drainage from medial incision line. Incisions intact. JEREMY with serous output.             MEDICATIONS  (STANDING):  acetaminophen   Tablet .. 975 milliGRAM(s) Oral every 12 hours  aspirin enteric coated 81 milliGRAM(s) Oral daily  enoxaparin Injectable 40 milliGRAM(s) SubCutaneous daily  influenza   Vaccine 0.5 milliLiter(s) IntraMuscular once  senna 2 Tablet(s) Oral at bedtime  vancomycin  IVPB 1250 milliGRAM(s) IV Intermittent every 24 hours    MEDICATIONS  (PRN):  benzocaine 15 mG/menthol 3.6 mG Lozenge 1 Lozenge Oral three times a day PRN Sore Throat  bisacodyl Suppository 10 milliGRAM(s) Rectal daily PRN Constipation  docusate sodium 100 milliGRAM(s) Oral three times a day PRN Constipation  HYDROmorphone  Injectable 0.5 milliGRAM(s) IV Push every 10 minutes PRN Moderate Pain (4 - 6)  ondansetron Injectable 4 milliGRAM(s) IV Push every 6 hours PRN Nausea and/or Vomiting  oxyCODONE    IR 5 milliGRAM(s) Oral every 4 hours PRN Moderate Pain (4 - 6)  oxyCODONE    IR 10 milliGRAM(s) Oral every 4 hours PRN Severe Pain (7 - 10)

## 2018-10-03 NOTE — PROGRESS NOTE ADULT - PROBLEM SELECTOR PLAN 1
Improving. Initially found to have pansensitive E. coli UTI (noted on 9/22 urine cx) . Evaluated by ID - IV abx broadened to cefepime and vancomycin after noted left thigh abscess on CT scan, currently s/p OR washout done on 9/26, POD#7 with stable WBC. Pt hemodynamically stable. Patient remains afebrile. Now only on vancomycin  Ceftriaxone 9/22-2/25  Vancomycin 9/23 - present  Cefepime 9/25-9/29  - continue vancomycin 1250mg daily--> f/u ID recs  - closely monitor VS and blood count  - f/u 9/25 blood cx --> NGTD   - f/u 9/26 OR thigh cx --> noted Enterococcus faecalis Resolved. Initially found to have pansensitive E. coli UTI (noted on 9/22 urine cx) . Evaluated by ID - IV abx broadened to cefepime and vancomycin after noted left thigh abscess on CT scan, currently s/p OR washout done on 9/26, POD#7. Pt hemodynamically stable. Patient remains afebrile. Now only on vancomycin  Ceftriaxone 9/22-2/25  Vancomycin 9/23 - present  Cefepime 9/25-9/29  - continue vancomycin 1250mg daily--> f/u ID recs  - closely monitor VS and blood count  - f/u 9/25 blood cx --> NGTD   - f/u 9/26 OR thigh cx --> noted Enterococcus faecalis

## 2018-10-03 NOTE — PROGRESS NOTE ADULT - PROBLEM SELECTOR PLAN 7
would continue to trend and replete daily as necessary
Evaluated by nutritionist/dietician on 9/29.  -  ensure clear 1 can po 3 x daily  - encourage increased oral intake
Evaluated by nutritionist/dietician on 9/29.  -  ensure clear 1 can po 3 x daily  - encourage increased oral intake
albumin 1.8  -recommend nutrition consult.
improving, likely in setting of large ecchymosis/hematoma form surgery and platelet consumption, continue to monitor.
would continue to trend and replete daily as necessary
Resolved

## 2018-10-03 NOTE — PROGRESS NOTE ADULT - PROBLEM SELECTOR PLAN 2
Patient with longstanding LLE edema post-op but slightly increased on today's exam than prior. May be secondary to post-surgical inflammatory changes, underlying seroma, or possible DVT. Will defer imaging of LLE to r/o DVT at this time since pt on DVT ppx with enoxaparin. However if pt continues to complain of worsening pain and/or swelling, would recommend obtaining LE venous duplex. Will closely monitor Patient with longstanding LLE edema post-op. Likely due to underlying fluid collection in patient's groin area extending to her left thigh, which is still present despite drains.    - plan as per plastic surgery. F/u recs  - pain control as needed.

## 2018-10-03 NOTE — PROGRESS NOTE ADULT - SUBJECTIVE AND OBJECTIVE BOX
No acute events overnight. Pain controlled.   Pt received CT yesterday 7x13cm collection, stable.    PE:  Vital Signs Last 24 Hrs  T(C): 36.8 (03 Oct 2018 01:56), Max: 37.6 (02 Oct 2018 16:12)  T(F): 98.3 (03 Oct 2018 01:56), Max: 99.7 (02 Oct 2018 16:12)  HR: 56 (03 Oct 2018 01:56) (55 - 64)  BP: 98/60 (03 Oct 2018 01:56) (97/59 - 113/61)  RR: 16 (03 Oct 2018 01:56) (15 - 18)  SpO2: 99% (03 Oct 2018 01:56) (95% - 100%)  Gen: No acute distress  LLE: serous saturation on abd   Motor intact TA/GCS/EHL/FHL   SILT DP/SP/Tib  cap refill <2 sec, [facility]     Labs:                        8.4    13.19 )-----------( 416      ( 02 Oct 2018 06:07 )             26.2   10-02    136  |  101  |  14  ----------------------------<  93  3.9   |  25  |  0.68    Ca    7.5<L>      02 Oct 2018 06:04  Phos  2.2     10-02  Mg     1.9     10-02    Assessment/Plan:  67yFemale s/p Liposarcomae xcision, rectus flaap, iliac pop bypass POD 16, and PRS I and D  -CT appreciated.  Will d/w PRS  -pain control  -PT  -WBAT in [place]  -OOB  -DVT ppx  -dispo plan

## 2018-10-03 NOTE — PROGRESS NOTE ADULT - PROBLEM SELECTOR PLAN 4
Hypoosmolar hypovolemic hyponatremia. Pt with ongoing fluid loss via drains and urinary incontinence. Repeat urine sodium <20. Now resolved s/p IV normal saline administration.  - no further w/u or intervention necessary at this time  - encourage oral intake continue to hold home anti-hypertensive agents

## 2018-10-03 NOTE — PROGRESS NOTE ADULT - SUBJECTIVE AND OBJECTIVE BOX
CC: f/u for  infected thigh collection  Patient reports  feels ok. leg drains  REVIEW OF SYSTEMS:  All other review of systems negative (Comprehensive ROS)    Antimicrobials Day #  :12  vancomycin  IVPB 1250 milliGRAM(s) IV Intermittent every 24 hours    Other Medications Reviewed    T(F): 98.5 (10-03-18 @ 14:40), Max: 99.7 (10-02-18 @ 16:12)  HR: 69 (10-03-18 @ 14:40)  BP: 107/65 (10-03-18 @ 14:40)  RR: 18 (10-03-18 @ 14:40)  SpO2: 98% (10-03-18 @ 14:40)  Wt(kg): --    PHYSICAL EXAM:  General: alert, no acute distress  Eyes:  anicteric, no conjunctival injection, no discharge  Oropharynx: no lesions or injection 	  Neck: supple, without adenopathy  Lungs: clear to auscultation  Heart: regular rate and rhythm; no murmur, rubs or gallops  Abdomen: soft, left lower abdomen wounds with weeping, drains in place  Skin: no lesions  Extremities:Left thigh swollen and puffy with wounds clean though some maceration  Neurologic: alert, oriented, moves all extremities    LAB RESULTS:                        7.8    10.14 )-----------( 363      ( 03 Oct 2018 10:45 )             24.6     10-03    137  |  102  |  15  ----------------------------<  113<H>  3.9   |  26  |  0.70    Ca    7.3<L>      03 Oct 2018 10:45  Phos  2.2     10-02  Mg     1.9     10-02          MICROBIOLOGY:  RECENT CULTURES:    Culture - Surg Site Aerob/Anaer w/Gm St (09.26.18 @ 11:46)    Gram Stain Wound:   NOS No Organisms Seen  WBC^White Blood Cells  QNTY CELLS IN GRAM STAIN: FEW (2+)    -  Ampicillin: S <=2 MOOSE    -  Vancomycin: S 2 MOOSE    Culture - Surgical Site:   FEW    -  Tetra/Doxy: S <=1 MOOSE    -  Ciprofloxacin: S <=1 MOOSE    Specimen Source: THIGH    Organism Identification: Enterococcus faecalis    Organism: Enterococcus faecalis    Method Type: POSITIVE MOOSE 29      RADIOLOGY REVIEWED:  < from: CT Abdomen and Pelvis w/ IV Cont (10.02.18 @ 15:50) >  IMPRESSION: No change in left groin and smaller left lateral side fluid   and gas collections.       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 post op infected collection s/p washout but it now reaccumulated and now looks like it does surround the graft as reviewed with dr. Coley last night  Plan:  continue vanco  for IR drain of reaccumulated collection  will now need 6 weeks of iv antibiotics and maybe chronic suppressive regimen given new findings  allergist evaluation to see if can give penicillin or desensitize since may benefit from suppressive amoxicillin  folllow up cultures of new planned ir drainage

## 2018-10-03 NOTE — PROGRESS NOTE ADULT - PROBLEM SELECTOR PROBLEM 7
Hypophosphatemia
Hypophosphatemia
Moderate malnutrition
Severe protein-calorie malnutrition
Severe protein-calorie malnutrition
Thrombocytopenia
Thrombocytopenia

## 2018-10-03 NOTE — PROGRESS NOTE ADULT - PROBLEM SELECTOR PLAN 3
POD#15 of surgical excision with reconstruction. C/b post-op left thigh abscess s/p OR washout today, POD#6  -post-op care as per ortho, plastics, and vascular surgery  -PT as tolerated  - pain control/bowel regimen   - IV abx as noted above  - f/u vascular surg and plastic surg recs POD#16 of surgical excision with reconstruction. C/b post-op left thigh abscess s/p OR washout today, POD#7  -post-op care as per ortho, plastics, and vascular surgery  -PT as tolerated  - pain control/bowel regimen   - IV abx as noted above  - f/u vascular surg and plastic surg recs

## 2018-10-04 DIAGNOSIS — L02.419 CUTANEOUS ABSCESS OF LIMB, UNSPECIFIED: ICD-10-CM

## 2018-10-04 DIAGNOSIS — Z88.0 ALLERGY STATUS TO PENICILLIN: ICD-10-CM

## 2018-10-04 PROCEDURE — 99223 1ST HOSP IP/OBS HIGH 75: CPT | Mod: GC,25

## 2018-10-04 PROCEDURE — 95018 ALL TSTG PERQ&IQ DRUGS/BIOL: CPT | Mod: GC

## 2018-10-04 PROCEDURE — 99233 SBSQ HOSP IP/OBS HIGH 50: CPT

## 2018-10-04 RX ADMIN — OXYCODONE HYDROCHLORIDE 10 MILLIGRAM(S): 5 TABLET ORAL at 10:40

## 2018-10-04 RX ADMIN — ENOXAPARIN SODIUM 40 MILLIGRAM(S): 100 INJECTION SUBCUTANEOUS at 12:19

## 2018-10-04 RX ADMIN — OXYCODONE HYDROCHLORIDE 10 MILLIGRAM(S): 5 TABLET ORAL at 01:03

## 2018-10-04 RX ADMIN — OXYCODONE HYDROCHLORIDE 10 MILLIGRAM(S): 5 TABLET ORAL at 02:03

## 2018-10-04 RX ADMIN — Medication 975 MILLIGRAM(S): at 16:57

## 2018-10-04 RX ADMIN — Medication 100 MILLIGRAM(S): at 18:47

## 2018-10-04 RX ADMIN — OXYCODONE HYDROCHLORIDE 10 MILLIGRAM(S): 5 TABLET ORAL at 20:54

## 2018-10-04 RX ADMIN — OXYCODONE HYDROCHLORIDE 10 MILLIGRAM(S): 5 TABLET ORAL at 16:50

## 2018-10-04 RX ADMIN — OXYCODONE HYDROCHLORIDE 10 MILLIGRAM(S): 5 TABLET ORAL at 09:44

## 2018-10-04 RX ADMIN — OXYCODONE HYDROCHLORIDE 10 MILLIGRAM(S): 5 TABLET ORAL at 19:54

## 2018-10-04 RX ADMIN — OXYCODONE HYDROCHLORIDE 10 MILLIGRAM(S): 5 TABLET ORAL at 15:54

## 2018-10-04 RX ADMIN — OXYCODONE HYDROCHLORIDE 10 MILLIGRAM(S): 5 TABLET ORAL at 06:40

## 2018-10-04 RX ADMIN — OXYCODONE HYDROCHLORIDE 10 MILLIGRAM(S): 5 TABLET ORAL at 05:53

## 2018-10-04 RX ADMIN — Medication 166.67 MILLIGRAM(S): at 12:19

## 2018-10-04 RX ADMIN — Medication 975 MILLIGRAM(S): at 05:53

## 2018-10-04 RX ADMIN — Medication 10 MILLIGRAM(S): at 17:00

## 2018-10-04 RX ADMIN — Medication 81 MILLIGRAM(S): at 12:19

## 2018-10-04 NOTE — PROGRESS NOTE ADULT - PROBLEM SELECTOR PLAN 3
POD#16 of surgical excision with reconstruction. C/b post-op left thigh abscess s/p OR washout today, POD#7  -post-op care as per ortho, plastics, and vascular surgery  -PT as tolerated  - pain control/bowel regimen   - IV abx as noted above  - f/u vascular surg and plastic surg recs continue to hold home anti-hypertensive agents

## 2018-10-04 NOTE — CONSULT NOTE ADULT - SUBJECTIVE AND OBJECTIVE BOX
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 Her hospital course was complicated by ***    Allergies    penicillin (Hives)    Intolerances      MEDICATIONS  (STANDING):  acetaminophen   Tablet .. 975 milliGRAM(s) Oral every 12 hours  aspirin enteric coated 81 milliGRAM(s) Oral daily  enoxaparin Injectable 40 milliGRAM(s) SubCutaneous daily  influenza   Vaccine 0.5 milliLiter(s) IntraMuscular once  senna 2 Tablet(s) Oral at bedtime  vancomycin  IVPB 1250 milliGRAM(s) IV Intermittent every 24 hours    MEDICATIONS  (PRN):  benzocaine 15 mG/menthol 3.6 mG Lozenge 1 Lozenge Oral three times a day PRN Sore Throat  bisacodyl Suppository 10 milliGRAM(s) Rectal daily PRN Constipation  docusate sodium 100 milliGRAM(s) Oral three times a day PRN Constipation  ondansetron Injectable 4 milliGRAM(s) IV Push every 6 hours PRN Nausea and/or Vomiting  oxyCODONE    IR 5 milliGRAM(s) Oral every 4 hours PRN Moderate Pain (4 - 6)  oxyCODONE    IR 10 milliGRAM(s) Oral every 4 hours PRN Severe Pain (7 - 10)      PAST MEDICAL & SURGICAL HISTORY:  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, unspecified  HTN (hypertension)  No significant past surgical history      REVIEW OF SYSTEMS  All review of systems negative, except for those marked:  General:		  Eyes:			  ENT:			  Pulmonary:		  Cardiac:		  Gastrointestinal:	  Renal/Urologic:		  Musculoskeletal:	  Skin:		  Neurologic:		  Psychiatric:		  Endocrine:		  Hematologic:		  Allergy/Immune:	    SOCIAL/ENVIRONMENTAL HISTORY:  Family Lives:  Education Level:  Tobacco	[] No	[] Yes:  Alcohol		[] No	[] Yes:    FAMILY HISTORY:  No pertinent family history in first degree relatives    Allergies		[] No	[] Yes:   Miscarriages		[] No	[] Yes:   Autoimmune		[] No	[] Yes:   Asthma			[] No	[] Yes:  Still Births		[] No	[] Yes:  Sinusitis		[] No	[] Yes:   Fetal Demise		[] No	[] Yes:   Immune Deficiency	[] No	[] Yes:   Other:			[] No	[] Yes:    Vital Signs Last 24 Hrs  T(C): 36.9 (04 Oct 2018 14:37), Max: 37.3 (03 Oct 2018 20:52)  T(F): 98.5 (04 Oct 2018 14:37), Max: 99.1 (03 Oct 2018 20:52)  HR: 66 (04 Oct 2018 14:37) (55 - 66)  BP: 95/48 (04 Oct 2018 14:37) (95/48 - 104/56)  BP(mean): --  RR: 15 (04 Oct 2018 14:37) (15 - 18)  SpO2: 99% (04 Oct 2018 14:37) (95% - 100%)    PHYSICAL EXAM  All physical exam findings normal, except for those marked:  General:	alert, well developed/well nourished, no acute distress  Eyes      no conjunctival injection, no discharge, no photophobia, intact                 extraocular movements, sclera not icteric, no suborbital bogginess  ENT:    normal tympanic membranes; external ear normal, normal nasal mucosa  .		and turbinates without discharge, no pharyngeal erythema or exudates, no  .		cobblestoning, normal tongue and lips, normal tonsils   Neck    supple, full range of motion  Lymph Nodes	normal size and consistency, non-tender  Cardiovascular	regular rate and rhythm; Normal S1, S2; No murmur  Respiratory	good air movement bilaterally, no wheezing or crackles,  no retractions  Abdominal	soft; ND, NT, no hepatosplenomegaly, + bowel sounds  		normal external genitalia, no rash  Skin		skin intact and not indurated; no rash, no desquamation  Neurologic	alert, appropriate for age, cranial nerves II-XII grossly normal  Musculoskeletal	 no joint swelling, erythema, or tenderness; full range of motion  .			with no contractures; no muscle tenderness; no clubbing; no cyanosis;  .			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 Her hospital course was complicated by infected groin collection with Enterococcus growth from the collection, for which she might require long term amoxicillin for supression. Patient has a suspected penicillin allergy. At 11 years of age during a hospital admission for typhoid, she was given penicillin. She had whole body swelling, unclear how long after dose/initiation of penicillin treatment. She denies rash, wheezing, lip/tongue swelling during this reaction. She has not taken penicillin since. She denies other drug allergies. No hives, angioedema, asthma or any allergies. She has not had similar swelling since.     Allergies    Penicillin (Hives)    MEDICATIONS  (STANDING):  acetaminophen   Tablet .. 975 milliGRAM(s) Oral every 12 hours  aspirin enteric coated 81 milliGRAM(s) Oral daily  enoxaparin Injectable 40 milliGRAM(s) SubCutaneous daily  influenza   Vaccine 0.5 milliLiter(s) IntraMuscular once  senna 2 Tablet(s) Oral at bedtime  vancomycin  IVPB 1250 milliGRAM(s) IV Intermittent every 24 hours    MEDICATIONS  (PRN):  benzocaine 15 mG/menthol 3.6 mG Lozenge 1 Lozenge Oral three times a day PRN Sore Throat  bisacodyl Suppository 10 milliGRAM(s) Rectal daily PRN Constipation  docusate sodium 100 milliGRAM(s) Oral three times a day PRN Constipation  ondansetron Injectable 4 milliGRAM(s) IV Push every 6 hours PRN Nausea and/or Vomiting  oxyCODONE    IR 5 milliGRAM(s) Oral every 4 hours PRN Moderate Pain (4 - 6)  oxyCODONE    IR 10 milliGRAM(s) Oral every 4 hours PRN Severe Pain (7 - 10)      PAST MEDICAL & SURGICAL HISTORY:  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    Vital Signs Last 24 Hrs  T(C): 36.9 (04 Oct 2018 14:37), Max: 37.3 (03 Oct 2018 20:52)  T(F): 98.5 (04 Oct 2018 14:37), Max: 99.1 (03 Oct 2018 20:52)  HR: 66 (04 Oct 2018 14:37) (55 - 66)  BP: 95/48 (04 Oct 2018 14:37) (95/48 - 104/56)  BP(mean): --  RR: 15 (04 Oct 2018 14:37) (15 - 18)  SpO2: 99% (04 Oct 2018 14:37) (95% - 100%)    PHYSICAL EXAM  All physical exam findings normal, except for those marked:  General:	alert, well developed/well nourished, no acute distress  Eyes      no conjunctival injection, no discharge, no photophobia, intact                 extraocular movements, sclera not icteric, no suborbital bogginess  ENT:    normal tympanic membranes; external ear normal, normal nasal mucosa  .		and turbinates without discharge, no pharyngeal erythema or exudates, no  .		cobblestoning, normal tongue and lips, normal tonsils   Neck    supple, full range of motion  Lymph Nodes	normal size and consistency, non-tender  Cardiovascular	regular rate and rhythm; Normal S1, S2; No murmur  Respiratory	good air movement bilaterally, no wheezing or crackles,  no retractions  Abdominal	soft; ND, NT, no hepatosplenomegaly, + bowel sounds  		normal external genitalia, no rash  Skin		skin intact and not indurated; no rash, no desquamation  Neurologic	alert, appropriate for age, cranial nerves II-XII grossly normal  Musculoskeletal	 no joint swelling, erythema, or tenderness; full range of motion  .			with no contractures; no muscle tenderness; no clubbing; no cyanosis;  .			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 Her hospital course was complicated by infected groin collection with Enterococcus growth from the collection, for which she might require long term amoxicillin for supression. Patient has a suspected penicillin allergy. At 11 years of age during a hospital admission for typhoid, she was given penicillin. She had whole body swelling, unclear how long after dose/initiation of penicillin treatment. She denies rash, wheezing, lip/tongue swelling during this reaction. She has not taken penicillin since. She denies other drug allergies. No hives, angioedema, asthma or any allergies. She has not had similar swelling since.     Allergies    Penicillin (Hives)    MEDICATIONS  (STANDING):  acetaminophen   Tablet .. 975 milliGRAM(s) Oral every 12 hours  aspirin enteric coated 81 milliGRAM(s) Oral daily  enoxaparin Injectable 40 milliGRAM(s) SubCutaneous daily  influenza   Vaccine 0.5 milliLiter(s) IntraMuscular once  senna 2 Tablet(s) Oral at bedtime  vancomycin  IVPB 1250 milliGRAM(s) IV Intermittent every 24 hours    MEDICATIONS  (PRN):  benzocaine 15 mG/menthol 3.6 mG Lozenge 1 Lozenge Oral three times a day PRN Sore Throat  bisacodyl Suppository 10 milliGRAM(s) Rectal daily PRN Constipation  docusate sodium 100 milliGRAM(s) Oral three times a day PRN Constipation  ondansetron Injectable 4 milliGRAM(s) IV Push every 6 hours PRN Nausea and/or Vomiting  oxyCODONE    IR 5 milliGRAM(s) Oral every 4 hours PRN Moderate Pain (4 - 6)  oxyCODONE    IR 10 milliGRAM(s) Oral every 4 hours PRN Severe Pain (7 - 10)      PAST MEDICAL & SURGICAL HISTORY:  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    Vital Signs Last 24 Hrs  T(C): 36.9 (04 Oct 2018 14:37), Max: 37.3 (03 Oct 2018 20:52)  T(F): 98.5 (04 Oct 2018 14:37), Max: 99.1 (03 Oct 2018 20:52)  HR: 66 (04 Oct 2018 14:37) (55 - 66)  BP: 95/48 (04 Oct 2018 14:37) (95/48 - 104/56)  BP(mean): --  RR: 15 (04 Oct 2018 14:37) (15 - 18)  SpO2: 99% (04 Oct 2018 14:37) (95% - 100%)    PHYSICAL EXAM  All physical exam findings normal, except for those marked:  General:	alert, no acute distress  Lymph Nodes	normal size and consistency, non-tender  Cardiovascular	regular rate and rhythm; Normal S1, S2; No murmur  Respiratory	good air movement bilaterally, no wheezing or crackles, no retractions  Abdominal	soft; nondistended, dressing over left side of abdomen.   Neurologic	alert, appropriate for age, cranial nerves II-XII grossly normal  Musculoskeletal	 dressing over left thigh. PIV in left arm.     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 Her hospital course was complicated by infected groin collection with Enterococcus growth from the collection, for which she might require long term amoxicillin for supression.    Patient has a suspected penicillin allergy. At 11 years of age during a hospital admission for typhoid, she was given penicillin. She had whole body swelling, unclear how long after dose/initiation of penicillin treatment. She denies rash, wheezing, lip/tongue swelling during this reaction. She has not taken penicillin since. She denies other drug allergies. No hives, angioedema, asthma or any allergies. She has not had similar swelling since.     Allergies    Penicillin (Hives)    MEDICATIONS  (STANDING):  acetaminophen   Tablet .. 975 milliGRAM(s) Oral every 12 hours  aspirin enteric coated 81 milliGRAM(s) Oral daily  enoxaparin Injectable 40 milliGRAM(s) SubCutaneous daily  influenza   Vaccine 0.5 milliLiter(s) IntraMuscular once  senna 2 Tablet(s) Oral at bedtime  vancomycin  IVPB 1250 milliGRAM(s) IV Intermittent every 24 hours    MEDICATIONS  (PRN):  benzocaine 15 mG/menthol 3.6 mG Lozenge 1 Lozenge Oral three times a day PRN Sore Throat  bisacodyl Suppository 10 milliGRAM(s) Rectal daily PRN Constipation  docusate sodium 100 milliGRAM(s) Oral three times a day PRN Constipation  ondansetron Injectable 4 milliGRAM(s) IV Push every 6 hours PRN Nausea and/or Vomiting  oxyCODONE    IR 5 milliGRAM(s) Oral every 4 hours PRN Moderate Pain (4 - 6)  oxyCODONE    IR 10 milliGRAM(s) Oral every 4 hours PRN Severe Pain (7 - 10)      PAST MEDICAL & SURGICAL HISTORY:  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    Vital Signs Last 24 Hrs  T(C): 36.9 (04 Oct 2018 14:37), Max: 37.3 (03 Oct 2018 20:52)  T(F): 98.5 (04 Oct 2018 14:37), Max: 99.1 (03 Oct 2018 20:52)  HR: 66 (04 Oct 2018 14:37) (55 - 66)  BP: 95/48 (04 Oct 2018 14:37) (95/48 - 104/56)  BP(mean): --  RR: 15 (04 Oct 2018 14:37) (15 - 18)  SpO2: 99% (04 Oct 2018 14:37) (95% - 100%)    PHYSICAL EXAM  All physical exam findings normal, except for those marked:  General:	alert, no acute distress  Lymph Nodes	normal size and consistency, non-tender  Cardiovascular	regular rate and rhythm; Normal S1, S2; No murmur  Respiratory	good air movement bilaterally, no wheezing or crackles, no retractions  Abdominal	soft; nondistended, dressing over left side of abdomen.   Neurologic	alert, appropriate for age, cranial nerves II-XII grossly normal  Musculoskeletal	 dressing over left thigh. PIV in left arm.     Lab Results:                        7.8    10.14 )-----------( 363      ( 03 Oct 2018 10:45 )             24.6     10-03    137  |  102  |  15  ----------------------------<  113<H>  3.9   |  26  |  0.70    Ca    7.3<L>      03 Oct 2018 10:45

## 2018-10-04 NOTE — PROGRESS NOTE ADULT - SUBJECTIVE AND OBJECTIVE BOX
JULIA UMANA  [id number]    Subjective:  Patient is a 67y old  Female who presents with a chief complaint of tumor (03 Oct 2018 15:00)   Patient was seen and examined at bedside. No acute events overnight.     Objective:  T(C): 36.6 (10-04-18 @ 09:45), Max: 37.3 (10-03-18 @ 20:52)  HR: 58 (10-04-18 @ 09:45) (55 - 69)  BP: 101/55 (10-04-18 @ 09:45) (96/58 - 107/65)  RR: 16 (10-04-18 @ 09:45) (16 - 18)  SpO2: 100% (10-04-18 @ 09:45) (95% - 100%)  Wt(kg): --   10-03    137  |  102  |  15  ----------------------------<  113<H>  3.9   |  26  |  0.70    Ca    7.3<L>      03 Oct 2018 10:45                          7.8    10.14 )-----------( 363      ( 03 Oct 2018 10:45 )             24.6       10-03 @ 07:01  -  10-04 @ 07:00  --------------------------------------------------------  IN: 0 mL / OUT: 50 mL / NET: -50 mL      PHYSICAL EXAM:    General: NAD  Groin/abdomen: left groin with stable erythema around incisions. Medial flap incision line opened, packed with wet kerlex, with serous drainage and small debris, serosang output            MEDICATIONS  (STANDING):  acetaminophen   Tablet .. 975 milliGRAM(s) Oral every 12 hours  aspirin enteric coated 81 milliGRAM(s) Oral daily  enoxaparin Injectable 40 milliGRAM(s) SubCutaneous daily  influenza   Vaccine 0.5 milliLiter(s) IntraMuscular once  senna 2 Tablet(s) Oral at bedtime  vancomycin  IVPB 1250 milliGRAM(s) IV Intermittent every 24 hours    MEDICATIONS  (PRN):  benzocaine 15 mG/menthol 3.6 mG Lozenge 1 Lozenge Oral three times a day PRN Sore Throat  bisacodyl Suppository 10 milliGRAM(s) Rectal daily PRN Constipation  docusate sodium 100 milliGRAM(s) Oral three times a day PRN Constipation  ondansetron Injectable 4 milliGRAM(s) IV Push every 6 hours PRN Nausea and/or Vomiting  oxyCODONE    IR 5 milliGRAM(s) Oral every 4 hours PRN Moderate Pain (4 - 6)  oxyCODONE    IR 10 milliGRAM(s) Oral every 4 hours PRN Severe Pain (7 - 10)

## 2018-10-04 NOTE — PROGRESS NOTE ADULT - ASSESSMENT
ASSESSMENT: 67F s/p liposarcoma excision and reconstruction with VRAM POD#17 c/b with 12x8cm collection on CT scan s/p bedside I&D    PLAN:  - f/u ID abx plan  - Continue JEREMY drain/care  - ABDs as needed to collect drainage  - Silvadene to Erythematous Skin  - DVT ppx  - IVF  - Activity per vascular/ortho  - IR consulted for fluid collection on CT      Tasha Hoang PGY-1    Plastic Surgery  [id number]

## 2018-10-04 NOTE — PROGRESS NOTE ADULT - SUBJECTIVE AND OBJECTIVE BOX
No acute events overnight. Pain controlled.     PE:  Vital Signs Last 24 Hrs  T(C): 36.5 (04 Oct 2018 05:49), Max: 37.3 (03 Oct 2018 20:52)  T(F): 97.7 (04 Oct 2018 05:49), Max: 99.1 (03 Oct 2018 20:52)  HR: 55 (04 Oct 2018 05:49) (54 - 69)  BP: 104/56 (04 Oct 2018 05:49) (96/58 - 107/65)  RR: 16 (04 Oct 2018 05:49) (16 - 18)  SpO2: 96% (04 Oct 2018 05:49) (95% - 98%)    Gen: No acute distress  LLE: incision with some serous drainage with some surrounding erythema, incision site dressed  Motor intact TA/GCS/EHL/FHL   SILT DP/SP/Tib  cap refill <2 sec, wwp     Labs:                        7.8    10.14 )-----------( 363      ( 03 Oct 2018 10:45 )             24.6   10-03    137  |  102  |  15  ----------------------------<  113<H>  3.9   |  26  |  0.70    Ca    7.3<L>      03 Oct 2018 10:45    Assessment/Plan:  67yFemale s/p liposarcoma excision complicated by fluid collections   -pain control  -FU PRS  -dressing per PRS  -PT  -WBAT in [place]I  -OOB  -DVT ppx  -dispo plan

## 2018-10-04 NOTE — CONSULT NOTE ADULT - ASSESSMENT
67-year-old female with hypertension admitted for surgical resection of left groin liposarcoma now s/p resection, left popliteal bypass, rectus abdominis flap. Allergy and immunology consulted for history of penicillin allergy, since patient is in need of amoxicillin for long term management.    Patient reports a history of reaction to penicillin at 11 years of age. Described reaction is not suggestive of an IgE-mediated reaction (whole body swelling without hives, angioedema, wheezing, GI symptoms). We discussed that skin testing is only predictive of immediate-type reactions. People with negative skin test still have a chance of developing delayed hypersensitivity reaction. There is no reliable testing available for delayed reactions.     After obtaining consent, skin test to penicillin, ampicillin and Pre-Pen were done and were negative. Intradermal test was done per penicillin testing protocol and were also negative. 67-year-old female with hypertension admitted for surgical resection of left groin liposarcoma now s/p resection, left popliteal bypass, rectus abdominis flap. Allergy and immunology consulted for history of penicillin allergy, since patient is in need of amoxicillin for long term management.    Patient reports a history of reaction to penicillin at 11 years of age. Described reaction is not suggestive of an IgE-mediated reaction (whole body swelling without hives, angioedema, wheezing, GI symptoms). We discussed that skin testing is only predictive of immediate-type reactions. People with negative skin test still have a chance of developing delayed hypersensitivity reaction. There is no reliable testing available for delayed reactions.     After obtaining consent, skin test to penicillin, ampicillin and Pre-Pen were done and were negative. Intradermal test was done per penicillin testing protocol and were also negative.    We would recommend an inpatient graded challenge to amoxicillin. Please give ~20% of the planned dose of amoxicillin and monitor for 20 minutes for signs of a reaction. Liquid amoxicillin can be used for easier dose measuring. If no reaction occurs, give 80% of the dose to complete the planned dose. If patient doesn't have any signs of reactions after this, she can continue the course as planned.    Please contact allergy and immunology fellow on call with questions ([phone]).

## 2018-10-04 NOTE — PROGRESS NOTE ADULT - ASSESSMENT
67y f with a PMH of HTN,   S/p extensive surgery for resection of sarcoma of left groin with need for vascular grafting,   complicated by infected groin collection   S/p I and D on 9/25/18 with recovery of Enterococcus faecalis   she is afebrile and her white cell count is stable     Plan:   Continue IV vancomycin she is going to require and extended course of antibiotics, six week of IV followed by po per last ID note   continue with wound care as per plastic  and ortho services

## 2018-10-04 NOTE — PROGRESS NOTE ADULT - PROBLEM SELECTOR PLAN 1
Resolved. Initially found to have pansensitive E. coli UTI (noted on 9/22 urine cx) . Evaluated by ID - IV abx broadened to cefepime and vancomycin after noted left thigh abscess on CT scan, currently s/p OR washout done on 9/26, POD#7. Pt hemodynamically stable. Patient remains afebrile. Now only on vancomycin  Ceftriaxone 9/22-2/25  Vancomycin 9/23 - present  Cefepime 9/25-9/29  - continue vancomycin 1250mg daily--> f/u ID recs  - closely monitor VS and blood count  - f/u 9/25 blood cx --> NGTD   - f/u 9/26 OR thigh cx --> noted Enterococcus faecalis Leukocytosis resolved. Evaluated by ID. Currently s/p OR washout done on 9/26, POD#8 and now bedside I&D by plastic surgery on 10/3. Pt hemodynamically stable. Patient remains afebrile. Now only on vancomycin. 9/25 blood cx --> no growth. 9/26 OR thigh cx --> noted pansensitive Enterococcus faecalis  Ceftriaxone 9/22-2/25  Vancomycin 9/23 - present  Cefepime 9/25-9/29  - continue vancomycin 1250mg daily--> f/u ID recs  - plan for six week IV abx course followed by PO abx as per ID  - consider IR consult for further drainage if necessary  - continue pain control as needed  - dressing changes as per plastic surgery recs

## 2018-10-04 NOTE — PROGRESS NOTE ADULT - PROBLEM SELECTOR PLAN 2
Patient with longstanding LLE edema post-op. Likely due to underlying fluid collection in patient's groin area extending to her left thigh, which is still present despite drains.    - plan as per plastic surgery. F/u recs  - pain control as needed. POD#17 of surgical excision with reconstruction. C/b post-op left thigh abscess s/p OR washout today, POD#8  -post-op care as per ortho, plastics, and vascular surgery  -PT as tolerated  - pain control/bowel regimen   - IV abx as noted above  - f/u vascular surg and plastic surg recs

## 2018-10-04 NOTE — PROGRESS NOTE ADULT - SUBJECTIVE AND OBJECTIVE BOX
CHIEF COMPLAINT: f/u     SUBJECTIVE / OVERNIGHT EVENTS:   [employer]  utilized for [place], ID# [id number]  Patient seen and examined this morning. No acute events overnight. Pain is better controlled today. Patient states she was able to stand with PT longer than she had been before. As per nursing staff, pt has been having significant draining from incision site for bedside I&D of left thigh collection performed by plastic surgery team yesterday.      MEDICATIONS  (STANDING):  acetaminophen   Tablet .. 975 milliGRAM(s) Oral every 12 hours  aspirin enteric coated 81 milliGRAM(s) Oral daily  enoxaparin Injectable 40 milliGRAM(s) SubCutaneous daily  influenza   Vaccine 0.5 milliLiter(s) IntraMuscular once  senna 2 Tablet(s) Oral at bedtime  vancomycin  IVPB 1250 milliGRAM(s) IV Intermittent every 24 hours    MEDICATIONS  (PRN):  benzocaine 15 mG/menthol 3.6 mG Lozenge 1 Lozenge Oral three times a day PRN Sore Throat  bisacodyl Suppository 10 milliGRAM(s) Rectal daily PRN Constipation  docusate sodium 100 milliGRAM(s) Oral three times a day PRN Constipation  ondansetron Injectable 4 milliGRAM(s) IV Push every 6 hours PRN Nausea and/or Vomiting  oxyCODONE    IR 5 milliGRAM(s) Oral every 4 hours PRN Moderate Pain (4 - 6)  oxyCODONE    IR 10 milliGRAM(s) Oral every 4 hours PRN Severe Pain (7 - 10)      VITALS:  T(F): 98.5 (10-04-18 @ 14:37), Max: 99.1 (10-03-18 @ 20:52)  HR: 66 (10-04-18 @ 14:37) (55 - 66)  BP: 95/48 (10-04-18 @ 14:37) (95/48 - 104/56)  RR: 15 (10-04-18 @ 14:37) (15 - 18)  SpO2: 99% (10-04-18 @ 14:37)      PHYSICAL EXAM:  GENERAL: Laying in bed in NAD  CHEST/LUNG: decreased BS at lung bases b/l but otherwise clear  HEART: Regular rate and rhythm; No murmurs, rubs, or gallops  ABDOMEN/PELVIS: Dressing over left sided surgical site. Erythema with noted edema of mons pubis, edema of left thigh and dependent area of flanks. Viable skin flap. Blackening of skin tissue medial to skin flap  EXTREMITIES: Left upper thigh with saturated dressing; b/l lower leg edema  PSYCH: Alert & Oriented, follows commands; pleasant      LABS:              7.8                  137  | 26   | 15           10.14 >-----------< 363     ------------------------< 113                   24.6                 3.9  | 102  | 0.70                                         Ca 7.3   Mg x     Ph x                [ ] Consultant(s) Notes Reviewed:  [x] Care Discussed with Consultants/Other Providers: Orthopedic PA - discussed

## 2018-10-04 NOTE — PROGRESS NOTE ADULT - PROBLEM SELECTOR PLAN 5
Initially due to intraoperative bleeding. Pt asymptomatic. No signs of ongoing bleeding on exam  - avoid unnecessary blood draws  - consider transfusing for Hb <7.0 Evaluated by nutritionist/dietician on 9/29.  -  ensure clear 1 can po 3 x daily  - encourage increased oral intake

## 2018-10-04 NOTE — CONSULT NOTE ADULT - ATTENDING COMMENTS
I have personally interviewed and examined this patient, reviewed pertinent labs and imaging, and discussed the case with colleagues, residents, physician assistants, and nurses on SICU rounds.     30    minutes in total were spent in providing direct critical care for the diagnoses, assessment and plan outlined below.  These diagnoses are unrelated to the surgical procedure.  Additionally, time spent in the performance of separately billable procedures was not counted toward the critical care time.  There is no overlap.
I have seen and examined this patient and agree with Dr. Arzola's above plan.  Negative PCN skin testing confers 92-95% negative predictive value for PCN allergy.  Recommend graded challenge (PO or IV) to rule out PCN allergy.  If patient passes PCN challenge, can remove allergy from chart and use PCN agents.

## 2018-10-04 NOTE — PROGRESS NOTE ADULT - SUBJECTIVE AND OBJECTIVE BOX
CC: f/u for  infected left leg collection    Patient is laying in bed she is awake and alert     REVIEW OF SYSTEMS:  All other review of systems negative (Comprehensive ROS)    Antimicrobials Day #  : 9  vancomycin  IVPB 1250 milliGRAM(s) IV Intermittent every 24 hours    Other Medications Reviewed    Vital Signs Last 24 Hrs  T(C): 36.9 (04 Oct 2018 14:37), Max: 37.3 (03 Oct 2018 20:52)  T(F): 98.5 (04 Oct 2018 14:37), Max: 99.1 (03 Oct 2018 20:52)  HR: 66 (04 Oct 2018 14:37) (55 - 66)  BP: 95/48 (04 Oct 2018 14:37) (95/48 - 104/56)  BP(mean): --  RR: 15 (04 Oct 2018 14:37) (15 - 18)  SpO2: 99% (04 Oct 2018 14:37) (95% - 100%)    PHYSICAL EXAM:  General: alert, no acute distress  Eyes:  anicteric, no conjunctival injection, no discharge  Oropharynx: no lesions	  Neck: supple, without adenopathy  Lungs: clear to auscultation  Heart: regular rate and rhythm; no murmur, rubs or gallops  Abdomen: wounds are clean & intact with staples in place, drains in place, not tender. Left leg wounds are C/D/I  Skin: no lesions  Extremities: no clubbing, cyanosis,. left leg edema >> RLE edema   Neurologic: alert, oriented, moves all extremities    LAB RESULTS:                                   7.8    10.14 )-----------( 363      ( 03 Oct 2018 10:45 )             24.6     10-03    137  |  102  |  15  ----------------------------<  113<H>  3.9   |  26  |  0.70    Ca    7.3<L>      03 Oct 2018 10:45          MICROBIOLOGY:  RECENT CULTURES:    RADIOLOGY REVIEWED:

## 2018-10-04 NOTE — CONSULT NOTE ADULT - PROBLEM SELECTOR RECOMMENDATION 9
POD#4 of surgical excision with revascularization and reconstruction surgery.  - surgical care as per ortho and plastic surgery  - DVT ppx as per surgical team  - PT/OT as tolerated  - pain control as per ortho  - bowel regimen
Negative skin prick test and intradermal test for penicillin G, ampicillin and Pre-Pen.  - Inpatient graded challenge to amoxicillin with 20% of dose and 20 minutes of monitoring, followed by remainder of the dose (80%)

## 2018-10-05 LAB
ALBUMIN SERPL ELPH-MCNC: 2 G/DL — LOW (ref 3.3–5)
ALP SERPL-CCNC: 94 U/L — SIGNIFICANT CHANGE UP (ref 40–120)
ALT FLD-CCNC: 27 U/L — SIGNIFICANT CHANGE UP (ref 4–33)
AST SERPL-CCNC: 16 U/L — SIGNIFICANT CHANGE UP (ref 4–32)
BILIRUB SERPL-MCNC: < 0.2 MG/DL — LOW (ref 0.2–1.2)
BUN SERPL-MCNC: 16 MG/DL — SIGNIFICANT CHANGE UP (ref 7–23)
CALCIUM SERPL-MCNC: 7.8 MG/DL — LOW (ref 8.4–10.5)
CHLORIDE SERPL-SCNC: 100 MMOL/L — SIGNIFICANT CHANGE UP (ref 98–107)
CO2 SERPL-SCNC: 28 MMOL/L — SIGNIFICANT CHANGE UP (ref 22–31)
CREAT SERPL-MCNC: 0.63 MG/DL — SIGNIFICANT CHANGE UP (ref 0.5–1.3)
GLUCOSE SERPL-MCNC: 132 MG/DL — HIGH (ref 70–99)
GRAM STN SPEC: SIGNIFICANT CHANGE UP
HCT VFR BLD CALC: 25.4 % — LOW (ref 34.5–45)
HGB BLD-MCNC: 8.1 G/DL — LOW (ref 11.5–15.5)
MCHC RBC-ENTMCNC: 29.8 PG — SIGNIFICANT CHANGE UP (ref 27–34)
MCHC RBC-ENTMCNC: 31.9 % — LOW (ref 32–36)
MCV RBC AUTO: 93.4 FL — SIGNIFICANT CHANGE UP (ref 80–100)
NRBC # FLD: 0 — SIGNIFICANT CHANGE UP
PLATELET # BLD AUTO: 326 K/UL — SIGNIFICANT CHANGE UP (ref 150–400)
PMV BLD: 10 FL — SIGNIFICANT CHANGE UP (ref 7–13)
POTASSIUM SERPL-MCNC: 4.5 MMOL/L — SIGNIFICANT CHANGE UP (ref 3.5–5.3)
POTASSIUM SERPL-SCNC: 4.5 MMOL/L — SIGNIFICANT CHANGE UP (ref 3.5–5.3)
PROT SERPL-MCNC: 4.9 G/DL — LOW (ref 6–8.3)
RBC # BLD: 2.72 M/UL — LOW (ref 3.8–5.2)
RBC # FLD: 15.2 % — HIGH (ref 10.3–14.5)
SODIUM SERPL-SCNC: 138 MMOL/L — SIGNIFICANT CHANGE UP (ref 135–145)
SPECIMEN SOURCE: SIGNIFICANT CHANGE UP
VANCOMYCIN TROUGH SERPL-MCNC: 10.4 UG/ML — SIGNIFICANT CHANGE UP (ref 10–20)
WBC # BLD: 7.87 K/UL — SIGNIFICANT CHANGE UP (ref 3.8–10.5)
WBC # FLD AUTO: 7.87 K/UL — SIGNIFICANT CHANGE UP (ref 3.8–10.5)

## 2018-10-05 PROCEDURE — 99233 SBSQ HOSP IP/OBS HIGH 50: CPT

## 2018-10-05 RX ORDER — SODIUM CHLORIDE 9 MG/ML
500 INJECTION, SOLUTION INTRAVENOUS ONCE
Qty: 0 | Refills: 0 | Status: COMPLETED | OUTPATIENT
Start: 2018-10-05 | End: 2018-10-05

## 2018-10-05 RX ADMIN — OXYCODONE HYDROCHLORIDE 10 MILLIGRAM(S): 5 TABLET ORAL at 12:21

## 2018-10-05 RX ADMIN — OXYCODONE HYDROCHLORIDE 10 MILLIGRAM(S): 5 TABLET ORAL at 14:13

## 2018-10-05 RX ADMIN — OXYCODONE HYDROCHLORIDE 10 MILLIGRAM(S): 5 TABLET ORAL at 03:00

## 2018-10-05 RX ADMIN — OXYCODONE HYDROCHLORIDE 10 MILLIGRAM(S): 5 TABLET ORAL at 18:46

## 2018-10-05 RX ADMIN — OXYCODONE HYDROCHLORIDE 10 MILLIGRAM(S): 5 TABLET ORAL at 04:00

## 2018-10-05 RX ADMIN — OXYCODONE HYDROCHLORIDE 10 MILLIGRAM(S): 5 TABLET ORAL at 08:01

## 2018-10-05 RX ADMIN — Medication 81 MILLIGRAM(S): at 12:16

## 2018-10-05 RX ADMIN — OXYCODONE HYDROCHLORIDE 10 MILLIGRAM(S): 5 TABLET ORAL at 07:04

## 2018-10-05 RX ADMIN — ENOXAPARIN SODIUM 40 MILLIGRAM(S): 100 INJECTION SUBCUTANEOUS at 12:15

## 2018-10-05 RX ADMIN — Medication 975 MILLIGRAM(S): at 18:47

## 2018-10-05 RX ADMIN — Medication 975 MILLIGRAM(S): at 07:04

## 2018-10-05 RX ADMIN — SODIUM CHLORIDE 1000 MILLILITER(S): 9 INJECTION, SOLUTION INTRAVENOUS at 09:53

## 2018-10-05 RX ADMIN — Medication 166.67 MILLIGRAM(S): at 12:15

## 2018-10-05 NOTE — PROGRESS NOTE ADULT - SUBJECTIVE AND OBJECTIVE BOX
No acute events overnight. Pain controlled.     PE:  Vital Signs Last 24 Hrs  T(C): 36.8 (05 Oct 2018 01:44), Max: 37.5 (04 Oct 2018 17:43)  T(F): 98.3 (05 Oct 2018 01:44), Max: 99.5 (04 Oct 2018 17:43)  HR: 62 (05 Oct 2018 01:44) (58 - 66)  BP: 115/81 (05 Oct 2018 01:44) (95/48 - 115/81)  RR: 16 (05 Oct 2018 01:44) (15 - 16)  SpO2: 100% (05 Oct 2018 01:44) (95% - 100%)  Gen: No acute distress  LLE: some serous drainage on abds  wound packed wet to dry per PRS  Motor intact TA/GCS/EHL/FHL   SILT DP/SP/Tib  cap refill <2 sec, wwp     Labs:                        7.8    10.14 )-----------( 363      ( 03 Oct 2018 10:45 )             24.6   10-03    137  |  102  |  15  ----------------------------<  113<H>  3.9   |  26  |  0.70    Ca    7.3<L>      03 Oct 2018 10:45    Assessment/Plan:  67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

## 2018-10-05 NOTE — PROGRESS NOTE ADULT - ASSESSMENT
Ms. Fleming is a 66 yo woman with PMHx of HTN admitted for scheduled surgical resection of left inguinal mass. Patient underwent enbloc resection of inguinal mass, left femoral to below knee popliteal bypass, venous resection, and reconstruction with rectus abdominus myocutaneous flap on 9/17. Hospital course notable for asymptomatic bigeminy with bradycardia deemed 2/2 catecholamine surge, non-traumatic rhabdomyolysis - both resolved. Transferred to surgical floor on 9/21 from SICU, where she stayed post-op for closer hemodynamic monitoring. Patient found to have post-surgical left thigh abscess s/p washout done on 9/26 in addition to CAUTI due to E. coli. Patient appears clinically stable and WBC improved on IV abx, currently only on vancomycin. Tissue culture growing pansensitive Enterococcus faecalis. Pt also s/p bedside I&D of left thigh abscess on 10/3 Ms. Fleming is a 66 yo woman with PMHx of HTN admitted for scheduled surgical resection of left inguinal mass. Patient underwent enbloc resection of inguinal mass, left femoral to below knee popliteal bypass, venous resection, and reconstruction with rectus abdominus myocutaneous flap on 9/17. Hospital course notable for asymptomatic bigeminy with bradycardia deemed 2/2 catecholamine surge, non-traumatic rhabdomyolysis - both resolved. Transferred to surgical floor on 9/21 from SICU, where she stayed post-op for closer hemodynamic monitoring. Patient found to have post-surgical left thigh abscess s/p washout done on 9/26 in addition to CAUTI due to E. coli. Patient appears clinically stable and WBC improved on IV abx, currently only on vancomycin. Tissue culture growing pansensitive Enterococcus faecalis. Pt also s/p bedside I&D of left thigh abscess on 10/3 and today, 10/5

## 2018-10-05 NOTE — PROGRESS NOTE ADULT - PROBLEM SELECTOR PLAN 4
Initially due to intraoperative bleeding. Pt asymptomatic. No signs of ongoing bleeding on exam  - avoid unnecessary blood draws  - consider transfusing for Hb <7.0

## 2018-10-05 NOTE — PROCEDURE NOTE - NSPOSTCAREGUIDE_GEN_A_CORE
Care for catheter as per unit/ICU protocols
Care for catheter as per unit/ICU protocols
Verbal/written post procedure instructions were given to patient/caregiver
Verbal/written post procedure instructions were given to patient/caregiver

## 2018-10-05 NOTE — PROGRESS NOTE ADULT - ASSESSMENT
ASSESSMENT: 67F s/p liposarcoma excision and reconstruction with VRAM POD#18 c/b with 12x8cm collection on CT scan s/p bedside I&D x2    PLAN:  - f/u ID abx plan  - ABDs as needed to collect drainage  - daily dressing changes to medial and lateral wounds with wet to dry kerlex   - Silvadene to Erythematous Skin  - DVT ppx  - IVF  - Activity per vascular/ortho  - f/u ortho surgical plan     Tasha Hoang PGY-1    Plastic Surgery  [id number]

## 2018-10-05 NOTE — PROGRESS NOTE ADULT - ASSESSMENT
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  CT and exam suspicious for infected collection.  She is s/p drainage on 9/26 as well as I&D on 10/3 at bedside  Antibiotics are adjunctive to effective drainage.  Her recent CT scan shows fluid is contiguos to graft.  To date no positive blood cultures  Appreciate allergy evaluation, no reaction on PCN skin testing, prior reaction at age 11 not suggestive of type 1 RXN.  Suggest:  1.Continue vanco for now  2.Drainage should serve as "source control"  3.will convert to ampicillin after cultures are finalized  4.long term antibiotics, Will review length of IV therapy with my associates.

## 2018-10-05 NOTE — PROCEDURE NOTE - NSINDICATIONS_GEN_A_CORE
blood sampling/critical patient/arterial puncture to obtain ABG's/monitoring purposes
venous access/critical illness/hemodynamic monitoring/volume resuscitation
seroma/collection
abscess/seroma

## 2018-10-05 NOTE — PROGRESS NOTE ADULT - SUBJECTIVE AND OBJECTIVE BOX
No new complaints. Reports pain is somewhat improved.   Being evaluated by immunologist for PCN allergy  Dressing c/d/i  LLE warm with palpable DP pulse. 1+ pitting edema.    A/P:  68 yo F POD 18 s/p left inguinal mass resection with left EIA to AK popliteal artery bypass with PTFE. Post-op course complicated by L groin collection seen on CTA, s/p I&D 9/25/18 with cultures positive for E. faecalis. Currently on IV vanco.    - Cx results from 10/3 bedside I&D pending  - six week IV vanco followed by PO abx per ID note  - f/u ortho/plastic recs  - No further vascular interventions at this time    Discussed with Dr. Kerr

## 2018-10-05 NOTE — PROCEDURE NOTE - NSINFORMCONSENT_GEN_A_CORE
Benefits, risks, and possible complications of procedure explained to patient/caregiver who verbalized understanding and gave written consent.
Benefits, risks, and possible complications of procedure explained to patient/caregiver who verbalized understanding and gave verbal consent.
Benefits, risks, and possible complications of procedure explained to patient/caregiver who verbalized understanding and gave verbal consent.
This was an emergent procedure.

## 2018-10-05 NOTE — PROGRESS NOTE ADULT - PROBLEM SELECTOR PLAN 2
POD#17 of surgical excision with reconstruction. C/b post-op left thigh abscess s/p OR washout today, POD#8  -post-op care as per ortho, plastics, and vascular surgery  -PT as tolerated  - pain control/bowel regimen   - IV abx as noted above  - f/u vascular surg and plastic surg recs POD#18 of surgical excision with reconstruction. C/b post-op left thigh abscess s/p OR washout today, POD#9  -post-op care as per ortho, plastics, and vascular surgery  -PT as tolerated  - pain control/bowel regimen   - IV abx as noted above  - f/u vascular surg and plastic surg recs

## 2018-10-05 NOTE — PROCEDURE NOTE - NSPROCDETAILS_GEN_ALL_CORE
sterile technique, catheter placed/sterile dressing applied/guidewire recovered/lumen(s) aspirated and flushed/ultrasound guidance
Seldinger technique/all materials/supplies accounted for at end of procedure/location identified, draped/prepped, sterile technique used, needle inserted/introduced/hemostasis with direct pressure, dressing applied/ultrasound guidance/sutured in place
incision left open, packing placed
incision left open, packing placed

## 2018-10-05 NOTE — PROGRESS NOTE ADULT - SUBJECTIVE AND OBJECTIVE BOX
CC: f/u for left groin infection    Patient reports: she is comfortable in bed, s/p bedside I&D on 10/3 with purulent fluid drained    REVIEW OF SYSTEMS:  All other review of systems negative (Comprehensive ROS)    Antimicrobials Day #  :day 12  vancomycin  IVPB 1250 milliGRAM(s) IV Intermittent every 24 hours    Other Medications Reviewed    T(F): 98.2 (10-05-18 @ 15:13), Max: 99.5 (10-04-18 @ 17:43)  HR: 54 (10-05-18 @ 15:13)  BP: 104/60 (10-05-18 @ 15:13)  RR: 18 (10-05-18 @ 15:13)  SpO2: 97% (10-05-18 @ 15:13)  Wt(kg): --    PHYSICAL EXAM:  General: alert, no acute distress  Eyes:  anicteric, no conjunctival injection, no discharge  Oropharynx: no lesions or injection 	  Neck: supple, without adenopathy  Lungs: clear to auscultation  Heart: regular rate and rhythm; no murmur, rubs or gallops  Abdomen: soft, nondistended, nontender, without mass or organomegaly.multiple incisions intact, left groin with packing  Skin: no lesions  Extremities: no clubbing, cyanosis, + edema  Neurologic: alert, oriented, moves all extremities    LAB RESULTS:                        8.1    7.87  )-----------( 326      ( 05 Oct 2018 12:00 )             25.4     10-05    138  |  100  |  16  ----------------------------<  132<H>  4.5   |  28  |  0.63    Ca    7.8<L>      05 Oct 2018 12:00    TPro  4.9<L>  /  Alb  2.0<L>  /  TBili  < 0.2<L>  /  DBili  x   /  AST  16  /  ALT  27  /  AlkPhos  94  10-05    LIVER FUNCTIONS - ( 05 Oct 2018 12:00 )  Alb: 2.0 g/dL / Pro: 4.9 g/dL / ALK PHOS: 94 u/L / ALT: 27 u/L / AST: 16 u/L / GGT: x             MICROBIOLOGY:  RECENT CULTURES:  10-03 @ 15:52 LEG - LEFT     CULTURE IN PROGRESS, FURTHER REPORT TO FOLLOW.  preliminary is enterococcus faecalis        RADIOLOGY REVIEWED:  < from: CT Abdomen and Pelvis w/ IV Cont (10.02.18 @ 15:50) >  PROCEDURE:   CT of the Abdomen and Pelvis was performed with intravenous contrast.   Intravenous contrast: 90 ml Omnipaque 350. 10 ml discarded.  Oral contrast: None.  Sagittal and coronal reformats were performed.    FINDINGS:    LOWER CHEST: Bilateral trace pleural effusions and compressive   atelectasis.    LIVER: Multiple hepatic cysts, stable.  BILE DUCTS: Normal caliber.  GALLBLADDER: Within normal limits.  SPLEEN: Previously identified hypodense lesion is not visualized.  PANCREAS: Within normal limits.  ADRENALS: Within normal limits.  KIDNEYS/URETERS: Left renal cysts, unchanged. Right kidney and ureter   normal.    BLADDER: Within normal limits.  REPRODUCTIVE ORGANS: Unchanged central low attenuation in the uterus. No   adnexal mass.    BOWEL: No bowel obstruction. Appendix normal.  PERITONEUM: No ascites or free air.  VESSELS: Atherosclerotic calcification.  RETROPERITONEUM: No lymphadenopathy.    ABDOMINAL WALL: Status post interval resection of left groin sarcoma.   There is redemonstration of large fluid collection in left groin with   emphysema. The collection is largely unchanged, with its largest   component measuring 12 x 7.5 cm (series 2, image 113). The collection   again surrounds the left femoral bypass graft. Interval removal of 2 of 3   percutaneous drainage catheters. Most inferior drainage catheter remains   unchanged in position with tip in lateral thigh fluid collection. This   collection is unchanged containing fluid and gas measuring 5.5 x 5.3 cm   (series 2, image 115).    Unchanged left anterior abdominal wall complex hernia containing   nonobstructed small and large bowel.    BONES: Within normal limits.    IMPRESSION: No change in left groin and smaller left lateral side fluid   and gas collections.     < end of copied text >

## 2018-10-05 NOTE — PROGRESS NOTE ADULT - SUBJECTIVE AND OBJECTIVE BOX
JULIA UMANA  [id number]    Subjective:  Patient is a 67y old  Female who presents with a chief complaint of Liposarcoma resection (04 Oct 2018 16:29)   Patient was seen and examined at bedside. No acute events overnight. On exam patient had some drainage from lateral incision line, incision was opened bedside and packed. Please see procedure note for further details. Medial wound dressing repacked at bedside this AM    Objective:  T(C): 36.9 (10-05-18 @ 09:21), Max: 37.5 (10-04-18 @ 17:43)  HR: 50 (10-05-18 @ 09:33) (50 - 66)  BP: 90/56 (10-05-18 @ 09:33) (90/55 - 115/81)  RR: 18 (10-05-18 @ 09:21) (15 - 19)  SpO2: 95% (10-05-18 @ 09:21) (95% - 100%)  Wt(kg): --   10-03    137  |  102  |  15  ----------------------------<  113<H>  3.9   |  26  |  0.70    Ca    7.3<L>      03 Oct 2018 10:45                          7.8    10.14 )-----------( 363      ( 03 Oct 2018 10:45 )             24.6       PHYSICAL EXAM:    General: NAD  Groin: medial wound clear, packed with kerlex. drainage expressed from lateral incision line. Other incisions remain stable and intact             MEDICATIONS  (STANDING):  acetaminophen   Tablet .. 975 milliGRAM(s) Oral every 12 hours  aspirin enteric coated 81 milliGRAM(s) Oral daily  enoxaparin Injectable 40 milliGRAM(s) SubCutaneous daily  influenza   Vaccine 0.5 milliLiter(s) IntraMuscular once  senna 2 Tablet(s) Oral at bedtime  vancomycin  IVPB 1250 milliGRAM(s) IV Intermittent every 24 hours    MEDICATIONS  (PRN):  benzocaine 15 mG/menthol 3.6 mG Lozenge 1 Lozenge Oral three times a day PRN Sore Throat  bisacodyl Suppository 10 milliGRAM(s) Rectal daily PRN Constipation  docusate sodium 100 milliGRAM(s) Oral three times a day PRN Constipation  ondansetron Injectable 4 milliGRAM(s) IV Push every 6 hours PRN Nausea and/or Vomiting  oxyCODONE    IR 5 milliGRAM(s) Oral every 4 hours PRN Moderate Pain (4 - 6)  oxyCODONE    IR 10 milliGRAM(s) Oral every 4 hours PRN Severe Pain (7 - 10)

## 2018-10-05 NOTE — PROGRESS NOTE ADULT - PROBLEM SELECTOR PLAN 1
Leukocytosis resolved. Evaluated by ID. Currently s/p OR washout done on 9/26, POD#8 and now bedside I&D by plastic surgery on 10/3. Pt hemodynamically stable. Patient remains afebrile. Now only on vancomycin. 9/25 blood cx --> no growth. 9/26 OR thigh cx --> noted pansensitive Enterococcus faecalis  Ceftriaxone 9/22-2/25  Vancomycin 9/23 - present  Cefepime 9/25-9/29  - continue vancomycin 1250mg daily--> f/u ID recs  - plan for six week IV abx course followed by PO abx as per ID  - consider IR consult for further drainage if necessary  - continue pain control as needed  - dressing changes as per plastic surgery recs Leukocytosis resolved. Evaluated by ID. Currently s/p OR washout done on 9/26, POD#9 and now bedside I&D by plastic surgery on 10/3. Pt hemodynamically stable. Patient remains afebrile. Now only on vancomycin. 9/25 blood cx --> no growth. 9/26 OR thigh cx --> noted pansensitive Enterococcus faecalis  Ceftriaxone 9/22-2/25  Vancomycin 9/23 - present  Cefepime 9/25-9/29  - continue vancomycin as per ID--> f/u ID recs  - plan for six week IV abx course followed by PO abx as per ID  - f/u 10/3 wound cx from left thigh  - consider IR consult for further drainage if necessary  - continue pain control as needed  - dressing changes as per plastic surgery recs

## 2018-10-06 LAB
-  AMPICILLIN: SIGNIFICANT CHANGE UP
-  CIPROFLOXACIN: SIGNIFICANT CHANGE UP
-  TETRACYCLINE: SIGNIFICANT CHANGE UP
-  VANCOMYCIN: SIGNIFICANT CHANGE UP
BUN SERPL-MCNC: 19 MG/DL — SIGNIFICANT CHANGE UP (ref 7–23)
CALCIUM SERPL-MCNC: 8.3 MG/DL — LOW (ref 8.4–10.5)
CHLORIDE SERPL-SCNC: 101 MMOL/L — SIGNIFICANT CHANGE UP (ref 98–107)
CO2 SERPL-SCNC: 29 MMOL/L — SIGNIFICANT CHANGE UP (ref 22–31)
CREAT SERPL-MCNC: 0.69 MG/DL — SIGNIFICANT CHANGE UP (ref 0.5–1.3)
GLUCOSE SERPL-MCNC: 134 MG/DL — HIGH (ref 70–99)
GRAM STN SPEC: SIGNIFICANT CHANGE UP
HCT VFR BLD CALC: 26.5 % — LOW (ref 34.5–45)
HGB BLD-MCNC: 8.3 G/DL — LOW (ref 11.5–15.5)
MCHC RBC-ENTMCNC: 29.9 PG — SIGNIFICANT CHANGE UP (ref 27–34)
MCHC RBC-ENTMCNC: 31.3 % — LOW (ref 32–36)
MCV RBC AUTO: 95.3 FL — SIGNIFICANT CHANGE UP (ref 80–100)
METHOD TYPE: SIGNIFICANT CHANGE UP
NRBC # FLD: 0 — SIGNIFICANT CHANGE UP
ORGANISM # SPEC MICROSCOPIC CNT: SIGNIFICANT CHANGE UP
ORGANISM # SPEC MICROSCOPIC CNT: SIGNIFICANT CHANGE UP
PLATELET # BLD AUTO: 313 K/UL — SIGNIFICANT CHANGE UP (ref 150–400)
PMV BLD: 10.7 FL — SIGNIFICANT CHANGE UP (ref 7–13)
POTASSIUM SERPL-MCNC: 4.2 MMOL/L — SIGNIFICANT CHANGE UP (ref 3.5–5.3)
POTASSIUM SERPL-SCNC: 4.2 MMOL/L — SIGNIFICANT CHANGE UP (ref 3.5–5.3)
RBC # BLD: 2.78 M/UL — LOW (ref 3.8–5.2)
RBC # FLD: 15.1 % — HIGH (ref 10.3–14.5)
SODIUM SERPL-SCNC: 140 MMOL/L — SIGNIFICANT CHANGE UP (ref 135–145)
WBC # BLD: 7.94 K/UL — SIGNIFICANT CHANGE UP (ref 3.8–10.5)
WBC # FLD AUTO: 7.94 K/UL — SIGNIFICANT CHANGE UP (ref 3.8–10.5)

## 2018-10-06 PROCEDURE — 99233 SBSQ HOSP IP/OBS HIGH 50: CPT

## 2018-10-06 RX ADMIN — OXYCODONE HYDROCHLORIDE 10 MILLIGRAM(S): 5 TABLET ORAL at 01:27

## 2018-10-06 RX ADMIN — ENOXAPARIN SODIUM 40 MILLIGRAM(S): 100 INJECTION SUBCUTANEOUS at 12:54

## 2018-10-06 RX ADMIN — OXYCODONE HYDROCHLORIDE 10 MILLIGRAM(S): 5 TABLET ORAL at 14:40

## 2018-10-06 RX ADMIN — Medication 81 MILLIGRAM(S): at 12:54

## 2018-10-06 RX ADMIN — Medication 166.67 MILLIGRAM(S): at 12:40

## 2018-10-06 RX ADMIN — Medication 10 MILLIGRAM(S): at 12:40

## 2018-10-06 RX ADMIN — Medication 975 MILLIGRAM(S): at 18:37

## 2018-10-06 RX ADMIN — SENNA PLUS 2 TABLET(S): 8.6 TABLET ORAL at 21:33

## 2018-10-06 RX ADMIN — OXYCODONE HYDROCHLORIDE 10 MILLIGRAM(S): 5 TABLET ORAL at 02:20

## 2018-10-06 RX ADMIN — OXYCODONE HYDROCHLORIDE 10 MILLIGRAM(S): 5 TABLET ORAL at 22:30

## 2018-10-06 RX ADMIN — Medication 975 MILLIGRAM(S): at 05:47

## 2018-10-06 RX ADMIN — OXYCODONE HYDROCHLORIDE 10 MILLIGRAM(S): 5 TABLET ORAL at 21:33

## 2018-10-06 RX ADMIN — OXYCODONE HYDROCHLORIDE 10 MILLIGRAM(S): 5 TABLET ORAL at 14:06

## 2018-10-06 NOTE — PROGRESS NOTE ADULT - PROBLEM SELECTOR PLAN 1
Leukocytosis resolved. Evaluated by ID. Currently s/p OR washout done on 9/26, POD#10 and now bedside I&D by plastic surgery on 10/3 . Pt hemodynamically stable. Patient remains afebrile. Now only on vancomycin. 9/25 blood cx --> no growth. 9/26 OR thigh cx --> noted pansensitive Enterococcus faecalis  Ceftriaxone 9/22-2/25  Vancomycin 9/23 - present  Cefepime 9/25-9/29  - continue vancomycin as per ID--> f/u ID recs  - plan for six week IV abx course followed by PO abx as per ID  - f/u 10/3 wound cx from left thigh  - consider IR consult for further drainage if necessary  - continue pain control as needed  - dressing changes as per plastic surgery recs

## 2018-10-06 NOTE — PROGRESS NOTE ADULT - ASSESSMENT
ASSESSMENT: 67F s/p liposarcoma excision and reconstruction with VRAM POD#18 c/b with 12x8cm collection on CT scan s/p bedside I&D x2    PLAN:  - f/u ID abx plan  - ABDs as needed to collect drainage  - daily dressing changes to medial and lateral wounds with wet to dry kerlex   - Silvadene to Erythematous Skin  - DVT ppx  - IVF      Tasha Hoang PGY-1    Plastic Surgery  [id number]

## 2018-10-06 NOTE — PROGRESS NOTE ADULT - PROBLEM SELECTOR PLAN 2
POD#18 of surgical excision with reconstruction. C/b post-op left thigh abscess s/p OR washout today, POD#9  -post-op care as per ortho, plastics, and vascular surgery  -PT as tolerated  - pain control/bowel regimen   - IV abx as noted above  - f/u vascular surg and plastic surg recs

## 2018-10-06 NOTE — PROGRESS NOTE ADULT - SUBJECTIVE AND OBJECTIVE BOX
JULIA DONG  [id number]    Subjective:  Patient is a 67y old  Female who presents with a chief complaint of Inguinal mass excision determined to be liposarcoma (05 Oct 2018 18:49)   Patient was seen and examined at bedside. No acute events overnight. Packing in medial and lateral wound changed at bedside this am. Patient continues to have pain. Afebrile.     Objective:  T(C): 36.8 (10-06-18 @ 10:11), Max: 37.1 (10-05-18 @ 21:48)  HR: 56 (10-06-18 @ 10:11) (50 - 64)  BP: 102/58 (10-06-18 @ 10:11) (100/55 - 115/60)  RR: 18 (10-06-18 @ 10:11) (15 - 18)  SpO2: 95% (10-06-18 @ 10:11) (95% - 99%)  Wt(kg): --   10-06    140  |  101  |  19  ----------------------------<  134<H>  4.2   |  29  |  0.69    Ca    8.3<L>      06 Oct 2018 10:07    TPro  4.9<L>  /  Alb  2.0<L>  /  TBili  < 0.2<L>  /  DBili  x   /  AST  16  /  ALT  27  /  AlkPhos  94  10-05                        8.3    7.94  )-----------( 313      ( 06 Oct 2018 10:07 )             26.5       PHYSICAL EXAM:    General: NAD  Groin: medial and lateral wound clear, packed with kerlex. Serous drainage. Other incisions remain stable and intact. Silvadene applied to macerated skin             MEDICATIONS  (STANDING):  acetaminophen   Tablet .. 975 milliGRAM(s) Oral every 12 hours  aspirin enteric coated 81 milliGRAM(s) Oral daily  enoxaparin Injectable 40 milliGRAM(s) SubCutaneous daily  influenza   Vaccine 0.5 milliLiter(s) IntraMuscular once  senna 2 Tablet(s) Oral at bedtime  vancomycin  IVPB 1250 milliGRAM(s) IV Intermittent every 24 hours    MEDICATIONS  (PRN):  benzocaine 15 mG/menthol 3.6 mG Lozenge 1 Lozenge Oral three times a day PRN Sore Throat  bisacodyl Suppository 10 milliGRAM(s) Rectal daily PRN Constipation  docusate sodium 100 milliGRAM(s) Oral three times a day PRN Constipation  ondansetron Injectable 4 milliGRAM(s) IV Push every 6 hours PRN Nausea and/or Vomiting  oxyCODONE    IR 5 milliGRAM(s) Oral every 4 hours PRN Moderate Pain (4 - 6)  oxyCODONE    IR 10 milliGRAM(s) Oral every 4 hours PRN Severe Pain (7 - 10)      Assessment/Plan:  Patient is a 67y old  Female who presents with a chief complaint of Inguinal mass excision determined to be liposarcoma (05 Oct 2018 18:49)

## 2018-10-06 NOTE — PROGRESS NOTE ADULT - SUBJECTIVE AND OBJECTIVE BOX
CC: f/u for post op wound infection    Patient reports no new complaints,    REVIEW OF SYSTEMS:  All other review of systems negative (Comprehensive ROS)    Antimicrobials Day #  :day 13  vancomycin  IVPB 1250 milliGRAM(s) IV Intermittent every 24 hours    Other Medications Reviewed    T(F): 98.3 (10-06-18 @ 10:11), Max: 98.7 (10-05-18 @ 21:48)  HR: 56 (10-06-18 @ 10:11)  BP: 102/58 (10-06-18 @ 10:11)  RR: 18 (10-06-18 @ 10:11)  SpO2: 95% (10-06-18 @ 10:11)  Wt(kg): --    PHYSICAL EXAM:  General: alert, no acute distress  Eyes:  anicteric, no conjunctival injection, no discharge  Oropharynx: no lesions or injection 	  Neck: supple, without adenopathy  Lungs: clear to auscultation  Heart: regular rate and rhythm; no murmur, rubs or gallops  Abdomen: soft, nondistended, nontender, flap incisions intact, left thigh wounds dressed and packed  Skin: no lesions  Extremities: no clubbing, cyanosis, + edema  Neurologic: alert, oriented, moves all extremities    LAB RESULTS:                        8.3    7.94  )-----------( 313      ( 06 Oct 2018 10:07 )             26.5     10-06    140  |  101  |  19  ----------------------------<  134<H>  4.2   |  29  |  0.69    Ca    8.3<L>      06 Oct 2018 10:07    TPro  4.9<L>  /  Alb  2.0<L>  /  TBili  < 0.2<L>  /  DBili  x   /  AST  16  /  ALT  27  /  AlkPhos  94  10-05    LIVER FUNCTIONS - ( 05 Oct 2018 12:00 )  Alb: 2.0 g/dL / Pro: 4.9 g/dL / ALK PHOS: 94 u/L / ALT: 27 u/L / AST: 16 u/L / GGT: x             MICROBIOLOGY:  RECENT CULTURES:  10-05 @ 12:20 LEG - LEFT     NO GROWTH - PRELIMINARY RESULTS      10-03 @ 15:52 LEG - LEFT     CULTURE IN PROGRESS, FURTHER REPORT TO FOLLOW.  E Faecalis        RADIOLOGY REVIEWED:    < from: CT Abdomen and Pelvis w/ IV Cont (10.02.18 @ 15:50) >  BLADDER: Within normal limits.  REPRODUCTIVE ORGANS: Unchanged central low attenuation in the uterus. No   adnexal mass.    BOWEL: No bowel obstruction. Appendix normal.  PERITONEUM: No ascites or free air.  VESSELS: Atherosclerotic calcification.  RETROPERITONEUM: No lymphadenopathy.    ABDOMINAL WALL: Status post interval resection of left groin sarcoma.   There is redemonstration of large fluid collection in left groin with   emphysema. The collection is largely unchanged, with its largest   component measuring 12 x 7.5 cm (series 2, image 113). The collection   again surrounds the left femoral bypass graft. Interval removal of 2 of 3   percutaneous drainage catheters. Most inferior drainage catheter remains   unchanged in position with tip in lateral thigh fluid collection. This   collection is unchanged containing fluid and gas measuring 5.5 x 5.3 cm   (series 2, image 115).    Unchanged left anterior abdominal wall complex hernia containing   nonobstructed small and large bowel.    BONES: Within normal limits.    IMPRESSION: No change in left groin and smaller left lateral side fluid   and gas collections.     < end of copied text >

## 2018-10-06 NOTE — PROGRESS NOTE ADULT - ASSESSMENT
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  CT and exam suspicious for infected collection.  She is s/p drainage on 9/26 as well as I&D on 10/3 at bedside and repeat I&D on 10/5  Antibiotics are adjunctive to effective drainage.  Her recent CT scan shows fluid is contiguous  to graft.  To date no positive blood cultures, E Faecalis has been sole isolate  Appreciate allergy evaluation, no reaction on PCN skin testing, prior reaction at age 11 not suggestive of type 1 RXN.  She will be cleared to use ampicillin  Suggest:  1.Continue vanco for now  2.Drainage should serve as "source control"  3.will convert to ampicillin after cultures are finalized  4.long term antibiotics, Will review length of IV therapy with my associates.If we are going to relegate to long term therapy due to graft material we may consider oral options and avoid lengthy IV course.

## 2018-10-06 NOTE — PROGRESS NOTE ADULT - SUBJECTIVE AND OBJECTIVE BOX
No acute events overnight. Pain controlled.     PE:  ICU Vital Signs Last 24 Hrs  T(C): 36.7 (06 Oct 2018 05:45), Max: 37.2 (05 Oct 2018 07:02)  T(F): 98.1 (06 Oct 2018 05:45), Max: 98.9 (05 Oct 2018 07:02)  HR: 59 (06 Oct 2018 05:45) (50 - 64)  BP: 110/69 (06 Oct 2018 05:45) (90/55 - 115/60)  BP(mean): --  ABP: --  ABP(mean): --  RR: 16 (06 Oct 2018 05:45) (15 - 19)  SpO2: 96% (06 Oct 2018 05:45) (95% - 100%)                          8.1    7.87  )-----------( 326      ( 05 Oct 2018 12:00 )             25.4     10-05    138  |  100  |  16  ----------------------------<  132<H>  4.5   |  28  |  0.63    Ca    7.8<L>      05 Oct 2018 12:00    TPro  4.9<L>  /  Alb  2.0<L>  /  TBili  < 0.2<L>  /  DBili  x   /  AST  16  /  ALT  27  /  AlkPhos  94  10-05      Assessment/Plan:  67yFemale s/p L thigh excision of liposarcoma and rectus flap  -care per PRSa  -recs per allergy  -plan to attempt amoxicillin trial per allergy recs   -FU ID  -pain control  -PT  -WBAT in [facility]  -OOB  -DVT ppx  -dispo plan

## 2018-10-06 NOTE — PROGRESS NOTE ADULT - SUBJECTIVE AND OBJECTIVE BOX
Patient is a 67y old  Female who presents with a chief complaint of sarcoma removal (06 Oct 2018 11:49)      SUBJECTIVE / OVERNIGHT EVENTS: Last BM about 4 days ago. Denies Cp, SOB, n/v. Pain overall controlled.     Review of Systems:     MEDICATIONS  (STANDING):  acetaminophen   Tablet .. 975 milliGRAM(s) Oral every 12 hours  aspirin enteric coated 81 milliGRAM(s) Oral daily  enoxaparin Injectable 40 milliGRAM(s) SubCutaneous daily  influenza   Vaccine 0.5 milliLiter(s) IntraMuscular once  senna 2 Tablet(s) Oral at bedtime  sodium biphosphate Rectal Enema 1 Enema Rectal once  vancomycin  IVPB 1250 milliGRAM(s) IV Intermittent every 24 hours    MEDICATIONS  (PRN):  benzocaine 15 mG/menthol 3.6 mG Lozenge 1 Lozenge Oral three times a day PRN Sore Throat  bisacodyl Suppository 10 milliGRAM(s) Rectal daily PRN Constipation  docusate sodium 100 milliGRAM(s) Oral three times a day PRN Constipation  ondansetron Injectable 4 milliGRAM(s) IV Push every 6 hours PRN Nausea and/or Vomiting  oxyCODONE    IR 5 milliGRAM(s) Oral every 4 hours PRN Moderate Pain (4 - 6)  oxyCODONE    IR 10 milliGRAM(s) Oral every 4 hours PRN Severe Pain (7 - 10)      PHYSICAL EXAM:  Vital Signs Last 24 Hrs  T(C): 36.8 (06 Oct 2018 14:11), Max: 37.1 (05 Oct 2018 21:48)  T(F): 98.3 (06 Oct 2018 14:11), Max: 98.7 (05 Oct 2018 21:48)  HR: 62 (06 Oct 2018 14:11) (55 - 64)  BP: 120/69 (06 Oct 2018 14:11) (102/58 - 120/69)  BP(mean): --  RR: 18 (06 Oct 2018 14:11) (15 - 18)  SpO2: 100% (06 Oct 2018 14:11) (95% - 100%)  I&O's Summary    PHYSICAL EXAM:  GENERAL: Laying in bed in NAD  CHEST/LUNG: decreased BS at lung bases b/l but CTA in other fields   HEART: Regular rate and rhythm; No murmurs, rubs, or gallops  ABDOMEN/PELVIS: Dressing over left sided surgical site - saturated with serous drainage. Mild erythema, edema of left thigh and dependent area of flanks.   EXTREMITIES: b/l lower leg edema  PSYCH: Alert & Oriented, follows commands; pleasant    LABS:  CAPILLARY BLOOD GLUCOSE                              8.3    7.94  )-----------( 313      ( 06 Oct 2018 10:07 )             26.5     10-06    140  |  101  |  19  ----------------------------<  134<H>  4.2   |  29  |  0.69    Ca    8.3<L>      06 Oct 2018 10:07    TPro  4.9<L>  /  Alb  2.0<L>  /  TBili  < 0.2<L>  /  DBili  x   /  AST  16  /  ALT  27  /  AlkPhos  94  10-05              RADIOLOGY & ADDITIONAL TESTS:    Imaging Personally Reviewed:    Consultant(s) Notes Reviewed:      Care Discussed with Consultants/Other Providers:

## 2018-10-06 NOTE — PROGRESS NOTE ADULT - ASSESSMENT
Ms. Fleming is a 68 yo woman with PMHx of HTN admitted for scheduled surgical resection of left inguinal mass. Patient underwent enbloc resection of inguinal mass, left femoral to below knee popliteal bypass, venous resection, and reconstruction with rectus abdominus myocutaneous flap on 9/17. Hospital course notable for asymptomatic bigeminy with bradycardia deemed 2/2 catecholamine surge, non-traumatic rhabdomyolysis - both resolved. Transferred to surgical floor on 9/21 from SICU, where she stayed post-op for closer hemodynamic monitoring. Patient found to have post-surgical left thigh abscess s/p washout done on 9/26 in addition to CAUTI due to E. coli.Tissue culture growing pansensitive Enterococcus faecalis. Pt also s/p bedside I&D of left thigh abscess on 10/3 and  10/5

## 2018-10-07 LAB
BUN SERPL-MCNC: 15 MG/DL — SIGNIFICANT CHANGE UP (ref 7–23)
CALCIUM SERPL-MCNC: 7.9 MG/DL — LOW (ref 8.4–10.5)
CHLORIDE SERPL-SCNC: 99 MMOL/L — SIGNIFICANT CHANGE UP (ref 98–107)
CO2 SERPL-SCNC: 29 MMOL/L — SIGNIFICANT CHANGE UP (ref 22–31)
CREAT SERPL-MCNC: 0.63 MG/DL — SIGNIFICANT CHANGE UP (ref 0.5–1.3)
GLUCOSE SERPL-MCNC: 98 MG/DL — SIGNIFICANT CHANGE UP (ref 70–99)
HCT VFR BLD CALC: 26.9 % — LOW (ref 34.5–45)
HGB BLD-MCNC: 8.6 G/DL — LOW (ref 11.5–15.5)
MAGNESIUM SERPL-MCNC: 2 MG/DL — SIGNIFICANT CHANGE UP (ref 1.6–2.6)
MCHC RBC-ENTMCNC: 30.6 PG — SIGNIFICANT CHANGE UP (ref 27–34)
MCHC RBC-ENTMCNC: 32 % — SIGNIFICANT CHANGE UP (ref 32–36)
MCV RBC AUTO: 95.7 FL — SIGNIFICANT CHANGE UP (ref 80–100)
NRBC # FLD: 0 — SIGNIFICANT CHANGE UP
PHOSPHATE SERPL-MCNC: 3.5 MG/DL — SIGNIFICANT CHANGE UP (ref 2.5–4.5)
PLATELET # BLD AUTO: 310 K/UL — SIGNIFICANT CHANGE UP (ref 150–400)
PMV BLD: 11 FL — SIGNIFICANT CHANGE UP (ref 7–13)
POTASSIUM SERPL-MCNC: 4.2 MMOL/L — SIGNIFICANT CHANGE UP (ref 3.5–5.3)
POTASSIUM SERPL-SCNC: 4.2 MMOL/L — SIGNIFICANT CHANGE UP (ref 3.5–5.3)
RBC # BLD: 2.81 M/UL — LOW (ref 3.8–5.2)
RBC # FLD: 15 % — HIGH (ref 10.3–14.5)
SODIUM SERPL-SCNC: 139 MMOL/L — SIGNIFICANT CHANGE UP (ref 135–145)
WBC # BLD: 6.96 K/UL — SIGNIFICANT CHANGE UP (ref 3.8–10.5)
WBC # FLD AUTO: 6.96 K/UL — SIGNIFICANT CHANGE UP (ref 3.8–10.5)

## 2018-10-07 PROCEDURE — 99233 SBSQ HOSP IP/OBS HIGH 50: CPT

## 2018-10-07 RX ORDER — DOCUSATE SODIUM 100 MG
100 CAPSULE ORAL THREE TIMES A DAY
Qty: 0 | Refills: 0 | Status: DISCONTINUED | OUTPATIENT
Start: 2018-10-07 | End: 2018-10-23

## 2018-10-07 RX ADMIN — OXYCODONE HYDROCHLORIDE 10 MILLIGRAM(S): 5 TABLET ORAL at 19:30

## 2018-10-07 RX ADMIN — Medication 81 MILLIGRAM(S): at 12:32

## 2018-10-07 RX ADMIN — Medication 100 MILLIGRAM(S): at 13:59

## 2018-10-07 RX ADMIN — Medication 975 MILLIGRAM(S): at 18:42

## 2018-10-07 RX ADMIN — Medication 975 MILLIGRAM(S): at 05:37

## 2018-10-07 RX ADMIN — SENNA PLUS 2 TABLET(S): 8.6 TABLET ORAL at 21:17

## 2018-10-07 RX ADMIN — OXYCODONE HYDROCHLORIDE 10 MILLIGRAM(S): 5 TABLET ORAL at 11:30

## 2018-10-07 RX ADMIN — Medication 100 MILLIGRAM(S): at 21:16

## 2018-10-07 RX ADMIN — OXYCODONE HYDROCHLORIDE 10 MILLIGRAM(S): 5 TABLET ORAL at 10:48

## 2018-10-07 RX ADMIN — Medication 166.67 MILLIGRAM(S): at 12:32

## 2018-10-07 RX ADMIN — OXYCODONE HYDROCHLORIDE 10 MILLIGRAM(S): 5 TABLET ORAL at 18:42

## 2018-10-07 RX ADMIN — ENOXAPARIN SODIUM 40 MILLIGRAM(S): 100 INJECTION SUBCUTANEOUS at 12:32

## 2018-10-07 NOTE — PROGRESS NOTE ADULT - SUBJECTIVE AND OBJECTIVE BOX
Plastic Surgery  Daily Progress Note     Subjective/24 Hour Events: No acute events overnight    Objective:    Vitals:  T(C): 36.8 (10-07-18 @ 05:36), Max: 37.1 (10-06-18 @ 09:28)  HR: 56 (10-07-18 @ 05:36) (55 - 67)  BP: 108/58 (10-07-18 @ 05:36) (99/43 - 123/66)  RR: 16 (10-07-18 @ 05:36) (16 - 18)  SpO2: 99% (10-07-18 @ 05:36) (95% - 100%)    I/O:     10-06-18 @ 07:01  -  10-07-18 @ 07:00  --------------------------------------------------------  IN: 0 mL / OUT: 200 mL / NET: -200 mL      Physical Exam:   General: NAD  Groin: Medial & lateral wound clear, packed w/ Kerlex. Serous drainage. Other incisions remain stable and intact. Silvadene applied to macerated skin

## 2018-10-07 NOTE — PROGRESS NOTE ADULT - ASSESSMENT
Ms. Fleming is a 66 yo woman with PMHx of HTN admitted for scheduled surgical resection of left inguinal mass. Patient underwent enbloc resection of inguinal mass, left femoral to below knee popliteal bypass, venous resection, and reconstruction with rectus abdominus myocutaneous flap on 9/17. Hospital course notable for asymptomatic bigeminy with bradycardia deemed 2/2 catecholamine surge, non-traumatic rhabdomyolysis - both resolved. Transferred to surgical floor on 9/21 from SICU, where she stayed post-op for closer hemodynamic monitoring. Patient found to have post-surgical left thigh abscess s/p washout done on 9/26 in addition to CAUTI due to E. coli.Tissue culture growing pansensitive Enterococcus faecalis. Pt also s/p bedside I&D of left thigh abscess on 10/3 and  10/5

## 2018-10-07 NOTE — PROGRESS NOTE ADULT - SUBJECTIVE AND OBJECTIVE BOX
Vital Signs Last 24 Hrs  T(C): 36.9 (06 Oct 2018 21:23), Max: 37.1 (06 Oct 2018 09:28)  T(F): 98.5 (06 Oct 2018 21:23), Max: 98.7 (06 Oct 2018 09:28)  HR: 67 (06 Oct 2018 21:23) (55 - 67)  BP: 99/43 (06 Oct 2018 21:23) (99/43 - 123/66)  BP(mean): --  RR: 16 (06 Oct 2018 21:23) (16 - 18)  SpO2: 99% (06 Oct 2018 21:23) (95% - 100%)  No acute events overnight. Pain controlled.     PE:                        8.3    7.94  )-----------( 313      ( 06 Oct 2018 10:07 )             26.5     10-06    140  |  101  |  19  ----------------------------<  134<H>  4.2   |  29  |  0.69    Ca    8.3<L>      06 Oct 2018 10:07    TPro  4.9<L>  /  Alb  2.0<L>  /  TBili  < 0.2<L>  /  DBili  x   /  AST  16  /  ALT  27  /  AlkPhos  94  10-05                         Assessment/Plan:  67yFemale s/p L thigh excision of liposarcoma and rectus flap  -care per PRSa  -recs per allergy  -plan to attempt amoxicillin trial per allergy recs   -FU ID  -pain control  -PT  -WBAT in [facility]  -OOB  -DVT ppx  -dispo plan

## 2018-10-07 NOTE — PROGRESS NOTE ADULT - PROBLEM SELECTOR PLAN 1
Leukocytosis resolved. Evaluated by ID. Currently s/p OR washout done on 9/26, POD#10 and now bedside I&D by plastic surgery on 10/3 . Pt hemodynamically stable. Patient remains afebrile. Now only on vancomycin. 9/25 blood cx --> no growth. 9/26 OR thigh cx --> noted pansensitive Enterococcus faecalis. 10/3 and 10/5 cx also growing enterococcus   Ceftriaxone 9/22-2/25  Vancomycin 9/23 - present  Cefepime 9/25-9/29  - continue vancomycin as per ID--> f/u ID recs  - plan for six week IV abx course followed by PO abx as per ID. Continue to appreciate ID recs.   - continue pain control as needed, c/w BM regimen   - dressing changes as per plastic surgery recs

## 2018-10-07 NOTE — PROGRESS NOTE ADULT - SUBJECTIVE AND OBJECTIVE BOX
CC: f/u for post op wound infection    Patient reports no specific complaints    REVIEW OF SYSTEMS:  All other review of systems negative (Comprehensive ROS)    Antimicrobials Day #  :14  vancomycin  IVPB 1250 milliGRAM(s) IV Intermittent every 24 hours    Other Medications Reviewed    T(F): 98.3 (10-07-18 @ 05:36), Max: 98.7 (10-06-18 @ 09:28)  HR: 56 (10-07-18 @ 05:36)  BP: 108/58 (10-07-18 @ 05:36)  RR: 16 (10-07-18 @ 05:36)  SpO2: 99% (10-07-18 @ 05:36)  Wt(kg): --    PHYSICAL EXAM:  General: alert, no acute distress  Eyes:  anicteric, no conjunctival injection, no discharge  Oropharynx: no lesions or injection 	  Neck: supple, without adenopathy  Lungs: clear to auscultation  Heart: regular rate and rhythm; no murmur, rubs or gallops  Abdomen: soft, nondistended, nontender, flap intact, left groin wound packed, left knee incision is c/d/i  Skin: no lesions  Extremities: no clubbing, cyanosis, ++ edema  Neurologic: alert, oriented, moves all extremities    LAB RESULTS:                        8.6    6.96  )-----------( 310      ( 07 Oct 2018 06:18 )             26.9     10-07    139  |  99  |  15  ----------------------------<  98  4.2   |  29  |  0.63    Ca    7.9<L>      07 Oct 2018 06:18  Phos  3.5     10-07  Mg     2.0     10-07    TPro  4.9<L>  /  Alb  2.0<L>  /  TBili  < 0.2<L>  /  DBili  x   /  AST  16  /  ALT  27  /  AlkPhos  94  10-05    LIVER FUNCTIONS - ( 05 Oct 2018 12:00 )  Alb: 2.0 g/dL / Pro: 4.9 g/dL / ALK PHOS: 94 u/L / ALT: 27 u/L / AST: 16 u/L / GGT: x             MICROBIOLOGY:  RECENT CULTURES:  10-05 @ 12:20 LEG - LEFT     NO GROWTH - PRELIMINARY RESULTS      10-03 @ 15:52 LEG - LEFT Enterococcus faecalis    CULTURE IN PROGRESS, FURTHER REPORT TO FOLLOW.  sensitive to amp, vanco,cipro        RADIOLOGY REVIEWED:    < from: CT Abdomen and Pelvis w/ IV Cont (10.02.18 @ 15:50) >    PROCEDURE:   CT of the Abdomen and Pelvis was performed with intravenous contrast.   Intravenous contrast: 90 ml Omnipaque 350. 10 ml discarded.  Oral contrast: None.  Sagittal and coronal reformats were performed.    FINDINGS:    LOWER CHEST: Bilateral trace pleural effusions and compressive   atelectasis.    LIVER: Multiple hepatic cysts, stable.  BILE DUCTS: Normal caliber.  GALLBLADDER: Within normal limits.  SPLEEN: Previously identified hypodense lesion is not visualized.  PANCREAS: Within normal limits.  ADRENALS: Within normal limits.  KIDNEYS/URETERS: Left renal cysts, unchanged. Right kidney and ureter   normal.    BLADDER: Within normal limits.  REPRODUCTIVE ORGANS: Unchanged central low attenuation in the uterus. No   adnexal mass.    BOWEL: No bowel obstruction. Appendix normal.  PERITONEUM: No ascites or free air.  VESSELS: Atherosclerotic calcification.  RETROPERITONEUM: No lymphadenopathy.    ABDOMINAL WALL: Status post interval resection of left groin sarcoma.   There is redemonstration of large fluid collection in left groin with   emphysema. The collection is largely unchanged, with its largest   component measuring 12 x 7.5 cm (series 2, image 113). The collection   again surrounds the left femoral bypass graft. Interval removal of 2 of 3   percutaneous drainage catheters. Most inferior drainage catheter remains   unchanged in position with tip in lateral thigh fluid collection. This   collection is unchanged containing fluid and gas measuring 5.5 x 5.3 cm   (series 2, image 115).    Unchanged left anterior abdominal wall complex hernia containing   nonobstructed small and large bowel.    BONES: Within normal limits.    IMPRESSION: No change in left groin and smaller left lateral side fluid   and gas collections.     < end of copied text >

## 2018-10-07 NOTE — PROGRESS NOTE ADULT - PROBLEM SELECTOR PLAN 2
POD#20 of surgical excision with reconstruction. C/b post-op left thigh abscess s/p OR washout, POD#11  -post-op care as per ortho, plastics, and vascular surgery  -PT as tolerated  - pain control/bowel regimen   - IV abx as noted above  - f/u vascular surg and plastic surg recs

## 2018-10-07 NOTE — PROGRESS NOTE ADULT - ASSESSMENT
67F s/p liposarcoma excision and reconstruction with VRAM POD#18 c/b with 12x8cm collection on CT scan s/p bedside I&D x2    - f/u ID abx plan  - ABD pads, as needed to collect drainage  - daily dressing changes to medial and lateral wounds with wet to dry kerlex   - Silvadene to Erythematous Skin  - DVT ppx  - IVF  - Dispo to rehab

## 2018-10-07 NOTE — PROGRESS NOTE ADULT - SUBJECTIVE AND OBJECTIVE BOX
Patient is a 67y old  Female who presents with a chief complaint of sarcoma (07 Oct 2018 08:23)      SUBJECTIVE / OVERNIGHT EVENTS: Patient had BM yesterday. Currently having pain at site of surgery. Denies fevers/chills/n/v     Review of Systems:     MEDICATIONS  (STANDING):  acetaminophen   Tablet .. 975 milliGRAM(s) Oral every 12 hours  aspirin enteric coated 81 milliGRAM(s) Oral daily  docusate sodium 100 milliGRAM(s) Oral three times a day  enoxaparin Injectable 40 milliGRAM(s) SubCutaneous daily  influenza   Vaccine 0.5 milliLiter(s) IntraMuscular once  senna 2 Tablet(s) Oral at bedtime  vancomycin  IVPB 1250 milliGRAM(s) IV Intermittent every 24 hours    MEDICATIONS  (PRN):  benzocaine 15 mG/menthol 3.6 mG Lozenge 1 Lozenge Oral three times a day PRN Sore Throat  bisacodyl Suppository 10 milliGRAM(s) Rectal daily PRN Constipation  ondansetron Injectable 4 milliGRAM(s) IV Push every 6 hours PRN Nausea and/or Vomiting  oxyCODONE    IR 5 milliGRAM(s) Oral every 4 hours PRN Moderate Pain (4 - 6)  oxyCODONE    IR 10 milliGRAM(s) Oral every 4 hours PRN Severe Pain (7 - 10)      PHYSICAL EXAM:    I&O's Summary    06 Oct 2018 07:01  -  07 Oct 2018 07:00  --------------------------------------------------------  IN: 0 mL / OUT: 200 mL / NET: -200 mL      Vital Signs Last 24 Hrs  T(C): 37 (07 Oct 2018 13:57), Max: 37 (07 Oct 2018 09:05)  T(F): 98.6 (07 Oct 2018 13:57), Max: 98.6 (07 Oct 2018 09:05)  HR: 62 (07 Oct 2018 13:57) (56 - 67)  BP: 113/76 (07 Oct 2018 13:57) (99/43 - 123/66)  BP(mean): --  RR: 17 (07 Oct 2018 13:57) (16 - 18)  SpO2: 100% (07 Oct 2018 13:57) (97% - 100%)  LABS:  CAPILLARY BLOOD GLUCOSE    PHYSICAL EXAM:  GENERAL: Laying in bed in NAD  CHEST/LUNG: decreased BS at lung bases b/l but CTA in other fields   HEART: Regular rate and rhythm; No murmurs, rubs, or gallops  ABDOMEN/PELVIS: Dressing over left sided surgical site. Mild erythema, edema of left thigh and dependent area of flanks. Staples at surgical site intact   EXTREMITIES: b/l lower leg edema  PSYCH: Alert & Oriented, follows commands; pleasant                              8.6    6.96  )-----------( 310      ( 07 Oct 2018 06:18 )             26.9     10-07    139  |  99  |  15  ----------------------------<  98  4.2   |  29  |  0.63    Ca    7.9<L>      07 Oct 2018 06:18  Phos  3.5     10-07  Mg     2.0     10-07                RADIOLOGY & ADDITIONAL TESTS:    Imaging Personally Reviewed:    Consultant(s) Notes Reviewed:      Care Discussed with Consultants/Other Providers:

## 2018-10-07 NOTE — PROGRESS NOTE ADULT - ASSESSMENT
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  CT and exam suspicious for infected collection.  She is s/p drainage on 9/26 as well as I&D on 10/3 at bedside and repeat I&D on 10/5  Antibiotics are adjunctive to effective drainage.Initial 2 cultures with E Faecalis  Her recent CT scan shows fluid is contiguous  to graft.? if recent drainage procedure have been effective in drainind fluid  To date no positive blood cultures, E Faecalis has been sole isolate from fluid collections  Her most recent drainage fluid is sterile so far  Appreciate allergy evaluation, no reaction on PCN skin testing, prior reaction at age 11 not suggestive of type 1 RXN.  She will be cleared to use ampicillin when desired  Suggest:  1.Continue vanco for now  2.Drainage should serve as "source control"  3.will convert to ampicillin after cultures are finalized  4.long term antibiotics, Will review length of IV therapy with my associates.If we are going to relegate to long term therapy due to graft material we may consider oral options and avoid lengthy IV course.She is afebrile, has a normal wbc, and I would consider oral stepdown but will review with my associates

## 2018-10-08 LAB
-  AMPICILLIN: SIGNIFICANT CHANGE UP
-  CIPROFLOXACIN: SIGNIFICANT CHANGE UP
-  TETRACYCLINE: SIGNIFICANT CHANGE UP
-  VANCOMYCIN: SIGNIFICANT CHANGE UP
CULTURE RESULTS: SIGNIFICANT CHANGE UP
GRAM STN SPEC: SIGNIFICANT CHANGE UP
METHOD TYPE: SIGNIFICANT CHANGE UP
ORGANISM # SPEC MICROSCOPIC CNT: SIGNIFICANT CHANGE UP
ORGANISM # SPEC MICROSCOPIC CNT: SIGNIFICANT CHANGE UP
VANCOMYCIN TROUGH SERPL-MCNC: 10.3 UG/ML — SIGNIFICANT CHANGE UP (ref 10–20)

## 2018-10-08 RX ORDER — OXYCODONE HYDROCHLORIDE 5 MG/1
10 TABLET ORAL EVERY 4 HOURS
Qty: 0 | Refills: 0 | Status: DISCONTINUED | OUTPATIENT
Start: 2018-10-08 | End: 2018-10-15

## 2018-10-08 RX ORDER — OXYCODONE HYDROCHLORIDE 5 MG/1
5 TABLET ORAL EVERY 4 HOURS
Qty: 0 | Refills: 0 | Status: DISCONTINUED | OUTPATIENT
Start: 2018-10-08 | End: 2018-10-15

## 2018-10-08 RX ADMIN — Medication 100 MILLIGRAM(S): at 21:53

## 2018-10-08 RX ADMIN — OXYCODONE HYDROCHLORIDE 10 MILLIGRAM(S): 5 TABLET ORAL at 14:30

## 2018-10-08 RX ADMIN — SENNA PLUS 2 TABLET(S): 8.6 TABLET ORAL at 21:53

## 2018-10-08 RX ADMIN — OXYCODONE HYDROCHLORIDE 10 MILLIGRAM(S): 5 TABLET ORAL at 18:43

## 2018-10-08 RX ADMIN — Medication 100 MILLIGRAM(S): at 07:00

## 2018-10-08 RX ADMIN — Medication 975 MILLIGRAM(S): at 18:44

## 2018-10-08 RX ADMIN — OXYCODONE HYDROCHLORIDE 10 MILLIGRAM(S): 5 TABLET ORAL at 03:16

## 2018-10-08 RX ADMIN — Medication 975 MILLIGRAM(S): at 07:00

## 2018-10-08 RX ADMIN — OXYCODONE HYDROCHLORIDE 10 MILLIGRAM(S): 5 TABLET ORAL at 19:30

## 2018-10-08 RX ADMIN — ENOXAPARIN SODIUM 40 MILLIGRAM(S): 100 INJECTION SUBCUTANEOUS at 13:07

## 2018-10-08 RX ADMIN — Medication 166.67 MILLIGRAM(S): at 12:02

## 2018-10-08 RX ADMIN — Medication 100 MILLIGRAM(S): at 13:07

## 2018-10-08 RX ADMIN — OXYCODONE HYDROCHLORIDE 10 MILLIGRAM(S): 5 TABLET ORAL at 13:47

## 2018-10-08 RX ADMIN — Medication 975 MILLIGRAM(S): at 07:01

## 2018-10-08 RX ADMIN — Medication 81 MILLIGRAM(S): at 13:07

## 2018-10-08 NOTE — PROGRESS NOTE ADULT - SUBJECTIVE AND OBJECTIVE BOX
CC: f/u for left leg infection    Patient reports: she is comfortable in bed    REVIEW OF SYSTEMS:  All other review of systems negative (Comprehensive ROS)    Antimicrobials Day #  :day 15  vancomycin  IVPB 1250 milliGRAM(s) IV Intermittent every 24 hours    Other Medications Reviewed    T(F): 98.6 (10-08-18 @ 13:06), Max: 99 (10-07-18 @ 17:46)  HR: 56 (10-08-18 @ 13:06)  BP: 112/68 (10-08-18 @ 13:06)  RR: 16 (10-08-18 @ 13:06)  SpO2: 98% (10-08-18 @ 13:06)  Wt(kg): --    PHYSICAL EXAM:  General: alert, no acute distress  Eyes:  anicteric, no conjunctival injection, no discharge  Oropharynx: no lesions or injection 	  Neck: supple, without adenopathy  Lungs: clear to auscultation  Heart: regular rate and rhythm; no murmur, rubs or gallops  Abdomen: incisions intact, left groin and thigh dressed,packed  Skin: no lesions  Extremities: no clubbing, cyanosis, or edema  Neurologic: alert, oriented, moves all extremities    LAB RESULTS:                        8.6    6.96  )-----------( 310      ( 07 Oct 2018 06:18 )             26.9     10-07    139  |  99  |  15  ----------------------------<  98  4.2   |  29  |  0.63    Ca    7.9<L>      07 Oct 2018 06:18  Phos  3.5     10-07  Mg     2.0     10-07          MICROBIOLOGY:  RECENT CULTURES:  10-05 @ 12:20 LEG - LEFT     NO GROWTH - PRELIMINARY RESULTS  FEW  ENTF^Enterococcus faecalis          RADIOLOGY REVIEWED:

## 2018-10-08 NOTE — PROGRESS NOTE ADULT - SUBJECTIVE AND OBJECTIVE BOX
No acute events overnight. Pain controlled.     PE:  Vital Signs Last 24 Hrs  T(C): 36.8 (07 Oct 2018 21:12), Max: 37.2 (07 Oct 2018 17:46)  T(F): 98.2 (07 Oct 2018 21:12), Max: 99 (07 Oct 2018 17:46)  HR: 62 (07 Oct 2018 21:12) (60 - 66)  BP: 100/69 (07 Oct 2018 21:12) (100/69 - 116/64)  RR: 16 (07 Oct 2018 21:12) (16 - 17)  SpO2: 98% (07 Oct 2018 21:12) (98% - 100%)  Gen: No acute distress  LLE: dressing w/ serous saturation   Motor intact TA/GCS/EHL/FHL   SILT DP/SP/Tib  cap refill <2 sec, [facility]     Labs:                        8.6    6.96  )-----------( 310      ( 07 Oct 2018 06:18 )             26.9   10-07    139  |  99  |  15  -----------------------<  98  4.2   |  29  |  0.63    Ca    7.9<L>      07 Oct 2018 06:18  Phos  3.5     10-07  Mg     2.0     10-07    Assessment/Plan:  67yFemale s/p liposarcoma excisions c/b wound drainage and multiple bedside PRS I and D  -pain control  -Fu PRS  -cont vanc   -PT  -WBAT in [facility]  -OOB  -DVT ppx  -dispo plan

## 2018-10-08 NOTE — PROGRESS NOTE ADULT - ASSESSMENT
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  CT and exam suspicious for infected collection.  She is s/p drainage on 9/26 as well as I&D on 10/3 at bedside and repeat I&D on 10/5  Antibiotics are adjunctive to effective drainage.All  3 cultures with E Faecalis  Her recent CT scan shows fluid is contiguous  to graft.? if recent drainage procedure have been effective in drainind fluid  To date no positive blood cultures, E Faecalis has been sole isolate from fluid collections and not blood  Her most recent drainage fluid is still growing enterococcus despite 10 day of vanco.This points to limited role of antibiotics with sterilizing body collections  Appreciate allergy evaluation, no reaction on PCN skin testing, prior reaction at age 11 not suggestive of type 1 RXN.  She will be cleared to use ampicillin when desired  Suggest:  1.Continue vanco for now  2.Drainage should serve as "source control"  3.will favor switch to ampicillin  4.I have re-evaluate long term antibiotic plan.I think we should view drainage of fluid as most important modality.Antibiotics are secondary.I am in favor of a long course of oral amoxacillin given presence of graft but I do not believe she will gain much by IV route.Therefore i would switch to po amoxacillin in AM, suggest starting as advised by allergy evaluation.

## 2018-10-08 NOTE — PROGRESS NOTE ADULT - SUBJECTIVE AND OBJECTIVE BOX
JULIA UMANA  [id number]    Subjective:  Patient is a 67y old  Female who presents with a chief complaint of L thigh abscess (07 Oct 2018 17:31)   Patient was seen and examined at bedside. No acute events overnight. Dressing changed bedside. Patient having pain, but expresses pain medication helps.     Objective:  T(C): 36.8 (10-08-18 @ 10:51), Max: 37.2 (10-07-18 @ 17:46)  HR: 55 (10-08-18 @ 10:51) (54 - 62)  BP: 105/61 (10-08-18 @ 10:51) (100/69 - 116/64)  RR: 16 (10-08-18 @ 10:51) (16 - 17)  SpO2: 98% (10-08-18 @ 10:51) (98% - 100%)  Wt(kg): --   10-07    139  |  99  |  15  ----------------------------<  98  4.2   |  29  |  0.63    Ca    7.9<L>      07 Oct 2018 06:18  Phos  3.5     10-07  Mg     2.0     10-07                          8.6    6.96  )-----------( 310      ( 07 Oct 2018 06:18 )             26.9       10-07 @ 07:01  -  10-08 @ 07:00  --------------------------------------------------------  IN: 0 mL / OUT: 425 mL / NET: -425 mL    10-08 @ 07:01  -  10-08 @ 12:26  --------------------------------------------------------  IN: 0 mL / OUT: 450 mL / NET: -450 mL      PHYSICAL EXAM:    General: NAD  Groin: Medial & lateral wound clear, packed w/ Kerlex. Serous drainage. Other incisions remain stable and intact. Silvadene applied to macerated skin             MEDICATIONS  (STANDING):  acetaminophen   Tablet .. 975 milliGRAM(s) Oral every 12 hours  aspirin enteric coated 81 milliGRAM(s) Oral daily  docusate sodium 100 milliGRAM(s) Oral three times a day  enoxaparin Injectable 40 milliGRAM(s) SubCutaneous daily  influenza   Vaccine 0.5 milliLiter(s) IntraMuscular once  senna 2 Tablet(s) Oral at bedtime  vancomycin  IVPB 1250 milliGRAM(s) IV Intermittent every 24 hours    MEDICATIONS  (PRN):  benzocaine 15 mG/menthol 3.6 mG Lozenge 1 Lozenge Oral three times a day PRN Sore Throat  bisacodyl Suppository 10 milliGRAM(s) Rectal daily PRN Constipation  ondansetron Injectable 4 milliGRAM(s) IV Push every 6 hours PRN Nausea and/or Vomiting  oxyCODONE    IR 10 milliGRAM(s) Oral every 4 hours PRN Severe Pain (7 - 10)  oxyCODONE    IR 5 milliGRAM(s) Oral every 4 hours PRN Moderate Pain (4 - 6)      Assessment/Plan:  Patient is a 67y old  Female who presents with a chief complaint of L thigh abscess (07 Oct 2018 17:31)

## 2018-10-08 NOTE — PROGRESS NOTE ADULT - ASSESSMENT
67F s/p liposarcoma excision and reconstruction with VRAM POD#21 c/b with 12x8cm collection on CT scan s/p bedside I&D x2 with open wounds    - f/u ID abx plan, c/w vancomycin   - ABD pads, as needed to collect drainage  - daily dressing changes to medial and lateral wounds with wet to dry kerlex   - Silvadene to Erythematous Skin  - DVT ppx  - IVF  - Dispo to rehab

## 2018-10-09 RX ORDER — AMPICILLIN TRIHYDRATE 250 MG
1 CAPSULE ORAL EVERY 8 HOURS
Qty: 0 | Refills: 0 | Status: DISCONTINUED | OUTPATIENT
Start: 2018-10-09 | End: 2018-10-15

## 2018-10-09 RX ORDER — DIPHENHYDRAMINE HCL 50 MG
50 CAPSULE ORAL ONCE
Qty: 0 | Refills: 0 | Status: DISCONTINUED | OUTPATIENT
Start: 2018-10-09 | End: 2018-10-15

## 2018-10-09 RX ORDER — AMOXICILLIN 250 MG/5ML
250 SUSPENSION, RECONSTITUTED, ORAL (ML) ORAL ONCE
Qty: 0 | Refills: 0 | Status: COMPLETED | OUTPATIENT
Start: 2018-10-09 | End: 2018-10-09

## 2018-10-09 RX ADMIN — ENOXAPARIN SODIUM 40 MILLIGRAM(S): 100 INJECTION SUBCUTANEOUS at 13:42

## 2018-10-09 RX ADMIN — Medication 108 GRAM(S): at 23:37

## 2018-10-09 RX ADMIN — Medication 10 MILLIGRAM(S): at 15:44

## 2018-10-09 RX ADMIN — Medication 100 MILLIGRAM(S): at 06:31

## 2018-10-09 RX ADMIN — Medication 975 MILLIGRAM(S): at 06:31

## 2018-10-09 RX ADMIN — OXYCODONE HYDROCHLORIDE 10 MILLIGRAM(S): 5 TABLET ORAL at 17:00

## 2018-10-09 RX ADMIN — Medication 975 MILLIGRAM(S): at 17:42

## 2018-10-09 RX ADMIN — OXYCODONE HYDROCHLORIDE 10 MILLIGRAM(S): 5 TABLET ORAL at 21:40

## 2018-10-09 RX ADMIN — OXYCODONE HYDROCHLORIDE 10 MILLIGRAM(S): 5 TABLET ORAL at 03:46

## 2018-10-09 RX ADMIN — OXYCODONE HYDROCHLORIDE 10 MILLIGRAM(S): 5 TABLET ORAL at 22:10

## 2018-10-09 RX ADMIN — OXYCODONE HYDROCHLORIDE 10 MILLIGRAM(S): 5 TABLET ORAL at 09:58

## 2018-10-09 RX ADMIN — OXYCODONE HYDROCHLORIDE 10 MILLIGRAM(S): 5 TABLET ORAL at 09:02

## 2018-10-09 RX ADMIN — SENNA PLUS 2 TABLET(S): 8.6 TABLET ORAL at 21:40

## 2018-10-09 RX ADMIN — Medication 81 MILLIGRAM(S): at 13:42

## 2018-10-09 RX ADMIN — Medication 250 MILLIGRAM(S): at 11:29

## 2018-10-09 RX ADMIN — Medication 100 MILLIGRAM(S): at 21:40

## 2018-10-09 RX ADMIN — Medication 100 MILLIGRAM(S): at 13:42

## 2018-10-09 RX ADMIN — OXYCODONE HYDROCHLORIDE 10 MILLIGRAM(S): 5 TABLET ORAL at 15:56

## 2018-10-09 RX ADMIN — Medication 108 GRAM(S): at 16:25

## 2018-10-09 NOTE — PROGRESS NOTE ADULT - SUBJECTIVE AND OBJECTIVE BOX
No acute events overnight. Pain controlled.     PE:  Vital Signs Last 24 Hrs  T(C): 36.8 (08 Oct 2018 21:50), Max: 37 (08 Oct 2018 13:06)  T(F): 98.3 (08 Oct 2018 21:50), Max: 98.6 (08 Oct 2018 13:06)  HR: 57 (08 Oct 2018 21:50) (55 - 57)  BP: 96/57 (08 Oct 2018 21:50) (96/57 - 112/68)  RR: 16 (08 Oct 2018 21:50) (16 - 16)  SpO2: 96% (08 Oct 2018 21:50) (96% - 98%)  Gen: No acute distress  LUE: abds saturated with serous drainage  wound w/ wet to dry dressing  remainder of wound intact   Motor intact TA/GCS/EHL/FHL   SILT DP/SP/Tib  cap refill <2 sec, wwp     Labs:                        8.6    6.96  )-----------( 310      ( 07 Oct 2018 06:18 )             26.9   10-07    139  |  99  |  15  ----------------------------<  98  4.2   |  29  |  0.63    Ca    7.9<L>      07 Oct 2018 06:18  Phos  3.5     10-07  Mg     2.0     10-07    Assessment/Plan:  67yFemale s/p liposarcoma excision POD 22 complicated by collections requiring I and D  -ID appreciated  Recommending change to amoxicillin   -pain control  -PT  -WBAT in KI  -OOB  -DVT ppx  -dispo plan

## 2018-10-09 NOTE — PROGRESS NOTE ADULT - SUBJECTIVE AND OBJECTIVE BOX
JULIA UMANA  [id number]    Subjective:  Patient is a 67y old  Female who presents with a chief complaint of left groin sarcoma (08 Oct 2018 15:53)   Patient was seen and examined at bedside. No acute events overnight. Spoke with ortho team and case management about family communications. Continues to have pain- controlled with medication. Thigh wounds dressings changed at bedside- patient tolerated.    Objective:  T(C): 37.3 (10-09-18 @ 09:06), Max: 37.3 (10-09-18 @ 09:06)  HR: 58 (10-09-18 @ 09:06) (55 - 58)  BP: 140/71 (10-09-18 @ 09:06) (96/57 - 140/71)  RR: 17 (10-09-18 @ 09:06) (16 - 17)  SpO2: 99% (10-09-18 @ 09:06) (96% - 99%)  Wt(kg): --           10-08 @ 07:01  -  10-09 @ 07:00  --------------------------------------------------------  IN: 0 mL / OUT: 1350 mL / NET: -1350 mL      PHYSICAL EXAM:    General: NAD  Groin: Medial & lateral wound clear, granulation tissue at wound bed, packed w/ Kerlex. Serous drainage. Other incisions remain stable and intact. Silvadene applied to macerated skin             MEDICATIONS  (STANDING):  acetaminophen   Tablet .. 975 milliGRAM(s) Oral every 12 hours  aspirin enteric coated 81 milliGRAM(s) Oral daily  docusate sodium 100 milliGRAM(s) Oral three times a day  enoxaparin Injectable 40 milliGRAM(s) SubCutaneous daily  influenza   Vaccine 0.5 milliLiter(s) IntraMuscular once  senna 2 Tablet(s) Oral at bedtime  vancomycin  IVPB 1250 milliGRAM(s) IV Intermittent every 24 hours    MEDICATIONS  (PRN):  benzocaine 15 mG/menthol 3.6 mG Lozenge 1 Lozenge Oral three times a day PRN Sore Throat  bisacodyl Suppository 10 milliGRAM(s) Rectal daily PRN Constipation  ondansetron Injectable 4 milliGRAM(s) IV Push every 6 hours PRN Nausea and/or Vomiting  oxyCODONE    IR 10 milliGRAM(s) Oral every 4 hours PRN Severe Pain (7 - 10)  oxyCODONE    IR 5 milliGRAM(s) Oral every 4 hours PRN Moderate Pain (4 - 6)

## 2018-10-09 NOTE — PROGRESS NOTE ADULT - ASSESSMENT
67F s/p liposarcoma excision and reconstruction with VRAM POD#22 c/b with 12x8cm collection on CT scan s/p bedside I&D x2    - Transition to amoxicillin per ID/allergy  - ABD pads, as needed to collect drainage  - daily dressing changes to medial and lateral wounds with wet to dry kerlex   - Silvadene to Erythematous Skin  - DVT ppx  - IVF  - Dispo to rehab

## 2018-10-10 RX ADMIN — SENNA PLUS 2 TABLET(S): 8.6 TABLET ORAL at 22:29

## 2018-10-10 RX ADMIN — OXYCODONE HYDROCHLORIDE 10 MILLIGRAM(S): 5 TABLET ORAL at 22:28

## 2018-10-10 RX ADMIN — OXYCODONE HYDROCHLORIDE 10 MILLIGRAM(S): 5 TABLET ORAL at 05:09

## 2018-10-10 RX ADMIN — Medication 975 MILLIGRAM(S): at 17:06

## 2018-10-10 RX ADMIN — OXYCODONE HYDROCHLORIDE 10 MILLIGRAM(S): 5 TABLET ORAL at 22:58

## 2018-10-10 RX ADMIN — OXYCODONE HYDROCHLORIDE 10 MILLIGRAM(S): 5 TABLET ORAL at 13:59

## 2018-10-10 RX ADMIN — OXYCODONE HYDROCHLORIDE 10 MILLIGRAM(S): 5 TABLET ORAL at 14:30

## 2018-10-10 RX ADMIN — Medication 81 MILLIGRAM(S): at 12:45

## 2018-10-10 RX ADMIN — Medication 108 GRAM(S): at 17:08

## 2018-10-10 RX ADMIN — Medication 975 MILLIGRAM(S): at 05:09

## 2018-10-10 RX ADMIN — Medication 108 GRAM(S): at 23:53

## 2018-10-10 RX ADMIN — Medication 100 MILLIGRAM(S): at 13:59

## 2018-10-10 RX ADMIN — Medication 108 GRAM(S): at 07:33

## 2018-10-10 RX ADMIN — ENOXAPARIN SODIUM 40 MILLIGRAM(S): 100 INJECTION SUBCUTANEOUS at 12:45

## 2018-10-10 RX ADMIN — Medication 100 MILLIGRAM(S): at 22:29

## 2018-10-10 RX ADMIN — OXYCODONE HYDROCHLORIDE 10 MILLIGRAM(S): 5 TABLET ORAL at 05:40

## 2018-10-10 NOTE — PROGRESS NOTE ADULT - SUBJECTIVE AND OBJECTIVE BOX
No acute events overnight. Pain controlled.     PE:  Vital Signs Last 24 Hrs  T(C): 36.8 (10 Oct 2018 05:07), Max: 37.3 (09 Oct 2018 09:06)  T(F): 98.2 (10 Oct 2018 05:07), Max: 99.1 (09 Oct 2018 09:06)  HR: 58 (10 Oct 2018 05:07) (55 - 64)  BP: 131/75 (10 Oct 2018 05:07) (123/70 - 140/71)  RR: 17 (10 Oct 2018 05:07) (16 - 18)  SpO2: 98% (10 Oct 2018 05:07) (95% - 100%)  Gen: No acute distress  LLE: abds saturated w serous drainage  incisions intact w some drainage, I and D sites packed  Motor intact TA/GCS/EHL/FHL   SILT DP/SP/Tib  cap refill <2 sec, wwp     Assessment/Plan:  67yFemale s/p R femur excision liposarcoma s/p bedside I and D x2  -pain control  -PT  -WBAT in KI  -OOB  -DVT ppx  -FU PRS  -continue wound care

## 2018-10-10 NOTE — PROGRESS NOTE ADULT - ASSESSMENT
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  CT and exam suspicious for infected collection.  She is s/p drainage on 9/26 as well as I&D on 10/3 at bedside and repeat I&D on 10/5  Antibiotics are adjunctive to effective drainage.All  3 cultures with E Faecalis, sensitive to amp,cipro, and vanco.  Her recent CT scan shows fluid is contiguous  to graft.? if recent drainage procedure have been effective in drainind fluid  To date no positive blood cultures, E Faecalis has been sole isolate from fluid collections and not blood  Her most recent drainage fluid is still growing enterococcus despite 10 day of vanco.This points to limited role of antibiotics with sterilizing body collections  Appreciate allergy evaluation, no reaction on PCN skin testing, prior reaction at age 11 not suggestive of type 1 RXN.  She was started on ampicillin yesterday , is tolerating drug well.  Suggest:  1.Continue IV ampicillin  for now  2.Drainage should serve as "source control"  3.will favor switch to po amoxacillin in 24-48 hours to extend treatment, 500 tid  4.Will defer to plastics on need for repeat CT scan, she is afebrile with normal wbc count, therefor she is not acting as if there is uncontrolled infection.  5.Given concerns that graft has been surrounded by infected seroma will advise long term po amoxacillin.

## 2018-10-10 NOTE — CHART NOTE - NSCHARTNOTEFT_GEN_A_CORE
NUTRITION FOLLOW-UP: The Pt states that her appetite has not been good and she has not been eating. The Pt states that she does not like the food served in the hospital. Offered alternate food choices that are available but Pt refused them. Pt states that she will drink the Ensure supplements    Weight: unsure of Pt's weight since there is a 13 kg fluctuation in the weights from admission to current    Labs: Glu-98    Current Diet: Regular Ensure Clear 2x/day    Enteral Recommendations:    RD to Remain Available: Destiny Banuelos MS, RDN, CDN pager [phone]     Additional Recommendations:   1) Monitor weight, labs, po intake and skin integrity.

## 2018-10-10 NOTE — PROGRESS NOTE ADULT - SUBJECTIVE AND OBJECTIVE BOX
CC: f/u for post op wound infection    Patient reports: stable pain in leg.she is tolerating ampicillin without rash.    REVIEW OF SYSTEMS:  All other review of systems negative (Comprehensive ROS)    Antimicrobials Day #  :day 17  ampicillin  IVPB 1 Gram(s) IV Intermittent every 8 hours    Other Medications Reviewed    T(F): 98.2 (10-10-18 @ 05:07), Max: 99 (10-09-18 @ 12:50)  HR: 58 (10-10-18 @ 05:07)  BP: 131/75 (10-10-18 @ 05:07)  RR: 17 (10-10-18 @ 05:07)  SpO2: 98% (10-10-18 @ 05:07)  Wt(kg): --    PHYSICAL EXAM:  General: alert, no acute distress  Eyes:  anicteric, no conjunctival injection, no discharge  Oropharynx: no lesions or injection 	  Neck: supple, without adenopathy  Lungs: clear to auscultation  Heart: regular rate and rhythm; no murmur, rubs or gallops  Abdomen: soft, nondistended, nontender,incisions intact, left thigh and groin wounds are packed, incision proximal to knee is C/D/I  Skin: no lesions  Extremities: no clubbing, cyanosis, +edema of left leg  Neurologic: alert, oriented, moves all extremities    LAB RESULTS:              MICROBIOLOGY:  RECENT CULTURES:  10-05 @ 12:20 LEG - LEFT Enterococcus faecalis    NO GROWTH - PRELIMINARY RESULTS  FEW          RADIOLOGY REVIEWED:

## 2018-10-10 NOTE — PROGRESS NOTE ADULT - ASSESSMENT
67F s/p liposarcoma excision and reconstruction with VRAM POD#23 c/b with 12x8cm collection on CT scan s/p bedside I&D x2    - Transition to amoxicillin per ID/allergy  - ABD pads, as needed to collect drainage  - daily dressing changes to medial and lateral wounds with wet to dry kerlex   - Silvadene to Erythematous Skin  - DVT ppx  - IVF  - Dispo to rehab

## 2018-10-10 NOTE — PROGRESS NOTE ADULT - SUBJECTIVE AND OBJECTIVE BOX
JULIA UMANA  [id number]    Subjective:  Patient is a 67y old  Female who presents with a chief complaint of left groin sarcoma (08 Oct 2018 15:53)  Patient was seen and examined at bedside. No acute events overnight. Spoke with ortho team and case management about family communications. Continues to have pain- controlled with medication. Thigh wounds dressings changed at bedside- patient tolerated.    Objective:  T(C): 36.8 (10-10-18 @ 05:07), Max: 37.3 (10-09-18 @ 09:06)  HR: 58 (10-10-18 @ 05:07) (55 - 64)  BP: 131/75 (10-10-18 @ 05:07) (123/70 - 140/71)  BP(mean): --  ABP: --  ABP(mean): --  RR: 17 (10-10-18 @ 05:07) (16 - 18)  SpO2: 98% (10-10-18 @ 05:07) (95% - 100%)  Wt(kg): --  CVP(mm Hg): --  CI: --  CAPILLARY BLOOD GLUCOSE       N/A      10-09 @ 07:01  -  10-10 @ 07:00  --------------------------------------------------------  IN:  Total IN: 0 mL    OUT:    Voided: 2050 mL  Total OUT: 2050 mL    Total NET: -2050 mL          PHYSICAL EXAM:    General: NAD  Groin: Medial & lateral wound clear, granulation tissue at wound bed, packed w/ Kerlex. Serous drainage. Other incisions remain stable and intact. Silvadene applied to macerated skin         MEDICATIONS  (STANDING):  acetaminophen   Tablet .. 975 milliGRAM(s) Oral every 12 hours  aspirin enteric coated 81 milliGRAM(s) Oral daily  docusate sodium 100 milliGRAM(s) Oral three times a day  enoxaparin Injectable 40 milliGRAM(s) SubCutaneous daily  influenza   Vaccine 0.5 milliLiter(s) IntraMuscular once  senna 2 Tablet(s) Oral at bedtime  vancomycin  IVPB 1250 milliGRAM(s) IV Intermittent every 24 hours    MEDICATIONS  (PRN):  benzocaine 15 mG/menthol 3.6 mG Lozenge 1 Lozenge Oral three times a day PRN Sore Throat  bisacodyl Suppository 10 milliGRAM(s) Rectal daily PRN Constipation  ondansetron Injectable 4 milliGRAM(s) IV Push every 6 hours PRN Nausea and/or Vomiting  oxyCODONE    IR 10 milliGRAM(s) Oral every 4 hours PRN Severe Pain (7 - 10)  oxyCODONE    IR 5 milliGRAM(s) Oral every 4 hours PRN Moderate Pain (4 - 6)

## 2018-10-10 NOTE — PROGRESS NOTE ADULT - SUBJECTIVE AND OBJECTIVE BOX
Functioning bypass graft (palp pedal pulse)  Open wounds examined at bedside. Bypass graft currently not seen in wounds.  Plan by plastics to debride in OR. If graft becomes exposed or currently unincorporated and continguous with open wound, not salvageable.  No good reconstruction options at present.  Likely overall poor prognosis.

## 2018-10-11 RX ORDER — ENOXAPARIN SODIUM 100 MG/ML
40 INJECTION SUBCUTANEOUS DAILY
Qty: 0 | Refills: 0 | Status: DISCONTINUED | OUTPATIENT
Start: 2018-10-11 | End: 2018-10-20

## 2018-10-11 RX ADMIN — OXYCODONE HYDROCHLORIDE 10 MILLIGRAM(S): 5 TABLET ORAL at 05:09

## 2018-10-11 RX ADMIN — OXYCODONE HYDROCHLORIDE 10 MILLIGRAM(S): 5 TABLET ORAL at 10:17

## 2018-10-11 RX ADMIN — Medication 100 MILLIGRAM(S): at 14:51

## 2018-10-11 RX ADMIN — OXYCODONE HYDROCHLORIDE 10 MILLIGRAM(S): 5 TABLET ORAL at 17:58

## 2018-10-11 RX ADMIN — Medication 81 MILLIGRAM(S): at 12:45

## 2018-10-11 RX ADMIN — OXYCODONE HYDROCHLORIDE 10 MILLIGRAM(S): 5 TABLET ORAL at 16:51

## 2018-10-11 RX ADMIN — OXYCODONE HYDROCHLORIDE 10 MILLIGRAM(S): 5 TABLET ORAL at 22:25

## 2018-10-11 RX ADMIN — SENNA PLUS 2 TABLET(S): 8.6 TABLET ORAL at 22:25

## 2018-10-11 RX ADMIN — Medication 975 MILLIGRAM(S): at 06:23

## 2018-10-11 RX ADMIN — Medication 108 GRAM(S): at 07:38

## 2018-10-11 RX ADMIN — Medication 100 MILLIGRAM(S): at 06:23

## 2018-10-11 RX ADMIN — Medication 975 MILLIGRAM(S): at 18:02

## 2018-10-11 RX ADMIN — OXYCODONE HYDROCHLORIDE 10 MILLIGRAM(S): 5 TABLET ORAL at 04:39

## 2018-10-11 RX ADMIN — OXYCODONE HYDROCHLORIDE 10 MILLIGRAM(S): 5 TABLET ORAL at 11:17

## 2018-10-11 RX ADMIN — Medication 100 MILLIGRAM(S): at 22:25

## 2018-10-11 RX ADMIN — Medication 108 GRAM(S): at 18:02

## 2018-10-11 RX ADMIN — OXYCODONE HYDROCHLORIDE 10 MILLIGRAM(S): 5 TABLET ORAL at 22:55

## 2018-10-11 RX ADMIN — ENOXAPARIN SODIUM 40 MILLIGRAM(S): 100 INJECTION SUBCUTANEOUS at 12:45

## 2018-10-11 NOTE — PROGRESS NOTE ADULT - SUBJECTIVE AND OBJECTIVE BOX
JULIA UMANA  [id number]    Subjective:  Patient is a 67y old  Female who presents with a chief complaint of left groin sarcoma (08 Oct 2018 15:53)  Patient was seen and examined at bedside. No acute events overnight. Spoke with ortho team and case management about family communications. Continues to have pain- controlled with medication.     Objective:  T(C): 36.7 (10-11-18 @ 06:20), Max: 37 (10-10-18 @ 22:24)  HR: 57 (10-11-18 @ 06:20) (57 - 67)  BP: 117/66 (10-11-18 @ 06:20) (117/66 - 138/79)  BP(mean): --  ABP: --  ABP(mean): --  RR: 16 (10-11-18 @ 06:20) (16 - 18)  SpO2: 100% (10-11-18 @ 06:20) (97% - 100%)  Wt(kg): --  CVP(mm Hg): --  CI: --  CAPILLARY BLOOD GLUCOSE       N/A      10-09 @ 07:01  -  10-10 @ 07:00  --------------------------------------------------------  IN:  Total IN: 0 mL    OUT:    Voided: 2350 mL  Total OUT: 2350 mL    Total NET: -2350 mL      10-10 @ 07:01  -  10-11 @ 06:45  --------------------------------------------------------  IN:  Total IN: 0 mL    OUT:    VAC (Vacuum Assisted Closure) System: 250 mL    Voided: 650 mL  Total OUT: 900 mL    Total NET: -900 mL          PHYSICAL EXAM:    General: NAD  Groin: VAC in place, holding suction Silvadene applied to macerated skin         MEDICATIONS  (STANDING):  acetaminophen   Tablet .. 975 milliGRAM(s) Oral every 12 hours  aspirin enteric coated 81 milliGRAM(s) Oral daily  docusate sodium 100 milliGRAM(s) Oral three times a day  enoxaparin Injectable 40 milliGRAM(s) SubCutaneous daily  influenza   Vaccine 0.5 milliLiter(s) IntraMuscular once  senna 2 Tablet(s) Oral at bedtime  vancomycin  IVPB 1250 milliGRAM(s) IV Intermittent every 24 hours    MEDICATIONS  (PRN):  benzocaine 15 mG/menthol 3.6 mG Lozenge 1 Lozenge Oral three times a day PRN Sore Throat  bisacodyl Suppository 10 milliGRAM(s) Rectal daily PRN Constipation  ondansetron Injectable 4 milliGRAM(s) IV Push every 6 hours PRN Nausea and/or Vomiting  oxyCODONE    IR 10 milliGRAM(s) Oral every 4 hours PRN Severe Pain (7 - 10)  oxyCODONE    IR 5 milliGRAM(s) Oral every 4 hours PRN Moderate Pain (4 - 6)

## 2018-10-11 NOTE — PROGRESS NOTE ADULT - SUBJECTIVE AND OBJECTIVE BOX
VASCULAR SURGERY PROGRESS NOTE:       Subjective:  No acute events overnight      Objective:    PE:  Gen: Laying in bed, in NAD  Resp: airway patent, respirations unlabored, no increased WoB  Groin: VAC in place  Ext: Medial and lateral wound dressed, minimal drainage. Palpable L DP      Vital Signs Last 24 Hrs  T(C): 36.7 (11 Oct 2018 06:20), Max: 37 (10 Oct 2018 22:24)  T(F): 98.1 (11 Oct 2018 06:20), Max: 98.6 (10 Oct 2018 22:24)  HR: 57 (11 Oct 2018 06:20) (57 - 67)  BP: 117/66 (11 Oct 2018 06:20) (117/66 - 138/79)  BP(mean): --  RR: 16 (11 Oct 2018 06:20) (16 - 18)  SpO2: 100% (11 Oct 2018 06:20) (97% - 100%)    I&O's Detail    10 Oct 2018 07:01  -  11 Oct 2018 07:00  --------------------------------------------------------  IN:  Total IN: 0 mL    OUT:    VAC (Vacuum Assisted Closure) System: 250 mL    Voided: 650 mL  Total OUT: 900 mL    Total NET: -900 mL      11 Oct 2018 07:01  -  11 Oct 2018 10:11  --------------------------------------------------------  IN:  Total IN: 0 mL    OUT:    VAC (Vacuum Assisted Closure) System: 50 mL  Total OUT: 50 mL    Total NET: -50 mL          Daily     Daily     MEDICATIONS  (STANDING):  acetaminophen   Tablet .. 975 milliGRAM(s) Oral every 12 hours  ampicillin  IVPB 1 Gram(s) IV Intermittent every 8 hours  aspirin enteric coated 81 milliGRAM(s) Oral daily  diphenhydrAMINE   Injectable 50 milliGRAM(s) IV Push once  docusate sodium 100 milliGRAM(s) Oral three times a day  enoxaparin Injectable 40 milliGRAM(s) SubCutaneous daily  influenza   Vaccine 0.5 milliLiter(s) IntraMuscular once  senna 2 Tablet(s) Oral at bedtime    MEDICATIONS  (PRN):  benzocaine 15 mG/menthol 3.6 mG Lozenge 1 Lozenge Oral three times a day PRN Sore Throat  bisacodyl Suppository 10 milliGRAM(s) Rectal daily PRN Constipation  ondansetron Injectable 4 milliGRAM(s) IV Push every 6 hours PRN Nausea and/or Vomiting  oxyCODONE    IR 10 milliGRAM(s) Oral every 4 hours PRN Severe Pain (7 - 10)  oxyCODONE    IR 5 milliGRAM(s) Oral every 4 hours PRN Moderate Pain (4 - 6)      LABS:                RADIOLOGY & ADDITIONAL STUDIES:

## 2018-10-11 NOTE — PROGRESS NOTE ADULT - ASSESSMENT
67F s/p liposarcoma excision and reconstruction with VRAM POD#24 c/b with 12x8cm collection on CT scan s/p bedside I&D x2. VAC applied yesterday    - Transition to amoxicillin per ID/allergy  - ABD pads, as needed to collect drainage  - VAC change MWF  - Silvadene to Erythematous Skin  - DVT ppx  - IVF  - OR monday for debridement of eschar  - Oncology consult

## 2018-10-11 NOTE — PROGRESS NOTE ADULT - ASSESSMENT
67F PMH HTN s/p enbloc resection of inguinal mass, left femoral to below knee popliteal bypass, venous resection, reconstruction with rectus abdominus myocutaneous flaps/p liposarcoma excision and reconstruction, POC c/b with 12x8cm collection on CT scan s/p bedside I&D x2    - Patient planned for OR for dbt with PRS on Monday (10/15)  - No acute vascular interventions planned at this time  - Care per primary team    ART Ibanez PGY2  Vascular Surgery  [id number]

## 2018-10-12 PROCEDURE — 99222 1ST HOSP IP/OBS MODERATE 55: CPT

## 2018-10-12 RX ADMIN — Medication 108 GRAM(S): at 16:46

## 2018-10-12 RX ADMIN — OXYCODONE HYDROCHLORIDE 5 MILLIGRAM(S): 5 TABLET ORAL at 05:22

## 2018-10-12 RX ADMIN — Medication 1 ENEMA: at 10:06

## 2018-10-12 RX ADMIN — OXYCODONE HYDROCHLORIDE 10 MILLIGRAM(S): 5 TABLET ORAL at 22:21

## 2018-10-12 RX ADMIN — SENNA PLUS 2 TABLET(S): 8.6 TABLET ORAL at 22:21

## 2018-10-12 RX ADMIN — Medication 100 MILLIGRAM(S): at 13:58

## 2018-10-12 RX ADMIN — Medication 108 GRAM(S): at 08:00

## 2018-10-12 RX ADMIN — ENOXAPARIN SODIUM 40 MILLIGRAM(S): 100 INJECTION SUBCUTANEOUS at 13:57

## 2018-10-12 RX ADMIN — OXYCODONE HYDROCHLORIDE 5 MILLIGRAM(S): 5 TABLET ORAL at 05:52

## 2018-10-12 RX ADMIN — Medication 975 MILLIGRAM(S): at 05:22

## 2018-10-12 RX ADMIN — Medication 975 MILLIGRAM(S): at 16:47

## 2018-10-12 RX ADMIN — Medication 81 MILLIGRAM(S): at 13:58

## 2018-10-12 RX ADMIN — OXYCODONE HYDROCHLORIDE 10 MILLIGRAM(S): 5 TABLET ORAL at 22:50

## 2018-10-12 RX ADMIN — Medication 100 MILLIGRAM(S): at 05:23

## 2018-10-12 RX ADMIN — Medication 108 GRAM(S): at 00:40

## 2018-10-12 NOTE — CONSULT NOTE ADULT - SUBJECTIVE AND OBJECTIVE BOX
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 975 milliGRAM(s) Oral every 12 hours  ampicillin  IVPB 1 Gram(s) IV Intermittent every 8 hours  aspirin enteric coated 81 milliGRAM(s) Oral daily  diphenhydrAMINE   Injectable 50 milliGRAM(s) IV Push once  docusate sodium 100 milliGRAM(s) Oral three times a day  enoxaparin Injectable 40 milliGRAM(s) SubCutaneous daily  influenza   Vaccine 0.5 milliLiter(s) IntraMuscular once  senna 2 Tablet(s) Oral at bedtime    MEDICATIONS  (PRN):  benzocaine 15 mG/menthol 3.6 mG Lozenge 1 Lozenge Oral three times a day PRN Sore Throat  bisacodyl Suppository 10 milliGRAM(s) Rectal daily PRN Constipation  ondansetron Injectable 4 milliGRAM(s) IV Push every 6 hours PRN Nausea and/or Vomiting  oxyCODONE    IR 10 milliGRAM(s) Oral every 4 hours PRN Severe Pain (7 - 10)  oxyCODONE    IR 5 milliGRAM(s) Oral every 4 hours PRN Moderate Pain (4 - 6)      FAMILY HISTORY:  No pertinent family history in first degree relatives    SOCIAL HISTORY: No EtOH, no tobacco    REVIEW OF SYSTEMS:  CONSTITUTIONAL: No fevers or chills. +weakness  EYES/ENT: No visual changes;  No vertigo or throat pain   NECK: No pain or stiffness  RESPIRATORY: No cough, wheezing, hemoptysis; No shortness of breath  CARDIOVASCULAR: No chest pain or palpitations  GASTROINTESTINAL: No abdominal or epigastric pain. No nausea, vomiting, or hematemesis; No diarrhea or constipation. No melena or hematochezia.  GENITOURINARY: No dysuria, frequency or hematuria  NEUROLOGICAL: No numbness or weakness  SKIN: No itching, burning, rashes, or lesions. +L groin scars and wound vac/pain associated  All other review of systems is negative unless indicated above.    T(F): 98.5 (10-12-18 @ 14:02), Max: 98.8 (10-12-18 @ 09:13)  HR: 68 (10-12-18 @ 14:02)  BP: 128/66 (10-12-18 @ 14:02)  RR: 17 (10-12-18 @ 14:02)  SpO2: 97% (10-12-18 @ 14:02)  Wt(kg): --    Constitutional: In no acute distress, awake and alert   Eyes: anicteric and no scleral or conjunctival lesions  ENT: oropharynx clear, moist mucus membranes   Lymph Nodes: no lymphadenopathy palpated  Resp: CTA b/l, full lung expansion and no accessory muscle use   CV: RRR, no murmur and no gallops   GI/Abd: normal bowel sounds, no masses, soft, non-distended, no HSM, non-tender. Large midline scar to L groin, healing, non-tender  Musculoskeletal: no atrophy and full range of motion, no clubbing, cyanosis, or edema  Neuro: awake, alert, oriented and moving all extremities, CN II-XII grossly intact  Skin: no rashes. warm and well-perfused. L groin with wound vac and dressing in place, no bleeding or oozing, no masses palpated, minimally tender  Psych: AAOx3, fluent and appropriate speech (Hungarian)    Most recent labs/imaging reviewed. 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 After some delays in outpatient follow up, she was admitted Sept 2018 and s/p enbloc resection of mass, L fem/pop arterial bypass, venous resection, reconstruction with rectus abdominus myocutaneous flap, with post-operative course c/b 12x8cm fluid collection on CT scan s/p bedside I&D x2, wound vac placement. Plan for OR Monday for wound debridement. Oncology is consulted for further recommendations regarding management of her sarcoma. On my exam (with  by phone), she notes some pain at L groin, discomfort with any attempts at walking (does get to chair almost daily) due to wound vac/dressings, and overall some weakness. She notes she is anxious regarding surgery but desires any further interventions necessary for cure. Denies any f/c/s, CP, SOB, SANCHEZ, abd pain, diarrhea, rashes, new neuro symptoms.     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 975 milliGRAM(s) Oral every 12 hours  ampicillin  IVPB 1 Gram(s) IV Intermittent every 8 hours  aspirin enteric coated 81 milliGRAM(s) Oral daily  diphenhydrAMINE   Injectable 50 milliGRAM(s) IV Push once  docusate sodium 100 milliGRAM(s) Oral three times a day  enoxaparin Injectable 40 milliGRAM(s) SubCutaneous daily  influenza   Vaccine 0.5 milliLiter(s) IntraMuscular once  senna 2 Tablet(s) Oral at bedtime    MEDICATIONS  (PRN):  benzocaine 15 mG/menthol 3.6 mG Lozenge 1 Lozenge Oral three times a day PRN Sore Throat  bisacodyl Suppository 10 milliGRAM(s) Rectal daily PRN Constipation  ondansetron Injectable 4 milliGRAM(s) IV Push every 6 hours PRN Nausea and/or Vomiting  oxyCODONE    IR 10 milliGRAM(s) Oral every 4 hours PRN Severe Pain (7 - 10)  oxyCODONE    IR 5 milliGRAM(s) Oral every 4 hours PRN Moderate Pain (4 - 6)      FAMILY HISTORY:  No pertinent family history in first degree relatives    SOCIAL HISTORY: No EtOH, no tobacco    REVIEW OF SYSTEMS:  CONSTITUTIONAL: No fevers or chills. +weakness  EYES/ENT: No visual changes;  No vertigo or throat pain   NECK: No pain or stiffness  RESPIRATORY: No cough, wheezing, hemoptysis; No shortness of breath  CARDIOVASCULAR: No chest pain or palpitations  GASTROINTESTINAL: No abdominal or epigastric pain. No nausea, vomiting, or hematemesis; No diarrhea or constipation. No melena or hematochezia.  GENITOURINARY: No dysuria, frequency or hematuria  NEUROLOGICAL: No numbness or weakness  SKIN: No itching, burning, rashes, or lesions. +L groin scars and wound vac/pain associated  All other review of systems is negative unless indicated above.    T(F): 98.5 (10-12-18 @ 14:02), Max: 98.8 (10-12-18 @ 09:13)  HR: 68 (10-12-18 @ 14:02)  BP: 128/66 (10-12-18 @ 14:02)  RR: 17 (10-12-18 @ 14:02)  SpO2: 97% (10-12-18 @ 14:02)  Wt(kg): --    Constitutional: In no acute distress, awake and alert   Eyes: anicteric and no scleral or conjunctival lesions  ENT: oropharynx clear, moist mucus membranes   Lymph Nodes: no lymphadenopathy palpated  Resp: CTA b/l, full lung expansion and no accessory muscle use   CV: RRR, no murmur and no gallops   GI/Abd: normal bowel sounds, no masses, soft, non-distended, no HSM, non-tender. Large midline scar to L groin, healing, non-tender  Musculoskeletal: no atrophy and full range of motion, no clubbing, cyanosis, or edema  Neuro: awake, alert, oriented and moving all extremities, CN II-XII grossly intact  Skin: no rashes. warm and well-perfused. L groin with wound vac and dressing in place, no bleeding or oozing, no masses palpated, minimally tender  Psych: AAOx3, fluent and appropriate speech (Latvian)    Most recent labs/imaging reviewed.

## 2018-10-12 NOTE — PROGRESS NOTE ADULT - ASSESSMENT
68 yo female s/p L groin liposarcoma resection 9/17 requiring vascular bypass (PTFE) and muscle flap closure.  Post op infected collection.  S/p drainage 9/26, as well as I&D 10/3 at bedside, and repeat I&D 10/5  Antibiotics remains adjunctive to effective drainage.  All cultures from above 3 interventions with E Faecalis, sensitive to amp  Most recent CT scan shows fluid contiguous with graft  No positive blood cultures  E Faecalis has been sole isolate from fluid collections, not blood  Most recent drainage fluid still growing enterococcus despite 10 day of vanco- points to limited role of antibiotics- source control most critical  Cleared by Allergy and started on ampicillin- tolerating well    Suggest:  Continue IV ampicillin for now  Further surgical drainage/revision/closure as noted  Once above completed, anticipate switch to po Amox as extended Tx  Given concerns that graft could be involved, may have to consider indefinite po Amoxacillin.  D/w pt and family at length

## 2018-10-12 NOTE — PROGRESS NOTE ADULT - ASSESSMENT
67F s/p liposarcoma excision and reconstruction with VRAM POD#24 c/b with 12x8cm collection on CT scan s/p bedside I&D x2. VAC applied yesterday    - Transition to amoxicillin per ID/allergy  - VAC change MWF  - Silvadene to necrotic skin   - DVT ppx  - IVF  - OR monday for debridement of eschar  - Oncology consult

## 2018-10-12 NOTE — PROGRESS NOTE ADULT - SUBJECTIVE AND OBJECTIVE BOX
JULIA UMANA  [id number]    Subjective:  Patient is a 68y old  Female who presents with a chief complaint of post op wound infection (10 Oct 2018 09:16)   Patient was seen and examined at bedside. No acute events overnight. Vac to be changed at bedside today by wound team.     Objective:  T(C): 37.1 (10-12-18 @ 09:13), Max: 37.1 (10-11-18 @ 17:27)  HR: 66 (10-12-18 @ 09:13) (55 - 77)  BP: 131/61 (10-12-18 @ 09:13) (110/70 - 132/77)  RR: 16 (10-12-18 @ 09:13) (16 - 17)  SpO2: 95% (10-12-18 @ 09:13) (95% - 100%)  Wt(kg): --           10-11 @ 07:01  -  10-12 @ 07:00  --------------------------------------------------------  IN: 0 mL / OUT: 950 mL / NET: -950 mL    10-12 @ 07:01  -  10-12 @ 09:18  --------------------------------------------------------  IN: 0 mL / OUT: 400 mL / NET: -400 mL      PHYSICAL EXAM:    General: NAD  Groin: Silvadene to necrotic skin, vac in place with good seal, remaining incisions intact             MEDICATIONS  (STANDING):  acetaminophen   Tablet .. 975 milliGRAM(s) Oral every 12 hours  ampicillin  IVPB 1 Gram(s) IV Intermittent every 8 hours  aspirin enteric coated 81 milliGRAM(s) Oral daily  diphenhydrAMINE   Injectable 50 milliGRAM(s) IV Push once  docusate sodium 100 milliGRAM(s) Oral three times a day  enoxaparin Injectable 40 milliGRAM(s) SubCutaneous daily  influenza   Vaccine 0.5 milliLiter(s) IntraMuscular once  senna 2 Tablet(s) Oral at bedtime    MEDICATIONS  (PRN):  benzocaine 15 mG/menthol 3.6 mG Lozenge 1 Lozenge Oral three times a day PRN Sore Throat  bisacodyl Suppository 10 milliGRAM(s) Rectal daily PRN Constipation  ondansetron Injectable 4 milliGRAM(s) IV Push every 6 hours PRN Nausea and/or Vomiting  oxyCODONE    IR 10 milliGRAM(s) Oral every 4 hours PRN Severe Pain (7 - 10)  oxyCODONE    IR 5 milliGRAM(s) Oral every 4 hours PRN Moderate Pain (4 - 6)

## 2018-10-12 NOTE — PROGRESS NOTE ADULT - SUBJECTIVE AND OBJECTIVE BOX
CC: f/u for post op wound infection    Patient reports pain controlled, wound VAC continues    REVIEW OF SYSTEMS:  All other review of systems negative (Comprehensive ROS)    Antimicrobials Day 19  ampicillin  IVPB 1 Gram(s) IV Intermittent every 8 hours    Other Medications Reviewed    Vital Signs Last 24 Hrs  T(F): 99.4 (12 Oct 2018 17:14), Max: 99.4 (12 Oct 2018 17:14)  HR: 78 (12 Oct 2018 17:14) (55 - 78)  BP: 135/82 (12 Oct 2018 17:14) (110/70 - 135/82)  BP(mean): --  RR: 17 (12 Oct 2018 17:14) (16 - 17)  SpO2: 98% (12 Oct 2018 17:14) (95% - 100%)    PHYSICAL EXAM:  General: alert, no acute distress  Eyes:  anicteric, no conjunctival injection, no discharge  Oropharynx: no lesions or injection 	  Neck: supple, without adenopathy  Lungs: clear to auscultation  Heart: regular rate and rhythm; no murmur, rubs or gallops  Abdomen: soft, nondistended, incisions intact, local discoloration LLQ, post inflammatory change, left groin VAC  Skin: no lesions  Extremities: L LE +edema; L distal thigh incision C/D/I  Neurologic: alert, oriented, moves all extremities    LAB RESULTS:  no new labs    MICROBIOLOGY:  RECENT CULTURES:  Culture - Abscess with Gram Stain (10.05.18 @ 12:20)    -  Tetra/Doxy: S <=1 MOOSE    -  Vancomycin: S 2 MOOSE    -  Ampicillin: S <=2 MOOSE    -  Ciprofloxacin: S <=1 MOOSE    Specimen Source: LEG - LEFT    Gram Stain Result:   NOS No Organisms Seen  WBC^White Blood Cells  QNTY CELLS IN GRAM STAIN: RARE (1+)    Culture Results:     Organism Identification: Enterococcus faecalis      RADIOLOGY REVIEWED:  CT Abdomen and Pelvis w/ IV Cont (10.02.18 @ 15:50) >  No change in left groin and smaller left lateral side fluid   and gas collections.

## 2018-10-12 NOTE — PROGRESS NOTE ADULT - SUBJECTIVE AND OBJECTIVE BOX
No acute events overnight. Pain controlled.     PE:  Vital Signs Last 24 Hrs  T(C): 36.7 (12 Oct 2018 01:35), Max: 37.1 (11 Oct 2018 17:27)  T(F): 98.1 (12 Oct 2018 01:35), Max: 98.7 (11 Oct 2018 17:27)  HR: 55 (12 Oct 2018 01:35) (55 - 77)  BP: 115/68 (12 Oct 2018 01:35) (110/70 - 132/77)  BP(mean): --  RR: 17 (12 Oct 2018 01:35) (16 - 17)  SpO2: 98% (12 Oct 2018 01:35) (98% - 100%)  Gen: No acute distress  LLE:   Upper abdominal incisions c/d/i, healing well   vac in place about thigh incision   Motor intact TA/GCS/EHL/FHL   SILT DP/SP/Tib  cap refill <2 sec, wwp       Assessment/Plan:  68yFemale s/p L thigh excision of liposarcoma POD 25 complicated by wound collections and RTOR  -FU PRS, plan for OR Monday  -pain control  -PT  -WBAT in [place]  -OOB  -DVT ppx  -dispo plan

## 2018-10-12 NOTE — CONSULT NOTE ADULT - ASSESSMENT
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 Plan for OR monday for wound debridement. Oncology is consulted for further recommendations regarding management of her sarcoma.    Definitive management and cure of this sarcoma is with surgery/locoregional treatment, given localized disease with low metastatic potential. There is no current plan for chemotherapy. Recommend proceeding with surgery, wound closure/debridement, and potential further resection of any remaining tumor (which may be preventing further healing of surgical site). Please call with any questions.

## 2018-10-13 RX ADMIN — Medication 108 GRAM(S): at 23:59

## 2018-10-13 RX ADMIN — Medication 100 MILLIGRAM(S): at 13:27

## 2018-10-13 RX ADMIN — SENNA PLUS 2 TABLET(S): 8.6 TABLET ORAL at 21:44

## 2018-10-13 RX ADMIN — OXYCODONE HYDROCHLORIDE 10 MILLIGRAM(S): 5 TABLET ORAL at 11:34

## 2018-10-13 RX ADMIN — Medication 108 GRAM(S): at 00:04

## 2018-10-13 RX ADMIN — Medication 81 MILLIGRAM(S): at 13:27

## 2018-10-13 RX ADMIN — Medication 108 GRAM(S): at 07:12

## 2018-10-13 RX ADMIN — Medication 975 MILLIGRAM(S): at 05:27

## 2018-10-13 RX ADMIN — Medication 108 GRAM(S): at 17:39

## 2018-10-13 RX ADMIN — OXYCODONE HYDROCHLORIDE 10 MILLIGRAM(S): 5 TABLET ORAL at 12:30

## 2018-10-13 RX ADMIN — ENOXAPARIN SODIUM 40 MILLIGRAM(S): 100 INJECTION SUBCUTANEOUS at 13:27

## 2018-10-13 RX ADMIN — Medication 100 MILLIGRAM(S): at 21:44

## 2018-10-13 RX ADMIN — Medication 975 MILLIGRAM(S): at 17:40

## 2018-10-13 NOTE — PROGRESS NOTE ADULT - SUBJECTIVE AND OBJECTIVE BOX
JULIA DONG  [id number]    Subjective:  Patient is a 68y old  Female who presents with a chief complaint of operative management of sarcoma (12 Oct 2018 15:06)   Patient was seen and examined at bedside. No acute events overnight. Vac changed yesterday. Silvadene applied to compromised are of wound healing.     Objective:  T(C): 36.9 (10-13-18 @ 05:26), Max: 37.4 (10-12-18 @ 17:14)  HR: 58 (10-13-18 @ 05:26) (58 - 78)  BP: 127/74 (10-13-18 @ 05:26) (127/74 - 135/82)  RR: 17 (10-13-18 @ 05:26) (16 - 18)  SpO2: 98% (10-13-18 @ 05:26) (95% - 99%)  Wt(kg): --           10-12 @ 07:01  -  10-13 @ 07:00  --------------------------------------------------------  IN: 0 mL / OUT: 1270 mL / NET: -1270 mL      PHYSICAL EXAM:    General: NAD  Groin: VAC in place with good seal. incisions intact. silvadene over compromised skin             MEDICATIONS  (STANDING):  acetaminophen   Tablet .. 975 milliGRAM(s) Oral every 12 hours  ampicillin  IVPB 1 Gram(s) IV Intermittent every 8 hours  aspirin enteric coated 81 milliGRAM(s) Oral daily  diphenhydrAMINE   Injectable 50 milliGRAM(s) IV Push once  docusate sodium 100 milliGRAM(s) Oral three times a day  enoxaparin Injectable 40 milliGRAM(s) SubCutaneous daily  influenza   Vaccine 0.5 milliLiter(s) IntraMuscular once  senna 2 Tablet(s) Oral at bedtime    MEDICATIONS  (PRN):  benzocaine 15 mG/menthol 3.6 mG Lozenge 1 Lozenge Oral three times a day PRN Sore Throat  bisacodyl Suppository 10 milliGRAM(s) Rectal daily PRN Constipation  ondansetron Injectable 4 milliGRAM(s) IV Push every 6 hours PRN Nausea and/or Vomiting  oxyCODONE    IR 10 milliGRAM(s) Oral every 4 hours PRN Severe Pain (7 - 10)  oxyCODONE    IR 5 milliGRAM(s) Oral every 4 hours PRN Moderate Pain (4 - 6)

## 2018-10-13 NOTE — PROGRESS NOTE ADULT - ASSESSMENT
67F s/p liposarcoma excision and reconstruction with VRAM POD#24 c/b with 12x8cm collection on CT scan s/p bedside I&D x2. VAC applied yesterday    - Transition to amoxicillin per ID/allergy  - VAC change MWF   - Silvadene to necrotic skin   - DVT ppx  - IVF  - OR monday for debridement of eschar and hernia repair   - Oncology consult- agree with surgical plan

## 2018-10-14 ENCOUNTER — TRANSCRIPTION ENCOUNTER (OUTPATIENT)
Age: 68
End: 2018-10-14

## 2018-10-14 RX ORDER — HYDROMORPHONE HYDROCHLORIDE 2 MG/ML
1 INJECTION INTRAMUSCULAR; INTRAVENOUS; SUBCUTANEOUS
Qty: 0 | Refills: 0 | Status: DISCONTINUED | OUTPATIENT
Start: 2018-10-14 | End: 2018-10-20

## 2018-10-14 RX ORDER — PHENYLEPHRINE-SHARK LIVER OIL-MINERAL OIL-PETROLATUM RECTAL OINTMENT
1 OINTMENT (GRAM) RECTAL
Qty: 0 | Refills: 0 | Status: DISCONTINUED | OUTPATIENT
Start: 2018-10-14 | End: 2018-10-14

## 2018-10-14 RX ORDER — SODIUM CHLORIDE 9 MG/ML
1000 INJECTION, SOLUTION INTRAVENOUS
Qty: 0 | Refills: 0 | Status: DISCONTINUED | OUTPATIENT
Start: 2018-10-14 | End: 2018-10-15

## 2018-10-14 RX ORDER — AER TRAVELER 0.5 G/1
1 SOLUTION RECTAL; TOPICAL
Qty: 0 | Refills: 0 | Status: DISCONTINUED | OUTPATIENT
Start: 2018-10-14 | End: 2018-10-23

## 2018-10-14 RX ORDER — DIBUCAINE 1 %
1 OINTMENT (GRAM) RECTAL EVERY 4 HOURS
Qty: 0 | Refills: 0 | Status: DISCONTINUED | OUTPATIENT
Start: 2018-10-14 | End: 2018-10-23

## 2018-10-14 RX ADMIN — SENNA PLUS 2 TABLET(S): 8.6 TABLET ORAL at 22:08

## 2018-10-14 RX ADMIN — Medication 975 MILLIGRAM(S): at 17:26

## 2018-10-14 RX ADMIN — Medication 108 GRAM(S): at 17:26

## 2018-10-14 RX ADMIN — ENOXAPARIN SODIUM 40 MILLIGRAM(S): 100 INJECTION SUBCUTANEOUS at 14:35

## 2018-10-14 RX ADMIN — OXYCODONE HYDROCHLORIDE 10 MILLIGRAM(S): 5 TABLET ORAL at 20:50

## 2018-10-14 RX ADMIN — HYDROMORPHONE HYDROCHLORIDE 1 MILLIGRAM(S): 2 INJECTION INTRAMUSCULAR; INTRAVENOUS; SUBCUTANEOUS at 22:25

## 2018-10-14 RX ADMIN — HYDROMORPHONE HYDROCHLORIDE 1 MILLIGRAM(S): 2 INJECTION INTRAMUSCULAR; INTRAVENOUS; SUBCUTANEOUS at 22:08

## 2018-10-14 RX ADMIN — Medication 100 MILLIGRAM(S): at 22:08

## 2018-10-14 RX ADMIN — OXYCODONE HYDROCHLORIDE 10 MILLIGRAM(S): 5 TABLET ORAL at 11:54

## 2018-10-14 RX ADMIN — Medication 108 GRAM(S): at 07:19

## 2018-10-14 RX ADMIN — OXYCODONE HYDROCHLORIDE 10 MILLIGRAM(S): 5 TABLET ORAL at 12:45

## 2018-10-14 RX ADMIN — Medication 975 MILLIGRAM(S): at 05:03

## 2018-10-14 RX ADMIN — Medication 81 MILLIGRAM(S): at 14:35

## 2018-10-14 RX ADMIN — OXYCODONE HYDROCHLORIDE 10 MILLIGRAM(S): 5 TABLET ORAL at 20:19

## 2018-10-14 RX ADMIN — Medication 100 MILLIGRAM(S): at 05:03

## 2018-10-14 RX ADMIN — Medication 100 MILLIGRAM(S): at 14:35

## 2018-10-14 NOTE — PROGRESS NOTE ADULT - ASSESSMENT
67F s/p liposarcoma excision and reconstruction with VRAM POD#24 c/b with 12x8cm collection on CT scan s/p bedside I&D x2. VAC applied yesterday    - Transition to amoxicillin per ID/allergy  - VAC change MWF   - Silvadene to necrotic skin   - DVT ppx  - IVF  - OR monday for debridement of eschar and hernia repair   - Oncology consult- agree with surgical plan 67F s/p liposarcoma excision and reconstruction with VRAM, c/b thigh collections s/p bedside I&D x2 and VAC placement; also c/b incisional hernia    - Transition to amoxicillin per ID/allergy  - VAC change MWF   - Silvadene to necrotic skin   - DVT ppx  - IVF  - OR monday for debridement of eschar and hernia repair   - Oncology consult- agree with surgical plan

## 2018-10-14 NOTE — PROGRESS NOTE ADULT - ASSESSMENT
68 yo female s/p L groin liposarcoma resection 9/17 requiring vascular bypass (PTFE) and muscle flap closure.  Post op infected collection.  S/p drainage 9/26, as well as I&D 10/3 at bedside, and repeat I&D 10/5  Antibiotics remains adjunctive to effective drainage.  All cultures from above 3 interventions with E Faecalis, sensitive to amp  Most recent CT scan showed fluid contiguous with graft  No positive blood cultures  E Faecalis has been sole isolate from fluid collections, not blood  Most recent drainage fluid still growing enterococcus despite 10 day of vanco- points to limited role of antibiotics- source control most critical  Cleared by Allergy and tolerating ampicillin well  Scheduled for debridement of eschar and hernia repair on 10/15/18    Suggest:  Continue IV ampicillin for now  Further surgical drainage/revision/closure as noted  Once above completed, anticipate switch to po Amox as extended Tx  Given concerns that graft could be involved, may have to consider indefinite po Amoxacillin.  D/w pt and family at length

## 2018-10-14 NOTE — PROGRESS NOTE ADULT - SUBJECTIVE AND OBJECTIVE BOX
Plastic Surgery Progress Note    S: Patient seen and examined    Vital Signs Last 24 Hrs  T(C): 36.9 (14 Oct 2018 05:02), Max: 37.1 (13 Oct 2018 13:28)  T(F): 98.5 (14 Oct 2018 05:02), Max: 98.8 (13 Oct 2018 13:28)  HR: 60 (14 Oct 2018 05:02) (60 - 76)  BP: 135/71 (14 Oct 2018 05:02) (114/62 - 145/85)  BP(mean): --  RR: 18 (14 Oct 2018 05:02) (16 - 18)  SpO2: 100% (14 Oct 2018 05:02) (95% - 100%)  I&O's Detail    13 Oct 2018 07:01  -  14 Oct 2018 07:00  --------------------------------------------------------  IN:  Total IN: 0 mL    OUT:    VAC (Vacuum Assisted Closure) System: 200 mL  Total OUT: 200 mL    Total NET: -200 mL          Physical Exam:  General: Awake and alert, NAD  Groin: VAC in place with good seal. incisions intact. silvadene over compromised skin Plastic Surgery Progress Note    S: Patient seen and examined. No events.    Vital Signs Last 24 Hrs  T(C): 36.9 (14 Oct 2018 05:02), Max: 37.1 (13 Oct 2018 13:28)  T(F): 98.5 (14 Oct 2018 05:02), Max: 98.8 (13 Oct 2018 13:28)  HR: 60 (14 Oct 2018 05:02) (60 - 76)  BP: 135/71 (14 Oct 2018 05:02) (114/62 - 145/85)  BP(mean): --  RR: 18 (14 Oct 2018 05:02) (16 - 18)  SpO2: 100% (14 Oct 2018 05:02) (95% - 100%)  I&O's Detail    13 Oct 2018 07:01  -  14 Oct 2018 07:00  --------------------------------------------------------  IN:  Total IN: 0 mL    OUT:    VAC (Vacuum Assisted Closure) System: 200 mL  Total OUT: 200 mL    Total NET: -200 mL          Physical Exam:  General: Awake and alert, NAD  Groin: VAC in place with good seal. incisions intact. silvadene over compromised skin

## 2018-10-14 NOTE — PROGRESS NOTE ADULT - SUBJECTIVE AND OBJECTIVE BOX
CC: f/u for post op wound infection    Patient reports pain is controlled     REVIEW OF SYSTEMS:  All other review of systems negative (Comprehensive ROS)    Antimicrobials Day #  : 21  ampicillin  IVPB 1 Gram(s) IV Intermittent every 8 hours    Other Medications Reviewed    T(F): 98.1 (10-14-18 @ 14:35), Max: 98.9 (10-14-18 @ 09:29)  HR: 70 (10-14-18 @ 14:35)  BP: 114/75 (10-14-18 @ 14:35)  RR: 16 (10-14-18 @ 14:35)  SpO2: 98% (10-14-18 @ 14:35)  Wt(kg): --    PHYSICAL EXAM:  General: alert, no acute distress  Eyes:  anicteric, no conjunctival injection, no discharge  Oropharynx: no lesions or injection 	  Neck: supple, without adenopathy  Lungs: clear to auscultation  Heart: regular rate and rhythm; no murmur, rubs or gallops  Abdomen: soft, nondistended, incisions intact, local discoloration LLQ, post inflammatory change, left groin VAC  Skin: no lesions  Extremities: L LE +edema; L distal thigh incision C/D/I  Neurologic: alert, oriented, moves all extremities    LAB RESULTS:      MICROBIOLOGY:  RECENT CULTURES:        RADIOLOGY REVIEWED:

## 2018-10-15 LAB
APTT BLD: 26.6 SEC — LOW (ref 27.5–37.4)
BASE EXCESS BLDA CALC-SCNC: 2.5 MMOL/L — SIGNIFICANT CHANGE UP
BLD GP AB SCN SERPL QL: NEGATIVE — SIGNIFICANT CHANGE UP
BUN SERPL-MCNC: 15 MG/DL — SIGNIFICANT CHANGE UP (ref 7–23)
CA-I BLDA-SCNC: 1.15 MMOL/L — SIGNIFICANT CHANGE UP (ref 1.15–1.29)
CALCIUM SERPL-MCNC: 8.4 MG/DL — SIGNIFICANT CHANGE UP (ref 8.4–10.5)
CHLORIDE SERPL-SCNC: 103 MMOL/L — SIGNIFICANT CHANGE UP (ref 98–107)
CO2 SERPL-SCNC: 26 MMOL/L — SIGNIFICANT CHANGE UP (ref 22–31)
CREAT SERPL-MCNC: 0.71 MG/DL — SIGNIFICANT CHANGE UP (ref 0.5–1.3)
GLUCOSE BLDA-MCNC: 116 MG/DL — HIGH (ref 70–99)
GLUCOSE SERPL-MCNC: 109 MG/DL — HIGH (ref 70–99)
GRAM STN WND: SIGNIFICANT CHANGE UP
HCO3 BLDA-SCNC: 27 MMOL/L — HIGH (ref 22–26)
HCT VFR BLD CALC: 30.6 % — LOW (ref 34.5–45)
HCT VFR BLDA CALC: 26.6 % — LOW (ref 34.5–46.5)
HGB BLD-MCNC: 9.7 G/DL — LOW (ref 11.5–15.5)
HGB BLDA-MCNC: 8.5 G/DL — LOW (ref 11.5–15.5)
INR BLD: 0.99 — SIGNIFICANT CHANGE UP (ref 0.88–1.17)
MCHC RBC-ENTMCNC: 30.2 PG — SIGNIFICANT CHANGE UP (ref 27–34)
MCHC RBC-ENTMCNC: 31.7 % — LOW (ref 32–36)
MCV RBC AUTO: 95.3 FL — SIGNIFICANT CHANGE UP (ref 80–100)
NRBC # FLD: 0 — SIGNIFICANT CHANGE UP
PCO2 BLDA: 35 MMHG — SIGNIFICANT CHANGE UP (ref 32–48)
PH BLDA: 7.49 PH — HIGH (ref 7.35–7.45)
PLATELET # BLD AUTO: 319 K/UL — SIGNIFICANT CHANGE UP (ref 150–400)
PMV BLD: 10.4 FL — SIGNIFICANT CHANGE UP (ref 7–13)
PO2 BLDA: 365 MMHG — HIGH (ref 83–108)
POTASSIUM BLDA-SCNC: 3.4 MMOL/L — SIGNIFICANT CHANGE UP (ref 3.4–4.5)
POTASSIUM SERPL-MCNC: 4 MMOL/L — SIGNIFICANT CHANGE UP (ref 3.5–5.3)
POTASSIUM SERPL-SCNC: 4 MMOL/L — SIGNIFICANT CHANGE UP (ref 3.5–5.3)
PROTHROM AB SERPL-ACNC: 11 SEC — SIGNIFICANT CHANGE UP (ref 9.8–13.1)
RBC # BLD: 3.21 M/UL — LOW (ref 3.8–5.2)
RBC # FLD: 15.5 % — HIGH (ref 10.3–14.5)
RH IG SCN BLD-IMP: POSITIVE — SIGNIFICANT CHANGE UP
SAO2 % BLDA: 100 % — HIGH (ref 95–99)
SODIUM BLDA-SCNC: 137 MMOL/L — SIGNIFICANT CHANGE UP (ref 136–146)
SODIUM SERPL-SCNC: 140 MMOL/L — SIGNIFICANT CHANGE UP (ref 135–145)
SPECIMEN SOURCE: SIGNIFICANT CHANGE UP
WBC # BLD: 5.46 K/UL — SIGNIFICANT CHANGE UP (ref 3.8–10.5)
WBC # FLD AUTO: 5.46 K/UL — SIGNIFICANT CHANGE UP (ref 3.8–10.5)

## 2018-10-15 PROCEDURE — 11042 DBRDMT SUBQ TIS 1ST 20SQCM/<: CPT | Mod: 78

## 2018-10-15 PROCEDURE — 97605 NEG PRS WND THER DME<=50SQCM: CPT

## 2018-10-15 RX ORDER — AMPICILLIN TRIHYDRATE 250 MG
1 CAPSULE ORAL EVERY 8 HOURS
Qty: 0 | Refills: 0 | Status: DISCONTINUED | OUTPATIENT
Start: 2018-10-15 | End: 2018-10-22

## 2018-10-15 RX ORDER — OXYCODONE HYDROCHLORIDE 5 MG/1
10 TABLET ORAL EVERY 4 HOURS
Qty: 0 | Refills: 0 | Status: DISCONTINUED | OUTPATIENT
Start: 2018-10-15 | End: 2018-10-20

## 2018-10-15 RX ORDER — SODIUM CHLORIDE 9 MG/ML
1000 INJECTION, SOLUTION INTRAVENOUS
Qty: 0 | Refills: 0 | Status: DISCONTINUED | OUTPATIENT
Start: 2018-10-15 | End: 2018-10-23

## 2018-10-15 RX ORDER — ONDANSETRON 8 MG/1
4 TABLET, FILM COATED ORAL ONCE
Qty: 0 | Refills: 0 | Status: DISCONTINUED | OUTPATIENT
Start: 2018-10-15 | End: 2018-10-15

## 2018-10-15 RX ORDER — DIPHENHYDRAMINE HCL 50 MG
25 CAPSULE ORAL EVERY 6 HOURS
Qty: 0 | Refills: 0 | Status: DISCONTINUED | OUTPATIENT
Start: 2018-10-15 | End: 2018-10-23

## 2018-10-15 RX ORDER — ONDANSETRON 8 MG/1
4 TABLET, FILM COATED ORAL EVERY 8 HOURS
Qty: 0 | Refills: 0 | Status: DISCONTINUED | OUTPATIENT
Start: 2018-10-15 | End: 2018-10-23

## 2018-10-15 RX ORDER — FENTANYL CITRATE 50 UG/ML
50 INJECTION INTRAVENOUS
Qty: 0 | Refills: 0 | Status: DISCONTINUED | OUTPATIENT
Start: 2018-10-15 | End: 2018-10-15

## 2018-10-15 RX ORDER — OXYCODONE HYDROCHLORIDE 5 MG/1
5 TABLET ORAL EVERY 4 HOURS
Qty: 0 | Refills: 0 | Status: DISCONTINUED | OUTPATIENT
Start: 2018-10-15 | End: 2018-10-20

## 2018-10-15 RX ORDER — FENTANYL CITRATE 50 UG/ML
25 INJECTION INTRAVENOUS
Qty: 0 | Refills: 0 | Status: DISCONTINUED | OUTPATIENT
Start: 2018-10-15 | End: 2018-10-15

## 2018-10-15 RX ADMIN — SODIUM CHLORIDE 100 MILLILITER(S): 9 INJECTION, SOLUTION INTRAVENOUS at 19:00

## 2018-10-15 RX ADMIN — Medication 10 MILLIGRAM(S): at 11:51

## 2018-10-15 RX ADMIN — Medication 100 MILLIGRAM(S): at 05:53

## 2018-10-15 RX ADMIN — Medication 108 GRAM(S): at 07:57

## 2018-10-15 RX ADMIN — OXYCODONE HYDROCHLORIDE 10 MILLIGRAM(S): 5 TABLET ORAL at 09:18

## 2018-10-15 RX ADMIN — Medication 975 MILLIGRAM(S): at 05:53

## 2018-10-15 RX ADMIN — SODIUM CHLORIDE 100 MILLILITER(S): 9 INJECTION, SOLUTION INTRAVENOUS at 00:38

## 2018-10-15 RX ADMIN — Medication 108 GRAM(S): at 00:41

## 2018-10-15 RX ADMIN — OXYCODONE HYDROCHLORIDE 10 MILLIGRAM(S): 5 TABLET ORAL at 08:48

## 2018-10-15 RX ADMIN — Medication 1 ENEMA: at 11:12

## 2018-10-15 NOTE — PROGRESS NOTE ADULT - SUBJECTIVE AND OBJECTIVE BOX
JULIA UMANA  [id number]    Subjective:  Patient is a 68y old  Female who presents with a chief complaint of operative management of sarcoma (12 Oct 2018 15:06)   Patient was seen and examined at bedside. No acute events overnight. Vac currently holding suction. Plans for OR today. Pain controlled.    Objective:  T(C): 36.7 (10-15-18 @ 05:52), Max: 37.3 (10-14-18 @ 17:47)  HR: 55 (10-15-18 @ 05:52) (55 - 70)  BP: 137/77 (10-15-18 @ 05:52) (114/75 - 153/77)  RR: 16 (10-15-18 @ 05:52) (16 - 18)  SpO2: 98% (10-15-18 @ 05:52) (98% - 100%)  Wt(kg): --           10-14 @ 07:01  -  10-15 @ 07:00  --------------------------------------------------------  IN: 0 mL / OUT: 1855 mL / NET: -1855 mL      PHYSICAL EXAM:    General: NAD  Groin: Vac in place. Silvadene applied to compromised skin. Remaining incisions intact.               MEDICATIONS  (STANDING):  acetaminophen   Tablet .. 975 milliGRAM(s) Oral every 12 hours  ampicillin  IVPB 1 Gram(s) IV Intermittent every 8 hours  aspirin enteric coated 81 milliGRAM(s) Oral daily  dextrose 5% + sodium chloride 0.45%. 1000 milliLiter(s) (100 mL/Hr) IV Continuous <Continuous>  diphenhydrAMINE   Injectable 50 milliGRAM(s) IV Push once  docusate sodium 100 milliGRAM(s) Oral three times a day  enoxaparin Injectable 40 milliGRAM(s) SubCutaneous daily  influenza   Vaccine 0.5 milliLiter(s) IntraMuscular once  senna 2 Tablet(s) Oral at bedtime    MEDICATIONS  (PRN):  benzocaine 15 mG/menthol 3.6 mG Lozenge 1 Lozenge Oral three times a day PRN Sore Throat  bisacodyl Suppository 10 milliGRAM(s) Rectal daily PRN Constipation  dibucaine 1% Ointment 1 Application(s) Topical every 4 hours PRN anal pruritis  HYDROmorphone  Injectable 1 milliGRAM(s) IV Push every 3 hours PRN breakthrough pain  ondansetron Injectable 4 milliGRAM(s) IV Push every 6 hours PRN Nausea and/or Vomiting  oxyCODONE    IR 10 milliGRAM(s) Oral every 4 hours PRN Severe Pain (7 - 10)  oxyCODONE    IR 5 milliGRAM(s) Oral every 4 hours PRN Moderate Pain (4 - 6)  witch hazel Pads 1 Application(s) Topical four times a day PRN anal pruritis

## 2018-10-15 NOTE — BRIEF OPERATIVE NOTE - PRE-OP DX
Liposarcoma of thigh, left  09/17/2018    Active  David Mae
Cellulitis  09/26/2018    Active  Lucio Smith  Fluid collection at surgical site  09/26/2018    Active  Lucio Smith  Liposarcoma of thigh, left  09/17/2018    Active  Bogdan, RTIF
Cellulitis  09/26/2018    Active  Lucio Smith  Fluid collection at surgical site  09/26/2018    Active  Lucio Smith  Liposarcoma of thigh, left  09/17/2018    Active  Bogdan, RTIF
Liposarcoma of thigh, left  09/17/2018    Active  [employer], RTIF

## 2018-10-15 NOTE — BRIEF OPERATIVE NOTE - OPERATION/FINDINGS
left thigh excision of liposarcoma, revascularization and rectus flap
No bowel or graft encountered
Left retroperitoneal exposure.  Left  external iliac to above knee popliteal artery bypass with 6 mm PTFE graft.    Palpable L DP, dopplerable L PT  Palpable L DP and PT
Preoperative Diagnosis: Left thigh fluid collection; Cellulitis of left thigh  Intraoperative Findings: Malodorous, sero-purulent fluid drained from the medial and lateral aspects of the pedicled VRAM flap  Procedure: Incision and drainage of fluid collection  Postoperative Diagnosis: Left thigh infected collection; Cellulitis of left thigh

## 2018-10-15 NOTE — PROGRESS NOTE ADULT - SUBJECTIVE AND OBJECTIVE BOX
No acute events overnight. Pain controlled.     PE:  Vital Signs Last 24 Hrs  T(C): 36.7 (15 Oct 2018 05:52), Max: 37.3 (14 Oct 2018 17:47)  T(F): 98.1 (15 Oct 2018 05:52), Max: 99.1 (14 Oct 2018 17:47)  HR: 55 (15 Oct 2018 05:52) (55 - 70)  BP: 137/77 (15 Oct 2018 05:52) (114/75 - 153/77)  RR: 16 (15 Oct 2018 05:52) (16 - 18)  SpO2: 98% (15 Oct 2018 05:52) (98% - 100%)  Gen: No acute distress  LLE: incisions stable  vac in place  Motor intact TA/GCS/EHL/FHL   SILT DP/SP/Tib  cap refill <2 sec, wwp       Assessment/Plan:  68yFemale s/p liposarcoma excision c/b wound bed drainage, bedside I and Ds  -OR today w PRS/GSx  -pain control  -PT  -WBAT in KI  -OOB  -DVT ppx  -dispo plan

## 2018-10-15 NOTE — PROGRESS NOTE ADULT - ASSESSMENT
67F s/p liposarcoma excision and reconstruction with VRAM, c/b thigh collections s/p bedside I&D x2 and VAC placement; also c/b incisional hernia    - Transition to amoxicillin per ID/allergy  - VAC change MWF   - Silvadene to compromised skin   - DVT ppx  - IVF  - OR today for debridement of eschar and hernia repair   - Oncology consult- agree with surgical plan

## 2018-10-15 NOTE — BRIEF OPERATIVE NOTE - PROCEDURE
<<-----Click on this checkbox to enter Procedure Debridement and application of dressing  10/15/2018  Debridement of Devitalized Tissue of Abdominal Wall & Left Groin. Application of Negative Pressure VAC Dressing  Active  JSAMAS

## 2018-10-15 NOTE — BRIEF OPERATIVE NOTE - POST-OP DX
Liposarcoma of left thigh  09/17/2018    Active  David Mae
Cellulitis and abscess  09/26/2018    Active  Lucio Smith  Liposarcoma of left thigh  09/17/2018    Active  Interfaces, RTIF
Cellulitis and abscess  09/26/2018    Active  Lucio Smith  Liposarcoma of left thigh  09/17/2018    Active  Interfaces, RTIF
Liposarcoma of left thigh  09/17/2018    Active  Interfaces, RTIF

## 2018-10-15 NOTE — BRIEF OPERATIVE NOTE - BRIEF OP NOTE DRAINS
Vac
x3 15-[place] Ramsey drains in place prior to surgery.  x2 additional 15-[place] Ramsey drains in place placed during this surgery.

## 2018-10-16 RX ADMIN — Medication 108 GRAM(S): at 00:05

## 2018-10-16 RX ADMIN — Medication 975 MILLIGRAM(S): at 17:22

## 2018-10-16 RX ADMIN — Medication 100 MILLIGRAM(S): at 22:09

## 2018-10-16 RX ADMIN — SENNA PLUS 2 TABLET(S): 8.6 TABLET ORAL at 22:09

## 2018-10-16 RX ADMIN — HYDROMORPHONE HYDROCHLORIDE 1 MILLIGRAM(S): 2 INJECTION INTRAMUSCULAR; INTRAVENOUS; SUBCUTANEOUS at 04:03

## 2018-10-16 RX ADMIN — Medication 81 MILLIGRAM(S): at 13:24

## 2018-10-16 RX ADMIN — Medication 108 GRAM(S): at 07:59

## 2018-10-16 RX ADMIN — Medication 100 MILLIGRAM(S): at 13:25

## 2018-10-16 RX ADMIN — Medication 5 MILLIGRAM(S): at 18:29

## 2018-10-16 RX ADMIN — ENOXAPARIN SODIUM 40 MILLIGRAM(S): 100 INJECTION SUBCUTANEOUS at 13:24

## 2018-10-16 RX ADMIN — OXYCODONE HYDROCHLORIDE 10 MILLIGRAM(S): 5 TABLET ORAL at 18:00

## 2018-10-16 RX ADMIN — OXYCODONE HYDROCHLORIDE 10 MILLIGRAM(S): 5 TABLET ORAL at 17:22

## 2018-10-16 RX ADMIN — OXYCODONE HYDROCHLORIDE 10 MILLIGRAM(S): 5 TABLET ORAL at 14:00

## 2018-10-16 RX ADMIN — Medication 100 MILLIGRAM(S): at 06:52

## 2018-10-16 RX ADMIN — Medication 108 GRAM(S): at 17:22

## 2018-10-16 RX ADMIN — OXYCODONE HYDROCHLORIDE 5 MILLIGRAM(S): 5 TABLET ORAL at 02:38

## 2018-10-16 RX ADMIN — HYDROMORPHONE HYDROCHLORIDE 1 MILLIGRAM(S): 2 INJECTION INTRAMUSCULAR; INTRAVENOUS; SUBCUTANEOUS at 04:15

## 2018-10-16 RX ADMIN — OXYCODONE HYDROCHLORIDE 10 MILLIGRAM(S): 5 TABLET ORAL at 13:25

## 2018-10-16 RX ADMIN — OXYCODONE HYDROCHLORIDE 5 MILLIGRAM(S): 5 TABLET ORAL at 01:43

## 2018-10-16 RX ADMIN — Medication 975 MILLIGRAM(S): at 06:52

## 2018-10-16 RX ADMIN — OXYCODONE HYDROCHLORIDE 10 MILLIGRAM(S): 5 TABLET ORAL at 08:31

## 2018-10-16 RX ADMIN — OXYCODONE HYDROCHLORIDE 10 MILLIGRAM(S): 5 TABLET ORAL at 09:30

## 2018-10-16 NOTE — PROGRESS NOTE ADULT - SUBJECTIVE AND OBJECTIVE BOX
No acute events overnight. Pain controlled. Pt underwent rpt I and D     PE:  Vital Signs Last 24 Hrs  T(C): 36.9 (16 Oct 2018 06:52), Max: 37.2 (15 Oct 2018 22:33)  T(F): 98.4 (16 Oct 2018 06:52), Max: 99 (15 Oct 2018 22:33)  HR: 75 (16 Oct 2018 06:52) (64 - 89)  BP: 102/61 (16 Oct 2018 06:52) (102/61 - 170/63)  RR: 16 (16 Oct 2018 06:52) (12 - 16)  SpO2: 100% (16 Oct 2018 06:52) (95% - 100%)  Gen: No acute distress  LLE: vac in place   Motor intact TA/GCS/EHL/FHL   SILT DP/SP/Tib  cap refill <2 sec, wwp     Labs:                        9.7    5.46  )-----------( 319      ( 15 Oct 2018 06:23 )             30.6   10-15    140  |  103  |  15  ----------------------------<  109<H>  4.0   |  26  |  0.71    Ca    8.4      15 Oct 2018 06:23    Assessment/Plan:  68yFemale liposarcoma excision s/p multiple I and D of wound bed by PRS  -abx   -FU PRS  -pain control  -PT  -WBAT in KI  -OOB  -DVT ppx  -dispo plan

## 2018-10-16 NOTE — PROGRESS NOTE ADULT - SUBJECTIVE AND OBJECTIVE BOX
ANESTHESIA POSTOP CHECK    68y Female POSTOP DAY 1 S/P Debridement of Left Groin    Vital Signs Last 24 Hrs  T(C): 36.9 (16 Oct 2018 06:52), Max: 37.2 (15 Oct 2018 22:33)  T(F): 98.4 (16 Oct 2018 06:52), Max: 99 (15 Oct 2018 22:33)  HR: 75 (16 Oct 2018 06:52) (64 - 89)  BP: 102/61 (16 Oct 2018 06:52) (102/61 - 170/63)  BP(mean): --  RR: 16 (16 Oct 2018 06:52) (12 - 16)  SpO2: 100% (16 Oct 2018 06:52) (95% - 100%)  I&O's Summary    15 Oct 2018 07:01  -  16 Oct 2018 07:00  --------------------------------------------------------  IN: 500 mL / OUT: 885 mL / NET: -385 mL    16 Oct 2018 07:01  -  16 Oct 2018 10:21  --------------------------------------------------------  IN: 0 mL / OUT: 50 mL / NET: -50 mL        [x ] NO APPARENT ANESTHESIA COMPLICATIONS      Comments:

## 2018-10-16 NOTE — PROGRESS NOTE ADULT - SUBJECTIVE AND OBJECTIVE BOX
JULIA UMANA  [id number]    Subjective:  Patient is a 68y old  Female who presents with a chief complaint of operative management of sarcoma (12 Oct 2018 15:06)   Patient was seen and examined at bedside. POD1 from debridement. Wound vac with good seal. Pain controlled.     Objective:  T(C): 36.9 (10-16-18 @ 01:45), Max: 37.2 (10-15-18 @ 22:33)  HR: 72 (10-16-18 @ 04:02) (64 - 89)  BP: 115/67 (10-16-18 @ 04:02) (108/67 - 170/63)  RR: 15 (10-16-18 @ 04:02) (12 - 16)  SpO2: 98% (10-16-18 @ 04:02) (95% - 100%)  Wt(kg): --   10-15    140  |  103  |  15  ----------------------------<  109<H>  4.0   |  26  |  0.71    Ca    8.4      15 Oct 2018 06:23                          9.7    5.46  )-----------( 319      ( 15 Oct 2018 06:23 )             30.6       10-14 @ 07:01  -  10-15 @ 07:00  --------------------------------------------------------  IN: 0 mL / OUT: 1855 mL / NET: -1855 mL    10-15 @ 07:01  -  10-16 @ 06:41  --------------------------------------------------------  IN: 500 mL / OUT: 835 mL / NET: -335 mL      PHYSICAL EXAM:    General: NAD  Abdomen: Aquacel in place over midline. Wound vac over debridement w/ good seal. Remaining incisions c/d/i.             MEDICATIONS  (STANDING):  acetaminophen   Tablet .. 975 milliGRAM(s) Oral every 12 hours  ampicillin  IVPB 1 Gram(s) IV Intermittent every 8 hours  aspirin enteric coated 81 milliGRAM(s) Oral daily  dextrose 5% + sodium chloride 0.45%. 1000 milliLiter(s) (100 mL/Hr) IV Continuous <Continuous>  docusate sodium 100 milliGRAM(s) Oral three times a day  enoxaparin Injectable 40 milliGRAM(s) SubCutaneous daily  influenza   Vaccine 0.5 milliLiter(s) IntraMuscular once  senna 2 Tablet(s) Oral at bedtime    MEDICATIONS  (PRN):  benzocaine 15 mG/menthol 3.6 mG Lozenge 1 Lozenge Oral three times a day PRN Sore Throat  bisacodyl Suppository 10 milliGRAM(s) Rectal daily PRN Constipation  dibucaine 1% Ointment 1 Application(s) Topical every 4 hours PRN anal pruritis  diphenhydrAMINE   Injectable 25 milliGRAM(s) IV Push every 6 hours PRN Rash and/or Itching  HYDROmorphone  Injectable 1 milliGRAM(s) IV Push every 3 hours PRN breakthrough pain  ondansetron Injectable 4 milliGRAM(s) IV Push every 8 hours PRN Nausea and/or Vomiting  oxyCODONE    IR 10 milliGRAM(s) Oral every 4 hours PRN Severe Pain (7 - 10)  oxyCODONE    IR 5 milliGRAM(s) Oral every 4 hours PRN Moderate Pain (4 - 6)  witch hazel Pads 1 Application(s) Topical four times a day PRN anal pruritis

## 2018-10-16 NOTE — PROGRESS NOTE ADULT - ASSESSMENT
67F s/p liposarcoma excision and reconstruction with VRAM, c/b thigh collections s/p bedside I&D x2 and VAC placement; also c/b incisional hernia now s/p debridement in the OR    - Transition to amoxicillin per ID/allergy  - VAC change MWF   - Aquacel change q3days   - DVT ppx  - IVF  - Dispo: rehab

## 2018-10-17 RX ADMIN — Medication 1 APPLICATION(S): at 06:00

## 2018-10-17 RX ADMIN — Medication 108 GRAM(S): at 07:33

## 2018-10-17 RX ADMIN — SENNA PLUS 2 TABLET(S): 8.6 TABLET ORAL at 21:14

## 2018-10-17 RX ADMIN — Medication 975 MILLIGRAM(S): at 18:16

## 2018-10-17 RX ADMIN — Medication 5 MILLIGRAM(S): at 07:23

## 2018-10-17 RX ADMIN — ENOXAPARIN SODIUM 40 MILLIGRAM(S): 100 INJECTION SUBCUTANEOUS at 13:02

## 2018-10-17 RX ADMIN — Medication 100 MILLIGRAM(S): at 13:02

## 2018-10-17 RX ADMIN — OXYCODONE HYDROCHLORIDE 10 MILLIGRAM(S): 5 TABLET ORAL at 13:02

## 2018-10-17 RX ADMIN — Medication 100 MILLIGRAM(S): at 21:14

## 2018-10-17 RX ADMIN — Medication 108 GRAM(S): at 00:10

## 2018-10-17 RX ADMIN — Medication 108 GRAM(S): at 18:16

## 2018-10-17 RX ADMIN — Medication 81 MILLIGRAM(S): at 13:03

## 2018-10-17 RX ADMIN — Medication 100 MILLIGRAM(S): at 06:00

## 2018-10-17 RX ADMIN — Medication 5 MILLIGRAM(S): at 21:14

## 2018-10-17 RX ADMIN — ONDANSETRON 4 MILLIGRAM(S): 8 TABLET, FILM COATED ORAL at 08:45

## 2018-10-17 RX ADMIN — Medication 1 ENEMA: at 08:05

## 2018-10-17 RX ADMIN — Medication 975 MILLIGRAM(S): at 06:00

## 2018-10-17 RX ADMIN — OXYCODONE HYDROCHLORIDE 10 MILLIGRAM(S): 5 TABLET ORAL at 13:50

## 2018-10-17 NOTE — PROGRESS NOTE ADULT - SUBJECTIVE AND OBJECTIVE BOX
JULIA UMANA  [id number]    Subjective:  Patient is a 68y old  Female who presents with a chief complaint of operative management of sarcoma (12 Oct 2018 15:06)   Patient was seen and examined at bedside. No acute events overnight. Patient's pain controlled. Continues to complain of constipation. Vac change today. Aquacel change today    Objective:  T(C): 37.3 (10-17-18 @ 05:59), Max: 37.3 (10-17-18 @ 05:59)  HR: 69 (10-17-18 @ 05:59) (64 - 72)  BP: 123/76 (10-17-18 @ 05:59) (107/67 - 123/76)  RR: 15 (10-17-18 @ 05:59) (15 - 17)  SpO2: 98% (10-17-18 @ 05:59) (97% - 99%)  Wt(kg): --           10-16 @ 07:01  -  10-17 @ 07:00  --------------------------------------------------------  IN: 0 mL / OUT: 2965 mL / NET: -2965 mL      PHYSICAL EXAM:    General: NAD  Abodmen: Vac in place. Remaining incisions c/d/i            MEDICATIONS  (STANDING):  acetaminophen   Tablet .. 975 milliGRAM(s) Oral every 12 hours  ampicillin  IVPB 1 Gram(s) IV Intermittent every 8 hours  aspirin enteric coated 81 milliGRAM(s) Oral daily  dextrose 5% + sodium chloride 0.45%. 1000 milliLiter(s) (100 mL/Hr) IV Continuous <Continuous>  docusate sodium 100 milliGRAM(s) Oral three times a day  enoxaparin Injectable 40 milliGRAM(s) SubCutaneous daily  influenza   Vaccine 0.5 milliLiter(s) IntraMuscular once  senna 2 Tablet(s) Oral at bedtime    MEDICATIONS  (PRN):  benzocaine 15 mG/menthol 3.6 mG Lozenge 1 Lozenge Oral three times a day PRN Sore Throat  bisacodyl 5 milliGRAM(s) Oral every 12 hours PRN Constipation  dibucaine 1% Ointment 1 Application(s) Topical every 4 hours PRN anal pruritis  diphenhydrAMINE   Injectable 25 milliGRAM(s) IV Push every 6 hours PRN Rash and/or Itching  HYDROmorphone  Injectable 1 milliGRAM(s) IV Push every 3 hours PRN breakthrough pain  ondansetron Injectable 4 milliGRAM(s) IV Push every 8 hours PRN Nausea and/or Vomiting  oxyCODONE    IR 10 milliGRAM(s) Oral every 4 hours PRN Severe Pain (7 - 10)  oxyCODONE    IR 5 milliGRAM(s) Oral every 4 hours PRN Moderate Pain (4 - 6)  sodium biphosphate Rectal Enema 1 Enema Rectal daily PRN Constipation  witch hazel Pads 1 Application(s) Topical four times a day PRN anal pruritis      Assessment/Plan:  Patient is a 68y old  Female who presents with a chief complaint of operative management of sarcoma (12 Oct 2018 15:06)

## 2018-10-17 NOTE — PROGRESS NOTE ADULT - ASSESSMENT
S/p L groin liposarcoma resection 9/17 requiring vascular bypass (PTFE) and muscle flap closure.  Post op infected collection.  S/p drainage 9/26, I&D 10/3 and 10/5  Antibiotics remains adjunctive to effective drainage.  All cultures with E Faecalis,  amp--sens  Most recent CT scan showed fluid contiguous with graft  blood cult remain sterile  Cleared by Allergy and tolerating ampicillin well  s/p debridement of eschar and hernia repair on 10/15/18    Suggest:  Continue IV ampicillin for now  Further surgical drainage/revision/closure as noted  may switch to po Amox as chronic suppression S/p L groin liposarcoma resection 9/17 requiring vascular bypass (PTFE) and muscle flap closure.  Post op infected collection.  S/p drainage 9/26, I&D 10/3 and 10/5  Antibiotics remains adjunctive to effective drainage.  All cultures with E Faecalis,  amp--sens  Most recent CT scan showed fluid contiguous with graft  blood cult remain sterile  Cleared by Allergy and tolerating ampicillin well  s/p debridement of eschar and hernia repair on 10/15/18: awaiting final culture data    Suggest:  Continue IV ampicillin for now  f/u final culture results  po Amox possible option as chronic suppression

## 2018-10-17 NOTE — PROGRESS NOTE ADULT - ASSESSMENT
67F s/p liposarcoma excision and reconstruction with VRAM, c/b thigh collections s/p bedside I&D x2 and VAC placement; also c/b incisional hernia now s/p debridement in the OR    - Transition to amoxicillin per ID/allergy  - VAC change MWF   - Aquacel change q3days   - DVT ppx  - IVF  - Dispo: rehab 67F s/p liposarcoma excision and reconstruction with VRAM, c/b thigh collections s/p bedside I&D x2 and VAC placement; also c/b incisional hernia now s/p debridement in the OR    - Transition to amoxicillin per ID/allergy  - VAC change MWF   - Aquacel change q3days   - DVT ppx  - IVF  -dc belcher  - Dispo: rehab

## 2018-10-17 NOTE — PROGRESS NOTE ADULT - SUBJECTIVE AND OBJECTIVE BOX
CC: f/u for post op wound infection    Patient reports no fevers, no chills    REVIEW OF SYSTEMS:  All other review of systems negative (Comprehensive ROS)    Antimicrobials Day #  : 24  ampicillin  IVPB 1 Gram(s) IV Intermittent every 8 hours    Other Medications Reviewed    Vital Signs Last 24 Hrs  T(C): 37.1 (17 Oct 2018 10:13), Max: 37.3 (17 Oct 2018 05:59)  T(F): 98.8 (17 Oct 2018 10:13), Max: 99.2 (17 Oct 2018 05:59)  HR: 61 (17 Oct 2018 10:13) (61 - 72)  BP: 141/76 (17 Oct 2018 10:13) (112/69 - 141/76)  BP(mean): --  RR: 18 (17 Oct 2018 10:13) (15 - 18)  SpO2: 99% (17 Oct 2018 10:13) (97% - 99%)    PHYSICAL EXAM:  General: alert, no acute distress  Eyes:  anicteric, no conjunctival injection, no discharge  Oropharynx: no lesions or injection 	  Neck: supple, without adenopathy  Lungs: clear to auscultation  Heart: regular rate and rhythm; no murmur, rubs or gallops  Abdomen: soft, nondistended, incisions intact, local discoloration LLQ, post inflammatory change, left groin VAC  Skin: no lesions  Extremities: L LE +edema; L distal thigh incision C/D/I  Neurologic: alert, oriented, moves all extremities    LAB RESULTS:  no new labs    MICROBIOLOGY:  RECENT CULTURES:        RADIOLOGY REVIEWED: CC: f/u for post op wound infection    Patient reports no fevers, no chills, no skin rashes and tolerates Ampicillin    REVIEW OF SYSTEMS:  All other review of systems negative (Comprehensive ROS)    Antimicrobials Day #  : 24  ampicillin  IVPB 1 Gram(s) IV Intermittent every 8 hours    Other Medications Reviewed    Vital Signs Last 24 Hrs  T(C): 37.1 (17 Oct 2018 10:13), Max: 37.3 (17 Oct 2018 05:59)  T(F): 98.8 (17 Oct 2018 10:13), Max: 99.2 (17 Oct 2018 05:59)  HR: 61 (17 Oct 2018 10:13) (61 - 72)  BP: 141/76 (17 Oct 2018 10:13) (112/69 - 141/76)  BP(mean): --  RR: 18 (17 Oct 2018 10:13) (15 - 18)  SpO2: 99% (17 Oct 2018 10:13) (97% - 99%)    PHYSICAL EXAM:  General: alert, no acute distress  Eyes:  anicteric, no conjunctival injection, no discharge  Oropharynx: no lesions or injection 	  Neck: supple, without adenopathy  Lungs: clear to auscultation  Heart: regular rate and rhythm; no murmur, rubs or gallops  Abdomen: soft, nondistended, incisions intact, local discoloration LLQ, post inflammatory change, left groin VAC  Skin: no lesions  Extremities: L LE +edema; wound vac in place  Neurologic: alert, oriented, moves all extremities    LAB RESULTS:  no new labs    MICROBIOLOGY:  RECENT CULTURES:        RADIOLOGY REVIEWED:

## 2018-10-18 LAB
-  AMPICILLIN: SIGNIFICANT CHANGE UP
-  CIPROFLOXACIN: SIGNIFICANT CHANGE UP
-  TETRACYCLINE: SIGNIFICANT CHANGE UP
-  VANCOMYCIN: SIGNIFICANT CHANGE UP
CULTURE - SURGICAL SITE: SIGNIFICANT CHANGE UP
GRAM STN WND: SIGNIFICANT CHANGE UP
GRAM STN WND: SIGNIFICANT CHANGE UP
METHOD TYPE: SIGNIFICANT CHANGE UP
METHOD TYPE: SIGNIFICANT CHANGE UP
ORGANISM # SPEC MICROSCOPIC CNT: SIGNIFICANT CHANGE UP
SPECIMEN SOURCE: SIGNIFICANT CHANGE UP

## 2018-10-18 RX ADMIN — OXYCODONE HYDROCHLORIDE 10 MILLIGRAM(S): 5 TABLET ORAL at 02:00

## 2018-10-18 RX ADMIN — Medication 1 ENEMA: at 16:11

## 2018-10-18 RX ADMIN — Medication 100 MILLIGRAM(S): at 13:10

## 2018-10-18 RX ADMIN — Medication 5 MILLIGRAM(S): at 12:21

## 2018-10-18 RX ADMIN — OXYCODONE HYDROCHLORIDE 10 MILLIGRAM(S): 5 TABLET ORAL at 11:30

## 2018-10-18 RX ADMIN — OXYCODONE HYDROCHLORIDE 10 MILLIGRAM(S): 5 TABLET ORAL at 10:54

## 2018-10-18 RX ADMIN — Medication 108 GRAM(S): at 16:10

## 2018-10-18 RX ADMIN — Medication 975 MILLIGRAM(S): at 17:26

## 2018-10-18 RX ADMIN — Medication 108 GRAM(S): at 07:32

## 2018-10-18 RX ADMIN — SENNA PLUS 2 TABLET(S): 8.6 TABLET ORAL at 21:07

## 2018-10-18 RX ADMIN — Medication 81 MILLIGRAM(S): at 12:21

## 2018-10-18 RX ADMIN — ENOXAPARIN SODIUM 40 MILLIGRAM(S): 100 INJECTION SUBCUTANEOUS at 12:21

## 2018-10-18 RX ADMIN — Medication 100 MILLIGRAM(S): at 07:32

## 2018-10-18 RX ADMIN — Medication 108 GRAM(S): at 00:24

## 2018-10-18 RX ADMIN — Medication 975 MILLIGRAM(S): at 07:32

## 2018-10-18 RX ADMIN — Medication 100 MILLIGRAM(S): at 21:07

## 2018-10-18 RX ADMIN — OXYCODONE HYDROCHLORIDE 10 MILLIGRAM(S): 5 TABLET ORAL at 01:10

## 2018-10-18 NOTE — PROGRESS NOTE ADULT - SUBJECTIVE AND OBJECTIVE BOX
JULIA UMANA  [id number]    Subjective:  Patient is a 68y old  Female who presents with a chief complaint of operative management of sarcoma (12 Oct 2018 15:06)   Patient was seen and examined at bedside. No acute events overnight. Pain controlled. Will work with PT today. Vac and aquacel changed yesterday    Objective:  T(C): 37.1 (10-18-18 @ 07:31), Max: 37.6 (10-17-18 @ 17:28)  HR: 61 (10-18-18 @ 07:31) (60 - 70)  BP: 132/70 (10-18-18 @ 07:31) (132/70 - 141/76)  RR: 15 (10-18-18 @ 07:31) (15 - 18)  SpO2: 99% (10-18-18 @ 07:31) (98% - 100%)  Wt(kg): --           10-17 @ 07:01  -  10-18 @ 07:00  --------------------------------------------------------  IN: 0 mL / OUT: 1275 mL / NET: -1275 mL    10-18 @ 07:01  -  10-18 @ 09:21  --------------------------------------------------------  IN: 0 mL / OUT: 100 mL / NET: -100 mL      PHYSICAL EXAM:    General: NAD  Abdomen: Vac in place with good seal. Incisions c/d/i            MEDICATIONS  (STANDING):  acetaminophen   Tablet .. 975 milliGRAM(s) Oral every 12 hours  ampicillin  IVPB 1 Gram(s) IV Intermittent every 8 hours  aspirin enteric coated 81 milliGRAM(s) Oral daily  dextrose 5% + sodium chloride 0.45%. 1000 milliLiter(s) (100 mL/Hr) IV Continuous <Continuous>  docusate sodium 100 milliGRAM(s) Oral three times a day  enoxaparin Injectable 40 milliGRAM(s) SubCutaneous daily  influenza   Vaccine 0.5 milliLiter(s) IntraMuscular once  senna 2 Tablet(s) Oral at bedtime    MEDICATIONS  (PRN):  benzocaine 15 mG/menthol 3.6 mG Lozenge 1 Lozenge Oral three times a day PRN Sore Throat  bisacodyl 5 milliGRAM(s) Oral every 12 hours PRN Constipation  dibucaine 1% Ointment 1 Application(s) Topical every 4 hours PRN anal pruritis  diphenhydrAMINE   Injectable 25 milliGRAM(s) IV Push every 6 hours PRN Rash and/or Itching  HYDROmorphone  Injectable 1 milliGRAM(s) IV Push every 3 hours PRN breakthrough pain  ondansetron Injectable 4 milliGRAM(s) IV Push every 8 hours PRN Nausea and/or Vomiting  oxyCODONE    IR 10 milliGRAM(s) Oral every 4 hours PRN Severe Pain (7 - 10)  oxyCODONE    IR 5 milliGRAM(s) Oral every 4 hours PRN Moderate Pain (4 - 6)  sodium biphosphate Rectal Enema 1 Enema Rectal daily PRN Constipation  witch hazel Pads 1 Application(s) Topical four times a day PRN anal pruritis

## 2018-10-18 NOTE — PROGRESS NOTE ADULT - ASSESSMENT
67F PMH HTN s/p enbloc resection of inguinal mass, left femoral to below knee popliteal bypass, venous resection, reconstruction with rectus abdominus myocutaneous flaps/p liposarcoma excision and reconstruction, POC c/b with 12x8cm collection on CT scan s/p bedside I&D x2, now s/p OR dbt    - Blood flow in LLE assessed - pt with palpable DP  - Care per PRS  - Discussed with Vascular Sx  fellow    ART Ibanez PGY2  Vascular Surgery  [id number]

## 2018-10-18 NOTE — PROGRESS NOTE ADULT - SUBJECTIVE AND OBJECTIVE BOX
No acute events overnight. Pain controlled. Tolerating abx.     PE:  Vital Signs Last 24 Hrs  T(C): 36.9 (18 Oct 2018 02:01), Max: 37.6 (17 Oct 2018 17:28)  T(F): 98.5 (18 Oct 2018 02:01), Max: 99.6 (17 Oct 2018 17:28)  HR: 70 (18 Oct 2018 02:01) (60 - 70)  BP: 137/73 (18 Oct 2018 02:01) (134/77 - 141/76)  RR: 16 (18 Oct 2018 02:01) (16 - 18)  SpO2: 100% (18 Oct 2018 02:01) (98% - 100%)  Gen: No acute distress  Abdominal dressing c/d/i  LLE vac in place  Motor intact TA/GCS/EHL/FHL   SILT DP/SP/Tib  cap refill <2 sec, wwp       Assessment/Plan:  68yFemale s/p liposarcoma excision POD 31 s/p multiple I and Ds  -Vac/dressing per PRS  -cont abx, FU ID  -pain control  -PT  -WBAT in KI  -OOB  -DVT ppx  -dispo plan

## 2018-10-18 NOTE — PROGRESS NOTE ADULT - ASSESSMENT
67F s/p liposarcoma excision and reconstruction with VRAM, c/b thigh collections s/p bedside I&D x2 and VAC placement; also c/b incisional hernia now s/p debridement in the OR    - Transition to amoxicillin per ID/allergy  - VAC change MWF   - Aquacel change q3days   - DVT ppx  - PT  - Dispo: rehab

## 2018-10-18 NOTE — PROGRESS NOTE ADULT - SUBJECTIVE AND OBJECTIVE BOX
VASCULAR SURGERY PROGRESS NOTE:       Subjective:  No acute events overnight        Objective:    PE:  General: NAD  Abdomen: Vac in place, incisions c/d/i  LLE: Palpable DP    Vital Signs Last 24 Hrs  T(C): 36.7 (18 Oct 2018 09:45), Max: 37.6 (17 Oct 2018 17:28)  T(F): 98 (18 Oct 2018 09:45), Max: 99.6 (17 Oct 2018 17:28)  HR: 70 (18 Oct 2018 09:45) (60 - 70)  BP: 135/81 (18 Oct 2018 09:45) (132/70 - 137/73)  BP(mean): --  RR: 18 (18 Oct 2018 09:45) (15 - 18)  SpO2: 100% (18 Oct 2018 09:45) (98% - 100%)    I&O's Detail    17 Oct 2018 07:01  -  18 Oct 2018 07:00  --------------------------------------------------------  IN:  Total IN: 0 mL    OUT:    Indwelling Catheter - Urethral: 350 mL    VAC (Vacuum Assisted Closure) System: 275 mL    Voided: 650 mL  Total OUT: 1275 mL    Total NET: -1275 mL      18 Oct 2018 07:01  -  18 Oct 2018 13:05  --------------------------------------------------------  IN:  Total IN: 0 mL    OUT:    VAC (Vacuum Assisted Closure) System: 100 mL  Total OUT: 100 mL    Total NET: -100 mL          Daily     Daily Weight in k.8 (18 Oct 2018 02:01)    MEDICATIONS  (STANDING):  acetaminophen   Tablet .. 975 milliGRAM(s) Oral every 12 hours  ampicillin  IVPB 1 Gram(s) IV Intermittent every 8 hours  aspirin enteric coated 81 milliGRAM(s) Oral daily  dextrose 5% + sodium chloride 0.45%. 1000 milliLiter(s) (100 mL/Hr) IV Continuous <Continuous>  docusate sodium 100 milliGRAM(s) Oral three times a day  enoxaparin Injectable 40 milliGRAM(s) SubCutaneous daily  influenza   Vaccine 0.5 milliLiter(s) IntraMuscular once  senna 2 Tablet(s) Oral at bedtime    MEDICATIONS  (PRN):  benzocaine 15 mG/menthol 3.6 mG Lozenge 1 Lozenge Oral three times a day PRN Sore Throat  bisacodyl 5 milliGRAM(s) Oral every 12 hours PRN Constipation  dibucaine 1% Ointment 1 Application(s) Topical every 4 hours PRN anal pruritis  diphenhydrAMINE   Injectable 25 milliGRAM(s) IV Push every 6 hours PRN Rash and/or Itching  HYDROmorphone  Injectable 1 milliGRAM(s) IV Push every 3 hours PRN breakthrough pain  ondansetron Injectable 4 milliGRAM(s) IV Push every 8 hours PRN Nausea and/or Vomiting  oxyCODONE    IR 10 milliGRAM(s) Oral every 4 hours PRN Severe Pain (7 - 10)  oxyCODONE    IR 5 milliGRAM(s) Oral every 4 hours PRN Moderate Pain (4 - 6)  sodium biphosphate Rectal Enema 1 Enema Rectal daily PRN Constipation  witch hazel Pads 1 Application(s) Topical four times a day PRN anal pruritis      LABS:                RADIOLOGY & ADDITIONAL STUDIES:

## 2018-10-19 LAB
-  AMIKACIN: SIGNIFICANT CHANGE UP
-  AMPICILLIN/SULBACTAM: SIGNIFICANT CHANGE UP
-  AMPICILLIN: SIGNIFICANT CHANGE UP
-  AMPICILLIN: SIGNIFICANT CHANGE UP
-  AZTREONAM: SIGNIFICANT CHANGE UP
-  CEFAZOLIN: SIGNIFICANT CHANGE UP
-  CEFEPIME: SIGNIFICANT CHANGE UP
-  CEFOXITIN: SIGNIFICANT CHANGE UP
-  CEFTAZIDIME: SIGNIFICANT CHANGE UP
-  CEFTRIAXONE: SIGNIFICANT CHANGE UP
-  CIPROFLOXACIN: SIGNIFICANT CHANGE UP
-  CIPROFLOXACIN: SIGNIFICANT CHANGE UP
-  ERTAPENEM: SIGNIFICANT CHANGE UP
-  GENTAMICIN: SIGNIFICANT CHANGE UP
-  IMIPENEM: SIGNIFICANT CHANGE UP
-  LEVOFLOXACIN: SIGNIFICANT CHANGE UP
-  MEROPENEM: SIGNIFICANT CHANGE UP
-  PIPERACILLIN/TAZOBACTAM: SIGNIFICANT CHANGE UP
-  TETRACYCLINE: SIGNIFICANT CHANGE UP
-  TIGECYCLINE: SIGNIFICANT CHANGE UP
-  TOBRAMYCIN: SIGNIFICANT CHANGE UP
-  TRIMETHOPRIM/SULFAMETHOXAZOLE: SIGNIFICANT CHANGE UP
-  VANCOMYCIN: SIGNIFICANT CHANGE UP
CULTURE - SURGICAL SITE: SIGNIFICANT CHANGE UP
CULTURE - SURGICAL SITE: SIGNIFICANT CHANGE UP
METHOD TYPE: SIGNIFICANT CHANGE UP

## 2018-10-19 RX ORDER — CIPROFLOXACIN LACTATE 400MG/40ML
500 VIAL (ML) INTRAVENOUS EVERY 12 HOURS
Qty: 0 | Refills: 0 | Status: DISCONTINUED | OUTPATIENT
Start: 2018-10-19 | End: 2018-10-23

## 2018-10-19 RX ADMIN — Medication 100 MILLIGRAM(S): at 05:08

## 2018-10-19 RX ADMIN — Medication 500 MILLIGRAM(S): at 19:08

## 2018-10-19 RX ADMIN — Medication 975 MILLIGRAM(S): at 05:08

## 2018-10-19 RX ADMIN — OXYCODONE HYDROCHLORIDE 10 MILLIGRAM(S): 5 TABLET ORAL at 09:00

## 2018-10-19 RX ADMIN — OXYCODONE HYDROCHLORIDE 10 MILLIGRAM(S): 5 TABLET ORAL at 23:10

## 2018-10-19 RX ADMIN — OXYCODONE HYDROCHLORIDE 10 MILLIGRAM(S): 5 TABLET ORAL at 08:10

## 2018-10-19 RX ADMIN — Medication 108 GRAM(S): at 17:21

## 2018-10-19 RX ADMIN — Medication 975 MILLIGRAM(S): at 17:22

## 2018-10-19 RX ADMIN — OXYCODONE HYDROCHLORIDE 10 MILLIGRAM(S): 5 TABLET ORAL at 22:36

## 2018-10-19 RX ADMIN — ENOXAPARIN SODIUM 40 MILLIGRAM(S): 100 INJECTION SUBCUTANEOUS at 12:27

## 2018-10-19 RX ADMIN — OXYCODONE HYDROCHLORIDE 10 MILLIGRAM(S): 5 TABLET ORAL at 13:15

## 2018-10-19 RX ADMIN — Medication 108 GRAM(S): at 07:09

## 2018-10-19 RX ADMIN — OXYCODONE HYDROCHLORIDE 10 MILLIGRAM(S): 5 TABLET ORAL at 01:10

## 2018-10-19 RX ADMIN — SENNA PLUS 2 TABLET(S): 8.6 TABLET ORAL at 22:36

## 2018-10-19 RX ADMIN — Medication 108 GRAM(S): at 00:37

## 2018-10-19 RX ADMIN — OXYCODONE HYDROCHLORIDE 10 MILLIGRAM(S): 5 TABLET ORAL at 00:37

## 2018-10-19 RX ADMIN — Medication 81 MILLIGRAM(S): at 12:27

## 2018-10-19 RX ADMIN — Medication 100 MILLIGRAM(S): at 22:36

## 2018-10-19 RX ADMIN — OXYCODONE HYDROCHLORIDE 10 MILLIGRAM(S): 5 TABLET ORAL at 12:27

## 2018-10-19 NOTE — PROGRESS NOTE ADULT - ASSESSMENT
67F s/p liposarcoma excision and reconstruction with VRAM, c/b thigh collections s/p bedside I&D x2 and VAC placement; also c/b incisional hernia now s/p debridement in the OR    - VAC change MWF   - Aquacel change q3days   - DVT ppx  - PT  - Dispo: rehab

## 2018-10-19 NOTE — PROGRESS NOTE ADULT - SUBJECTIVE AND OBJECTIVE BOX
VASCULAR SURGERY PROGRESS NOTE:       Subjective:  No acute events overnight      Objective:    PE:  General: NAD  Abdomen: Vac in place, incisions c/d/i  LLE: Palpable DP    Vital Signs Last 24 Hrs  T(C): 36.7 (19 Oct 2018 09:43), Max: 37.2 (18 Oct 2018 17:16)  T(F): 98.1 (19 Oct 2018 09:43), Max: 98.9 (18 Oct 2018 17:16)  HR: 66 (19 Oct 2018 09:43) (60 - 70)  BP: 128/71 (19 Oct 2018 09:43) (121/77 - 147/85)  BP(mean): --  RR: 17 (19 Oct 2018 09:43) (16 - 17)  SpO2: 97% (19 Oct 2018 09:43) (97% - 100%)    I&O's Detail    18 Oct 2018 07:01  -  19 Oct 2018 07:00  --------------------------------------------------------  IN:  Total IN: 0 mL    OUT:    VAC (Vacuum Assisted Closure) System: 250 mL  Total OUT: 250 mL    Total NET: -250 mL      19 Oct 2018 07:01  -  19 Oct 2018 13:12  --------------------------------------------------------  IN:  Total IN: 0 mL    OUT:    VAC (Vacuum Assisted Closure) System: 150 mL  Total OUT: 150 mL    Total NET: -150 mL          Daily     Daily Weight in k.5 (19 Oct 2018 01:08)    MEDICATIONS  (STANDING):  acetaminophen   Tablet .. 975 milliGRAM(s) Oral every 12 hours  ampicillin  IVPB 1 Gram(s) IV Intermittent every 8 hours  aspirin enteric coated 81 milliGRAM(s) Oral daily  dextrose 5% + sodium chloride 0.45%. 1000 milliLiter(s) (100 mL/Hr) IV Continuous <Continuous>  docusate sodium 100 milliGRAM(s) Oral three times a day  enoxaparin Injectable 40 milliGRAM(s) SubCutaneous daily  influenza   Vaccine 0.5 milliLiter(s) IntraMuscular once  senna 2 Tablet(s) Oral at bedtime    MEDICATIONS  (PRN):  benzocaine 15 mG/menthol 3.6 mG Lozenge 1 Lozenge Oral three times a day PRN Sore Throat  bisacodyl 5 milliGRAM(s) Oral every 12 hours PRN Constipation  dibucaine 1% Ointment 1 Application(s) Topical every 4 hours PRN anal pruritis  diphenhydrAMINE   Injectable 25 milliGRAM(s) IV Push every 6 hours PRN Rash and/or Itching  HYDROmorphone  Injectable 1 milliGRAM(s) IV Push every 3 hours PRN breakthrough pain  ondansetron Injectable 4 milliGRAM(s) IV Push every 8 hours PRN Nausea and/or Vomiting  oxyCODONE    IR 10 milliGRAM(s) Oral every 4 hours PRN Severe Pain (7 - 10)  oxyCODONE    IR 5 milliGRAM(s) Oral every 4 hours PRN Moderate Pain (4 - 6)  sodium biphosphate Rectal Enema 1 Enema Rectal daily PRN Constipation  witch hazel Pads 1 Application(s) Topical four times a day PRN anal pruritis      LABS:                RADIOLOGY & ADDITIONAL STUDIES:

## 2018-10-19 NOTE — PROGRESS NOTE ADULT - ASSESSMENT
67F PMH HTN s/p enbloc resection of inguinal mass, left femoral to below knee popliteal bypass, venous resection, reconstruction with rectus abdominus myocutaneous flaps/p liposarcoma excision and reconstruction, POC c/b with 12x8cm collection on CT scan s/p bedside I&D x2, now s/p OR dbt    - Blood flow in LLE assessed - pt with palpable DP  - Care per PRS  - Discussed with Vascular Sx  fellow    ART Ibanez PGY2  Vascular Surgery  [id number]

## 2018-10-19 NOTE — PROGRESS NOTE ADULT - SUBJECTIVE AND OBJECTIVE BOX
CC: f/u for infected collection post op     Patient reports pain in the abdomen and groin    REVIEW OF SYSTEMS:  All other review of systems negative (Comprehensive ROS)    Antimicrobials Day #  :26  ampicillin  IVPB 1 Gram(s) IV Intermittent every 8 hours    Other Medications Reviewed    T(F): 98.4 (10-19-18 @ 17:15), Max: 98.4 (10-19-18 @ 17:15)  HR: 67 (10-19-18 @ 17:15)  BP: 131/71 (10-19-18 @ 17:15)  RR: 17 (10-19-18 @ 17:15)  SpO2: 100% (10-19-18 @ 17:15)  Wt(kg): --    PHYSICAL EXAM:  General: alert,  acute distress  Eyes:  anicteric, no conjunctival injection, no discharge  Oropharynx: no lesions or injection 	  Neck: supple, without adenopathy  Lungs: clear to auscultation  Heart: regular rate and rhythm; no murmur, rubs or gallops  Abdomen: vac over llq that is bulging, midline wound mild redness, thigh with dressings and distal thigh wound intact,  Skin: no lesions  Extremities: no clubbing, cyanosis,   Neurologic: alert, oriented, moves all extremities    LAB RESULTS:              MICROBIOLOGY:  RECENT CULTURES:  10-15 @ 18:05 SURGERY         10-15 @ 18:02 ABDOMEN Enterococcus faecalis        10-15 @ 18:00 GROIN Klebsiella pneumoniae  Enterococcus faecalis  Staphylococcus sp.,coag neg            RADIOLOGY REVIEWED:    < from: CT Abdomen and Pelvis w/ IV Cont (10.02.18 @ 15:50) >  IMPRESSION: No change in left groin and smaller left lateral side fluid   and gas collections.       < end of copied text >    Assessment: Patient s/p extensive left abdomen wall and groin surgery for sarcoma with vascular graft required and developed infected collection in the groin involving the graft s/p I and D twice with growth of enterococcus. She is on ampicillin despite report of pen allergy after a rapid desensitization period and doing well. 4 days ago she had further debridement of devitalized tissue from the abdomen wall and groin with growth of enterococcus and klebsiella. Since wounds are a bit red will give a brief course of cipro with the ampicillin but would not stop the ampicillin since she will need that indefinitely and do not want to risk hypersensitivity if needs challenge again.     Plan:  will continue ampicillin iv for now  will give a week of cipro  if tolerates the cipro over next 2 days will change iv ampicillin to po amoxicillin for indefinite time. Do not want to change ampicillin iv to amoxicillin at same time as start cipro because occasionally can react to po delivery of a drug  and not iv and if we do po amox and cipro at same time, and she has an allergic reaction we  wont know what she is reacting to.

## 2018-10-19 NOTE — PROGRESS NOTE ADULT - SUBJECTIVE AND OBJECTIVE BOX
JULIA UMANA  [id number]    Subjective:  Patient is a 68y old  Female who presents with a chief complaint of operative management of sarcoma (12 Oct 2018 15:06)   Patient was seen and examined at bedside. No acute events overnight. Pain controlled.   Objective:  T(C): 37.1 (10-18-18 @ 07:31), Max: 37.6 (10-17-18 @ 17:28)  HR: 61 (10-18-18 @ 07:31) (60 - 70)  BP: 132/70 (10-18-18 @ 07:31) (132/70 - 141/76)  RR: 15 (10-18-18 @ 07:31) (15 - 18)  SpO2: 99% (10-18-18 @ 07:31) (98% - 100%)  Wt(kg): --           10-17 @ 07:01  -  10-18 @ 07:00  --------------------------------------------------------  IN: 0 mL / OUT: 1275 mL / NET: -1275 mL    10-18 @ 07:01  -  10-18 @ 09:21  --------------------------------------------------------  IN: 0 mL / OUT: 100 mL / NET: -100 mL      PHYSICAL EXAM:    General: NAD  Abdomen: Vac in place with good seal. Incisions c/d/i            MEDICATIONS  (STANDING):  acetaminophen   Tablet .. 975 milliGRAM(s) Oral every 12 hours  ampicillin  IVPB 1 Gram(s) IV Intermittent every 8 hours  aspirin enteric coated 81 milliGRAM(s) Oral daily  dextrose 5% + sodium chloride 0.45%. 1000 milliLiter(s) (100 mL/Hr) IV Continuous <Continuous>  docusate sodium 100 milliGRAM(s) Oral three times a day  enoxaparin Injectable 40 milliGRAM(s) SubCutaneous daily  influenza   Vaccine 0.5 milliLiter(s) IntraMuscular once  senna 2 Tablet(s) Oral at bedtime    MEDICATIONS  (PRN):  benzocaine 15 mG/menthol 3.6 mG Lozenge 1 Lozenge Oral three times a day PRN Sore Throat  bisacodyl 5 milliGRAM(s) Oral every 12 hours PRN Constipation  dibucaine 1% Ointment 1 Application(s) Topical every 4 hours PRN anal pruritis  diphenhydrAMINE   Injectable 25 milliGRAM(s) IV Push every 6 hours PRN Rash and/or Itching  HYDROmorphone  Injectable 1 milliGRAM(s) IV Push every 3 hours PRN breakthrough pain  ondansetron Injectable 4 milliGRAM(s) IV Push every 8 hours PRN Nausea and/or Vomiting  oxyCODONE    IR 10 milliGRAM(s) Oral every 4 hours PRN Severe Pain (7 - 10)  oxyCODONE    IR 5 milliGRAM(s) Oral every 4 hours PRN Moderate Pain (4 - 6)  sodium biphosphate Rectal Enema 1 Enema Rectal daily PRN Constipation  witch hazel Pads 1 Application(s) Topical four times a day PRN anal pruritis

## 2018-10-20 RX ORDER — HYDROMORPHONE HYDROCHLORIDE 2 MG/ML
1 INJECTION INTRAMUSCULAR; INTRAVENOUS; SUBCUTANEOUS
Qty: 0 | Refills: 0 | Status: DISCONTINUED | OUTPATIENT
Start: 2018-10-20 | End: 2018-10-23

## 2018-10-20 RX ORDER — OXYCODONE HYDROCHLORIDE 5 MG/1
5 TABLET ORAL EVERY 4 HOURS
Qty: 0 | Refills: 0 | Status: DISCONTINUED | OUTPATIENT
Start: 2018-10-20 | End: 2018-10-23

## 2018-10-20 RX ORDER — BENZOCAINE AND MENTHOL 5; 1 G/100ML; G/100ML
1 LIQUID ORAL THREE TIMES A DAY
Qty: 0 | Refills: 0 | Status: DISCONTINUED | OUTPATIENT
Start: 2018-10-20 | End: 2018-10-23

## 2018-10-20 RX ORDER — OXYCODONE HYDROCHLORIDE 5 MG/1
10 TABLET ORAL EVERY 4 HOURS
Qty: 0 | Refills: 0 | Status: DISCONTINUED | OUTPATIENT
Start: 2018-10-20 | End: 2018-10-23

## 2018-10-20 RX ORDER — ENOXAPARIN SODIUM 100 MG/ML
40 INJECTION SUBCUTANEOUS DAILY
Qty: 0 | Refills: 0 | Status: DISCONTINUED | OUTPATIENT
Start: 2018-10-20 | End: 2018-10-23

## 2018-10-20 RX ADMIN — Medication 500 MILLIGRAM(S): at 18:30

## 2018-10-20 RX ADMIN — OXYCODONE HYDROCHLORIDE 10 MILLIGRAM(S): 5 TABLET ORAL at 14:30

## 2018-10-20 RX ADMIN — ENOXAPARIN SODIUM 40 MILLIGRAM(S): 100 INJECTION SUBCUTANEOUS at 13:00

## 2018-10-20 RX ADMIN — OXYCODONE HYDROCHLORIDE 10 MILLIGRAM(S): 5 TABLET ORAL at 06:40

## 2018-10-20 RX ADMIN — Medication 975 MILLIGRAM(S): at 18:30

## 2018-10-20 RX ADMIN — Medication 81 MILLIGRAM(S): at 13:00

## 2018-10-20 RX ADMIN — Medication 500 MILLIGRAM(S): at 06:12

## 2018-10-20 RX ADMIN — Medication 100 MILLIGRAM(S): at 13:00

## 2018-10-20 RX ADMIN — Medication 975 MILLIGRAM(S): at 06:12

## 2018-10-20 RX ADMIN — Medication 100 MILLIGRAM(S): at 22:10

## 2018-10-20 RX ADMIN — OXYCODONE HYDROCHLORIDE 10 MILLIGRAM(S): 5 TABLET ORAL at 21:00

## 2018-10-20 RX ADMIN — Medication 108 GRAM(S): at 18:31

## 2018-10-20 RX ADMIN — Medication 100 MILLIGRAM(S): at 06:12

## 2018-10-20 RX ADMIN — ENOXAPARIN SODIUM 40 MILLIGRAM(S): 100 INJECTION SUBCUTANEOUS at 22:10

## 2018-10-20 RX ADMIN — OXYCODONE HYDROCHLORIDE 10 MILLIGRAM(S): 5 TABLET ORAL at 06:12

## 2018-10-20 RX ADMIN — SODIUM CHLORIDE 100 MILLILITER(S): 9 INJECTION, SOLUTION INTRAVENOUS at 22:10

## 2018-10-20 RX ADMIN — Medication 108 GRAM(S): at 09:01

## 2018-10-20 RX ADMIN — SENNA PLUS 2 TABLET(S): 8.6 TABLET ORAL at 22:10

## 2018-10-20 RX ADMIN — Medication 108 GRAM(S): at 01:23

## 2018-10-20 RX ADMIN — OXYCODONE HYDROCHLORIDE 10 MILLIGRAM(S): 5 TABLET ORAL at 20:01

## 2018-10-20 RX ADMIN — OXYCODONE HYDROCHLORIDE 10 MILLIGRAM(S): 5 TABLET ORAL at 13:00

## 2018-10-20 NOTE — PROGRESS NOTE ADULT - ASSESSMENT
67F s/p liposarcoma excision and reconstruction with VRAM, c/b thigh collections s/p bedside I&D x2 and VAC placement; also c/b incisional hernia now s/p debridement in the OR    - VAC change MWF   - Aquacel change q3days   - DVT ppx  - PT  - Will add-on PO Cipro (500mg BID)  - Dispo: rehab

## 2018-10-21 RX ORDER — MULTIVIT-MIN/FERROUS GLUCONATE 9 MG/15 ML
1 LIQUID (ML) ORAL DAILY
Qty: 0 | Refills: 0 | Status: DISCONTINUED | OUTPATIENT
Start: 2018-10-21 | End: 2018-10-21

## 2018-10-21 RX ADMIN — OXYCODONE HYDROCHLORIDE 5 MILLIGRAM(S): 5 TABLET ORAL at 11:52

## 2018-10-21 RX ADMIN — Medication 108 GRAM(S): at 17:50

## 2018-10-21 RX ADMIN — Medication 100 MILLIGRAM(S): at 05:17

## 2018-10-21 RX ADMIN — Medication 500 MILLIGRAM(S): at 17:50

## 2018-10-21 RX ADMIN — Medication 975 MILLIGRAM(S): at 17:50

## 2018-10-21 RX ADMIN — Medication 975 MILLIGRAM(S): at 05:17

## 2018-10-21 RX ADMIN — Medication 108 GRAM(S): at 01:56

## 2018-10-21 RX ADMIN — Medication 1 TABLET(S): at 12:24

## 2018-10-21 RX ADMIN — Medication 500 MILLIGRAM(S): at 05:17

## 2018-10-21 RX ADMIN — Medication 5 MILLIGRAM(S): at 06:39

## 2018-10-21 RX ADMIN — OXYCODONE HYDROCHLORIDE 5 MILLIGRAM(S): 5 TABLET ORAL at 10:57

## 2018-10-21 RX ADMIN — ENOXAPARIN SODIUM 40 MILLIGRAM(S): 100 INJECTION SUBCUTANEOUS at 12:24

## 2018-10-21 RX ADMIN — Medication 81 MILLIGRAM(S): at 12:24

## 2018-10-21 RX ADMIN — Medication 100 MILLIGRAM(S): at 21:29

## 2018-10-21 RX ADMIN — Medication 100 MILLIGRAM(S): at 13:45

## 2018-10-21 RX ADMIN — OXYCODONE HYDROCHLORIDE 5 MILLIGRAM(S): 5 TABLET ORAL at 06:39

## 2018-10-21 RX ADMIN — Medication 1 ENEMA: at 14:46

## 2018-10-21 RX ADMIN — OXYCODONE HYDROCHLORIDE 5 MILLIGRAM(S): 5 TABLET ORAL at 07:39

## 2018-10-21 RX ADMIN — Medication 108 GRAM(S): at 10:52

## 2018-10-21 RX ADMIN — SENNA PLUS 2 TABLET(S): 8.6 TABLET ORAL at 21:29

## 2018-10-21 NOTE — PROGRESS NOTE ADULT - SUBJECTIVE AND OBJECTIVE BOX
Plastic Surgery  Daily Progress Note     Subjective/24 Hour Events: No acute events overnight, doing well this morning.    Objective:    Vitals:  T(C): 37 (10-21-18 @ 05:15), Max: 37.3 (10-20-18 @ 09:03)  HR: 60 (10-21-18 @ 05:15) (58 - 63)  BP: 144/86 (10-21-18 @ 05:15) (115/70 - 146/75)  RR: 16 (10-21-18 @ 05:15) (16 - 19)  SpO2: 98% (10-21-18 @ 05:15) (97% - 98%)    I/O:     10-20-18 @ 07:01  -  10-21-18 @ 07:00  --------------------------------------------------------  IN: 0 mL / OUT: 400 mL / NET: -400 mL    10-21-18 @ 07:01  -  10-21-18 @ 08:31  --------------------------------------------------------  IN: 0 mL / OUT: 300 mL / NET: -300 mL      Physical Exam:   General: NAD  Abdomen: Vac in place with good seal. Incisions c/d/i

## 2018-10-22 RX ORDER — AMOXICILLIN 250 MG/5ML
500 SUSPENSION, RECONSTITUTED, ORAL (ML) ORAL THREE TIMES A DAY
Qty: 0 | Refills: 0 | Status: DISCONTINUED | OUTPATIENT
Start: 2018-10-22 | End: 2018-10-23

## 2018-10-22 RX ADMIN — Medication 500 MILLIGRAM(S): at 21:18

## 2018-10-22 RX ADMIN — Medication 500 MILLIGRAM(S): at 14:35

## 2018-10-22 RX ADMIN — OXYCODONE HYDROCHLORIDE 10 MILLIGRAM(S): 5 TABLET ORAL at 21:16

## 2018-10-22 RX ADMIN — Medication 975 MILLIGRAM(S): at 05:55

## 2018-10-22 RX ADMIN — Medication 100 MILLIGRAM(S): at 13:49

## 2018-10-22 RX ADMIN — Medication 81 MILLIGRAM(S): at 13:49

## 2018-10-22 RX ADMIN — SENNA PLUS 2 TABLET(S): 8.6 TABLET ORAL at 21:17

## 2018-10-22 RX ADMIN — ENOXAPARIN SODIUM 40 MILLIGRAM(S): 100 INJECTION SUBCUTANEOUS at 13:49

## 2018-10-22 RX ADMIN — Medication 500 MILLIGRAM(S): at 05:55

## 2018-10-22 RX ADMIN — Medication 108 GRAM(S): at 01:48

## 2018-10-22 RX ADMIN — Medication 1 TABLET(S): at 13:49

## 2018-10-22 RX ADMIN — Medication 108 GRAM(S): at 10:31

## 2018-10-22 RX ADMIN — OXYCODONE HYDROCHLORIDE 10 MILLIGRAM(S): 5 TABLET ORAL at 21:45

## 2018-10-22 RX ADMIN — Medication 100 MILLIGRAM(S): at 05:55

## 2018-10-22 RX ADMIN — Medication 100 MILLIGRAM(S): at 21:16

## 2018-10-22 RX ADMIN — Medication 5 MILLIGRAM(S): at 10:31

## 2018-10-22 RX ADMIN — Medication 500 MILLIGRAM(S): at 17:36

## 2018-10-22 RX ADMIN — Medication 975 MILLIGRAM(S): at 17:36

## 2018-10-22 NOTE — PROGRESS NOTE ADULT - SUBJECTIVE AND OBJECTIVE BOX
JULIA DONG  [id number]    Subjective:    Patient seen and examined by plastic surgery team on morning rounds.   No acute events overnight, afebrile, pain controlled.          Objective:  T(C): 36.7 (10-22-18 @ 05:51), Max: 37.1 (10-21-18 @ 21:27)  HR: 60 (10-22-18 @ 05:51) (60 - 74)  BP: 147/88 (10-22-18 @ 05:51) (130/76 - 156/85)  RR: 16 (10-22-18 @ 05:51) (16 - 18)  SpO2: 98% (10-22-18 @ 05:51) (98% - 100%)  Wt(kg): --           10-21 @ 07:01  -  10-22 @ 07:00  --------------------------------------------------------  IN: 0 mL / OUT: 400 mL / NET: -400 mL      PHYSICAL EXAM:    General: NAD, appears comfortable.   Abdomen: Vac in place with good seal. Incisions c/d/i.             MEDICATIONS  (STANDING):  acetaminophen   Tablet .. 975 milliGRAM(s) Oral every 12 hours  ampicillin  IVPB 1 Gram(s) IV Intermittent every 8 hours  aspirin enteric coated 81 milliGRAM(s) Oral daily  ciprofloxacin     Tablet 500 milliGRAM(s) Oral every 12 hours  dextrose 5% + sodium chloride 0.45%. 1000 milliLiter(s) (100 mL/Hr) IV Continuous <Continuous>  docusate sodium 100 milliGRAM(s) Oral three times a day  enoxaparin Injectable 40 milliGRAM(s) SubCutaneous daily  influenza   Vaccine 0.5 milliLiter(s) IntraMuscular once  multivitamin 1 Tablet(s) Oral daily  senna 2 Tablet(s) Oral at bedtime    MEDICATIONS  (PRN):  benzocaine 15 mG/menthol 3.6 mG Lozenge 1 Lozenge Oral three times a day PRN Sore Throat  bisacodyl 5 milliGRAM(s) Oral every 12 hours PRN Constipation  dibucaine 1% Ointment 1 Application(s) Topical every 4 hours PRN anal pruritis  diphenhydrAMINE   Injectable 25 milliGRAM(s) IV Push every 6 hours PRN Rash and/or Itching  HYDROmorphone  Injectable 1 milliGRAM(s) IV Push every 3 hours PRN breakthrough pain  ondansetron Injectable 4 milliGRAM(s) IV Push every 8 hours PRN Nausea and/or Vomiting  oxyCODONE    IR 10 milliGRAM(s) Oral every 4 hours PRN Severe Pain (7 - 10)  oxyCODONE    IR 5 milliGRAM(s) Oral every 4 hours PRN Moderate Pain (4 - 6)  sodium biphosphate Rectal Enema 1 Enema Rectal daily PRN Constipation  witch hazel Pads 1 Application(s) Topical four times a day PRN anal pruritis

## 2018-10-22 NOTE — PROGRESS NOTE ADULT - ASSESSMENT
67F s/p liposarcoma excision and reconstruction with VRAM, c/b thigh collections s/p bedside I&D x2 and VAC placement; also c/b incisional hernia now s/p debridement in the OR    - VAC change MWF   - Apply Aquacel to anterior abdomen, change q3days   - DVT ppx  - PT  - Will add-on PO Cipro (500mg BID)  - Rehab planning ongoing

## 2018-10-22 NOTE — PROGRESS NOTE ADULT - SUBJECTIVE AND OBJECTIVE BOX
Orthopedic PA Progress Note     No acute events overnight. Pain controlled. Tolerating abx.     PE:  Vital Signs Last 24 Hrs  T(C): 36.7 (22 Oct 2018 05:51), Max: 37.1 (21 Oct 2018 21:27)  T(F): 98.1 (22 Oct 2018 05:51), Max: 98.7 (21 Oct 2018 21:27)  HR: 60 (22 Oct 2018 05:51) (60 - 74)  BP: 147/88 (22 Oct 2018 05:51) (130/76 - 156/85)  BP(mean): --  RR: 16 (22 Oct 2018 05:51) (16 - 18)  SpO2: 98% (22 Oct 2018 05:51) (98% - 100%)      Gen: No acute distress  Abdominal dressing c/d/i  LLE vac in place  Motor intact TA/GCS/EHL/FHL   SILT DP/SP/Tib  cap refill <2 sec, wwp       Assessment/Plan:  68yFemale s/p liposarcoma excision POD 35 s/p multiple I and Ds  -Vac/dressing per PRS  -cont abx, FU ID  -pain control  -PT  -WBAT in [facility]  -OOB  -DVT ppx  -dispo plan

## 2018-10-22 NOTE — PROGRESS NOTE ADULT - SUBJECTIVE AND OBJECTIVE BOX
CC: f/u for infected left groin     Patient reports she is feeling ok today    REVIEW OF SYSTEMS:  All other review of systems negative (Comprehensive ROS)    Antimicrobials Day #  :  amoxicillin      Capsule 500 milliGRAM(s) Oral three times a day  ciprofloxacin     Tablet 500 milliGRAM(s) Oral every 12 hours   day 4    Other Medications Reviewed    T(F): 98.7 (10-22-18 @ 09:14), Max: 98.7 (10-21-18 @ 21:27)  HR: 62 (10-22-18 @ 09:14)  BP: 148/85 (10-22-18 @ 09:14)  RR: 16 (10-22-18 @ 09:14)  SpO2: 99% (10-22-18 @ 09:14)  Wt(kg): --    PHYSICAL EXAM:  General: alert, no acute distress  Eyes:  anicteric, no conjunctival injection, no discharge  Oropharynx: no lesions or injection 	  Neck: supple, without adenopathy  Lungs: clear to auscultation  Heart: regular rate and rhythm; no murmur, rubs or gallops  Abdomen: soft, nondistended, nontender, without mass or organomegaly. LLQ wound with vac, midline wound dressed  Skin: no lesions  Extremities: no clubbing, cyanosis, or edema. wound is dressed with vac  Neurologic: alert, oriented, moves all extremities    LAB RESULTS:              MICROBIOLOGY:  RECENT CULTURES:      RADIOLOGY REVIEWED:    < from: CT Abdomen and Pelvis w/ IV Cont (10.02.18 @ 15:50) >  IMPRESSION: No change in left groin and smaller left lateral side fluid   and gas collections.      end of copied text >    Assessment:  Patient s/p sarcoma resection and left leg bypass with prosthetic graft who developed post op infected collection around groin, thigh and graft now s/p drainage twice and now debrided devitalized tissue , hernia repair and vac dressing. Grew enterococcus consistently but one culture most recent grew klebsiella too but that is more likely just colonizing brian. Does appear to tolerate the ampicillin fine and the cipro  Plan: will continue cipro for 3 more days  change to po amoxicillin indefinitely  update labs  local care wounds per plastics.

## 2018-10-23 ENCOUNTER — TRANSCRIPTION ENCOUNTER (OUTPATIENT)
Age: 68
End: 2018-10-23

## 2018-10-23 VITALS
SYSTOLIC BLOOD PRESSURE: 116 MMHG | OXYGEN SATURATION: 98 % | HEART RATE: 63 BPM | RESPIRATION RATE: 17 BRPM | TEMPERATURE: 98 F | DIASTOLIC BLOOD PRESSURE: 77 MMHG

## 2018-10-23 LAB
ALBUMIN SERPL ELPH-MCNC: 2.2 G/DL — LOW (ref 3.3–5)
ALP SERPL-CCNC: 76 U/L — SIGNIFICANT CHANGE UP (ref 40–120)
ALT FLD-CCNC: 13 U/L — SIGNIFICANT CHANGE UP (ref 4–33)
AST SERPL-CCNC: 16 U/L — SIGNIFICANT CHANGE UP (ref 4–32)
BASOPHILS # BLD AUTO: 0.04 K/UL — SIGNIFICANT CHANGE UP (ref 0–0.2)
BASOPHILS NFR BLD AUTO: 0.7 % — SIGNIFICANT CHANGE UP (ref 0–2)
BILIRUB SERPL-MCNC: 0.2 MG/DL — SIGNIFICANT CHANGE UP (ref 0.2–1.2)
BUN SERPL-MCNC: 10 MG/DL — SIGNIFICANT CHANGE UP (ref 7–23)
CALCIUM SERPL-MCNC: 8.3 MG/DL — LOW (ref 8.4–10.5)
CHLORIDE SERPL-SCNC: 103 MMOL/L — SIGNIFICANT CHANGE UP (ref 98–107)
CO2 SERPL-SCNC: 26 MMOL/L — SIGNIFICANT CHANGE UP (ref 22–31)
CREAT SERPL-MCNC: 0.59 MG/DL — SIGNIFICANT CHANGE UP (ref 0.5–1.3)
EOSINOPHIL # BLD AUTO: 0.14 K/UL — SIGNIFICANT CHANGE UP (ref 0–0.5)
EOSINOPHIL NFR BLD AUTO: 2.6 % — SIGNIFICANT CHANGE UP (ref 0–6)
GLUCOSE SERPL-MCNC: 100 MG/DL — HIGH (ref 70–99)
HCT VFR BLD CALC: 28.6 % — LOW (ref 34.5–45)
HGB BLD-MCNC: 9.1 G/DL — LOW (ref 11.5–15.5)
IMM GRANULOCYTES # BLD AUTO: 0.01 # — SIGNIFICANT CHANGE UP
IMM GRANULOCYTES NFR BLD AUTO: 0.2 % — SIGNIFICANT CHANGE UP (ref 0–1.5)
LYMPHOCYTES # BLD AUTO: 1.65 K/UL — SIGNIFICANT CHANGE UP (ref 1–3.3)
LYMPHOCYTES # BLD AUTO: 30.6 % — SIGNIFICANT CHANGE UP (ref 13–44)
MCHC RBC-ENTMCNC: 29.8 PG — SIGNIFICANT CHANGE UP (ref 27–34)
MCHC RBC-ENTMCNC: 31.8 % — LOW (ref 32–36)
MCV RBC AUTO: 93.8 FL — SIGNIFICANT CHANGE UP (ref 80–100)
MONOCYTES # BLD AUTO: 0.51 K/UL — SIGNIFICANT CHANGE UP (ref 0–0.9)
MONOCYTES NFR BLD AUTO: 9.4 % — SIGNIFICANT CHANGE UP (ref 2–14)
NEUTROPHILS # BLD AUTO: 3.05 K/UL — SIGNIFICANT CHANGE UP (ref 1.8–7.4)
NEUTROPHILS NFR BLD AUTO: 56.5 % — SIGNIFICANT CHANGE UP (ref 43–77)
NRBC # FLD: 0 — SIGNIFICANT CHANGE UP
PLATELET # BLD AUTO: 270 K/UL — SIGNIFICANT CHANGE UP (ref 150–400)
PMV BLD: 10.6 FL — SIGNIFICANT CHANGE UP (ref 7–13)
POTASSIUM SERPL-MCNC: 3.6 MMOL/L — SIGNIFICANT CHANGE UP (ref 3.5–5.3)
POTASSIUM SERPL-SCNC: 3.6 MMOL/L — SIGNIFICANT CHANGE UP (ref 3.5–5.3)
PROT SERPL-MCNC: 5.4 G/DL — LOW (ref 6–8.3)
RBC # BLD: 3.05 M/UL — LOW (ref 3.8–5.2)
RBC # FLD: 14.9 % — HIGH (ref 10.3–14.5)
SODIUM SERPL-SCNC: 140 MMOL/L — SIGNIFICANT CHANGE UP (ref 135–145)
WBC # BLD: 5.4 K/UL — SIGNIFICANT CHANGE UP (ref 3.8–10.5)
WBC # FLD AUTO: 5.4 K/UL — SIGNIFICANT CHANGE UP (ref 3.8–10.5)

## 2018-10-23 RX ORDER — ONDANSETRON 8 MG/1
1 TABLET, FILM COATED ORAL
Qty: 90 | Refills: 0
Start: 2018-10-23 | End: 2018-11-21

## 2018-10-23 RX ORDER — OXYCODONE HYDROCHLORIDE 5 MG/1
1 TABLET ORAL
Qty: 0 | Refills: 0 | DISCHARGE
Start: 2018-10-23

## 2018-10-23 RX ORDER — DOCUSATE SODIUM 100 MG
1 CAPSULE ORAL
Qty: 0 | Refills: 0 | DISCHARGE
Start: 2018-10-23

## 2018-10-23 RX ORDER — CIPROFLOXACIN LACTATE 400MG/40ML
1 VIAL (ML) INTRAVENOUS
Qty: 0 | Refills: 0 | DISCHARGE
Start: 2018-10-23

## 2018-10-23 RX ORDER — ACETAMINOPHEN 500 MG
3 TABLET ORAL
Qty: 0 | Refills: 0 | DISCHARGE
Start: 2018-10-23

## 2018-10-23 RX ORDER — DIPHENHYDRAMINE HCL 50 MG
0 CAPSULE ORAL
Qty: 0 | Refills: 0 | DISCHARGE
Start: 2018-10-23

## 2018-10-23 RX ORDER — DIBUCAINE 1 %
1 OINTMENT (GRAM) RECTAL
Qty: 0 | Refills: 0 | DISCHARGE
Start: 2018-10-23

## 2018-10-23 RX ORDER — ASPIRIN/CALCIUM CARB/MAGNESIUM 324 MG
1 TABLET ORAL
Qty: 0 | Refills: 0 | DISCHARGE
Start: 2018-10-23

## 2018-10-23 RX ORDER — SENNA PLUS 8.6 MG/1
2 TABLET ORAL
Qty: 0 | Refills: 0 | DISCHARGE
Start: 2018-10-23

## 2018-10-23 RX ORDER — AMOXICILLIN 250 MG/5ML
1 SUSPENSION, RECONSTITUTED, ORAL (ML) ORAL
Qty: 0 | Refills: 0 | DISCHARGE
Start: 2018-10-23

## 2018-10-23 RX ADMIN — ENOXAPARIN SODIUM 40 MILLIGRAM(S): 100 INJECTION SUBCUTANEOUS at 12:17

## 2018-10-23 RX ADMIN — OXYCODONE HYDROCHLORIDE 10 MILLIGRAM(S): 5 TABLET ORAL at 04:33

## 2018-10-23 RX ADMIN — OXYCODONE HYDROCHLORIDE 10 MILLIGRAM(S): 5 TABLET ORAL at 14:00

## 2018-10-23 RX ADMIN — OXYCODONE HYDROCHLORIDE 10 MILLIGRAM(S): 5 TABLET ORAL at 05:00

## 2018-10-23 RX ADMIN — Medication 100 MILLIGRAM(S): at 06:33

## 2018-10-23 RX ADMIN — Medication 500 MILLIGRAM(S): at 06:32

## 2018-10-23 RX ADMIN — Medication 1 TABLET(S): at 12:17

## 2018-10-23 RX ADMIN — Medication 100 MILLIGRAM(S): at 14:44

## 2018-10-23 RX ADMIN — Medication 81 MILLIGRAM(S): at 12:17

## 2018-10-23 RX ADMIN — INFLUENZA VIRUS VACCINE 0.5 MILLILITER(S): 15; 15; 15; 15 SUSPENSION INTRAMUSCULAR at 12:20

## 2018-10-23 RX ADMIN — Medication 500 MILLIGRAM(S): at 14:44

## 2018-10-23 RX ADMIN — Medication 5 MILLIGRAM(S): at 12:17

## 2018-10-23 RX ADMIN — Medication 975 MILLIGRAM(S): at 06:32

## 2018-10-23 RX ADMIN — OXYCODONE HYDROCHLORIDE 10 MILLIGRAM(S): 5 TABLET ORAL at 13:25

## 2018-10-23 NOTE — DISCHARGE NOTE ADULT - MEDICATION SUMMARY - MEDICATIONS TO TAKE
I will START or STAY ON the medications listed below when I get home from the hospital:    MVI 1 tab orally daily last dose 9/10  -- Indication: For Home medication    Calcium with D 1 tab orally daily  -- Indication: For Home medication    acetaminophen 325 mg oral tablet  -- 3 tab(s) by mouth every 12 hours  -- Indication: For Pain    oxyCODONE 10 mg oral tablet  -- 1 tab(s) by mouth every 4 hours, As needed, Severe Pain (7 - 10)  -- Indication: For Pain    oxyCODONE 5 mg oral tablet  -- 1 tab(s) by mouth every 4 hours, As needed, Moderate Pain (4 - 6)  -- Indication: For Pain    aspirin 81 mg oral delayed release tablet  -- 1 tab(s) by mouth once a day  -- Indication: For Home medication    diphenhydrAMINE 50 mg/mL injectable solution  --  injectable   -- Indication: For Home medication    losartan-hydrochlorothiazide 100mg-12.5mg oral tablet  -- 1 tab(s) by mouth once a day  -- Indication: For Home medication    dibucaine 1% topical ointment  -- 1 application on skin every 4 hours, As needed, anal pruritis  -- Indication: For Home medication    bisacodyl 5 mg oral delayed release tablet  -- 1 tab(s) by mouth every 12 hours, As needed, Constipation  -- Indication: For Home medication    docusate sodium 100 mg oral capsule  -- 1 cap(s) by mouth 3 times a day  -- Indication: For Constipation    senna oral tablet  -- 2 tab(s) by mouth once a day (at bedtime)  -- Indication: For Constipation    amoxicillin 500 mg oral capsule  -- 1 cap(s) by mouth 3 times a day  -- Indication: For Antimicrobial    ciprofloxacin 500 mg oral tablet  -- 1 tab(s) by mouth every 12 hours  -- Indication: For Antimicrobial    Multiple Vitamins oral tablet  -- 1 tab(s) by mouth once a day  -- Indication: For Home medication

## 2018-10-23 NOTE — DISCHARGE NOTE ADULT - CARE PROVIDER_API CALL
Adair Coley), Surgery  [facility]  [address] [place] [address]  Phone: [phone]  Fax: [phone]

## 2018-10-23 NOTE — DISCHARGE NOTE ADULT - NS AS DC FOLLOWUP STROKE INST
carenotes on liposarcoma, cellulitis, d/c medications side effects pamphlet Influenza vaccination ([facility] Pub Date: August 7, 2015)/carenotes on liposarcoma, cellulitis, d/c medications side effects pamphlet

## 2018-10-23 NOTE — PROGRESS NOTE ADULT - ASSESSMENT
ASSESMENT:   67F s/p liposarcoma excision and reconstruction with VRAM, c/b thigh collections s/p bedside I&D x2 and VAC placement; also c/b incisional hernia now s/p debridement in the OR    PLAN:     - VAC change MWF   - Apply Aquacel to anterior abdomen, change q3days   - DVT ppx  - PT  - Will add-on PO Cipro (500mg BID)  - Rehab planning ongoing

## 2018-10-23 NOTE — DISCHARGE NOTE ADULT - HOSPITAL COURSE
67y F with Hx of HTN, presented for scheduled surgical resection of left inguinal mass. Patient underwent resection of inguinal mass, left femoral to below knee popliteal bypass, venous resection, reconstruction with rectus abdominus myocutaneous flap. At start of case patient was noted to have bigeminy with ventricular rate of 30s-40s. EBL 1500cc, received 3u pRBC. Post-op patient left intubated, sedated and brought to SICU for close hemodynamic monitoring. The patient was stabilized and transferred to the surgical floor for further care. The patient received pain control, wound care and physical therapy. The patient was seen by wound care and received a wound vac which was changed three times a week while in hospital and changed to wet to dry dressing before leaving. At time of the discharge the patient had adequate pain control, was hemodynamically stable, tolerating PO and agreeable for discharge to rehab. She was given follow up instructions, all questions were answered.

## 2018-10-23 NOTE — DISCHARGE NOTE ADULT - PLAN OF CARE
Veronica-operative management WOUND CARE: Please remove wet to dry dressings from wound on L LE and place black sponge wound vac over wound and change three times a week. Aquacel dressing can remain over anterior abdominal incision, replace every third day.

## 2018-10-23 NOTE — DISCHARGE NOTE ADULT - CONDITIONS AT DISCHARGE
stable, tolerating diet, oob ambulating with walker, voiding well, stable, tolerating diet, oob ambulating with walker, voiding well incontinent at times, abdominal sutures SCOUT, Pt with perineal IAD, and bilateral groin MAD,

## 2018-10-23 NOTE — PROGRESS NOTE ADULT - SUBJECTIVE AND OBJECTIVE BOX
JULIA UMANA  [id number]    Subjective:    Patient seen and examined by plastic surgery team on morning rounds, no acute changes overnight.   Wound vac changed yesterday by wound care team. Patient working with PT, pain controlled.        Objective:  T(C): 36.9 (10-23-18 @ 06:31), Max: 37.2 (10-22-18 @ 13:53)  HR: 65 (10-23-18 @ 06:31) (59 - 66)  BP: 129/80 (10-23-18 @ 06:31) (119/71 - 154/84)  RR: 16 (10-23-18 @ 06:31) (16 - 17)  SpO2: 97% (10-23-18 @ 06:31) (97% - 99%)  Wt(kg): --   10-23    140  |  103  |  10  ----------------------------<  100<H>  3.6   |  26  |  0.59    Ca    8.3<L>      23 Oct 2018 06:36    TPro  5.4<L>  /  Alb  2.2<L>  /  TBili  0.2  /  DBili  x   /  AST  16  /  ALT  13  /  AlkPhos  76  10-23                        9.1    5.40  )-----------( 270      ( 23 Oct 2018 06:36 )             28.6       10-22 @ 07:01  -  10-23 @ 07:00  --------------------------------------------------------  IN: 0 mL / OUT: 150 mL / NET: -150 mL    10-23 @ 07:01  -  10-23 @ 08:48  --------------------------------------------------------  IN: 0 mL / OUT: 50 mL / NET: -50 mL      PHYSICAL EXAM:    >> General: Well-developed. Well-nourished. No acute distress.  >> Abdomen: Soft. Non tender, non distended, incision CDI with aquacel dressing in place to anterior abdomen.   >> Extremities: Wound vac in place to L proximal LE, holding suction well, no leaks. Wound vac changed yesterday at bedside with wound care.               MEDICATIONS  (STANDING):  acetaminophen   Tablet .. 975 milliGRAM(s) Oral every 12 hours  amoxicillin      Capsule 500 milliGRAM(s) Oral three times a day  aspirin enteric coated 81 milliGRAM(s) Oral daily  ciprofloxacin     Tablet 500 milliGRAM(s) Oral every 12 hours  dextrose 5% + sodium chloride 0.45%. 1000 milliLiter(s) (100 mL/Hr) IV Continuous <Continuous>  docusate sodium 100 milliGRAM(s) Oral three times a day  enoxaparin Injectable 40 milliGRAM(s) SubCutaneous daily  influenza   Vaccine 0.5 milliLiter(s) IntraMuscular once  multivitamin 1 Tablet(s) Oral daily  senna 2 Tablet(s) Oral at bedtime    MEDICATIONS  (PRN):  benzocaine 15 mG/menthol 3.6 mG Lozenge 1 Lozenge Oral three times a day PRN Sore Throat  bisacodyl 5 milliGRAM(s) Oral every 12 hours PRN Constipation  dibucaine 1% Ointment 1 Application(s) Topical every 4 hours PRN anal pruritis  diphenhydrAMINE   Injectable 25 milliGRAM(s) IV Push every 6 hours PRN Rash and/or Itching  HYDROmorphone  Injectable 1 milliGRAM(s) IV Push every 3 hours PRN breakthrough pain  ondansetron Injectable 4 milliGRAM(s) IV Push every 8 hours PRN Nausea and/or Vomiting  oxyCODONE    IR 10 milliGRAM(s) Oral every 4 hours PRN Severe Pain (7 - 10)  oxyCODONE    IR 5 milliGRAM(s) Oral every 4 hours PRN Moderate Pain (4 - 6)  sodium biphosphate Rectal Enema 1 Enema Rectal daily PRN Constipation  witch hazel Pads 1 Application(s) Topical four times a day PRN anal pruritis

## 2018-10-23 NOTE — DISCHARGE NOTE ADULT - CARE PLAN
Principal Discharge DX:	H/O osteosarcoma  Goal:	Veronica-operative management Principal Discharge DX:	H/O osteosarcoma  Goal:	Veronica-operative management  Assessment and plan of treatment:	WOUND CARE: Please remove wet to dry dressings from wound on L LE and place black sponge wound vac over wound and change three times a week. Aquacel dressing can remain over anterior abdominal incision, replace every third day.

## 2018-10-23 NOTE — DISCHARGE NOTE ADULT - INSTRUCTIONS
Notify Dr Coley if you experience any increase in pain not relieved with pain medication, any redness, drainage swelling around incisions or any fever >100.5.  Drink plenty of fluids, use stool softeners to assist with constipation which can be a side effect of your pain medication. Notify Dr Coley if you experience any increase in pain not relieved with pain medication, any redness, drainage swelling around incisions or any fever >100.5.  Drink plenty of fluids, use stool softeners to assist with constipation which can be a side effect of your pain medication.  Bilateral groin MAD, area reddened, skin cleanser and moisture barrier ointment applied, perineal IAD , moisture barrier paste applied. Regular diet Notify Dr Coley if you experience any increase in pain not relieved with pain medication, any redness, drainage swelling around incisions or any fever >100.5.  Drink plenty of fluids, use stool softeners to assist with constipation which can be a side effect of your pain medication.  Bilateral groin MAD, area reddened, skin cleanser and moisture barrier ointment applied, perineal IAD , moisture barrier paste applied.  Vac to be applied to  left thigh at rehab, wet to dry NS dressing in place.

## 2018-10-23 NOTE — PROGRESS NOTE ADULT - PROVIDER SPECIALTY LIST ADULT
Anesthesia
Anesthesia
Hospitalist
Infectious Disease
Orthopedics
Plastic Surgery
SICU
Vascular Surgery
Orthopedics
Orthopedics
Infectious Disease
Plastic Surgery
Hospitalist

## 2018-10-23 NOTE — DISCHARGE NOTE ADULT - PATIENT PORTAL LINK FT
You can access the [facility] Patient Portal, offered by [facility], by registering with the following website: [email or web address]

## 2018-11-09 ENCOUNTER — APPOINTMENT (OUTPATIENT)
Dept: ORTHOPEDIC SURGERY | Facility: CLINIC | Age: 68
End: 2018-11-09
Payer: MEDICARE

## 2018-11-09 PROCEDURE — 99024 POSTOP FOLLOW-UP VISIT: CPT

## 2018-11-13 ENCOUNTER — APPOINTMENT (OUTPATIENT)
Dept: PLASTIC SURGERY | Facility: CLINIC | Age: 68
End: 2018-11-13
Payer: MEDICARE

## 2018-11-13 VITALS — WEIGHT: 134 LBS | BODY MASS INDEX: 27.01 KG/M2 | HEIGHT: 59 IN | RESPIRATION RATE: 15 BRPM

## 2018-11-13 PROCEDURE — 99024 POSTOP FOLLOW-UP VISIT: CPT

## 2018-11-14 LAB
FUNGUS SPEC QL CULT: SIGNIFICANT CHANGE UP

## 2018-11-22 NOTE — H&P PST ADULT - ALLERGIC/IMMUNOLOGIC
Pt A&Ox4. VSS. Pt receiving 10 L via oxymask per ERAS protocol. Pt reports pain tolerable and verbalizes understanding of PCA bolus button. Pt reported nausea/lightheadedness and tolerated water and apple juice well.   Due to lightheadedness and nausea pt did not  PACU.     Pharmacy called regarding post-op antibiotic. Pharmacist stated order would be released once pt arrived on [facility] and that the receiving RN could infuse it at that time.     Pt transported up in [facility] however agrees to sit in chair on arrival to [facility].   
Pt arrived to PACU, monitors applied and report received from MD and RN.   Pt drowsy but aroused to voice on arrival. Surgical drsg CDI. Ice pack applied   VSS on RA. Will continue to monitor.   
details…

## 2018-11-30 NOTE — PROGRESS NOTE ADULT - ASSESSMENT
Verified Results  LIPID PNL W/ RFX [id number] 03:50PM SONG CASTANO   [Sep 15, 2018 8:09AM CASTANOSONG]  your cholesterol panel is almost perfect, great job, im sure with continued weight loss the triglycerides will get below 150, eat low fat diet and continue regular exercise for this, your sugar and kidney tests are normal , great job!!!!!     Test Name Result Flag Reference   FASTING STATUS FASTING hrs     CHOLESTEROL 187 mg/dl  <200   Desirable            <200  Borderline High      200 to 239  High                 >=240   HDL CHOLESTEROL 56 mg/dl  >39   Low            <40  Borderline Low 40 to 49  Near Optimal   50 to 59  Optimal        >=60   TRIGLYCERIDES 156 mg/dl H <150   Normal                   <150  Borderline High          150 to 199  High                     200 to 499  Very High                >=500   LDL CHOLESTEROL (CALCULATED) 100 mg/dl  <130   OPTIMAL               <100  NEAR OPTIMAL          100-129  BORDERLINE HIGH       130-159  HIGH                  160-189  VERY HIGH             >=190   NON-HDL CHOLESTEROL 131 mg/dl     Therapeutic Target:  CHD and risk equivalents <130  Multiple risk factors    <160  0 to 1 risk factors      <190   CHOLESTEROL/HDL RATIO 3.3  <4.5     BASIC METABOLIC PNL [id number] 03:50PM CASTANO, SONG   [Sep 15, 2018 8:09AM SONG CASTANO]  your cholesterol panel is almost perfect, great job, im sure with continued weight loss the triglycerides will get below 150, eat low fat diet and continue regular exercise for this, your sugar and kidney tests are normal , great job!!!!!     Test Name Result Flag Reference   SODIUM 139 mmol/L  135-145   POTASSIUM 4.7 mmol/L  3.4-5.1   CHLORIDE 99 mmol/L     CARBON DIOXIDE 31 mmol/L  21-32   ANION GAP 14 mmol/L  10-20   GLUCOSE 89 mg/dl  65-99   BUN 13 mg/dl  6-20   CREATININE 1.01 mg/dl  0.67-1.17   GFR EST.AFRICAN AMER 86     eGFR 60 - 89 mL/min/1.73m2 = Mild decrease in kidney function.   GFR EST.NONAFRI AMER 74     eGFR  60 - 89 mL/min/1.73m2 = Mild decrease in kidney function.   BUN/CREATININE RATIO 13  7-25   CALCIUM 9.4 mg/dl  8.4-10.2   FASTING STATUS FASTING hrs          Ms. Fleming is a 68 yo woman with PMHx of HTN admitted for scheduled surgical resection of left inguinal mass. Patient underwent enbloc resection of inguinal mass, left femoral to below knee popliteal bypass, venous resection, and reconstruction with rectus abdominus myocutaneous flap on 9/17. Hospital course notable for asymptomatic bigeminy with bradycardia deemed 2/2 catecholamine surge, non-traumatic rhabdomyolysis - both resolved. Transferred to surgical floor on 9/21 from SICU, where she stayed post-op for closer hemodynamic monitoring. Patient found to have post-surgical left thigh abscess s/p washout done on 9/26 in addition to CAUTI due to E. coli. Patient appears clinically stable and WBC improved on IV abx, currently only on vancomycin. Tissue culture growing pansensitive Enterococcus faecalis. Pt also s/p bedside I&D of left thigh abscess on 10/3

## 2018-12-04 ENCOUNTER — APPOINTMENT (OUTPATIENT)
Dept: PLASTIC SURGERY | Facility: CLINIC | Age: 68
End: 2018-12-04
Payer: MEDICARE

## 2018-12-04 VITALS
DIASTOLIC BLOOD PRESSURE: 82 MMHG | SYSTOLIC BLOOD PRESSURE: 120 MMHG | OXYGEN SATURATION: 100 % | BODY MASS INDEX: 22.2 KG/M2 | HEART RATE: 73 BPM | WEIGHT: 130 LBS | HEIGHT: 64 IN

## 2018-12-04 PROCEDURE — 99024 POSTOP FOLLOW-UP VISIT: CPT

## 2018-12-10 ENCOUNTER — APPOINTMENT (OUTPATIENT)
Dept: ORTHOPEDIC SURGERY | Facility: CLINIC | Age: 68
End: 2018-12-10
Payer: MEDICARE

## 2018-12-10 PROCEDURE — 99024 POSTOP FOLLOW-UP VISIT: CPT

## 2018-12-17 ENCOUNTER — OTHER (OUTPATIENT)
Age: 68
End: 2018-12-17

## 2018-12-21 ENCOUNTER — FORM ENCOUNTER (OUTPATIENT)
Age: 68
End: 2018-12-21

## 2018-12-22 ENCOUNTER — APPOINTMENT (OUTPATIENT)
Dept: NUCLEAR MEDICINE | Facility: IMAGING CENTER | Age: 68
End: 2018-12-22
Payer: MEDICARE

## 2018-12-22 ENCOUNTER — OUTPATIENT (OUTPATIENT)
Dept: OUTPATIENT SERVICES | Facility: HOSPITAL | Age: 68
LOS: 1 days | End: 2018-12-22
Payer: MEDICARE

## 2018-12-22 DIAGNOSIS — Z00.8 ENCOUNTER FOR OTHER GENERAL EXAMINATION: ICD-10-CM

## 2018-12-22 DIAGNOSIS — C49.9 MALIGNANT NEOPLASM OF CONNECTIVE AND SOFT TISSUE, UNSPECIFIED: ICD-10-CM

## 2018-12-22 PROCEDURE — 78815 PET IMAGE W/CT SKULL-THIGH: CPT

## 2018-12-22 PROCEDURE — 78815 PET IMAGE W/CT SKULL-THIGH: CPT | Mod: 26,PS

## 2018-12-22 PROCEDURE — A9552: CPT

## 2019-01-04 ENCOUNTER — APPOINTMENT (OUTPATIENT)
Dept: PLASTIC SURGERY | Facility: CLINIC | Age: 69
End: 2019-01-04

## 2019-01-04 VITALS
SYSTOLIC BLOOD PRESSURE: 135 MMHG | DIASTOLIC BLOOD PRESSURE: 92 MMHG | HEART RATE: 63 BPM | BODY MASS INDEX: 22.53 KG/M2 | OXYGEN SATURATION: 99 % | WEIGHT: 132 LBS | HEIGHT: 64 IN

## 2019-01-14 ENCOUNTER — APPOINTMENT (OUTPATIENT)
Dept: ORTHOPEDIC SURGERY | Facility: CLINIC | Age: 69
End: 2019-01-14
Payer: MEDICARE

## 2019-01-14 VITALS — BODY MASS INDEX: 22.53 KG/M2 | HEIGHT: 64 IN | WEIGHT: 132 LBS

## 2019-01-14 PROCEDURE — 99213 OFFICE O/P EST LOW 20 MIN: CPT

## 2019-01-18 ENCOUNTER — APPOINTMENT (OUTPATIENT)
Dept: WOUND CARE | Facility: CLINIC | Age: 69
End: 2019-01-18

## 2019-02-14 NOTE — HISTORY OF PRESENT ILLNESS
[FreeTextEntry1] : Patient is overall improved. She is walking and better with a walker or a cane. She is in much better spirits. She thinks everything is almost completely healed. She has no new pain in her groin or her hip. She has no new masses. [Stable] : stable [2] : currently ~his/her~ pain is 2 out of 10 [Direct Pressure] : worsened by direct pressure [Walking] : worsened by walking

## 2019-02-14 NOTE — PHYSICAL EXAM
[FreeTextEntry1] : On exam patient is able to walk with a walker and he can't little bit. She refuses to use a brace at this point. She has no active extension of her knee because she has no femoral nerve. She has no new pain elsewhere. All of the lower sternum he incisions have healed. Her only open wound is just below the RIGHT breast for the rectus abdominous. [General Appearance - Well-Appearing] : Well appearing [General Appearance - Well Nourished] : well nourished [Antalgic Gait Left] : antalgic on the left [Limping On The Left] : limping on the left [Normal Station and Gait] : gait and station were normal [Cane] : Uses cane for ambulation [Walker] : Uses walker for ambulation. [Tenderness] : no tenderness [Swelling] : swelling [Masses] : no masses [Incision Healed - Describe:] : Incision is not healed [Normal] : Palpable DP and PT pulses, warm and pink with brisk capillary refill [LE  Motor Strength Normal unless otherwise noted:] : 5/5 strength in bilateral lower extemities unless otherwise noted. [0] : knee flexion 0/5 [4] : knee extension 4/5 [2+] : right 2+

## 2019-02-14 NOTE — DISCUSSION/SUMMARY
[All Questions Answered] : Patient (and family) had all questions answered to an agreeable level of satisfaction [Interested in Proceeding] : Patient (and family) expressed understanding and interest in proceeding with the plan as outlined [de-identified] : Patient is 4 months postop recommended that she start talking to the radiation Dr. There is probably still disease LEFT which is all over the muscles and the adductor close to the pelvis. This is from the area where she had her nonpathologic the excised area. As we discussed before the real treatment for this would involve a hemicolectomy however the patient is completely refusing any amputation at this point. The lung lesions may also be of consideration before.\par My recommendation for patient is to get another PET/CT in 3 months to see both a medical and radiation oncologists.

## 2019-02-14 NOTE — REVIEW OF SYSTEMS
[Feeling Tired] : not feeling tired [Fever] : no fever [Joint Pain] : joint pain [Feeling Weak] : feeling week [Nl] : Hematologic/Lymphatic

## 2019-02-14 NOTE — DATA REVIEWED
[Imaging Present] : Present [de-identified] : PET/CT from December 22 of SVT which is heterogeneous intramuscular hyper metallic in the groin and anterior LEFT thigh which was thought to be either infectious inflammatory vs. neoplasm.\par There is also minimally SVT at the next all in progress the RIGHT upper lobe pulmonary nodule. There is 4 mm RIGHT upper lobe pulmonary nodule.

## 2019-02-15 ENCOUNTER — APPOINTMENT (OUTPATIENT)
Dept: WOUND CARE | Facility: CLINIC | Age: 69
End: 2019-02-15
Payer: MEDICARE

## 2019-02-15 VITALS — SYSTOLIC BLOOD PRESSURE: 184 MMHG | TEMPERATURE: 98.8 F | HEART RATE: 63 BPM | DIASTOLIC BLOOD PRESSURE: 104 MMHG

## 2019-02-15 DIAGNOSIS — E78.5 HYPERLIPIDEMIA, UNSPECIFIED: ICD-10-CM

## 2019-02-15 PROCEDURE — 99204 OFFICE O/P NEW MOD 45 MIN: CPT

## 2019-02-15 RX ORDER — LOSARTAN POTASSIUM AND HYDROCHLOROTHIAZIDE 12.5; 5 MG/1; MG/1
50-12.5 TABLET ORAL
Refills: 0 | Status: COMPLETED | COMMUNITY
Start: 2018-05-11 | End: 2019-02-15

## 2019-02-28 ENCOUNTER — APPOINTMENT (OUTPATIENT)
Dept: WOUND CARE | Facility: CLINIC | Age: 69
End: 2019-02-28
Payer: MEDICARE

## 2019-02-28 DIAGNOSIS — I10 ESSENTIAL (PRIMARY) HYPERTENSION: ICD-10-CM

## 2019-02-28 DIAGNOSIS — M81.0 AGE-RELATED OSTEOPOROSIS W/OUT CURRENT PATHOLOGICAL FRACTURE: ICD-10-CM

## 2019-02-28 DIAGNOSIS — Z78.9 OTHER SPECIFIED HEALTH STATUS: ICD-10-CM

## 2019-02-28 PROBLEM — E78.5 DYSLIPIDEMIA: Status: ACTIVE | Noted: 2018-05-11

## 2019-02-28 PROCEDURE — 99213 OFFICE O/P EST LOW 20 MIN: CPT

## 2019-02-28 NOTE — HISTORY OF PRESENT ILLNESS
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

## 2019-02-28 NOTE — PHYSICAL EXAM
[Normal Rate and Rhythm] : normal rate and rhythm [1+] : right 1+ [0] : left 0 [Ankle Swelling (On Exam)] : present [Ankle Swelling On The Left] : moderate [JVD] : no jugular venous distention  [Varicose Veins Of Lower Extremities] : not present [Abdomen Tenderness] : ~T ~M No abdominal tenderness [Skin Ulcer] : ulcer [Alert] : alert [Oriented to Person] : oriented to person [Oriented to Place] : oriented to place [Oriented to Time] : oriented to time [Calm] : calm [de-identified] : nad [de-identified] : open ulcers [FreeTextEntry1] : abdominal wound left [FreeTextEntry2] : .6 [FreeTextEntry3] : .6 [FreeTextEntry4] : .1 [de-identified] : aquacel [de-identified] : left leg lymphedema/ thigh53 and  ankle 21\par right 39.5 ankle 18 cm [FreeTextEntry7] : umbilical [FreeTextEntry8] : .4 [FreeTextEntry9] : .4 [de-identified] : 1cm [de-identified] : aquacel [de-identified] : blood with mechaical

## 2019-02-28 NOTE — ASSESSMENT
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

## 2019-03-21 ENCOUNTER — APPOINTMENT (OUTPATIENT)
Dept: WOUND CARE | Facility: CLINIC | Age: 69
End: 2019-03-21
Payer: MEDICARE

## 2019-03-21 VITALS
SYSTOLIC BLOOD PRESSURE: 157 MMHG | HEIGHT: 64 IN | DIASTOLIC BLOOD PRESSURE: 82 MMHG | BODY MASS INDEX: 23.73 KG/M2 | WEIGHT: 139 LBS | TEMPERATURE: 98.5 F | HEART RATE: 50 BPM

## 2019-03-21 PROCEDURE — 99213 OFFICE O/P EST LOW 20 MIN: CPT

## 2019-03-22 ENCOUNTER — APPOINTMENT (OUTPATIENT)
Dept: ORTHOPEDIC SURGERY | Facility: CLINIC | Age: 69
End: 2019-03-22
Payer: MEDICARE

## 2019-03-22 PROCEDURE — 99213 OFFICE O/P EST LOW 20 MIN: CPT

## 2019-03-26 NOTE — DATA REVIEWED
[Imaging Present] : Present [de-identified] : Ultrasound done today of the area shows no obvious cyst or fluid in the area of concern.

## 2019-03-26 NOTE — DISCUSSION/SUMMARY
[All Questions Answered] : Patient (and family) had all questions answered to an agreeable level of satisfaction [Interested in Proceeding] : Patient (and family) expressed understanding and interest in proceeding with the plan as outlined [de-identified] : Patient is due for a PET/CT. I think before putting a needle for aspiration it would be appropriate to look at the PET/CT to see if there is any abdominal herniation into this area. If it is cystic and we can drain it. I will see her after the PET/CT is done. I also am always hurt about recurrence especially in this lesion. She may be appropriate for radiation soon anyway.\par \par If imaging was ordered, the patient was told to make an appointment to review findings right after all imaging is completed.\par \par We discussed risks, benefits and alternatives. Rationale of care was reviewed and all questions were answered. Patient (and family) had all questions answered to her degree of the level of satisfaction. Patient (and family) expressed understanding and interest in proceeding with the plan as outlined.\par \par \par \par \par This note was done with a voice recognition transcription software and any typos are related to this rather than medical error.

## 2019-03-26 NOTE — HISTORY OF PRESENT ILLNESS
[FreeTextEntry1] : The patient is still healing. She is walking with a walker. She now complains of some swelling in the area of the old surgery. It is unclear whether this is cystic or something else. He wanted to get this checked out. She still has not had her PET scan yet. She is having mild pain in the area. She has no bowel or bladder problems. [Stable] : stable [3] : currently ~his/her~ pain is 3 out of 10

## 2019-03-26 NOTE — PHYSICAL EXAM
[FreeTextEntry1] : On exam almost appears healed except for under the RIGHT breast. All the other incisions are healed. She does have some swelling in the area but no obvious redness. She is able to move although it is still painful about her LEFT hip. The area of concern does not have any obvious bowel sounds to suggest any bowel herniation. [General Appearance - Well-Appearing] : Well appearing [Abdomen Soft] : Soft [Antalgic Gait Left] : antalgic on the left [Limping On The Left] : limping on the left [Normal Station and Gait] : gait and station were normal [Cane] : Uses cane for ambulation [Walker] : Uses walker for ambulation. [Tenderness] : no tenderness [Swelling] : swelling [Masses] : no masses [Incision Healed - Describe:] : Incision is not healed [Normal] : Palpable DP and PT pulses, warm and pink with brisk capillary refill [LE  Motor Strength Normal unless otherwise noted:] : 5/5 strength in bilateral lower extemities unless otherwise noted. [0] : knee flexion 0/5 [4] : knee extension 4/5 [2+] : right 2+

## 2019-04-02 PROBLEM — M81.0 OSTEOPOROSIS, UNSPECIFIED OSTEOPOROSIS TYPE, UNSPECIFIED PATHOLOGICAL FRACTURE PRESENCE: Status: ACTIVE | Noted: 2018-05-11

## 2019-04-02 NOTE — HISTORY OF PRESENT ILLNESS
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 fell out of bed onto carpet beside bed yesterday, left knee is swollen, slightly warm to touch/calf swollen, able to bear weight and walk, wrapped in ace, instructed to ice\par

## 2019-04-02 NOTE — PHYSICAL EXAM
[Normal Rate and Rhythm] : normal rate and rhythm [1+] : right 1+ [0] : left 0 [Ankle Swelling (On Exam)] : present [Ankle Swelling On The Left] : moderate [Skin Ulcer] : ulcer [Alert] : alert [Oriented to Person] : oriented to person [Oriented to Place] : oriented to place [Oriented to Time] : oriented to time [Calm] : calm [4 x 4] : 4 x 4  [JVD] : no jugular venous distention  [Varicose Veins Of Lower Extremities] : not present [Abdomen Tenderness] : ~T ~M No abdominal tenderness [de-identified] : nad [FreeTextEntry1] : lymphedema [de-identified] : open ulcers [de-identified] : left leg lymphedema/ thigh53 and  ankle 21\par right 39.5 ankle 18 cm [FreeTextEntry7] : midline umbilical [FreeTextEntry8] : 3 mm [FreeTextEntry9] : 3 mm [de-identified] : 3 mm [de-identified] : aquacel [de-identified] : \par \par 46 left knee

## 2019-04-02 NOTE — ASSESSMENT
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

## 2019-04-05 ENCOUNTER — APPOINTMENT (OUTPATIENT)
Dept: ORTHOPEDIC SURGERY | Facility: CLINIC | Age: 69
End: 2019-04-05
Payer: MEDICARE

## 2019-04-05 VITALS — DIASTOLIC BLOOD PRESSURE: 84 MMHG | SYSTOLIC BLOOD PRESSURE: 186 MMHG | HEART RATE: 42 BPM

## 2019-04-05 PROCEDURE — 99213 OFFICE O/P EST LOW 20 MIN: CPT

## 2019-04-08 ENCOUNTER — APPOINTMENT (OUTPATIENT)
Dept: HEMATOLOGY ONCOLOGY | Facility: CLINIC | Age: 69
End: 2019-04-08
Payer: MEDICARE

## 2019-04-08 ENCOUNTER — OUTPATIENT (OUTPATIENT)
Dept: OUTPATIENT SERVICES | Facility: HOSPITAL | Age: 69
LOS: 1 days | Discharge: HOME | End: 2019-04-08

## 2019-04-08 VITALS
WEIGHT: 140 LBS | SYSTOLIC BLOOD PRESSURE: 169 MMHG | TEMPERATURE: 99.3 F | HEART RATE: 39 BPM | BODY MASS INDEX: 24.03 KG/M2 | OXYGEN SATURATION: 100 % | RESPIRATION RATE: 20 BRPM | DIASTOLIC BLOOD PRESSURE: 90 MMHG

## 2019-04-08 DIAGNOSIS — C50.919 MALIGNANT NEOPLASM OF UNSPECIFIED SITE OF UNSPECIFIED FEMALE BREAST: ICD-10-CM

## 2019-04-08 PROCEDURE — 99215 OFFICE O/P EST HI 40 MIN: CPT

## 2019-04-08 RX ORDER — SOFT LENS RINSE,STORE SOLUTION
0.9 SOLUTION, NON-ORAL MISCELLANEOUS
Qty: 1 | Refills: 0 | Status: DISCONTINUED | COMMUNITY
Start: 2019-01-04 | End: 2019-04-08

## 2019-04-08 NOTE — REVIEW OF SYSTEMS
[Negative] : Allergic/Immunologic [FreeTextEntry9] : more leg edema beyond tumor, no erythema to suggest cellulitis.

## 2019-04-08 NOTE — REASON FOR VISIT
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

## 2019-04-08 NOTE — HISTORY OF PRESENT ILLNESS
[Disease: _____________________] : Disease: [unfilled] [T: ___] : T[unfilled] [N: ___] : N[unfilled] [M: ___] : M[unfilled] [AJCC Stage: ____] : AJCC Stage: [unfilled] [de-identified] : Ms. Fleming is a 68 F who presented with LEFT upper thigh mass near the groin. \par \par 1/2018: presented with LEFT groin mass, underwent CT torso on 1/25/18 showing a superficial mass in the anterior psoas muscle measuring 10+ x 4.6 x 7.9 cm. 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 \par \par 5/14/2018 : She has had difficulty with her managed medicare getting seen by appropriate physicians for her cancer. She and her son are surprised to learn she HAS a cancer. \par \par 6/25/18 ; CANCELED VISIT. Had seen Dr. Hankins, who referred her to radiation oncologist. \par \par 7/5/18 : Here for a follow up. Has not seen radiation oncology. Given referral for plastic surgery but has not made an appt to see a plastic surgeon yet. She has more left leg swelling from the groin tumor down to ankle. \par \par 4/8/19 : Clara Fleming comes in today for a follow up after close to 9 months. She has had resection of L groin mass, had complication with healing. She has been closely followed by Dr Hankins and Wound care team and is now doing better in healing. She did not have RT post operatively although was discussed by us and and Dr. Hankins; this was impossible to perform in a timely manner given the nature of poor healing over time. New PET/CT showed significant edema of the LLE, large inguinal hernia containing fat and large bowel extending to the level of the mid thigh and small lung nodule that was PET avid. She also has a ground glass lesion in the CAMACHO SUV1.3 that is persistent from 12/2018\par \par She is now seen for advice, opinion on further management.  [de-identified] : see ABOVE\par

## 2019-04-08 NOTE — CONSULT LETTER
[Dear  ___] : Dear  [unfilled], [Consult Letter:] : I had the pleasure of evaluating your patient, [unfilled]. [Please see my note below.] : Please see my note below. [Referral Closing:] : Thank you very much for seeing this patient.  If you have any questions, please do not hesitate to contact me. [Sincerely,] : Sincerely, [FreeTextEntry2] : Allen Goetz MD, AMERICO Fair MD [facility], [address], [facility] [id number]   [phone]\par Mina Hankins MD, [facility] Orthopaedic Surgery\par Eve Fisher MD, [facility] Heme Onc Ysamin\par Stephani Francisco MD, [facility] Radiation Oncology, [facility] [FreeTextEntry1] : She has no recurrence post op but has a big old hernia into her thigh from the abdominal wall, complete with bowel. It is not clear to me as a medical oncologist if there is any surgical fix for this complex issue. At least she has not had radiation to add further complexity. \par  [FreeTextEntry3] : David Marroquin MD PhD FACP\par \par [facility]\par [facility]\par Office: [address] [place] [address] (NOT [place]) [id number]\par \par TEL  [phone]\par  [phone]\par \par cheryl marroquin md @ [employer]

## 2019-04-08 NOTE — ASSESSMENT
[Curative] : Goals of care discussed with patient: Curative [Palliative Care Plan] : not applicable at this time [FreeTextEntry1] : 67 F with left groin liposarcoma, dedifferentiated/well diff (DDWDLS). She has another issue that is surgical, not medical in nature, in a large hernia from abdominal wall into groin.\par \par Most recurrences are local regional of this type of sarcoma. She has a small lesion on PET that will bear further follow up. We will get a new CT chest no contrast in 6 mo to assess. PET scan is not needed to follow the lesion. It is possible it is another cancer though it is more likely inflammatory. Will refer to thoracic surgery or pulmonary medicine if lesion persistent and at all suspicious for NSCLC. \par \par She is not a chemotherapy candidate given no proved recurrence of disease. There is no change in the lung lesion to suggest a metastatic lesion, nor is the density correct for a liposarcoma lung met. \par \par It is not clear if surgery will ever be an option for the hernia, since it is near a site of tumor\par \par For the lung nodule, suggest CT chest I- in 6 mo instead of PET CT. Scans ordered.\par \par She will have surgical follow up with the wound team and as needed a general surgeon or her plastic  surgeon for treatment of her hernia. \par \par GARCIAM

## 2019-04-08 NOTE — PHYSICAL EXAM
[Restricted in physically strenuous activity but ambulatory and able to carry out work of a light or sedentary nature] : Status 1- Restricted in physically strenuous activity but ambulatory and able to carry out work of a light or sedentary nature, e.g., light house work, office work [Normal] : affect appropriate [de-identified] : Mass now resected [de-identified] : very large hernia affecting left upper thigh and left groin [de-identified] : brawny 2+ edema in left LE distal to primary surgical site.  [de-identified] : Wound in left groin nearly completely healed

## 2019-04-08 NOTE — RESULTS/DATA
[FreeTextEntry1] : Bx left groin mass:  Dediff liposarcoma (outside pathology, final read pending here) \par \par 03/2019: PET CT - only report available for some reason - no tumor recurrence, large left abdominal hernia into thigh

## 2019-04-09 NOTE — PHYSICAL EXAM
[FreeTextEntry1] : Unchanged from the forces of swelling in the LEFT lower quadrant consistent with her hernia. She is able to move her hip and knee appropriately. [General Appearance - Well-Appearing] : Well appearing [General Appearance - Well Nourished] : well nourished [Limping On The Left] : limping on the left [Antalgic Gait Left] : antalgic on the left [Cane] : Uses cane for ambulation [Normal Station and Gait] : gait and station were normal [Walker] : Uses walker for ambulation. [Tenderness] : no tenderness [Swelling] : swelling [Incision Healed - Describe:] : Incision is not healed [Masses] : no masses [Normal] : Palpable DP and PT pulses, warm and pink with brisk capillary refill [0] : knee flexion 0/5 [LE  Motor Strength Normal unless otherwise noted:] : 5/5 strength in bilateral lower extemities unless otherwise noted. [2+] : right 2+ [4] : knee extension 4/5

## 2019-04-09 NOTE — REVIEW OF SYSTEMS
[Feeling Tired] : not feeling tired [Joint Pain] : joint pain [Fever] : no fever [Feeling Weak] : feeling week [Nl] : Hematologic/Lymphatic

## 2019-04-09 NOTE — DATA REVIEWED
[Imaging Present] : Present [de-identified] : PET/CT shows an SVT added RIGHT upper lobe groundglass opacities which was inflammatory vs. malignant there is a substandard RIGHT upper lobe nodule there is large bowel and fat-containing LEFT inguinal hernia with loops of bowel extending to the upper thigh and continued soft tissue swelling.

## 2019-04-09 NOTE — HISTORY OF PRESENT ILLNESS
[FreeTextEntry1] : Patient is back to review her PET/CT. She still feels the same as before with occasional problems in the area of concern of a hernia. [Stable] : stable

## 2019-04-09 NOTE — DISCUSSION/SUMMARY
[Interested in Proceeding] : Patient (and family) expressed understanding and interest in proceeding with the plan as outlined [All Questions Answered] : Patient (and family) had all questions answered to an agreeable level of satisfaction [de-identified] : At this point she seems to have a large hernia going into her LEFT lower quadrant and over the inguinal ligament which was taken out into her thigh. There is nothing to do at this point. She can certainly follow the Gen. surgery team as necessary. With the risk of recurrence and the multiple surgeries I would not do anything about this now. Her head CT did not seem to show any recurrence in the surgical area. There is a small lung lesion which will be followed up by the medical oncology team. We have discussed worrisome signs out for. I will see her again in 3-6 months.\par \par If imaging was ordered, the patient was told to make an appointment to review findings right after all imaging is completed.\par \par We discussed risks, benefits and alternatives. Rationale of care was reviewed and all questions were answered. Patient (and family) had all questions answered to her degree of the level of satisfaction. Patient (and family) expressed understanding and interest in proceeding with the plan as outlined.\par \par \par \par \par This note was done with a voice recognition transcription software and any typos are related to this rather than medical error.

## 2019-04-11 ENCOUNTER — APPOINTMENT (OUTPATIENT)
Dept: WOUND CARE | Facility: CLINIC | Age: 69
End: 2019-04-11
Payer: MEDICARE

## 2019-04-11 VITALS
DIASTOLIC BLOOD PRESSURE: 86 MMHG | RESPIRATION RATE: 16 BRPM | HEART RATE: 50 BPM | TEMPERATURE: 98.2 F | SYSTOLIC BLOOD PRESSURE: 150 MMHG

## 2019-04-11 DIAGNOSIS — I10 ESSENTIAL (PRIMARY) HYPERTENSION: ICD-10-CM

## 2019-04-11 PROCEDURE — 99213 OFFICE O/P EST LOW 20 MIN: CPT

## 2019-04-11 NOTE — REASON FOR VISIT
[Family Member] : family member [Follow-Up: _____] : a [unfilled] follow-up visit [FreeTextEntry1] : abdominal wound

## 2019-04-11 NOTE — PHYSICAL EXAM
[Normal Rate and Rhythm] : normal rate and rhythm [1+] : right 1+ [0] : left 0 [Ankle Swelling (On Exam)] : present [Ankle Swelling On The Left] : moderate [Skin Ulcer] : ulcer [Alert] : alert [Oriented to Person] : oriented to person [Oriented to Place] : oriented to place [Oriented to Time] : oriented to time [Calm] : calm [4 x 4] : 4 x 4  [JVD] : no jugular venous distention  [Varicose Veins Of Lower Extremities] : not present [Abdomen Tenderness] : ~T ~M No abdominal tenderness [de-identified] : nad [FreeTextEntry1] : lymphedema [de-identified] : open ulcers [de-identified] : left leg lymphedema/ thigh53 and  ankle 21\par right 39.5 ankle 18 cm [FreeTextEntry7] : midline umbilical [FreeTextEntry8] : 3 mm [FreeTextEntry9] : 3 mm [de-identified] : 3 mm [de-identified] : aquacel [de-identified] : \par \par 46 left knee

## 2019-04-11 NOTE — ASSESSMENT
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 fell out of bed onto carpet beside bed yesterday, left knee is swollen, slightly warm to touch/calf swollen, able to bear weight and walk, \par wrapped in ace, instructed to ice\par doubt dvt, possible injury, effusion, fup with pcp\par

## 2019-04-11 NOTE — ASSESSMENT
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 fell out of bed onto carpet beside bed yesterday\par  left knee  less swollen, \par  able to bear weight and walk, \par wrapped in ace,  ice for pain\par stocking for lymphedema\par   fup with pcp\par

## 2019-04-11 NOTE — REASON FOR VISIT
[Follow-Up: _____] : a [unfilled] follow-up visit [Family Member] : family member [FreeTextEntry1] : abdominal wound

## 2019-04-11 NOTE — PHYSICAL EXAM
[1+] : right 1+ [JVD] : no jugular venous distention  [Normal Rate and Rhythm] : normal rate and rhythm [Ankle Swelling (On Exam)] : present [0] : left 0 [Varicose Veins Of Lower Extremities] : not present [Ankle Swelling On The Left] : moderate [Abdomen Tenderness] : ~T ~M No abdominal tenderness [Oriented to Person] : oriented to person [Skin Ulcer] : ulcer [Alert] : alert [Oriented to Time] : oriented to time [Oriented to Place] : oriented to place [de-identified] : nad [Calm] : calm [FreeTextEntry1] : lymphedema [de-identified] : open ulcers [4 x 4] : 4 x 4  [FreeTextEntry7] : midline umbilical [de-identified] : left leg lymphedema/ thigh53 and  ankle 21\par right 39.5 ankle 18 cm [de-identified] : 3 mm [FreeTextEntry9] : 3 mm [FreeTextEntry8] : 3 mm [de-identified] : chun [de-identified] : \par \par 46 left knee

## 2019-05-23 ENCOUNTER — APPOINTMENT (OUTPATIENT)
Dept: WOUND CARE | Facility: CLINIC | Age: 69
End: 2019-05-23

## 2019-10-06 NOTE — AIRWAY REMOVAL NOTE  ADULT & PEDS - BREATH SOUNDS ALL FIELDS
PSYCHIATRY DISCHARGE SUMMARY      Patient: Roman Eric Date: 10/5/2019   YOB: 1983 Attending: No att. providers found   36 year old male        LENGTH OF VISIT: More than 30 minutes spent in medication management and discharge planning.    Reason for Hospitalization:   Alcohol detox    Significant Findings from Mental Status Examination: Please see initial assessment for additional details.    Medical History:  There are no active hospital problems to display for this patient.      Consultations: History and Physical Examination refer to consultation.    Laboratory Tests:  Lab Results   Component Value Date    WBC 5.1 10/01/2019    HGB 15.3 10/01/2019    HCT 43.1 10/01/2019     10/01/2019    SODIUM 139 10/01/2019    POTASSIUM 3.5 10/01/2019    CHLORIDE 105 10/01/2019    CO2 27 10/01/2019    GLUCOSE 100 (H) 10/01/2019    BUN 9 10/01/2019    CREATININE 0.63 (L) 10/01/2019    CALCIUM 9.1 10/01/2019    ALBUMIN 3.8 10/01/2019    MG 1.6 (L) 10/01/2019    BILIRUBIN 0.6 10/01/2019    ALKPT 79 10/01/2019    AST 35 10/01/2019    GPT 43 10/01/2019     TSH (uIU/mL)   Date Value   06/29/2007 0.62          Visit Vitals  /68 (BP Location: LUE - Left upper extremity, Patient Position: Supine)   Pulse 53   Temp 97.9 °F (36.6 °C) (Oral)   Resp 16   Ht 5' 10\" (1.778 m)   Wt 57.4 kg   SpO2 96%   BMI 18.16 kg/m²       Hospital Course: Roman Eric was admitted and seen by medical doctor for history and physical exam. Lab work was drawn with results shown above. The patient was encouraged to attend group therapy sessions to gain psychoeducational benefit. He had an uneventful detox and was discharged in improved condition.    Mental Status Examination:  Appearance: Well groomed  Engagement:good eyecontact  Motor Activity: no abnormal body movements  Speech: normal in rate rhythm volume and tone  Mood: \"good\"  Affect: congruent to mood  Thought Processes: linear and goal directed  Suicidal Ideations:denies  at the moment  Homicidal Ideations:denies at the moment  Psychosis: no delusions, obsessions, compulsions, phobias or theme  Perceptual Disturbances: denies auditory, visual, and tactile hallucinations  Insight: fair, as evidenced by patients' insight into his own illness  Judgment: fair, as evidenced by engagement in treatment  Orientation: Active, alert and oriented to time, place, and person  Memory: no apparent impairments in short term memory and no apparent impairments in long term memory   Attention Span/Concentration: good    Risk Status: Patient is not in imminent danger to harm self and others and is psychiatrically cleared for discharge.    Discharge on: 10/5/2019    Discharge To: Self    Next Level of Care Recommended:  [facility] IOP    Follow up with outpatient providers and recommended psychosocial rehabilitation program.          Discharge Medication List as of 10/5/2019 10:32 AM      START taking these medications    Details   gabapentin (NEURONTIN) 300 MG capsule Take 1 capsule by mouth 3 times daily.Eprescribe, Disp-90 capsule, R-1      nicotine (NICODERM) 21 MG/24HR patch Place 1 patch onto the skin daily.Eprescribe, Disp-28 patch, R-0      traZODone (DESYREL) 50 MG tablet Take 1 tablet by mouth nightly as needed for Sleep.Eprescribe, Disp-30 tablet, R-1         CONTINUE these medications which have CHANGED    Details   disulfiram 500 MG tablet Take 1 tablet by mouth daily.Eprescribe, Disp-30 tablet, R-1         CONTINUE these medications which have NOT CHANGED    Details   hydrOXYzine (ATARAX) 50 MG tablet Take 1 tablet by mouth every 8 hours as needed for Anxiety.Eprescribe, Disp-30 tablet, R-0         STOP taking these medications       Multiple Vitamins-Minerals (MULTIVITAL PO) Comments:   Reason for Stopping:         ibuprofen (MOTRIN) 200 MG tablet Comments:   Reason for Stopping:               Tobacco Best Practice  NRT: ordered: nicotine patch transdermal.    Patient is on two or more scheduled  equal bilaterally antipsychotic agents:No     Final Diagnosis for this level of Care:  Alcohol withdrawal  Alcohol dependence

## 2019-10-09 ENCOUNTER — OUTPATIENT (OUTPATIENT)
Dept: OUTPATIENT SERVICES | Facility: HOSPITAL | Age: 69
LOS: 1 days | Discharge: HOME | End: 2019-10-09

## 2019-10-09 DIAGNOSIS — C49.9 MALIGNANT NEOPLASM OF CONNECTIVE AND SOFT TISSUE, UNSPECIFIED: ICD-10-CM

## 2019-10-10 ENCOUNTER — RESULT REVIEW (OUTPATIENT)
Age: 69
End: 2019-10-10

## 2019-10-10 ENCOUNTER — APPOINTMENT (OUTPATIENT)
Dept: HEMATOLOGY ONCOLOGY | Facility: CLINIC | Age: 69
End: 2019-10-10
Payer: MEDICARE

## 2019-10-10 VITALS
WEIGHT: 138.89 LBS | OXYGEN SATURATION: 99 % | RESPIRATION RATE: 14 BRPM | SYSTOLIC BLOOD PRESSURE: 120 MMHG | DIASTOLIC BLOOD PRESSURE: 70 MMHG | HEART RATE: 53 BPM | TEMPERATURE: 98.4 F | BODY MASS INDEX: 23.84 KG/M2

## 2019-10-10 LAB
APTT BLD: 29.4 SEC
BASOPHILS # BLD AUTO: 0 K/UL — SIGNIFICANT CHANGE UP (ref 0–0.2)
BASOPHILS NFR BLD AUTO: 0.3 % — SIGNIFICANT CHANGE UP (ref 0–2)
EOSINOPHIL # BLD AUTO: 0.1 K/UL — SIGNIFICANT CHANGE UP (ref 0–0.5)
EOSINOPHIL NFR BLD AUTO: 1 % — SIGNIFICANT CHANGE UP (ref 0–6)
HCT VFR BLD CALC: 42 % — SIGNIFICANT CHANGE UP (ref 34.5–45)
HGB BLD-MCNC: 14.4 G/DL — SIGNIFICANT CHANGE UP (ref 11.5–15.5)
INR PPP: 1.03 RATIO
LYMPHOCYTES # BLD AUTO: 1.1 K/UL — SIGNIFICANT CHANGE UP (ref 1–3.3)
LYMPHOCYTES # BLD AUTO: 17.1 % — SIGNIFICANT CHANGE UP (ref 13–44)
MCHC RBC-ENTMCNC: 34.1 PG — HIGH (ref 27–34)
MCHC RBC-ENTMCNC: 34.2 G/DL — SIGNIFICANT CHANGE UP (ref 32–36)
MCV RBC AUTO: 99.8 FL — SIGNIFICANT CHANGE UP (ref 80–100)
MONOCYTES # BLD AUTO: 0.5 K/UL — SIGNIFICANT CHANGE UP (ref 0–0.9)
MONOCYTES NFR BLD AUTO: 7.1 % — SIGNIFICANT CHANGE UP (ref 2–14)
NEUTROPHILS # BLD AUTO: 4.9 K/UL — SIGNIFICANT CHANGE UP (ref 1.8–7.4)
NEUTROPHILS NFR BLD AUTO: 74.5 % — SIGNIFICANT CHANGE UP (ref 43–77)
PLATELET # BLD AUTO: 189 K/UL — SIGNIFICANT CHANGE UP (ref 150–400)
PT BLD: 11.7 SEC
RBC # BLD: 4.21 M/UL — SIGNIFICANT CHANGE UP (ref 3.8–5.2)
RBC # FLD: 11.7 % — SIGNIFICANT CHANGE UP (ref 10.3–14.5)
WBC # BLD: 6.5 K/UL — SIGNIFICANT CHANGE UP (ref 3.8–10.5)
WBC # FLD AUTO: 6.5 K/UL — SIGNIFICANT CHANGE UP (ref 3.8–10.5)

## 2019-10-10 PROCEDURE — 99214 OFFICE O/P EST MOD 30 MIN: CPT

## 2019-10-10 RX ORDER — AMLODIPINE AND OLMESARTAN MEDOXOMIL 5; 20 MG/1; MG/1
5-20 TABLET ORAL
Qty: 90 | Refills: 0 | Status: ACTIVE | COMMUNITY
Start: 2019-10-09

## 2019-10-11 ENCOUNTER — FORM ENCOUNTER (OUTPATIENT)
Age: 69
End: 2019-10-11

## 2019-10-11 LAB
ALBUMIN SERPL ELPH-MCNC: 4.4 G/DL
ALP BLD-CCNC: 77 U/L
ALT SERPL-CCNC: 26 U/L
ANION GAP SERPL CALC-SCNC: 14 MMOL/L
AST SERPL-CCNC: 21 U/L
BILIRUB SERPL-MCNC: 0.4 MG/DL
BUN SERPL-MCNC: 15 MG/DL
CALCIUM SERPL-MCNC: 9.6 MG/DL
CHLORIDE SERPL-SCNC: 105 MMOL/L
CO2 SERPL-SCNC: 24 MMOL/L
CREAT SERPL-MCNC: 0.71 MG/DL
GLUCOSE SERPL-MCNC: 92 MG/DL
POTASSIUM SERPL-SCNC: 4.5 MMOL/L
PROT SERPL-MCNC: 7 G/DL
SODIUM SERPL-SCNC: 143 MMOL/L

## 2019-10-12 ENCOUNTER — APPOINTMENT (OUTPATIENT)
Dept: CT IMAGING | Facility: CLINIC | Age: 69
End: 2019-10-12
Payer: MEDICARE

## 2019-10-12 ENCOUNTER — INPATIENT (INPATIENT)
Facility: HOSPITAL | Age: 69
LOS: 1 days | Discharge: HOME | End: 2019-10-14
Attending: SURGERY | Admitting: SURGERY
Payer: MEDICARE

## 2019-10-12 ENCOUNTER — OUTPATIENT (OUTPATIENT)
Dept: OUTPATIENT SERVICES | Facility: HOSPITAL | Age: 69
LOS: 1 days | End: 2019-10-12
Payer: MEDICARE

## 2019-10-12 VITALS
SYSTOLIC BLOOD PRESSURE: 180 MMHG | DIASTOLIC BLOOD PRESSURE: 106 MMHG | HEART RATE: 59 BPM | RESPIRATION RATE: 18 BRPM | OXYGEN SATURATION: 97 % | TEMPERATURE: 98 F

## 2019-10-12 DIAGNOSIS — K46.9 UNSPECIFIED ABDOMINAL HERNIA WITHOUT OBSTRUCTION OR GANGRENE: ICD-10-CM

## 2019-10-12 DIAGNOSIS — R10.9 UNSPECIFIED ABDOMINAL PAIN: ICD-10-CM

## 2019-10-12 DIAGNOSIS — Z00.8 ENCOUNTER FOR OTHER GENERAL EXAMINATION: ICD-10-CM

## 2019-10-12 LAB
ALBUMIN SERPL ELPH-MCNC: 4.4 G/DL — SIGNIFICANT CHANGE UP (ref 3.3–5)
ALP SERPL-CCNC: 87 U/L — SIGNIFICANT CHANGE UP (ref 40–120)
ALT FLD-CCNC: 22 U/L — SIGNIFICANT CHANGE UP (ref 4–33)
ANION GAP SERPL CALC-SCNC: 14 MMO/L — SIGNIFICANT CHANGE UP (ref 7–14)
APTT BLD: 29 SEC — SIGNIFICANT CHANGE UP (ref 27.5–36.3)
AST SERPL-CCNC: 20 U/L — SIGNIFICANT CHANGE UP (ref 4–32)
BASE EXCESS BLDV CALC-SCNC: 2.8 MMOL/L — SIGNIFICANT CHANGE UP
BASOPHILS # BLD AUTO: 0.02 K/UL — SIGNIFICANT CHANGE UP (ref 0–0.2)
BASOPHILS NFR BLD AUTO: 0.3 % — SIGNIFICANT CHANGE UP (ref 0–2)
BILIRUB SERPL-MCNC: 0.2 MG/DL — SIGNIFICANT CHANGE UP (ref 0.2–1.2)
BLD GP AB SCN SERPL QL: NEGATIVE — SIGNIFICANT CHANGE UP
BLOOD GAS VENOUS - CREATININE: 0.66 MG/DL — SIGNIFICANT CHANGE UP (ref 0.5–1.3)
BLOOD GAS VENOUS - FIO2: 21 — SIGNIFICANT CHANGE UP
BUN SERPL-MCNC: 13 MG/DL — SIGNIFICANT CHANGE UP (ref 7–23)
CALCIUM SERPL-MCNC: 9.9 MG/DL — SIGNIFICANT CHANGE UP (ref 8.4–10.5)
CHLORIDE BLDV-SCNC: 107 MMOL/L — SIGNIFICANT CHANGE UP (ref 96–108)
CHLORIDE SERPL-SCNC: 105 MMOL/L — SIGNIFICANT CHANGE UP (ref 98–107)
CO2 SERPL-SCNC: 25 MMOL/L — SIGNIFICANT CHANGE UP (ref 22–31)
CREAT SERPL-MCNC: 0.66 MG/DL — SIGNIFICANT CHANGE UP (ref 0.5–1.3)
EOSINOPHIL # BLD AUTO: 0.1 K/UL — SIGNIFICANT CHANGE UP (ref 0–0.5)
EOSINOPHIL NFR BLD AUTO: 1.5 % — SIGNIFICANT CHANGE UP (ref 0–6)
GAS PNL BLDV: 141 MMOL/L — SIGNIFICANT CHANGE UP (ref 136–146)
GLUCOSE BLDV-MCNC: 96 MG/DL — SIGNIFICANT CHANGE UP (ref 70–99)
GLUCOSE SERPL-MCNC: 98 MG/DL — SIGNIFICANT CHANGE UP (ref 70–99)
HCO3 BLDV-SCNC: 25 MMOL/L — SIGNIFICANT CHANGE UP (ref 20–27)
HCT VFR BLD CALC: 40.5 % — SIGNIFICANT CHANGE UP (ref 34.5–45)
HCT VFR BLDV CALC: 43.1 % — SIGNIFICANT CHANGE UP (ref 34.5–45)
HGB BLD-MCNC: 13.4 G/DL — SIGNIFICANT CHANGE UP (ref 11.5–15.5)
HGB BLDV-MCNC: 14.1 G/DL — SIGNIFICANT CHANGE UP (ref 11.5–15.5)
IMM GRANULOCYTES NFR BLD AUTO: 0.2 % — SIGNIFICANT CHANGE UP (ref 0–1.5)
INR BLD: 1.04 — SIGNIFICANT CHANGE UP (ref 0.88–1.17)
LACTATE BLDV-MCNC: 1.4 MMOL/L — SIGNIFICANT CHANGE UP (ref 0.5–2)
LYMPHOCYTES # BLD AUTO: 2.25 K/UL — SIGNIFICANT CHANGE UP (ref 1–3.3)
LYMPHOCYTES # BLD AUTO: 34.6 % — SIGNIFICANT CHANGE UP (ref 13–44)
MCHC RBC-ENTMCNC: 32.1 PG — SIGNIFICANT CHANGE UP (ref 27–34)
MCHC RBC-ENTMCNC: 33.1 % — SIGNIFICANT CHANGE UP (ref 32–36)
MCV RBC AUTO: 96.9 FL — SIGNIFICANT CHANGE UP (ref 80–100)
MONOCYTES # BLD AUTO: 0.5 K/UL — SIGNIFICANT CHANGE UP (ref 0–0.9)
MONOCYTES NFR BLD AUTO: 7.7 % — SIGNIFICANT CHANGE UP (ref 2–14)
NEUTROPHILS # BLD AUTO: 3.62 K/UL — SIGNIFICANT CHANGE UP (ref 1.8–7.4)
NEUTROPHILS NFR BLD AUTO: 55.7 % — SIGNIFICANT CHANGE UP (ref 43–77)
NRBC # FLD: 0 K/UL — SIGNIFICANT CHANGE UP (ref 0–0)
PCO2 BLDV: 50 MMHG — SIGNIFICANT CHANGE UP (ref 41–51)
PH BLDV: 7.36 PH — SIGNIFICANT CHANGE UP (ref 7.32–7.43)
PLATELET # BLD AUTO: 197 K/UL — SIGNIFICANT CHANGE UP (ref 150–400)
PMV BLD: 10.9 FL — SIGNIFICANT CHANGE UP (ref 7–13)
PO2 BLDV: 38 MMHG — SIGNIFICANT CHANGE UP (ref 35–40)
POTASSIUM BLDV-SCNC: 3.3 MMOL/L — LOW (ref 3.4–4.5)
POTASSIUM SERPL-MCNC: 3.7 MMOL/L — SIGNIFICANT CHANGE UP (ref 3.5–5.3)
POTASSIUM SERPL-SCNC: 3.7 MMOL/L — SIGNIFICANT CHANGE UP (ref 3.5–5.3)
PROT SERPL-MCNC: 7.5 G/DL — SIGNIFICANT CHANGE UP (ref 6–8.3)
PROTHROM AB SERPL-ACNC: 11.6 SEC — SIGNIFICANT CHANGE UP (ref 9.8–13.1)
RBC # BLD: 4.18 M/UL — SIGNIFICANT CHANGE UP (ref 3.8–5.2)
RBC # FLD: 12.7 % — SIGNIFICANT CHANGE UP (ref 10.3–14.5)
RH IG SCN BLD-IMP: POSITIVE — SIGNIFICANT CHANGE UP
SAO2 % BLDV: 66.5 % — SIGNIFICANT CHANGE UP (ref 60–85)
SODIUM SERPL-SCNC: 144 MMOL/L — SIGNIFICANT CHANGE UP (ref 135–145)
WBC # BLD: 6.5 K/UL — SIGNIFICANT CHANGE UP (ref 3.8–10.5)
WBC # FLD AUTO: 6.5 K/UL — SIGNIFICANT CHANGE UP (ref 3.8–10.5)

## 2019-10-12 PROCEDURE — 74177 CT ABD & PELVIS W/CONTRAST: CPT | Mod: 26

## 2019-10-12 PROCEDURE — 99222 1ST HOSP IP/OBS MODERATE 55: CPT

## 2019-10-12 PROCEDURE — 74177 CT ABD & PELVIS W/CONTRAST: CPT

## 2019-10-12 RX ORDER — SODIUM CHLORIDE 9 MG/ML
1000 INJECTION INTRAMUSCULAR; INTRAVENOUS; SUBCUTANEOUS ONCE
Refills: 0 | Status: COMPLETED | OUTPATIENT
Start: 2019-10-12 | End: 2019-10-12

## 2019-10-12 RX ORDER — SODIUM CHLORIDE 9 MG/ML
1000 INJECTION, SOLUTION INTRAVENOUS
Refills: 0 | Status: DISCONTINUED | OUTPATIENT
Start: 2019-10-12 | End: 2019-10-13

## 2019-10-12 RX ORDER — HEPARIN SODIUM 5000 [USP'U]/ML
5000 INJECTION INTRAVENOUS; SUBCUTANEOUS EVERY 8 HOURS
Refills: 0 | Status: DISCONTINUED | OUTPATIENT
Start: 2019-10-12 | End: 2019-10-14

## 2019-10-12 RX ORDER — INFLUENZA VIRUS VACCINE 15; 15; 15; 15 UG/.5ML; UG/.5ML; UG/.5ML; UG/.5ML
0.5 SUSPENSION INTRAMUSCULAR ONCE
Refills: 0 | Status: DISCONTINUED | OUTPATIENT
Start: 2019-10-12 | End: 2019-10-14

## 2019-10-12 RX ADMIN — HEPARIN SODIUM 5000 UNIT(S): 5000 INJECTION INTRAVENOUS; SUBCUTANEOUS at 21:20

## 2019-10-12 RX ADMIN — SODIUM CHLORIDE 100 MILLILITER(S): 9 INJECTION, SOLUTION INTRAVENOUS at 19:05

## 2019-10-12 RX ADMIN — SODIUM CHLORIDE 1000 MILLILITER(S): 9 INJECTION INTRAMUSCULAR; INTRAVENOUS; SUBCUTANEOUS at 15:24

## 2019-10-12 RX ADMIN — Medication 2.5 MILLIGRAM(S): at 19:25

## 2019-10-12 NOTE — H&P ADULT - NSHPPHYSICALEXAM_GEN_ALL_CORE
AAO in NAD  CTAB  RRR  Abdomen with extensive surgical scarring. Non-reducible LLQ hernia with overlying scar.  Focal tenderness over LLQ/hernia. No rebound, no guarding.

## 2019-10-12 NOTE — ED PROVIDER NOTE - PROGRESS NOTE DETAILS
per surgery - no acute surgical intervention at this time, but admit to Dr Verma at this time for further care  Yovany Dubon MD, PGY3 Emergency Medicine

## 2019-10-12 NOTE — ED PROVIDER NOTE - OBJECTIVE STATEMENT
69F, hx of HTN and lipsarcoma of left thigh, presents w chief complaint of abdominal pain, enlarging hernia, and abnormal CT from today. Patient has been having enlarging left sided abd hernia over last yr, with pain to varying areas of abdomen. Over last few days, has had decreased bowel movements and decreased flatus. No reported fevers or chills, nausea or vomiting, headache, dizziness, lightheadedness, blurry vision, diarrhea, urinary sx. Patient underwent CT this AM, which indicated concern for possible omental swelling and mesenteric ischemia (CT copied below). Sent to ED for assessment.   Oct 2018: patient underwent resection of left inguinal mass, left femoral to below knee popliteal bypass, venous resection, reconstruction with rectus abdominus myocutaneous flap.    CT abd/pelvis impression: "Increase in size of bowel containing hernia extending into the left upper thigh. There is new swirling of the mesentery and mesenteric edema, which may be secondary to venous congestion. Early changes of bowel ischemia is a consideration. Correlate with serum lactate.  Infiltration of the omentum consistent with underlying edema. Omental infarct is a consideration.

## 2019-10-12 NOTE — ED PROVIDER NOTE - ATTENDING CONTRIBUTION TO CARE
I performed a history and physical exam of the patient and discussed their management with the resident and /or advanced care provider. I reviewed the resident and /or ACP's note and agree with the documented findings and plan of care. My medical decison making and observations are found above.  Lungs clearr, Large hernia on lt with mild pain with palpation.

## 2019-10-12 NOTE — H&P ADULT - PROBLEM SELECTOR PLAN 1
Admit to surgery, Dr. Verma  NPO  IVF  NGT will be deferred at this time as the patient is feeling well, no N/V  Serial abdominal exams  Pain meds prn after abdominal exam  AM labs  Patient also evaluated by chief resident on call who discussed with attending.  Will watch closely, no emergent surgical intervention.  This was also discussed with the patient and her family who understand and agree.

## 2019-10-12 NOTE — ED ADULT NURSE NOTE - OBJECTIVE STATEMENT
Pt c/o abd pain x 2 weeks, constipation, and vomiting x 5 days. Pt was seen by PMD and had an outpatient CT abd/pelvis down that showed bowel ischemia. Pt was referred to ED.  Pt to room 2. Pt is alert and oriented times three. Pt has family at bedside and is not c/o pain at this time. IVL placed to left AC 20 gauge and labs drawn and sent. Waiting for results, further orders and disposition.   LUIS ENRIQUE Orozco

## 2019-10-12 NOTE — H&P ADULT - NSICDXPASTMEDICALHX_GEN_ALL_CORE_FT
PAST MEDICAL HISTORY:  HTN (hypertension)     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, unspecified

## 2019-10-12 NOTE — ED ADULT TRIAGE NOTE - CHIEF COMPLAINT QUOTE
Pt c/o abd pain x 2 weeks, constipation, and vomiting x 5 days. Pt was seen by PMD and had an outpatient CT abd/pelvis down that showed bowel ischemia. Pt was referred to ED.

## 2019-10-12 NOTE — H&P ADULT - HISTORY OF PRESENT ILLNESS
69F presents with one week of abdominal pain, located in her LLQ at site of known hernia. Previous extensive surgery performed 10/15/18 with resection of liposarcoma with positive margins (ortho), vascular bypass, and plastics reconstruction. Was seen by Dr. Win (GI?) as an outpatient and ordered for CT scan which she got this morning. Was called and told to come to ED for concern for bowel ischemia. Here, she states that she has mild LLQ abdominal pain. No flatus, no BM for a few days. Last meal after the CT scan this morning (coffee and a biscuit at 8:30am). No HA, CP, SOB, No F/C/N/V.

## 2019-10-12 NOTE — ED PROVIDER NOTE - CLINICAL SUMMARY MEDICAL DECISION MAKING FREE TEXT BOX
Sushant: sent in for R/O mesenteric ischemia. pain level low at this point, will get lactate and have surgery evaluate. Patient awake alert with low level pain.

## 2019-10-12 NOTE — CHART NOTE - NSCHARTNOTEFT_GEN_A_CORE
Pt seen and examined  No subjective abd pain  Abd PE: Multiple scars, soft, tympanic to percussion throughout, mildly TTP diffusely, no rebound guarding      Plan:   - Will reexamine abd again later tonight tomorrow am

## 2019-10-12 NOTE — H&P ADULT - ASSESSMENT
69F with outpatient CT findings concerning for incarcerated LLQ hernia with bowel obstruction/ischemia. Clinically stable with only mild tenderness normal labs. Would be an extremely high risk for surgery given history and lack of abdominal wall musculature, fascia.

## 2019-10-12 NOTE — ED CLERICAL - NS ED CLERK NOTE PRE-ARRIVAL INFORMATION; ADDITIONAL PRE-ARRIVAL INFORMATION
pt c/o abd pain has had sarcoma of abd had resection c/o abd pain had ct of abd  showed ? mesenteric edema  r/o bowel obstruction

## 2019-10-12 NOTE — CHART NOTE - NSCHARTNOTEFT_GEN_A_CORE
Patient seen and examined.  States that she passed minimal flatus since last exam, no BM.  No N/V.  Abdomen soft, LLQ TTP with no rebound, no guarding. Unchanged since prior exam.  Palpable bowel/gas in LLQ hernia.  Will continue to monitor closely.

## 2019-10-12 NOTE — H&P ADULT - NSHPLABSRESULTS_GEN_ALL_CORE
Labs overall normal.  BOWEL: Since previous examination, there is increase in size of left   lower quadrant abdominal hernia. Swirling of the mesentery is now present   with a small amount of mesenteric edema. No gross bowel wall thickening   or hypoenhancement in the nonopacified bowel loops. No evidence of   pneumatosis or mesenteric venous gas.

## 2019-10-13 LAB
ANION GAP SERPL CALC-SCNC: 11 MMO/L — SIGNIFICANT CHANGE UP (ref 7–14)
BASOPHILS # BLD AUTO: 0.02 K/UL — SIGNIFICANT CHANGE UP (ref 0–0.2)
BASOPHILS NFR BLD AUTO: 0.5 % — SIGNIFICANT CHANGE UP (ref 0–2)
BUN SERPL-MCNC: 10 MG/DL — SIGNIFICANT CHANGE UP (ref 7–23)
CALCIUM SERPL-MCNC: 9.3 MG/DL — SIGNIFICANT CHANGE UP (ref 8.4–10.5)
CHLORIDE SERPL-SCNC: 107 MMOL/L — SIGNIFICANT CHANGE UP (ref 98–107)
CO2 SERPL-SCNC: 25 MMOL/L — SIGNIFICANT CHANGE UP (ref 22–31)
CREAT SERPL-MCNC: 0.69 MG/DL — SIGNIFICANT CHANGE UP (ref 0.5–1.3)
EOSINOPHIL # BLD AUTO: 0.13 K/UL — SIGNIFICANT CHANGE UP (ref 0–0.5)
EOSINOPHIL NFR BLD AUTO: 3.1 % — SIGNIFICANT CHANGE UP (ref 0–6)
GLUCOSE SERPL-MCNC: 84 MG/DL — SIGNIFICANT CHANGE UP (ref 70–99)
HCT VFR BLD CALC: 35.8 % — SIGNIFICANT CHANGE UP (ref 34.5–45)
HCV AB S/CO SERPL IA: 0.1 S/CO — SIGNIFICANT CHANGE UP (ref 0–0.99)
HCV AB SERPL-IMP: SIGNIFICANT CHANGE UP
HGB BLD-MCNC: 12 G/DL — SIGNIFICANT CHANGE UP (ref 11.5–15.5)
IMM GRANULOCYTES NFR BLD AUTO: 0.2 % — SIGNIFICANT CHANGE UP (ref 0–1.5)
LACTATE SERPL-SCNC: 0.8 MMOL/L — SIGNIFICANT CHANGE UP (ref 0.5–2)
LYMPHOCYTES # BLD AUTO: 1.6 K/UL — SIGNIFICANT CHANGE UP (ref 1–3.3)
LYMPHOCYTES # BLD AUTO: 38.3 % — SIGNIFICANT CHANGE UP (ref 13–44)
MAGNESIUM SERPL-MCNC: 1.9 MG/DL — SIGNIFICANT CHANGE UP (ref 1.6–2.6)
MCHC RBC-ENTMCNC: 32.4 PG — SIGNIFICANT CHANGE UP (ref 27–34)
MCHC RBC-ENTMCNC: 33.5 % — SIGNIFICANT CHANGE UP (ref 32–36)
MCV RBC AUTO: 96.8 FL — SIGNIFICANT CHANGE UP (ref 80–100)
MONOCYTES # BLD AUTO: 0.31 K/UL — SIGNIFICANT CHANGE UP (ref 0–0.9)
MONOCYTES NFR BLD AUTO: 7.4 % — SIGNIFICANT CHANGE UP (ref 2–14)
NEUTROPHILS # BLD AUTO: 2.11 K/UL — SIGNIFICANT CHANGE UP (ref 1.8–7.4)
NEUTROPHILS NFR BLD AUTO: 50.5 % — SIGNIFICANT CHANGE UP (ref 43–77)
NRBC # FLD: 0 K/UL — SIGNIFICANT CHANGE UP (ref 0–0)
PHOSPHATE SERPL-MCNC: 3.5 MG/DL — SIGNIFICANT CHANGE UP (ref 2.5–4.5)
PLATELET # BLD AUTO: 176 K/UL — SIGNIFICANT CHANGE UP (ref 150–400)
PMV BLD: 11.3 FL — SIGNIFICANT CHANGE UP (ref 7–13)
POTASSIUM SERPL-MCNC: 3.9 MMOL/L — SIGNIFICANT CHANGE UP (ref 3.5–5.3)
POTASSIUM SERPL-SCNC: 3.9 MMOL/L — SIGNIFICANT CHANGE UP (ref 3.5–5.3)
RBC # BLD: 3.7 M/UL — LOW (ref 3.8–5.2)
RBC # FLD: 12.7 % — SIGNIFICANT CHANGE UP (ref 10.3–14.5)
SODIUM SERPL-SCNC: 143 MMOL/L — SIGNIFICANT CHANGE UP (ref 135–145)
WBC # BLD: 4.18 K/UL — SIGNIFICANT CHANGE UP (ref 3.8–10.5)
WBC # FLD AUTO: 4.18 K/UL — SIGNIFICANT CHANGE UP (ref 3.8–10.5)

## 2019-10-13 PROCEDURE — 99231 SBSQ HOSP IP/OBS SF/LOW 25: CPT | Mod: GC

## 2019-10-13 RX ADMIN — Medication 2.5 MILLIGRAM(S): at 00:10

## 2019-10-13 RX ADMIN — HEPARIN SODIUM 5000 UNIT(S): 5000 INJECTION INTRAVENOUS; SUBCUTANEOUS at 13:08

## 2019-10-13 RX ADMIN — Medication 2.5 MILLIGRAM(S): at 12:36

## 2019-10-13 RX ADMIN — Medication 2.5 MILLIGRAM(S): at 18:27

## 2019-10-13 RX ADMIN — SODIUM CHLORIDE 100 MILLILITER(S): 9 INJECTION, SOLUTION INTRAVENOUS at 12:37

## 2019-10-13 RX ADMIN — HEPARIN SODIUM 5000 UNIT(S): 5000 INJECTION INTRAVENOUS; SUBCUTANEOUS at 21:38

## 2019-10-13 RX ADMIN — SODIUM CHLORIDE 30 MILLILITER(S): 9 INJECTION, SOLUTION INTRAVENOUS at 18:28

## 2019-10-13 RX ADMIN — HEPARIN SODIUM 5000 UNIT(S): 5000 INJECTION INTRAVENOUS; SUBCUTANEOUS at 06:31

## 2019-10-13 RX ADMIN — Medication 2.5 MILLIGRAM(S): at 06:31

## 2019-10-13 NOTE — CHART NOTE - NSCHARTNOTEFT_GEN_A_CORE
Pt seen and examined  Exam improved from prior  Abd PE: Multiple scars, soft, tympanic to percussion throughout, NTTP, no rebound guarding

## 2019-10-13 NOTE — CHART NOTE - NSCHARTNOTEFT_GEN_A_CORE
Patient seen and examined at bedside after lunch. Per patient tolerated clear liquids but desires to eat more solid foods. No N/V or increased abdominal pain / discomfort following clears for lunch.     Physical: Abdomen: left side large hernia, soft, NTTP.     Exam stable from AM examination. Will trial on regular diet for dinner.

## 2019-10-14 ENCOUNTER — TRANSCRIPTION ENCOUNTER (OUTPATIENT)
Age: 69
End: 2019-10-14

## 2019-10-14 VITALS — SYSTOLIC BLOOD PRESSURE: 135 MMHG | HEART RATE: 52 BPM | DIASTOLIC BLOOD PRESSURE: 88 MMHG

## 2019-10-14 RX ADMIN — HEPARIN SODIUM 5000 UNIT(S): 5000 INJECTION INTRAVENOUS; SUBCUTANEOUS at 05:43

## 2019-10-14 RX ADMIN — Medication 2.5 MILLIGRAM(S): at 13:24

## 2019-10-14 NOTE — DISCHARGE NOTE PROVIDER - CARE PROVIDER_API CALL
Dano Jerez)  Surgery  Critical Care  [address] [place] [address]  Phone: [phone]  Fax: [phone]  Follow Up Time:     Adair Coley)  Plastic Surgery  [address] [facility], [address]  [place] [address]  Phone: [phone]  Fax: [phone]  Follow Up Time: Dano Jerez)  Surgery  Critical Care  [address] [place] [address]  Phone: [phone]  Fax: [phone]  Follow Up Time:     Adair Coley)  Plastic Surgery  [address] [facility], [address]  [place] [address]  Phone: [phone]  Fax: [phone]  Follow Up Time:     David Funez; PhD)  Internal Medicine; Medical Oncology  [address] [place] [address]  Phone: [phone]  Fax: [phone]  Follow Up Time:

## 2019-10-14 NOTE — DISCHARGE NOTE PROVIDER - CARE PROVIDERS DIRECT ADDRESSES
,DirectAddress_Unknown,[email or web address] ,DirectAddress_Unknown,[email or web address]

## 2019-10-14 NOTE — PROGRESS NOTE ADULT - ASSESSMENT
Patient is admitted with abdominal pain with concern for incarcerated hernia.    - hernia soft, non tender with no signs of strangulation  - advance to CLD - plan to advance to regular diet if tolerating   - possible d/c home this PM if tolerating diet  - DVT PPx  - Spoke with Plastic Surgeon Dr. Coley - reports that surgery on the abdomen should be avoided if possible due to extensive reconstruction performed on previous admission.     Plan discussed with B Team and Dr. Shin on morning rounds.    B Team Surgery   [id number]
ASSESSMENT:   69F with PMH/PSH liposarcoma resection with subsequent development of LLQ abdominal wall hernia, who presented with SBO in LLQ abdominal wall hernia, managed non-operatively, now tolerating a diet/passing flatus.     PLAN:  - C/w regular diet   - Spoke with Plastic Surgeon Dr. Coley - reports that surgery on the abdomen should be avoided if possible due to extensive reconstruction performed on previous admission; will follow-up outpatient  - Outpatient followup with oncology   - Discharge likely today   - DVT ppx / OOB ambulate     B Team Surgery   [id number]

## 2019-10-14 NOTE — DISCHARGE NOTE PROVIDER - NSDCFUSCHEDAPPT_GEN_ALL_CORE_FT
JULIA UMANA ; 10/30/2019 ; [facility] JULIA UMANA ; 10/15/2019 ; [facility] [address] JULIA Sánchez ; 10/30/2019 ; [facility]

## 2019-10-14 NOTE — DISCHARGE NOTE NURSING/CASE MANAGEMENT/SOCIAL WORK - PATIENT PORTAL LINK FT
You can access the FollowMyHealth Patient Portal offered by [facility] by registering at the following website: [email or web address]. By joining [employer]’s FollowMyHealth portal, you will also be able to view your health information using other applications (apps) compatible with our system.

## 2019-10-14 NOTE — DISCHARGE NOTE PROVIDER - HOSPITAL COURSE
69F presented to [facility] 10/12/19 with one week of abdominal pain, located in her LLQ at site of known hernia. Previous extensive surgery performed 10/15/18 with resection of liposarcoma with positive margins (ortho), vascular bypass, and plastics reconstruction. Was seen by Dr. Win (GI?) as an outpatient and ordered for CT scan which she got this morning. Was called and told to come to ED for concern for bowel ischemia. Here, she states that she has mild LLQ abdominal pain. No flatus, no BM for a few days.        CT abdomen/pelvis (10/12):    Increase in size of bowel containing hernia extending into the left upper     thigh. There is new swirling of the mesentery and mesenteric edema, which     may be secondary to venous congestion.         Infiltration of the omentum consistent with underlying edema. Omental     infarct is a consideration.        Patient was admitted to the surgical service, made NPO and started on IVF given concern for incarcerated LLQ hernia. NGT was deferred as patient was not feeling nausea or any episodes of vomiting. Surgery team felt she was extremely high risk for surgery given history and lack of abdominal wall musculature, fascia.        Patient underwent serial abdominal exams. On HD hernia was noted to be soft, non tender with no signs of strangulation. She began having GI function; diet was advanced to clear liquids and then regular with good tolerance.         Pt currently ambulating, voiding, tolerating a regular diet, without pain. Patient is stable for discharge home to follow up as an outpatient, instructed to call to schedule appointment. 69F presented to [facility] 10/12/19 with one week of abdominal pain, located in her LLQ at site of known hernia. Previous extensive surgery performed 10/15/18 with resection of liposarcoma with positive margins (ortho), vascular bypass, and plastics reconstruction. Was seen by Dr. Win (GI?) as an outpatient and ordered for CT scan which she got this morning. Was called and told to come to ED for concern for bowel ischemia. Here, she states that she has mild LLQ abdominal pain. No flatus, no BM for a few days.        CT abdomen/pelvis (10/12):    Increase in size of bowel containing hernia extending into the left upper     thigh. There is new swirling of the mesentery and mesenteric edema, which     may be secondary to venous congestion.         Infiltration of the omentum consistent with underlying edema. Omental     infarct is a consideration.        Patient was admitted to the surgical service, made NPO and started on IVF given concern for incarcerated LLQ hernia. NGT was deferred as patient was not feeling nausea or any episodes of vomiting. Surgery team felt she was extremely high risk for surgery given history and lack of abdominal wall musculature, fascia.        Patient underwent serial abdominal exams. On HD 2 hernia was noted to be soft, non tender with no signs of strangulation. She began having GI function; diet was advanced to clear liquids and then regular with good tolerance.         Pt currently ambulating, voiding, tolerating a regular diet, without pain. Patient is stable for discharge home to follow up as an outpatient, instructed to call to schedule appointment.

## 2019-10-14 NOTE — DISCHARGE NOTE PROVIDER - NSDCFUADDAPPT_GEN_ALL_CORE_FT
Follow-up plastic surgery, Dr. Coley within 1 -2 weeks following discharge for continued care.     Follow-up with your oncologist within 1- 2 weeks following discharge.

## 2019-10-14 NOTE — PROGRESS NOTE ADULT - SUBJECTIVE AND OBJECTIVE BOX
SURGERY PROGRESS NOTE    69yFemale    SUBJECTIVE:  Patient seen and examined at bedside. No acute events overnight. Pain is well controlled. Passing gas. Tolerating regular diet.  Denies fevers, chills, nausea, vomiting, chest pain, and shortness of breath.     --------------------------------------------------------------------------------------------------  OBJECTIVE:     Physical Exam:  General: AAOx3, NAD, resting comfortably   HEENT: NC/AT  Respiratory: no increased work of breathing   Abdomen: soft, nontender, minimally distended, ventral hernia apparent LLQ, no rebound/guarding  Extremities: warm and well perfused  --------------------------------------------------------------------------------------------------  Vital Signs:  Vital Signs Last 24 Hrs  T(C): 36.9 (14 Oct 2019 05:39), Max: 37.1 (13 Oct 2019 11:35)  T(F): 98.4 (14 Oct 2019 05:39), Max: 98.8 (13 Oct 2019 11:35)  HR: 47 (14 Oct 2019 05:39) (42 - 84)  BP: 130/60 (14 Oct 2019 05:39) (120/67 - 152/89)  BP(mean): --  RR: 17 (14 Oct 2019 05:39) (16 - 18)  SpO2: 97% (14 Oct 2019 05:39) (97% - 99%)    --------------------------------------------------------------------------------------------------  Inputs/Outputs:    13 Oct 2019 07:01  -  14 Oct 2019 07:00  --------------------------------------------------------  IN:    lactated ringers.: 1000 mL    Oral Fluid: 440 mL  Total IN: 1440 mL    OUT:    Voided: 1500 mL  Total OUT: 1500 mL    Total NET: -60 mL        --------------------------------------------------------------------------------------------------  Laboratories:                        12.0   4.18  )-----------( 176      ( 13 Oct 2019 06:40 )             35.8     LIVER FUNCTIONS - ( 12 Oct 2019 12:45 )  Alb: 4.4 g/dL / Pro: 7.5 g/dL / ALK PHOS: 87 u/L / ALT: 22 u/L / AST: 20 u/L / GGT: x             13 Oct 2019 06:40    143    |  107    |  10     ----------------------------<  84     3.9     |  25     |  0.69     Ca    9.3        13 Oct 2019 06:40  Phos  3.5       13 Oct 2019 06:40  Mg     1.9       13 Oct 2019 06:40    TPro  7.5    /  Alb  4.4    /  TBili  0.2    /  DBili  x      /  AST  20     /  ALT  22     /  AlkPhos  87     12 Oct 2019 12:45    PT/INR - ( 12 Oct 2019 12:45 )   PT: 11.6 SEC;   INR: 1.04       PTT - ( 12 Oct 2019 12:45 )  PTT:29.0 SEC    --------------------------------------------------------------------------------------------------  Medications:  MEDICATIONS  (STANDING):  enalaprilat Injectable 2.5 milliGRAM(s) IV Push every 6 hours  heparin  Injectable 5000 Unit(s) SubCutaneous every 8 hours  influenza   Vaccine 0.5 milliLiter(s) IntraMuscular once
Surgery Progress Note:    Subjective:    Patient seen and examined at bedside. Patient denies abdominal pain at this time. Denies N/V/F/C. Reports flatus but no BM. She is agreeable to the idea of possibly going home today if she can tolerate regular diet in the PM.     Objective:    Vital Signs Last 24 Hrs  T(C): 36.7 (13 Oct 2019 06:25), Max: 37 (12 Oct 2019 18:22)  T(F): 98 (13 Oct 2019 06:25), Max: 98.6 (12 Oct 2019 18:22)  HR: 47 (13 Oct 2019 06:40) (45 - 62)  BP: 150/87 (13 Oct 2019 06:40) (139/78 - 180/106)  BP(mean): --  RR: 17 (13 Oct 2019 06:25) (16 - 18)  SpO2: 99% (13 Oct 2019 06:25) (97% - 100%)    I&O's Summary    12 Oct 2019 07:01  -  13 Oct 2019 07:00  --------------------------------------------------------  IN: 1200 mL / OUT: 500 mL / NET: 700 mL    PHYSICAL EXAM:  GENERAL: NAD, lying in bed comfortably  HEAD:  Atraumatic, Normocephalic  ENT: Moist mucous membranes  CHEST/LUNG: Unlabored respirations  ABDOMEN: Soft, Nontender, obese abdomen with large left sided hernia (itself is soft, NT)  NERVOUS SYSTEM:  Alert & Oriented X3, speech clear.     10-13    143  |  107  |  10  ----------------------------<  84  3.9   |  25  |  0.69    Ca    9.3      13 Oct 2019 06:40  Phos  3.5     10-13  Mg     1.9     10-13    TPro  7.5  /  Alb  4.4  /  TBili  0.2  /  DBili  x   /  AST  20  /  ALT  22  /  AlkPhos  87  10-12                          12.0   4.18  )-----------( 176      ( 13 Oct 2019 06:40 )             35.8

## 2019-10-14 NOTE — DISCHARGE NOTE PROVIDER - PROVIDER TOKENS
PROVIDER:[TOKEN:[[id number]]],PROVIDER:[TOKEN:[[id number]]] PROVIDER:[TOKEN:[[id number]]],PROVIDER:[TOKEN:[[id number]]],PROVIDER:[TOKEN:[[id number]]]

## 2019-10-14 NOTE — DISCHARGE NOTE PROVIDER - NSDCCPCAREPLAN_GEN_ALL_CORE_FT
PRINCIPAL DISCHARGE DIAGNOSIS  Diagnosis: Ventral hernia  Assessment and Plan of Treatment: You were admitted to [facility] for bowel obstruction.  When you left the hospital you were pain free and without any signs of active obstruction.  If you have any abdominal pain, fever, chills, nausea or vomiting, please call Dr. Jerez's office.      SECONDARY DISCHARGE DIAGNOSES  Diagnosis: Liposarcoma  Assessment and Plan of Treatment: s/p liposarcoma excision and reconstruction  -Please follow-up plastic surgery, Dr. Coley within 1 -2 weeks following discharge for continued care.   - Follow-up with your oncologist within 1- 2 weeks following discharge.

## 2019-10-15 ENCOUNTER — APPOINTMENT (OUTPATIENT)
Dept: SURGERY | Facility: CLINIC | Age: 69
End: 2019-10-15
Payer: MEDICARE

## 2019-10-15 VITALS
DIASTOLIC BLOOD PRESSURE: 88 MMHG | HEIGHT: 62 IN | HEART RATE: 52 BPM | WEIGHT: 139 LBS | BODY MASS INDEX: 25.58 KG/M2 | RESPIRATION RATE: 16 BRPM | OXYGEN SATURATION: 99 % | SYSTOLIC BLOOD PRESSURE: 158 MMHG | TEMPERATURE: 99 F

## 2019-10-15 PROCEDURE — 99205 OFFICE O/P NEW HI 60 MIN: CPT

## 2019-10-15 PROCEDURE — 99204 OFFICE O/P NEW MOD 45 MIN: CPT

## 2019-10-15 RX ORDER — AMLODIPINE BESYLATE 5 MG/1
5 TABLET ORAL
Qty: 30 | Refills: 0 | Status: DISCONTINUED | COMMUNITY
Start: 2019-04-06 | End: 2019-10-15

## 2019-10-15 NOTE — HISTORY OF PRESENT ILLNESS
[de-identified] : The patient is a pleasant 69-year-old woman who underwent radical resection of a left thigh sarcoma last year. This required resection of her inguinal ligament and plastic surgery as well as vascular reconstruction. She subsequently has developed a large loss of domain incisional hernia extending from the left lower quadrant into her left thigh. She complains of abdominal discomfort especially after eating. She moves her bowels daily. She denies vomiting.\par \par She developed a wound dehiscence which ultimately healed by secondary intention. She did not receive postoperative radiation. She is presently being worked up for a possible lung mass (metastatic disease versus primary lung cancer)She is ambulatory without assistance and comes in today with her daughter

## 2019-10-15 NOTE — ASSESSMENT
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 This required resection of her inguinal ligament and has resulted in a large symptomatic loss of domain incisional hernia. I had a long discussion with the patient and her daughter. She is presently in the midst of a workup for a possible new lung cancer. At the present time this precludes any potential planning for hernia repair. I did explain to the patient and her daughter however that she may not have a surgically correctable problem as any surgery would carry a high risk of perioperative complications and a low likelihood of durable repair. Therefore if she has any sign of persistent malignancy I do not feel that any surgical intervention should be offered. In the meantime I recommend that she start a daily laxative to hopefully make her stools more easy to pass. I instructed her to follow up with me once her oncological workup of her lung mass is complete. She should also followup with Dr. Coley of plastic surgery.

## 2019-10-15 NOTE — PHYSICAL EXAM
[Normal Rate and Rhythm] : normal rate and rhythm [Wheezing] : wheezing was heard [No HSM] : no hepatosplenomegaly [No Rash or Lesion] : No rash or lesion [Alert] : alert [Calm] : calm [de-identified] : nad [de-identified] : Soft, nontender, incisions healed, chronically incarcerated left lower quadrant loss of domain incisional hernia extending onto her left anterolateral thigh [de-identified] : nl

## 2019-10-15 NOTE — CONSULT LETTER
[Dear  ___] : Dear  [unfilled], [Consult Letter:] : I had the pleasure of evaluating your patient, [unfilled]. [Please see my note below.] : Please see my note below. [Consult Closing:] : Thank you very much for allowing me to participate in the care of this patient.  If you have any questions, please do not hesitate to contact me. [Sincerely,] : Sincerely, [DrSelina  ___] : Dr. RASMUSSEN [DrSelina ___] : Dr. RASMUSSEN [FreeTextEntry3] : David Farley M.D., F.A.C.S, F.A.S.C.R.S

## 2019-10-23 ENCOUNTER — FORM ENCOUNTER (OUTPATIENT)
Age: 69
End: 2019-10-23

## 2019-10-24 ENCOUNTER — RESULT REVIEW (OUTPATIENT)
Age: 69
End: 2019-10-24

## 2019-10-24 ENCOUNTER — APPOINTMENT (OUTPATIENT)
Dept: CT IMAGING | Facility: HOSPITAL | Age: 69
End: 2019-10-24

## 2019-10-24 ENCOUNTER — OUTPATIENT (OUTPATIENT)
Dept: OUTPATIENT SERVICES | Facility: HOSPITAL | Age: 69
LOS: 1 days | End: 2019-10-24
Payer: MEDICARE

## 2019-10-24 DIAGNOSIS — C49.9 MALIGNANT NEOPLASM OF CONNECTIVE AND SOFT TISSUE, UNSPECIFIED: ICD-10-CM

## 2019-10-24 PROCEDURE — 32405: CPT

## 2019-10-24 PROCEDURE — 88172 CYTP DX EVAL FNA 1ST EA SITE: CPT

## 2019-10-24 PROCEDURE — 88173 CYTOPATH EVAL FNA REPORT: CPT | Mod: 26

## 2019-10-24 PROCEDURE — 88305 TISSUE EXAM BY PATHOLOGIST: CPT | Mod: 26

## 2019-10-24 PROCEDURE — 77012 CT SCAN FOR NEEDLE BIOPSY: CPT

## 2019-10-24 PROCEDURE — 88305 TISSUE EXAM BY PATHOLOGIST: CPT

## 2019-10-24 PROCEDURE — 88173 CYTOPATH EVAL FNA REPORT: CPT

## 2019-10-24 PROCEDURE — 77012 CT SCAN FOR NEEDLE BIOPSY: CPT | Mod: 26

## 2019-10-30 ENCOUNTER — APPOINTMENT (OUTPATIENT)
Dept: HEMATOLOGY ONCOLOGY | Facility: CLINIC | Age: 69
End: 2019-10-30
Payer: MEDICARE

## 2019-10-30 ENCOUNTER — INBOUND DOCUMENT (OUTPATIENT)
Age: 69
End: 2019-10-30

## 2019-10-30 VITALS
HEART RATE: 60 BPM | BODY MASS INDEX: 25.4 KG/M2 | TEMPERATURE: 99.5 F | RESPIRATION RATE: 18 BRPM | DIASTOLIC BLOOD PRESSURE: 87 MMHG | OXYGEN SATURATION: 98 % | SYSTOLIC BLOOD PRESSURE: 172 MMHG | WEIGHT: 138.89 LBS

## 2019-10-30 DIAGNOSIS — S31.109A UNSPECIFIED OPEN WOUND OF ABDOMINAL WALL, UNSPECIFIED QUADRANT W/OUT PENETRATION INTO PERITONEAL CAVITY, INITIAL ENCOUNTER: ICD-10-CM

## 2019-10-30 DIAGNOSIS — R93.89 ABNORMAL FINDINGS ON DIAGNOSTIC IMAGING OF OTHER SPECIFIED BODY STRUCTURES: ICD-10-CM

## 2019-10-30 LAB — NON-GYNECOLOGICAL CYTOLOGY STUDY: SIGNIFICANT CHANGE UP

## 2019-10-30 PROCEDURE — 99214 OFFICE O/P EST MOD 30 MIN: CPT

## 2019-10-30 NOTE — HISTORY OF PRESENT ILLNESS
[Disease: _____________________] : Disease: [unfilled] [T: ___] : T[unfilled] [N: ___] : N[unfilled] [M: ___] : M[unfilled] [AJCC Stage: ____] : AJCC Stage: [unfilled] [de-identified] : Ms. Fleming is a 69 F who presented with LEFT upper thigh mass near the groin. \par \par 1/2018: presented with LEFT groin mass, underwent CT torso on 1/25/18 showing a superficial mass in the anterior psoas muscle measuring 10+ x 4.6 x 7.9 cm. 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 \par \par 5/14/2018 : She has had difficulty with her managed medicare getting seen by appropriate physicians for her cancer. She and her son are surprised to learn she HAS a cancer. \par \par 6/25/18 ; CANCELED VISIT. Had seen Dr. Hankins, who referred her to radiation oncologist. \par \par 7/5/18 : Here for a follow up. Has not seen radiation oncology. Given referral for plastic surgery but has not made an appt to see a plastic surgeon yet. She has more left leg swelling from the groin tumor down to ankle. \par \par 4/8/19 : Clara Fleming comes in today for a follow up after close to 9 months. She has had resection of L groin mass, had complication with healing. She has been closely followed by Dr Hankins and Wound care team and is now doing better in healing. She did not have RT post operatively although was discussed by us and and Dr. Hankins; this was impossible to perform in a timely manner given the nature of poor healing over time. New PET/CT showed significant edema of the LLE, large inguinal hernia containing fat and large bowel extending to the level of the mid thigh and small lung nodule that was PET avid. She also has a ground glass lesion in the CAMACHO SUV1.3 that is persistent from 12/2018\par \par 10/10/19 : Ms Fleming is here today to discuss new CT scan result form 9/16/2019. This showed a R apical solid/ground glass lesion with linear extension to the lateral pleural surface. Total size lesion 2.4 cm, solid component 1.5 x1.7 cm. Suspicious for primary lung cancer. \par \par 10/12/19:  admitted with abd hernia, pain. Nonsurgical intervention given the size and the morbidity of the surgical procedure and without likelihood of long term benefit. \par \par 10/24/19 : RUL nodule US guided Bx to r/o second malignancy, Lung ca by Dr. Toth. \par \par 10/30/19 : Here for a follow up and there is  no biopsy result as of yet. Unlikely related to liposarcoma. She was also seen by Dr. Farley in surgery and Dr. Coley for hernia repair however not a surgical candidate at this moment pending work up and management of the increasingly dense appearing CAMACHO lung mass. ( ?RADHA)\par \par She is now seen for advice, opinion on further management.  [de-identified] : see ABOVE\par

## 2019-10-30 NOTE — CONSULT LETTER
[Courtesy Letter:] : I had the pleasure of seeing your patient, [unfilled], in my office today. [Dear  ___] : Dear  [unfilled], [Please see my note below.] : Please see my note below. [Referral Closing:] : Thank you very much for seeing this patient.  If you have any questions, please do not hesitate to contact me. [Sincerely,] : Sincerely, [FreeTextEntry2] : Dr. David Farley MD Surgery [facility]\par Dr. Adair Coley MD Plastic Surgery [facility]\par  [FreeTextEntry1] : Pathology from her lung FNA was indeterminate owing to too few cells. She will need continuing imaging to monitor. It is not definitvely cancer, so it is reasonable to proceed with management of her hernia at this point, in my opinion.  [FreeTextEntry3] : NATALIIA Funez\[facility], [place]

## 2019-10-30 NOTE — REASON FOR VISIT
[Follow-Up Visit] : a follow-up [Family Member] : family member [FreeTextEntry2] : 68F Dediff Liposarcoma (DDLS) of left groin / thigh, large abdominal hernia into thigh; also increasingly dense lung nodule on sequential scans

## 2019-10-30 NOTE — RESULTS/DATA
[FreeTextEntry1] : Bx left groin mass:  Dediff liposarcoma (outside pathology, final read pending here) \par \par

## 2019-10-30 NOTE — PHYSICAL EXAM
[Restricted in physically strenuous activity but ambulatory and able to carry out work of a light or sedentary nature] : Status 1- Restricted in physically strenuous activity but ambulatory and able to carry out work of a light or sedentary nature, e.g., light house work, office work [Normal] : affect appropriate [de-identified] : Mass now resected [de-identified] : very large hernia affecting left upper thigh and left groin, size of 30 cm and bulging.  [de-identified] : brawny 1+ edema in left LE distal to primary surgical site.  [de-identified] : Wound in left groin nearly completely healed

## 2019-10-30 NOTE — ASSESSMENT
[Curative] : Goals of care discussed with patient: Curative [Palliative Care Plan] : not applicable at this time [FreeTextEntry1] : 67 F with left groin liposarcoma, dedifferentiated/well diff (DDWDLS). She has another issue that is surgical, not medical in nature, in a large hernia from abdominal wall into groin.\par \par Most recurrences are local regional of this type of sarcoma. She has a small lesion on PET that will bear further follow up. We will get a new CT chest no contrast in 6 mo to assess. PET scan is not needed to follow the lesion. It is possible it is another cancer though it is more likely inflammatory. Will refer to thoracic surgery or pulmonary medicine if lesion persistent and at all suspicious for NSCLC. \par \par We discussed scan result today during our visit. Not significant changed however, \par \par 1. CT guided biopsy for lung lesion pending.Pathology from her lung FNA was indeterminate owing to too few cells. She will need continuing imaging to monitor. It is not definitively cancer, so it is reasonable to proceed with management of her hernia at this point, in my opinion.  Lesion is HIGHLY unlikely to represent metastatic liposarcoma and we will continue to follow her every 6 months with scans. She is aware of Dr. Funez's departure. \par \par 2. Hernia : Managed by Dr Farley and Brijesh. OK to proceed with surgery at least form our oncologic perspective. \par \par 3. Will follow up with bx result via telephone and plan. \par \par Pt seen and examined with Dr. David Fnuez MD PhD.\par \par Angelita Jerez, ANP-BC

## 2019-10-30 NOTE — END OF VISIT
[] : Fellow [>50% of Time Spent on Counseling and Coordination of Care for  ___] : Greater than 50% of the encounter time was spent on counseling and coordination of care for [unfilled] [Time Spent: ___ minutes] : I have spent [unfilled] minutes of face to face time with the patient [FreeTextEntry3] : Patient seen and examined with SENIA Jerez (she is not a fellow as the boilerplate note template indicates). I agree with the hx, ROS, exam, lab review and plan as outlined.

## 2019-11-19 ENCOUNTER — APPOINTMENT (OUTPATIENT)
Dept: SURGERY | Facility: CLINIC | Age: 69
End: 2019-11-19
Payer: MEDICARE

## 2019-12-17 ENCOUNTER — APPOINTMENT (OUTPATIENT)
Dept: SURGERY | Facility: CLINIC | Age: 69
End: 2019-12-17
Payer: MEDICARE

## 2019-12-17 VITALS
RESPIRATION RATE: 17 BRPM | TEMPERATURE: 98.8 F | SYSTOLIC BLOOD PRESSURE: 170 MMHG | DIASTOLIC BLOOD PRESSURE: 95 MMHG | HEART RATE: 69 BPM | OXYGEN SATURATION: 96 %

## 2019-12-17 PROCEDURE — 99214 OFFICE O/P EST MOD 30 MIN: CPT

## 2019-12-17 NOTE — HISTORY OF PRESENT ILLNESS
[de-identified] : Clara is a 68 y/o female here for follow up visit. Patient is s/p radical resection of a left thigh sarcoma last year. This required resection of her inguinal ligament and plastic surgery as well as vascular reconstruction. She subsequently has developed a large loss of domain incisional hernia extending from the left lower quadrant into her left thigh. Last seen on 10/15/19, patient was in the midst of a workup for a possible new lung cancer. It was recommend that she start a daily laxative to hopefully make her stools more easy to pass. I instructed her to follow up with me once her oncological workup of her lung mass is complete. She should also followup with Dr. Coley of plastic surgery. \par \par Interval history. Since her last office visit she underwent a CT-guided SNA of a right upper lobe mass. Pathology demonstrates atypical cells but was otherwise nondiagnostic. She was recommended to follow up with a PET CT this spring. In the meantime she has persistent lower abdominal pain on a daily basis. She moves her bowels. She denies vomiting.\par

## 2019-12-17 NOTE — ASSESSMENT
[FreeTextEntry1] : In summary the patient has a large loss of domain left lower quadrant incisional hernia extending to her left medial thigh. She also has a right upper lobe lung nodule which was in the process of being worked up. CT-guided fine needle aspiration was nondiagnostic. I do not feel that consideration should be given to repairing her hernia until a definite diagnosis of lung cancer can be ruled in or out.\par \par If she can be definitively cleared from an oncology standpoint then she should followup at a dedicated hernia center which specializes in complex abdominal wall reconstruction. I gave her the names of several surgical referrals.

## 2019-12-17 NOTE — PHYSICAL EXAM
[Normal Rate and Rhythm] : normal rate and rhythm [No HSM] : no hepatosplenomegaly [Alert] : alert [Abdominal Masses] : No abdominal masses [de-identified] : nad [de-identified] : Large nontender loss of domain left lower quadrant/left groin hernia extending to her left medial thigh [de-identified] : nad

## 2019-12-17 NOTE — CONSULT LETTER
[Consult Letter:] : I had the pleasure of evaluating your patient, [unfilled]. [Dear  ___] : Dear  [unfilled], [Please see my note below.] : Please see my note below. [Consult Closing:] : Thank you very much for allowing me to participate in the care of this patient.  If you have any questions, please do not hesitate to contact me. [DrSelina  ___] : Dr. RASMUSSEN [Sincerely,] : Sincerely, [DrSelina ___] : Dr. RASMUSSEN [FreeTextEntry3] : David Farley M.D., F.A.C.S, F.A.S.C.R.S

## 2020-01-30 ENCOUNTER — OUTPATIENT (OUTPATIENT)
Dept: OUTPATIENT SERVICES | Facility: HOSPITAL | Age: 70
LOS: 1 days | Discharge: HOME | End: 2020-01-30

## 2020-01-30 DIAGNOSIS — C49.9 MALIGNANT NEOPLASM OF CONNECTIVE AND SOFT TISSUE, UNSPECIFIED: ICD-10-CM

## 2020-02-05 ENCOUNTER — APPOINTMENT (OUTPATIENT)
Dept: HEMATOLOGY ONCOLOGY | Facility: CLINIC | Age: 70
End: 2020-02-05

## 2020-02-07 ENCOUNTER — APPOINTMENT (OUTPATIENT)
Dept: HEMATOLOGY ONCOLOGY | Facility: CLINIC | Age: 70
End: 2020-02-07
Payer: MEDICARE

## 2020-02-07 VITALS
WEIGHT: 139.97 LBS | BODY MASS INDEX: 25.6 KG/M2 | TEMPERATURE: 98.1 F | OXYGEN SATURATION: 98 % | RESPIRATION RATE: 14 BRPM | DIASTOLIC BLOOD PRESSURE: 88 MMHG | HEART RATE: 69 BPM | SYSTOLIC BLOOD PRESSURE: 152 MMHG

## 2020-02-07 PROCEDURE — 99215 OFFICE O/P EST HI 40 MIN: CPT

## 2020-02-07 RX ORDER — METOPROLOL TARTRATE 75 MG/1
TABLET, FILM COATED ORAL
Refills: 0 | Status: DISCONTINUED | COMMUNITY
End: 2020-02-07

## 2020-02-07 RX ORDER — SUCRALFATE 1 G/1
TABLET ORAL
Refills: 0 | Status: DISCONTINUED | COMMUNITY
End: 2020-02-07

## 2020-02-07 RX ORDER — IBUPROFEN 200 MG
600 CAPSULE ORAL
Refills: 0 | Status: DISCONTINUED | COMMUNITY
Start: 2018-05-11 | End: 2020-02-07

## 2020-02-07 RX ORDER — ALENDRONATE SODIUM 70 MG/1
70 TABLET ORAL
Refills: 0 | Status: DISCONTINUED | COMMUNITY
Start: 2018-05-11 | End: 2020-02-07

## 2020-02-07 RX ORDER — ERGOCALCIFEROL 1.25 MG/1
1.25 MG CAPSULE, LIQUID FILLED ORAL
Refills: 0 | Status: DISCONTINUED | COMMUNITY
Start: 2018-05-11 | End: 2020-02-07

## 2020-02-07 RX ORDER — ASPIRIN 81 MG/1
81 TABLET, COATED ORAL
Qty: 30 | Refills: 0 | Status: DISCONTINUED | COMMUNITY
Start: 2018-12-14 | End: 2020-02-07

## 2020-02-07 RX ORDER — ATENOLOL 50 MG/1
50 TABLET ORAL
Qty: 90 | Refills: 0 | Status: DISCONTINUED | COMMUNITY
Start: 2019-01-28 | End: 2020-02-07

## 2020-02-07 RX ORDER — LOSARTAN POTASSIUM AND HYDROCHLOROTHIAZIDE 25; 100 MG/1; MG/1
100-25 TABLET ORAL
Qty: 30 | Refills: 0 | Status: DISCONTINUED | COMMUNITY
Start: 2018-12-14 | End: 2020-02-07

## 2020-02-07 RX ORDER — PETROLATUM,WHITE 41 %
OINTMENT (GRAM) TOPICAL DAILY
Qty: 1 | Refills: 0 | Status: DISCONTINUED | OUTPATIENT
Start: 2019-01-04 | End: 2020-02-07

## 2020-02-14 NOTE — REASON FOR VISIT
[Initial Consultation] : an initial consultation [Other: _____] : [unfilled] [FreeTextEntry2] : Dediff Liposarcoma (DDLS) of left groin / thigh, large abdominal hernia into thigh; also increasingly dense lung nodule on sequential scans

## 2020-02-14 NOTE — HISTORY OF PRESENT ILLNESS
[Disease: _____________________] : Disease: [unfilled] [T: ___] : T[unfilled] [N: ___] : N[unfilled] [M: ___] : M[unfilled] [AJCC Stage: ____] : AJCC Stage: [unfilled] [de-identified] : Ms. Fleming is a 69 F who presented with LEFT upper thigh mass near the groin in 1/2018 and underwent CT torso on 1/25/18 showing a superficial mass in the anterior psoas muscle measuring 10+ x 4.6 x 7.9 cm and FNA from 2/8/2020 was suspicious for malignancy.  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 Patient was delayed in follow up due to insurance issues and found this out on 5/14/2018.  She was referred to Dr Hankins, plastic sx and Rad Onc.  She had resection of L groin mass, had complication with healing, closely followed by Dr Hankins and Wound care team and is now doing better in healing. She did not have RT post operatively although was discussed by us and and Dr. Hankins; this was impossible to perform in a timely manner given the nature of poor healing over time. New PET/CT showed significant edema of the LLE, large inguinal hernia containing fat and large bowel extending to the level of the mid thigh and small lung nodule that was PET avid. She also has a ground glass lesion in the CAMACHO SUV1.3 that is persistent from 12/2018.  Her CT scan from 9/16/2019 showed a R apical solid/ground glass lesion with linear extension to the lateral pleural surface. Total size lesion 2.4 cm, solid component 1.5 x1.7 cm. Suspicious for primary lung cancer.  On 10/12/19, she was admitted with abd hernia, pain. Nonsurgical intervention given the size and the morbidity of the surgical procedure and without likelihood of long term benefit and deemed nonsurgical candidate. On 10/24/19 she underwent RUL nodule US guided Bx to r/o second malignancy, Lung ca by Dr. Toth.  There were concerns of increasingly dense appearing CAMACHO lung mass\par \par  [de-identified] : no new complaints

## 2020-02-14 NOTE — PHYSICAL EXAM
[Restricted in physically strenuous activity but ambulatory and able to carry out work of a light or sedentary nature] : Status 1- Restricted in physically strenuous activity but ambulatory and able to carry out work of a light or sedentary nature, e.g., light house work, office work [Normal] : affect appropriate [de-identified] : incisions c/d/i [de-identified] : very large hernia affecting left upper thigh and left groin,   [de-identified] : LLE edema

## 2020-02-19 NOTE — H&P PST ADULT - AS BP NONINV METHOD
Post-Care Instructions: Patient instructed to not lie down for 4 hours and limit physical activity for 24 hours. auscultated w/ stethoscope

## 2020-02-20 NOTE — PROGRESS NOTE ADULT - PROBLEM SELECTOR PLAN 2
see above  prophylaxis rabies given per ER PA and next injection due 11/5  tetanus ppx given on last ER visit no

## 2020-02-27 ENCOUNTER — FORM ENCOUNTER (OUTPATIENT)
Age: 70
End: 2020-02-27

## 2020-02-28 ENCOUNTER — OUTPATIENT (OUTPATIENT)
Dept: OUTPATIENT SERVICES | Facility: HOSPITAL | Age: 70
LOS: 1 days | End: 2020-02-28
Payer: MEDICARE

## 2020-02-28 ENCOUNTER — APPOINTMENT (OUTPATIENT)
Dept: CT IMAGING | Facility: IMAGING CENTER | Age: 70
End: 2020-02-28
Payer: MEDICARE

## 2020-02-28 DIAGNOSIS — C49.9 MALIGNANT NEOPLASM OF CONNECTIVE AND SOFT TISSUE, UNSPECIFIED: ICD-10-CM

## 2020-02-28 PROCEDURE — 74177 CT ABD & PELVIS W/CONTRAST: CPT

## 2020-02-28 PROCEDURE — 74177 CT ABD & PELVIS W/CONTRAST: CPT | Mod: 26

## 2020-02-28 PROCEDURE — 82565 ASSAY OF CREATININE: CPT

## 2020-02-28 PROCEDURE — 71260 CT THORAX DX C+: CPT | Mod: 26

## 2020-02-28 PROCEDURE — 71260 CT THORAX DX C+: CPT

## 2020-03-05 ENCOUNTER — NON-APPOINTMENT (OUTPATIENT)
Age: 70
End: 2020-03-05

## 2020-04-29 ENCOUNTER — APPOINTMENT (OUTPATIENT)
Dept: HEMATOLOGY ONCOLOGY | Facility: CLINIC | Age: 70
End: 2020-04-29

## 2020-05-20 NOTE — PROGRESS NOTE ADULT - PROBLEM SELECTOR PROBLEM 4
Spoke to patient and said when she gave urine sample she had her period and not having any UTI symptoms at this time.  Patient aware of MD orders and results.  MD follow up appointment scheduled.   Anemia due to blood loss

## 2020-06-17 NOTE — CONSULT NOTE ADULT - ASSESSMENT
JULIA UMANA is a 67y Female PMH HTN, presented for scheduled surgical resection of left inguinal mass, intra-op course c/b bigeminy post-operatively pt left intubated, intubated.    Neuro: Sedated on fentanyl gtt  - Continue fentanyl gtt, wean as tolerated    Resp: Intubated - 16/350/5/40  - F/u ABG, adjust vent as tolerated    Cardiovascular: Bigeminy at start of case  - Seen by EP: NTD  - Transition from Ari to Levo    Gastrointestinal:   - Continue NPO  - Await return of bowel function    Gu/Renal:  - Engel in place, monitor I/Os.   - Monitor drain output    Heme:   - Monitor cbc q4h    Endo:  - No acute issues JULIA UMANA is a 67y Female PMH HTN, presented for scheduled surgical resection of left inguinal mass, intra-op course c/b bigeminy post-operatively pt left intubated, intubated.    Neuro: Sedated on fentanyl gtt  - Continue fentanyl gtt, wean as tolerated    Resp: Intubated - 16/350/5/40  - F/u ABG, adjust vent as tolerated    Cardiovascular: Bigeminy at start of case  - Seen by EP: NTD  - Transition from Ari to Levo    Gastrointestinal:   - Continue NPO  - Await return of bowel function    Gu/Renal:  - Engel in place, monitor I/Os.   - Monitor drain output    Heme:   - Monitor cbc q4h    ID: No acute issues  - Monitor for fever, leukocytosis    Endo:  - No acute issues JULIA UMANA is a 67y Female PMH HTN, presented for scheduled surgical resection of left inguinal mass, intra-op course c/b bigeminy post-operatively pt left intubated, intubated.    Neuro: Sedated on fentanyl gtt  - Continue fentanyl gtt, wean as tolerated  - RASS -1    Resp: Intubated - 16/350/5/40  - F/u ABG, adjust vent as tolerated    Cardiovascular: Bigeminy at start of case  - Seen by EP: NTD  - Post-op echo improved from pre-operative echo  - Cardiac enzymes ordered  - Transition from Ari to Levo    Gastrointestinal:   - Continue NPO  - Await return of bowel function    /Renal:  - Engel in place, monitor I/Os.   - Monitor drain output (JEREMY x3)    Heme:   - Monitor cbc q4h  - Fibrinogen levels decreased, prolonged PTT - f/u repeat labs    ID: No acute issues  - Monitor for fever, leukocytosis    Endo:  - No acute issues    C Ibanez PGY2  SICU  [id number] JULIA UMANA is a 67y Female PMH HTN, presented for scheduled surgical resection of left inguinal mass, intra-op course c/b bigeminy post-operatively pt left intubated, intubated.    Neuro: Sedated on fentanyl gtt  - Continue fentanyl gtt, wean as tolerated  - RASS -1    Resp: Intubated - 16/350/5/40  - F/u ABG, adjust vent as tolerated    Cardiovascular: Bigeminy at start of case  - Seen by EP: NTD  - Post-op echo improved from pre-operative echo  - Cardiac enzymes ordered  - Transition from Ari to Levo  - Vascular checks q1hr    Gastrointestinal:   - Continue NPO  - Await return of bowel function    /Renal:  - Engel in place, monitor I/Os.   - Monitor drain output (JEREMY x3)    Heme:   - Monitor cbc q4h  - Fibrinogen levels decreased, prolonged PTT - f/u repeat labs    ID: No acute issues  - Monitor for fever, leukocytosis    Endo:  - No acute issues    C Ibanez PGY2  SICU  [address] admission

## 2020-07-08 ENCOUNTER — OUTPATIENT (OUTPATIENT)
Dept: OUTPATIENT SERVICES | Facility: HOSPITAL | Age: 70
LOS: 1 days | End: 2020-07-08
Payer: MEDICARE

## 2020-07-08 ENCOUNTER — APPOINTMENT (OUTPATIENT)
Dept: CT IMAGING | Facility: IMAGING CENTER | Age: 70
End: 2020-07-08
Payer: MEDICARE

## 2020-07-08 DIAGNOSIS — Z00.8 ENCOUNTER FOR OTHER GENERAL EXAMINATION: ICD-10-CM

## 2020-07-08 PROCEDURE — 74177 CT ABD & PELVIS W/CONTRAST: CPT | Mod: 26

## 2020-07-08 PROCEDURE — 74177 CT ABD & PELVIS W/CONTRAST: CPT

## 2020-07-08 PROCEDURE — 82565 ASSAY OF CREATININE: CPT

## 2020-10-19 ENCOUNTER — OUTPATIENT (OUTPATIENT)
Dept: OUTPATIENT SERVICES | Facility: HOSPITAL | Age: 70
LOS: 1 days | End: 2020-10-19

## 2020-10-19 ENCOUNTER — APPOINTMENT (OUTPATIENT)
Dept: CT IMAGING | Facility: CLINIC | Age: 70
End: 2020-10-19
Payer: MEDICARE

## 2020-10-19 ENCOUNTER — RESULT REVIEW (OUTPATIENT)
Age: 70
End: 2020-10-19

## 2020-10-19 DIAGNOSIS — C49.9 MALIGNANT NEOPLASM OF CONNECTIVE AND SOFT TISSUE, UNSPECIFIED: ICD-10-CM

## 2020-10-19 PROCEDURE — 74177 CT ABD & PELVIS W/CONTRAST: CPT | Mod: 26

## 2020-10-19 PROCEDURE — 71260 CT THORAX DX C+: CPT | Mod: 26

## 2020-10-29 ENCOUNTER — OUTPATIENT (OUTPATIENT)
Dept: OUTPATIENT SERVICES | Facility: HOSPITAL | Age: 70
LOS: 1 days | Discharge: HOME | End: 2020-10-29

## 2020-10-29 DIAGNOSIS — C49.9 MALIGNANT NEOPLASM OF CONNECTIVE AND SOFT TISSUE, UNSPECIFIED: ICD-10-CM

## 2020-11-02 ENCOUNTER — APPOINTMENT (OUTPATIENT)
Dept: HEMATOLOGY ONCOLOGY | Facility: CLINIC | Age: 70
End: 2020-11-02
Payer: MEDICARE

## 2020-11-02 DIAGNOSIS — R91.1 SOLITARY PULMONARY NODULE: ICD-10-CM

## 2020-11-02 PROCEDURE — 99214 OFFICE O/P EST MOD 30 MIN: CPT | Mod: 95

## 2020-11-06 PROBLEM — R91.1 LUNG NODULE: Status: ACTIVE | Noted: 2020-11-06

## 2020-11-06 NOTE — PHYSICAL EXAM
[Restricted in physically strenuous activity but ambulatory and able to carry out work of a light or sedentary nature] : Status 1- Restricted in physically strenuous activity but ambulatory and able to carry out work of a light or sedentary nature, e.g., light house work, office work [Obese] : obese [Normal] : affect appropriate [de-identified] : aniceteric

## 2020-11-06 NOTE — REASON FOR VISIT
[Follow-Up Visit] : a follow-up [Other: _____] : [unfilled] [FreeTextEntry2] : Dediff Liposarcoma (DDLS) of left groin / thigh, large abdominal hernia into thigh; also increasingly dense lung nodule on sequential scans

## 2020-11-06 NOTE — CONSULT LETTER
[Dear  ___] : Dear  [unfilled], [Consult Letter:] : I had the pleasure of evaluating your patient, [unfilled]. [Please see my note below.] : Please see my note below. [Consult Closing:] : Thank you very much for allowing me to participate in the care of this patient.  If you have any questions, please do not hesitate to contact me. [Sincerely,] : Sincerely, [FreeTextEntry3] : Marin Kruse MD, FACP \par  of Medicine \par Division of Hematology/Oncology\par [facility]\par [facility] \par [address]\[place] [address]\par Tel: [phone]\par Fax: [phone]\par \par \par

## 2020-11-06 NOTE — PHYSICAL EXAM
[Restricted in physically strenuous activity but ambulatory and able to carry out work of a light or sedentary nature] : Status 1- Restricted in physically strenuous activity but ambulatory and able to carry out work of a light or sedentary nature, e.g., light house work, office work [Obese] : obese [Normal] : affect appropriate [de-identified] : aniceteric

## 2020-11-06 NOTE — CONSULT LETTER
[Dear  ___] : Dear  [unfilled], [Consult Letter:] : I had the pleasure of evaluating your patient, [unfilled]. [Please see my note below.] : Please see my note below. [Consult Closing:] : Thank you very much for allowing me to participate in the care of this patient.  If you have any questions, please do not hesitate to contact me. [Sincerely,] : Sincerely, [FreeTextEntry3] : Marin Kruse MD, FACP \par  of Medicine \par Division of Hematology/Oncology\par [facility]\par [facility] \par [address]\[place] [address]\par Tel: [phone]\par Fax: [phone]\par \par \par

## 2020-11-06 NOTE — HISTORY OF PRESENT ILLNESS
[Home] : at home, [unfilled] , at the time of the visit. [Medical Office: ([facility])___] : at the medical office located in  [Family Member] : family member [Verbal consent obtained from patient] : the patient, [unfilled] [Disease: _____________________] : Disease: [unfilled] [T: ___] : T[unfilled] [N: ___] : N[unfilled] [M: ___] : M[unfilled] [AJCC Stage: ____] : AJCC Stage: [unfilled] [de-identified] : Ms. Fleming is a 69 F who presented with LEFT upper thigh mass near the groin in 1/2018 and underwent CT torso on 1/25/18 showing a superficial mass in the anterior psoas muscle measuring 10+ x 4.6 x 7.9 cm and FNA from 2/8/2020 was suspicious for malignancy.  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 Patient was delayed in follow up due to insurance issues and found this out on 5/14/2018.  She was referred to Dr Hankins, plastic sx and Rad Onc.  She had resection of L groin mass, had complication with healing, closely followed by Dr Hankins and Wound care team and is now doing better in healing. She did not have RT post operatively although was discussed by us and and Dr. Hankins; this was impossible to perform in a timely manner given the nature of poor healing over time. New PET/CT showed significant edema of the LLE, large inguinal hernia containing fat and large bowel extending to the level of the mid thigh and small lung nodule that was PET avid. She also has a ground glass lesion in the CAMACHO SUV1.3 that is persistent from 12/2018.  Her CT scan from 9/16/2019 showed a R apical solid/ground glass lesion with linear extension to the lateral pleural surface. Total size lesion 2.4 cm, solid component 1.5 x1.7 cm. Suspicious for primary lung cancer.  On 10/12/19, she was admitted with abd hernia, pain. Nonsurgical intervention given the size and the morbidity of the surgical procedure and without likelihood of long term benefit and deemed nonsurgical candidate. On 10/24/19 she underwent RUL nodule US guided Bx to r/o second malignancy, Lung ca by Dr. Toth.  There were concerns of increasingly dense appearing CAMACHO lung mass\par \par  [FreeTextEntry1] : observation  [de-identified] : no abdominal pain \par no hernia repair feasible to date\par no acute complaints

## 2020-11-06 NOTE — HISTORY OF PRESENT ILLNESS
[Home] : at home, [unfilled] , at the time of the visit. [Medical Office: ([facility])___] : at the medical office located in  [Family Member] : family member [Verbal consent obtained from patient] : the patient, [unfilled] [Disease: _____________________] : Disease: [unfilled] [T: ___] : T[unfilled] [N: ___] : N[unfilled] [M: ___] : M[unfilled] [AJCC Stage: ____] : AJCC Stage: [unfilled] [de-identified] : Ms. Fleming is a 69 F who presented with LEFT upper thigh mass near the groin in 1/2018 and underwent CT torso on 1/25/18 showing a superficial mass in the anterior psoas muscle measuring 10+ x 4.6 x 7.9 cm and FNA from 2/8/2020 was suspicious for malignancy.  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 Patient was delayed in follow up due to insurance issues and found this out on 5/14/2018.  She was referred to Dr Hankins, plastic sx and Rad Onc.  She had resection of L groin mass, had complication with healing, closely followed by Dr Hankins and Wound care team and is now doing better in healing. She did not have RT post operatively although was discussed by us and and Dr. Hankins; this was impossible to perform in a timely manner given the nature of poor healing over time. New PET/CT showed significant edema of the LLE, large inguinal hernia containing fat and large bowel extending to the level of the mid thigh and small lung nodule that was PET avid. She also has a ground glass lesion in the CAMACHO SUV1.3 that is persistent from 12/2018.  Her CT scan from 9/16/2019 showed a R apical solid/ground glass lesion with linear extension to the lateral pleural surface. Total size lesion 2.4 cm, solid component 1.5 x1.7 cm. Suspicious for primary lung cancer.  On 10/12/19, she was admitted with abd hernia, pain. Nonsurgical intervention given the size and the morbidity of the surgical procedure and without likelihood of long term benefit and deemed nonsurgical candidate. On 10/24/19 she underwent RUL nodule US guided Bx to r/o second malignancy, Lung ca by Dr. Toth.  There were concerns of increasingly dense appearing CAMACHO lung mass\par \par  [FreeTextEntry1] : observation  [de-identified] : no abdominal pain \par no hernia repair feasible to date\par no acute complaints

## 2021-01-01 ENCOUNTER — APPOINTMENT (OUTPATIENT)
Dept: UROLOGY | Facility: CLINIC | Age: 71
End: 2021-01-01
Payer: COMMERCIAL

## 2021-01-01 ENCOUNTER — LABORATORY RESULT (OUTPATIENT)
Age: 71
End: 2021-01-01

## 2021-01-01 ENCOUNTER — APPOINTMENT (OUTPATIENT)
Dept: HEMATOLOGY ONCOLOGY | Facility: CLINIC | Age: 71
End: 2021-01-01
Payer: COMMERCIAL

## 2021-01-01 ENCOUNTER — OUTPATIENT (OUTPATIENT)
Dept: OUTPATIENT SERVICES | Facility: HOSPITAL | Age: 71
LOS: 1 days | Discharge: HOME | End: 2021-01-01

## 2021-01-01 ENCOUNTER — RESULT REVIEW (OUTPATIENT)
Age: 71
End: 2021-01-01

## 2021-01-01 ENCOUNTER — NON-APPOINTMENT (OUTPATIENT)
Age: 71
End: 2021-01-01

## 2021-01-01 ENCOUNTER — TRANSCRIPTION ENCOUNTER (OUTPATIENT)
Age: 71
End: 2021-01-01

## 2021-01-01 ENCOUNTER — APPOINTMENT (OUTPATIENT)
Dept: INFUSION THERAPY | Facility: HOSPITAL | Age: 71
End: 2021-01-01

## 2021-01-01 ENCOUNTER — APPOINTMENT (OUTPATIENT)
Dept: PLASTIC SURGERY | Facility: CLINIC | Age: 71
End: 2021-01-01
Payer: COMMERCIAL

## 2021-01-01 ENCOUNTER — FORM ENCOUNTER (OUTPATIENT)
Age: 71
End: 2021-01-01

## 2021-01-01 ENCOUNTER — APPOINTMENT (OUTPATIENT)
Dept: UROLOGY | Facility: HOSPITAL | Age: 71
End: 2021-01-01

## 2021-01-01 ENCOUNTER — APPOINTMENT (OUTPATIENT)
Dept: CT IMAGING | Facility: IMAGING CENTER | Age: 71
End: 2021-01-01

## 2021-01-01 ENCOUNTER — INPATIENT (INPATIENT)
Facility: HOSPITAL | Age: 71
LOS: 2 days | Discharge: HOME | End: 2021-08-13
Attending: UROLOGY | Admitting: UROLOGY
Payer: MEDICARE

## 2021-01-01 ENCOUNTER — APPOINTMENT (OUTPATIENT)
Dept: ENDOVASCULAR SURGERY | Facility: CLINIC | Age: 71
End: 2021-01-01
Payer: COMMERCIAL

## 2021-01-01 ENCOUNTER — APPOINTMENT (OUTPATIENT)
Dept: HEMATOLOGY ONCOLOGY | Facility: CLINIC | Age: 71
End: 2021-01-01
Payer: MEDICARE

## 2021-01-01 ENCOUNTER — OUTPATIENT (OUTPATIENT)
Dept: OUTPATIENT SERVICES | Facility: HOSPITAL | Age: 71
LOS: 1 days | Discharge: HOME | End: 2021-01-01
Payer: MEDICARE

## 2021-01-01 ENCOUNTER — APPOINTMENT (OUTPATIENT)
Dept: DISASTER EMERGENCY | Facility: CLINIC | Age: 71
End: 2021-01-01

## 2021-01-01 ENCOUNTER — APPOINTMENT (OUTPATIENT)
Dept: RADIATION ONCOLOGY | Facility: CLINIC | Age: 71
End: 2021-01-01
Payer: MEDICARE

## 2021-01-01 ENCOUNTER — OUTPATIENT (OUTPATIENT)
Dept: OUTPATIENT SERVICES | Facility: HOSPITAL | Age: 71
LOS: 1 days | End: 2021-01-01

## 2021-01-01 ENCOUNTER — OUTPATIENT (OUTPATIENT)
Dept: OUTPATIENT SERVICES | Facility: HOSPITAL | Age: 71
LOS: 1 days | End: 2021-01-01
Payer: MEDICARE

## 2021-01-01 ENCOUNTER — APPOINTMENT (OUTPATIENT)
Dept: PLASTIC SURGERY | Facility: HOSPITAL | Age: 71
End: 2021-01-01

## 2021-01-01 ENCOUNTER — APPOINTMENT (OUTPATIENT)
Dept: SURGERY | Facility: CLINIC | Age: 71
End: 2021-01-01
Payer: COMMERCIAL

## 2021-01-01 ENCOUNTER — APPOINTMENT (OUTPATIENT)
Dept: CARDIOLOGY | Facility: CLINIC | Age: 71
End: 2021-01-01
Payer: COMMERCIAL

## 2021-01-01 ENCOUNTER — APPOINTMENT (OUTPATIENT)
Dept: INTERVENTIONAL RADIOLOGY/VASCULAR | Facility: CLINIC | Age: 71
End: 2021-01-01
Payer: COMMERCIAL

## 2021-01-01 VITALS
DIASTOLIC BLOOD PRESSURE: 86 MMHG | BODY MASS INDEX: 23.56 KG/M2 | WEIGHT: 120 LBS | HEIGHT: 60 IN | HEART RATE: 77 BPM | RESPIRATION RATE: 18 BRPM | TEMPERATURE: 98.6 F | SYSTOLIC BLOOD PRESSURE: 129 MMHG | OXYGEN SATURATION: 100 %

## 2021-01-01 VITALS
HEART RATE: 64 BPM | DIASTOLIC BLOOD PRESSURE: 86 MMHG | SYSTOLIC BLOOD PRESSURE: 126 MMHG | BODY MASS INDEX: 21.79 KG/M2 | OXYGEN SATURATION: 98 % | HEIGHT: 60 IN | WEIGHT: 111 LBS | RESPIRATION RATE: 17 BRPM | TEMPERATURE: 97 F

## 2021-01-01 VITALS
HEART RATE: 57 BPM | WEIGHT: 126.1 LBS | OXYGEN SATURATION: 99 % | HEIGHT: 62.09 IN | DIASTOLIC BLOOD PRESSURE: 91 MMHG | RESPIRATION RATE: 16 BRPM | SYSTOLIC BLOOD PRESSURE: 141 MMHG | BODY MASS INDEX: 22.91 KG/M2 | TEMPERATURE: 97.2 F

## 2021-01-01 VITALS
WEIGHT: 139 LBS | BODY MASS INDEX: 25.58 KG/M2 | HEIGHT: 62 IN | TEMPERATURE: 98 F | RESPIRATION RATE: 17 BRPM | DIASTOLIC BLOOD PRESSURE: 103 MMHG | SYSTOLIC BLOOD PRESSURE: 176 MMHG | HEART RATE: 59 BPM

## 2021-01-01 VITALS — BODY MASS INDEX: 23.73 KG/M2 | WEIGHT: 139 LBS | HEIGHT: 64 IN

## 2021-01-01 VITALS
TEMPERATURE: 98 F | HEIGHT: 60 IN | DIASTOLIC BLOOD PRESSURE: 80 MMHG | OXYGEN SATURATION: 97 % | SYSTOLIC BLOOD PRESSURE: 136 MMHG | HEART RATE: 58 BPM | RESPIRATION RATE: 16 BRPM | WEIGHT: 126.1 LBS

## 2021-01-01 VITALS
SYSTOLIC BLOOD PRESSURE: 124 MMHG | DIASTOLIC BLOOD PRESSURE: 80 MMHG | WEIGHT: 127.19 LBS | HEART RATE: 59 BPM | TEMPERATURE: 98 F | OXYGEN SATURATION: 99 % | BODY MASS INDEX: 21.71 KG/M2 | HEIGHT: 64 IN

## 2021-01-01 VITALS
OXYGEN SATURATION: 97 % | HEART RATE: 60 BPM | TEMPERATURE: 98 F | SYSTOLIC BLOOD PRESSURE: 118 MMHG | DIASTOLIC BLOOD PRESSURE: 80 MMHG | RESPIRATION RATE: 17 BRPM

## 2021-01-01 VITALS
SYSTOLIC BLOOD PRESSURE: 156 MMHG | HEART RATE: 65 BPM | RESPIRATION RATE: 16 BRPM | WEIGHT: 113.54 LBS | OXYGEN SATURATION: 98 % | DIASTOLIC BLOOD PRESSURE: 96 MMHG | BODY MASS INDEX: 22.17 KG/M2 | TEMPERATURE: 98.1 F

## 2021-01-01 VITALS
WEIGHT: 121.12 LBS | RESPIRATION RATE: 16 BRPM | DIASTOLIC BLOOD PRESSURE: 85 MMHG | OXYGEN SATURATION: 100 % | TEMPERATURE: 98 F | SYSTOLIC BLOOD PRESSURE: 139 MMHG | BODY MASS INDEX: 22.09 KG/M2 | HEART RATE: 58 BPM

## 2021-01-01 VITALS
OXYGEN SATURATION: 98 % | DIASTOLIC BLOOD PRESSURE: 84 MMHG | TEMPERATURE: 98 F | HEART RATE: 70 BPM | SYSTOLIC BLOOD PRESSURE: 123 MMHG | WEIGHT: 114.99 LBS | BODY MASS INDEX: 22.46 KG/M2 | RESPIRATION RATE: 16 BRPM

## 2021-01-01 VITALS
RESPIRATION RATE: 16 BRPM | WEIGHT: 126.1 LBS | HEART RATE: 60 BPM | DIASTOLIC BLOOD PRESSURE: 86 MMHG | SYSTOLIC BLOOD PRESSURE: 147 MMHG | OXYGEN SATURATION: 100 % | TEMPERATURE: 98 F | HEIGHT: 60 IN

## 2021-01-01 DIAGNOSIS — K43.9 VENTRAL HERNIA WITHOUT OBSTRUCTION OR GANGRENE: ICD-10-CM

## 2021-01-01 DIAGNOSIS — C49.9 MALIGNANT NEOPLASM OF CONNECTIVE AND SOFT TISSUE, UNSPECIFIED: ICD-10-CM

## 2021-01-01 DIAGNOSIS — T78.40XA ALLERGY, UNSPECIFIED, INITIAL ENCOUNTER: ICD-10-CM

## 2021-01-01 DIAGNOSIS — N28.89 OTHER SPECIFIED DISORDERS OF KIDNEY AND URETER: ICD-10-CM

## 2021-01-01 DIAGNOSIS — Z51.11 ENCOUNTER FOR ANTINEOPLASTIC CHEMOTHERAPY: ICD-10-CM

## 2021-01-01 DIAGNOSIS — R07.89 OTHER CHEST PAIN: ICD-10-CM

## 2021-01-01 DIAGNOSIS — R11.2 NAUSEA WITH VOMITING, UNSPECIFIED: ICD-10-CM

## 2021-01-01 DIAGNOSIS — C49.9 MALIGNANT NEOPLASM OF CONNECTIVE AND SOFT TISSUE, UNSPECIFIED: Chronic | ICD-10-CM

## 2021-01-01 DIAGNOSIS — R26.9 UNSPECIFIED ABNORMALITIES OF GAIT AND MOBILITY: ICD-10-CM

## 2021-01-01 DIAGNOSIS — K43.9 VENTRAL HERNIA W/OUT OBSTRUCTION OR GANGRENE: ICD-10-CM

## 2021-01-01 DIAGNOSIS — R91.8 OTHER NONSPECIFIC ABNORMAL FINDING OF LUNG FIELD: ICD-10-CM

## 2021-01-01 DIAGNOSIS — M54.9 DORSALGIA, UNSPECIFIED: ICD-10-CM

## 2021-01-01 DIAGNOSIS — Z29.9 ENCOUNTER FOR PROPHYLACTIC MEASURES, UNSPECIFIED: ICD-10-CM

## 2021-01-01 DIAGNOSIS — Z79.899 OTHER LONG TERM (CURRENT) DRUG THERAPY: ICD-10-CM

## 2021-01-01 DIAGNOSIS — Z01.818 ENCOUNTER FOR OTHER PREPROCEDURAL EXAMINATION: ICD-10-CM

## 2021-01-01 DIAGNOSIS — Z00.00 ENCOUNTER FOR GENERAL ADULT MEDICAL EXAMINATION W/OUT ABNORMAL FINDINGS: ICD-10-CM

## 2021-01-01 DIAGNOSIS — I10 ESSENTIAL (PRIMARY) HYPERTENSION: ICD-10-CM

## 2021-01-01 DIAGNOSIS — M67.479 GANGLION, UNSPECIFIED ANKLE AND FOOT: ICD-10-CM

## 2021-01-01 DIAGNOSIS — R23.2 FLUSHING: ICD-10-CM

## 2021-01-01 LAB
ALBUMIN SERPL ELPH-MCNC: 3.4 G/DL — SIGNIFICANT CHANGE UP (ref 3.3–5)
ALBUMIN SERPL ELPH-MCNC: 4.3 G/DL — SIGNIFICANT CHANGE UP (ref 3.3–5)
ALP SERPL-CCNC: 62 U/L — SIGNIFICANT CHANGE UP (ref 40–120)
ALP SERPL-CCNC: 75 U/L — SIGNIFICANT CHANGE UP (ref 40–120)
ALT FLD-CCNC: 5 U/L — LOW (ref 10–45)
ALT FLD-CCNC: 7 U/L — SIGNIFICANT CHANGE UP (ref 4–33)
ANION GAP SERPL CALC-SCNC: 12 MMOL/L
ANION GAP SERPL CALC-SCNC: 13 MMOL/L — SIGNIFICANT CHANGE UP (ref 5–17)
ANION GAP SERPL CALC-SCNC: 14 MMOL/L — SIGNIFICANT CHANGE UP (ref 7–14)
ANION GAP SERPL CALC-SCNC: 16 MMOL/L — HIGH (ref 7–14)
AST SERPL-CCNC: 15 U/L — SIGNIFICANT CHANGE UP (ref 4–32)
AST SERPL-CCNC: 9 U/L — LOW (ref 10–40)
BASOPHILS # BLD AUTO: 0.04 K/UL
BASOPHILS # BLD AUTO: 0.04 K/UL — SIGNIFICANT CHANGE UP (ref 0–0.2)
BASOPHILS # BLD AUTO: 0.05 K/UL — SIGNIFICANT CHANGE UP (ref 0–0.2)
BASOPHILS # BLD AUTO: 0.06 K/UL — SIGNIFICANT CHANGE UP (ref 0–0.2)
BASOPHILS # BLD AUTO: 0.07 K/UL — SIGNIFICANT CHANGE UP (ref 0–0.2)
BASOPHILS # BLD AUTO: 0.07 K/UL — SIGNIFICANT CHANGE UP (ref 0–0.2)
BASOPHILS # BLD AUTO: 0.1 K/UL — SIGNIFICANT CHANGE UP (ref 0–0.2)
BASOPHILS NFR BLD AUTO: 0.6 % — SIGNIFICANT CHANGE UP (ref 0–2)
BASOPHILS NFR BLD AUTO: 0.7 %
BASOPHILS NFR BLD AUTO: 0.7 % — SIGNIFICANT CHANGE UP (ref 0–2)
BASOPHILS NFR BLD AUTO: 0.9 % — SIGNIFICANT CHANGE UP (ref 0–2)
BASOPHILS NFR BLD AUTO: 1 % — SIGNIFICANT CHANGE UP (ref 0–2)
BASOPHILS NFR BLD AUTO: 1.1 % — SIGNIFICANT CHANGE UP (ref 0–2)
BASOPHILS NFR BLD AUTO: 1.1 % — SIGNIFICANT CHANGE UP (ref 0–2)
BILIRUB SERPL-MCNC: 0.3 MG/DL — SIGNIFICANT CHANGE UP (ref 0.2–1.2)
BILIRUB SERPL-MCNC: 0.4 MG/DL — SIGNIFICANT CHANGE UP (ref 0.2–1.2)
BLD GP AB SCN SERPL QL: NEGATIVE — SIGNIFICANT CHANGE UP
BUN SERPL-MCNC: 10 MG/DL — SIGNIFICANT CHANGE UP (ref 7–23)
BUN SERPL-MCNC: 13 MG/DL — SIGNIFICANT CHANGE UP (ref 7–23)
BUN SERPL-MCNC: 14 MG/DL — SIGNIFICANT CHANGE UP (ref 7–23)
BUN SERPL-MCNC: 17 MG/DL
CALCIUM SERPL-MCNC: 9.1 MG/DL — SIGNIFICANT CHANGE UP (ref 8.4–10.5)
CALCIUM SERPL-MCNC: 9.3 MG/DL
CALCIUM SERPL-MCNC: 9.5 MG/DL — SIGNIFICANT CHANGE UP (ref 8.4–10.5)
CALCIUM SERPL-MCNC: 9.7 MG/DL — SIGNIFICANT CHANGE UP (ref 8.4–10.5)
CHLORIDE SERPL-SCNC: 100 MMOL/L — SIGNIFICANT CHANGE UP (ref 98–107)
CHLORIDE SERPL-SCNC: 105 MMOL/L — SIGNIFICANT CHANGE UP (ref 96–108)
CHLORIDE SERPL-SCNC: 106 MMOL/L
CHLORIDE SERPL-SCNC: 108 MMOL/L — HIGH (ref 98–107)
CO2 SERPL-SCNC: 20 MMOL/L — LOW (ref 22–31)
CO2 SERPL-SCNC: 23 MMOL/L — SIGNIFICANT CHANGE UP (ref 22–31)
CO2 SERPL-SCNC: 24 MMOL/L — SIGNIFICANT CHANGE UP (ref 22–31)
CO2 SERPL-SCNC: 25 MMOL/L
COVID-19 SPIKE DOMAIN AB INTERP: POSITIVE
COVID-19 SPIKE DOMAIN ANTIBODY RESULT: >250 U/ML — HIGH
CREAT SERPL-MCNC: 0.68 MG/DL — SIGNIFICANT CHANGE UP (ref 0.5–1.3)
CREAT SERPL-MCNC: 0.71 MG/DL — SIGNIFICANT CHANGE UP (ref 0.5–1.3)
CREAT SERPL-MCNC: 0.76 MG/DL — SIGNIFICANT CHANGE UP (ref 0.5–1.3)
CREAT SERPL-MCNC: 0.83 MG/DL
EOSINOPHIL # BLD AUTO: 0.02 K/UL — SIGNIFICANT CHANGE UP (ref 0–0.5)
EOSINOPHIL # BLD AUTO: 0.04 K/UL
EOSINOPHIL # BLD AUTO: 0.06 K/UL — SIGNIFICANT CHANGE UP (ref 0–0.5)
EOSINOPHIL # BLD AUTO: 0.06 K/UL — SIGNIFICANT CHANGE UP (ref 0–0.5)
EOSINOPHIL # BLD AUTO: 0.09 K/UL — SIGNIFICANT CHANGE UP (ref 0–0.5)
EOSINOPHIL # BLD AUTO: 0.29 K/UL — SIGNIFICANT CHANGE UP (ref 0–0.5)
EOSINOPHIL # BLD AUTO: 0.3 K/UL — SIGNIFICANT CHANGE UP (ref 0–0.5)
EOSINOPHIL NFR BLD AUTO: 0.3 % — SIGNIFICANT CHANGE UP (ref 0–6)
EOSINOPHIL NFR BLD AUTO: 0.7 %
EOSINOPHIL NFR BLD AUTO: 0.9 % — SIGNIFICANT CHANGE UP (ref 0–6)
EOSINOPHIL NFR BLD AUTO: 0.9 % — SIGNIFICANT CHANGE UP (ref 0–6)
EOSINOPHIL NFR BLD AUTO: 1.5 % — SIGNIFICANT CHANGE UP (ref 0–6)
EOSINOPHIL NFR BLD AUTO: 3.2 % — SIGNIFICANT CHANGE UP (ref 0–6)
EOSINOPHIL NFR BLD AUTO: 4.3 % — SIGNIFICANT CHANGE UP (ref 0–6)
GAS PNL BLDA: SIGNIFICANT CHANGE UP
GLUCOSE SERPL-MCNC: 125 MG/DL — HIGH (ref 70–99)
GLUCOSE SERPL-MCNC: 75 MG/DL — SIGNIFICANT CHANGE UP (ref 70–99)
GLUCOSE SERPL-MCNC: 89 MG/DL
GLUCOSE SERPL-MCNC: 93 MG/DL — SIGNIFICANT CHANGE UP (ref 70–99)
HBV CORE IGG+IGM SER QL: NONREACTIVE
HBV SURFACE AB SER QL: NONREACTIVE
HBV SURFACE AG SER QL: NONREACTIVE
HCT VFR BLD CALC: 35.9 % — SIGNIFICANT CHANGE UP (ref 34.5–45)
HCT VFR BLD CALC: 37 %
HCT VFR BLD CALC: 37.2 % — SIGNIFICANT CHANGE UP (ref 34.5–45)
HCT VFR BLD CALC: 37.7 % — SIGNIFICANT CHANGE UP (ref 34.5–45)
HCT VFR BLD CALC: 39.3 % — SIGNIFICANT CHANGE UP (ref 34.5–45)
HCT VFR BLD CALC: 39.7 % — SIGNIFICANT CHANGE UP (ref 34.5–45)
HCT VFR BLD CALC: 39.8 % — SIGNIFICANT CHANGE UP (ref 34.5–45)
HCT VFR BLD CALC: 42.1 % — SIGNIFICANT CHANGE UP (ref 34.5–45)
HCV AB SER QL: NONREACTIVE
HCV S/CO RATIO: 0.09 S/CO
HGB BLD-MCNC: 12.3 G/DL
HGB BLD-MCNC: 12.4 G/DL — SIGNIFICANT CHANGE UP (ref 11.5–15.5)
HGB BLD-MCNC: 12.8 G/DL — SIGNIFICANT CHANGE UP (ref 11.5–15.5)
HGB BLD-MCNC: 12.9 G/DL — SIGNIFICANT CHANGE UP (ref 11.5–15.5)
HGB BLD-MCNC: 12.9 G/DL — SIGNIFICANT CHANGE UP (ref 11.5–15.5)
HGB BLD-MCNC: 13.3 G/DL — SIGNIFICANT CHANGE UP (ref 11.5–15.5)
HGB BLD-MCNC: 13.5 G/DL — SIGNIFICANT CHANGE UP (ref 11.5–15.5)
HGB BLD-MCNC: 14.6 G/DL — SIGNIFICANT CHANGE UP (ref 11.5–15.5)
IMM GRANULOCYTES NFR BLD AUTO: 0.2 %
IMM GRANULOCYTES NFR BLD AUTO: 0.3 % — SIGNIFICANT CHANGE UP (ref 0–1.5)
IMM GRANULOCYTES NFR BLD AUTO: 0.4 % — SIGNIFICANT CHANGE UP (ref 0–1.5)
IMM GRANULOCYTES NFR BLD AUTO: 0.7 % — SIGNIFICANT CHANGE UP (ref 0–1.5)
IMM GRANULOCYTES NFR BLD AUTO: 0.7 % — SIGNIFICANT CHANGE UP (ref 0–1.5)
IMM GRANULOCYTES NFR BLD AUTO: 0.8 % — SIGNIFICANT CHANGE UP (ref 0–1.5)
IMM GRANULOCYTES NFR BLD AUTO: 1.1 % — SIGNIFICANT CHANGE UP (ref 0–1.5)
LYMPHOCYTES # BLD AUTO: 0.85 K/UL — LOW (ref 1–3.3)
LYMPHOCYTES # BLD AUTO: 0.99 K/UL — LOW (ref 1–3.3)
LYMPHOCYTES # BLD AUTO: 1.1 K/UL — SIGNIFICANT CHANGE UP (ref 1–3.3)
LYMPHOCYTES # BLD AUTO: 1.34 K/UL — SIGNIFICANT CHANGE UP (ref 1–3.3)
LYMPHOCYTES # BLD AUTO: 1.47 K/UL
LYMPHOCYTES # BLD AUTO: 1.56 K/UL — SIGNIFICANT CHANGE UP (ref 1–3.3)
LYMPHOCYTES # BLD AUTO: 1.79 K/UL — SIGNIFICANT CHANGE UP (ref 1–3.3)
LYMPHOCYTES # BLD AUTO: 12.5 % — LOW (ref 13–44)
LYMPHOCYTES # BLD AUTO: 15 % — SIGNIFICANT CHANGE UP (ref 13–44)
LYMPHOCYTES # BLD AUTO: 16.1 % — SIGNIFICANT CHANGE UP (ref 13–44)
LYMPHOCYTES # BLD AUTO: 17 % — SIGNIFICANT CHANGE UP (ref 13–44)
LYMPHOCYTES # BLD AUTO: 21.7 % — SIGNIFICANT CHANGE UP (ref 13–44)
LYMPHOCYTES # BLD AUTO: 25.7 % — SIGNIFICANT CHANGE UP (ref 13–44)
LYMPHOCYTES NFR BLD AUTO: 26.8 %
MAN DIFF?: NORMAL
MCHC RBC-ENTMCNC: 31.4 PG — SIGNIFICANT CHANGE UP (ref 27–34)
MCHC RBC-ENTMCNC: 32.3 PG
MCHC RBC-ENTMCNC: 32.5 PG — SIGNIFICANT CHANGE UP (ref 27–34)
MCHC RBC-ENTMCNC: 32.6 GM/DL — SIGNIFICANT CHANGE UP (ref 32–36)
MCHC RBC-ENTMCNC: 33 PG — SIGNIFICANT CHANGE UP (ref 27–34)
MCHC RBC-ENTMCNC: 33 PG — SIGNIFICANT CHANGE UP (ref 27–34)
MCHC RBC-ENTMCNC: 33.1 PG — SIGNIFICANT CHANGE UP (ref 27–34)
MCHC RBC-ENTMCNC: 33.1 PG — SIGNIFICANT CHANGE UP (ref 27–34)
MCHC RBC-ENTMCNC: 33.2 GM/DL
MCHC RBC-ENTMCNC: 33.4 G/DL — SIGNIFICANT CHANGE UP (ref 32–36)
MCHC RBC-ENTMCNC: 33.6 PG — SIGNIFICANT CHANGE UP (ref 27–34)
MCHC RBC-ENTMCNC: 34 G/DL — SIGNIFICANT CHANGE UP (ref 32–36)
MCHC RBC-ENTMCNC: 34.2 G/DL — SIGNIFICANT CHANGE UP (ref 32–36)
MCHC RBC-ENTMCNC: 34.5 G/DL — SIGNIFICANT CHANGE UP (ref 32–36)
MCHC RBC-ENTMCNC: 34.7 G/DL — SIGNIFICANT CHANGE UP (ref 32–36)
MCHC RBC-ENTMCNC: 34.7 G/DL — SIGNIFICANT CHANGE UP (ref 32–36)
MCV RBC AUTO: 95 FL — SIGNIFICANT CHANGE UP (ref 80–100)
MCV RBC AUTO: 95.5 FL — SIGNIFICANT CHANGE UP (ref 80–100)
MCV RBC AUTO: 95.7 FL — SIGNIFICANT CHANGE UP (ref 80–100)
MCV RBC AUTO: 96.6 FL — SIGNIFICANT CHANGE UP (ref 80–100)
MCV RBC AUTO: 96.9 FL — SIGNIFICANT CHANGE UP (ref 80–100)
MCV RBC AUTO: 97.1 FL
MCV RBC AUTO: 97.1 FL — SIGNIFICANT CHANGE UP (ref 80–100)
MCV RBC AUTO: 98.8 FL — SIGNIFICANT CHANGE UP (ref 80–100)
MONOCYTES # BLD AUTO: 0.43 K/UL
MONOCYTES # BLD AUTO: 0.46 K/UL — SIGNIFICANT CHANGE UP (ref 0–0.9)
MONOCYTES # BLD AUTO: 0.56 K/UL — SIGNIFICANT CHANGE UP (ref 0–0.9)
MONOCYTES # BLD AUTO: 0.65 K/UL — SIGNIFICANT CHANGE UP (ref 0–0.9)
MONOCYTES # BLD AUTO: 0.67 K/UL — SIGNIFICANT CHANGE UP (ref 0–0.9)
MONOCYTES # BLD AUTO: 0.69 K/UL — SIGNIFICANT CHANGE UP (ref 0–0.9)
MONOCYTES # BLD AUTO: 0.76 K/UL — SIGNIFICANT CHANGE UP (ref 0–0.9)
MONOCYTES NFR BLD AUTO: 10.4 % — SIGNIFICANT CHANGE UP (ref 2–14)
MONOCYTES NFR BLD AUTO: 10.5 % — SIGNIFICANT CHANGE UP (ref 2–14)
MONOCYTES NFR BLD AUTO: 11.1 % — SIGNIFICANT CHANGE UP (ref 2–14)
MONOCYTES NFR BLD AUTO: 5 % — SIGNIFICANT CHANGE UP (ref 2–14)
MONOCYTES NFR BLD AUTO: 7.8 %
MONOCYTES NFR BLD AUTO: 8.2 % — SIGNIFICANT CHANGE UP (ref 2–14)
MONOCYTES NFR BLD AUTO: 9.6 % — SIGNIFICANT CHANGE UP (ref 2–14)
NEUTROPHILS # BLD AUTO: 3.49 K/UL
NEUTROPHILS # BLD AUTO: 3.98 K/UL — SIGNIFICANT CHANGE UP (ref 1.8–7.4)
NEUTROPHILS # BLD AUTO: 4.1 K/UL — SIGNIFICANT CHANGE UP (ref 1.8–7.4)
NEUTROPHILS # BLD AUTO: 4.77 K/UL — SIGNIFICANT CHANGE UP (ref 1.8–7.4)
NEUTROPHILS # BLD AUTO: 4.8 K/UL — SIGNIFICANT CHANGE UP (ref 1.8–7.4)
NEUTROPHILS # BLD AUTO: 5.32 K/UL — SIGNIFICANT CHANGE UP (ref 1.8–7.4)
NEUTROPHILS # BLD AUTO: 6.7 K/UL — SIGNIFICANT CHANGE UP (ref 1.8–7.4)
NEUTROPHILS NFR BLD AUTO: 58.8 % — SIGNIFICANT CHANGE UP (ref 43–77)
NEUTROPHILS NFR BLD AUTO: 63.8 %
NEUTROPHILS NFR BLD AUTO: 64.4 % — SIGNIFICANT CHANGE UP (ref 43–77)
NEUTROPHILS NFR BLD AUTO: 70.2 % — SIGNIFICANT CHANGE UP (ref 43–77)
NEUTROPHILS NFR BLD AUTO: 72 % — SIGNIFICANT CHANGE UP (ref 43–77)
NEUTROPHILS NFR BLD AUTO: 73.3 % — SIGNIFICANT CHANGE UP (ref 43–77)
NEUTROPHILS NFR BLD AUTO: 78 % — HIGH (ref 43–77)
NON-GYNECOLOGICAL CYTOLOGY STUDY: SIGNIFICANT CHANGE UP
NRBC # BLD: 0 /100 WBCS — SIGNIFICANT CHANGE UP
NRBC # BLD: 0 /100 WBCS — SIGNIFICANT CHANGE UP (ref 0–0)
NRBC # FLD: 0 K/UL — SIGNIFICANT CHANGE UP
PLATELET # BLD AUTO: 204 K/UL — SIGNIFICANT CHANGE UP (ref 150–400)
PLATELET # BLD AUTO: 206 K/UL
PLATELET # BLD AUTO: 212 K/UL — SIGNIFICANT CHANGE UP (ref 150–400)
PLATELET # BLD AUTO: 215 K/UL — SIGNIFICANT CHANGE UP (ref 150–400)
PLATELET # BLD AUTO: 229 K/UL — SIGNIFICANT CHANGE UP (ref 150–400)
PLATELET # BLD AUTO: 233 K/UL — SIGNIFICANT CHANGE UP (ref 150–400)
PLATELET # BLD AUTO: 250 K/UL — SIGNIFICANT CHANGE UP (ref 150–400)
PLATELET # BLD AUTO: 252 K/UL — SIGNIFICANT CHANGE UP (ref 150–400)
POTASSIUM SERPL-MCNC: 4 MMOL/L — SIGNIFICANT CHANGE UP (ref 3.5–5.3)
POTASSIUM SERPL-MCNC: 4 MMOL/L — SIGNIFICANT CHANGE UP (ref 3.5–5.3)
POTASSIUM SERPL-MCNC: 4.8 MMOL/L — SIGNIFICANT CHANGE UP (ref 3.5–5.3)
POTASSIUM SERPL-SCNC: 4 MMOL/L — SIGNIFICANT CHANGE UP (ref 3.5–5.3)
POTASSIUM SERPL-SCNC: 4 MMOL/L — SIGNIFICANT CHANGE UP (ref 3.5–5.3)
POTASSIUM SERPL-SCNC: 4.3 MMOL/L
POTASSIUM SERPL-SCNC: 4.8 MMOL/L — SIGNIFICANT CHANGE UP (ref 3.5–5.3)
PROT SERPL-MCNC: 5.6 G/DL — LOW (ref 6–8.3)
PROT SERPL-MCNC: 7.1 G/DL — SIGNIFICANT CHANGE UP (ref 6–8.3)
RBC # BLD: 3.75 M/UL — LOW (ref 3.8–5.2)
RBC # BLD: 3.81 M/UL
RBC # BLD: 3.84 M/UL — SIGNIFICANT CHANGE UP (ref 3.8–5.2)
RBC # BLD: 3.97 M/UL — SIGNIFICANT CHANGE UP (ref 3.8–5.2)
RBC # BLD: 4.03 M/UL — SIGNIFICANT CHANGE UP (ref 3.8–5.2)
RBC # BLD: 4.07 M/UL — SIGNIFICANT CHANGE UP (ref 3.8–5.2)
RBC # BLD: 4.09 M/UL — SIGNIFICANT CHANGE UP (ref 3.8–5.2)
RBC # BLD: 4.41 M/UL — SIGNIFICANT CHANGE UP (ref 3.8–5.2)
RBC # FLD: 12.3 % — SIGNIFICANT CHANGE UP (ref 10.3–14.5)
RBC # FLD: 12.5 %
RBC # FLD: 12.8 % — SIGNIFICANT CHANGE UP (ref 10.3–14.5)
RBC # FLD: 12.9 % — SIGNIFICANT CHANGE UP (ref 10.3–14.5)
RBC # FLD: 13.2 % — SIGNIFICANT CHANGE UP (ref 10.3–14.5)
RBC # FLD: 13.3 % — SIGNIFICANT CHANGE UP (ref 10.3–14.5)
RBC # FLD: 13.4 % — SIGNIFICANT CHANGE UP (ref 10.3–14.5)
RBC # FLD: 13.7 % — SIGNIFICANT CHANGE UP (ref 10.3–14.5)
RH IG SCN BLD-IMP: POSITIVE — SIGNIFICANT CHANGE UP
SARS-COV-2 IGG+IGM SERPL QL IA: >250 U/ML — HIGH
SARS-COV-2 IGG+IGM SERPL QL IA: POSITIVE
SARS-COV-2 N GENE NPH QL NAA+PROBE: NOT DETECTED
SODIUM SERPL-SCNC: 139 MMOL/L — SIGNIFICANT CHANGE UP (ref 135–145)
SODIUM SERPL-SCNC: 142 MMOL/L
SODIUM SERPL-SCNC: 142 MMOL/L — SIGNIFICANT CHANGE UP (ref 135–145)
SODIUM SERPL-SCNC: 142 MMOL/L — SIGNIFICANT CHANGE UP (ref 135–145)
WBC # BLD: 6.18 K/UL — SIGNIFICANT CHANGE UP (ref 3.8–10.5)
WBC # BLD: 6.21 K/UL — SIGNIFICANT CHANGE UP (ref 3.8–10.5)
WBC # BLD: 6.62 K/UL — SIGNIFICANT CHANGE UP (ref 3.8–10.5)
WBC # BLD: 6.82 K/UL — SIGNIFICANT CHANGE UP (ref 3.8–10.5)
WBC # BLD: 6.84 K/UL — SIGNIFICANT CHANGE UP (ref 3.8–10.5)
WBC # BLD: 6.97 K/UL — SIGNIFICANT CHANGE UP (ref 3.8–10.5)
WBC # BLD: 9.15 K/UL — SIGNIFICANT CHANGE UP (ref 3.8–10.5)
WBC # FLD AUTO: 5.48 K/UL
WBC # FLD AUTO: 6.18 K/UL — SIGNIFICANT CHANGE UP (ref 3.8–10.5)
WBC # FLD AUTO: 6.21 K/UL — SIGNIFICANT CHANGE UP (ref 3.8–10.5)
WBC # FLD AUTO: 6.62 K/UL — SIGNIFICANT CHANGE UP (ref 3.8–10.5)
WBC # FLD AUTO: 6.82 K/UL — SIGNIFICANT CHANGE UP (ref 3.8–10.5)
WBC # FLD AUTO: 6.84 K/UL — SIGNIFICANT CHANGE UP (ref 3.8–10.5)
WBC # FLD AUTO: 6.97 K/UL — SIGNIFICANT CHANGE UP (ref 3.8–10.5)
WBC # FLD AUTO: 9.15 K/UL — SIGNIFICANT CHANGE UP (ref 3.8–10.5)

## 2021-01-01 PROCEDURE — 99232 SBSQ HOSP IP/OBS MODERATE 35: CPT

## 2021-01-01 PROCEDURE — 77295 3-D RADIOTHERAPY PLAN: CPT | Mod: 26

## 2021-01-01 PROCEDURE — 99215 OFFICE O/P EST HI 40 MIN: CPT

## 2021-01-01 PROCEDURE — 77280 THER RAD SIMULAJ FIELD SMPL: CPT | Mod: 26

## 2021-01-01 PROCEDURE — 77300 RADIATION THERAPY DOSE PLAN: CPT | Mod: 26

## 2021-01-01 PROCEDURE — 77334 RADIATION TREATMENT AID(S): CPT | Mod: 26

## 2021-01-01 PROCEDURE — 77001 FLUOROGUIDE FOR VEIN DEVICE: CPT

## 2021-01-01 PROCEDURE — 99214 OFFICE O/P EST MOD 30 MIN: CPT

## 2021-01-01 PROCEDURE — 99213 OFFICE O/P EST LOW 20 MIN: CPT

## 2021-01-01 PROCEDURE — 77012 CT SCAN FOR NEEDLE BIOPSY: CPT | Mod: 26

## 2021-01-01 PROCEDURE — 77263 THER RADIOLOGY TX PLNG CPLX: CPT

## 2021-01-01 PROCEDURE — 93356 MYOCRD STRAIN IMG SPCKL TRCK: CPT

## 2021-01-01 PROCEDURE — 99205 OFFICE O/P NEW HI 60 MIN: CPT | Mod: 25

## 2021-01-01 PROCEDURE — 88173 CYTOPATH EVAL FNA REPORT: CPT | Mod: 26

## 2021-01-01 PROCEDURE — 49000 EXPLORATION OF ABDOMEN: CPT

## 2021-01-01 PROCEDURE — 99204 OFFICE O/P NEW MOD 45 MIN: CPT

## 2021-01-01 PROCEDURE — 71260 CT THORAX DX C+: CPT | Mod: 26

## 2021-01-01 PROCEDURE — 77470 SPECIAL RADIATION TREATMENT: CPT | Mod: 26

## 2021-01-01 PROCEDURE — 99223 1ST HOSP IP/OBS HIGH 75: CPT

## 2021-01-01 PROCEDURE — 99213 OFFICE O/P EST LOW 20 MIN: CPT | Mod: 95

## 2021-01-01 PROCEDURE — 50200 RENAL BIOPSY PERQ: CPT | Mod: LT

## 2021-01-01 PROCEDURE — 93306 TTE W/DOPPLER COMPLETE: CPT

## 2021-01-01 PROCEDURE — 76937 US GUIDE VASCULAR ACCESS: CPT

## 2021-01-01 PROCEDURE — 88305 TISSUE EXAM BY PATHOLOGIST: CPT | Mod: 26

## 2021-01-01 PROCEDURE — 93010 ELECTROCARDIOGRAM REPORT: CPT | Mod: 76

## 2021-01-01 PROCEDURE — 88341 IMHCHEM/IMCYTCHM EA ADD ANTB: CPT | Mod: 26

## 2021-01-01 PROCEDURE — 36561 INSERT TUNNELED CV CATH: CPT | Mod: RT

## 2021-01-01 PROCEDURE — 74177 CT ABD & PELVIS W/CONTRAST: CPT | Mod: 26

## 2021-01-01 PROCEDURE — 88342 IMHCHEM/IMCYTCHM 1ST ANTB: CPT | Mod: 26

## 2021-01-01 PROCEDURE — 99443: CPT

## 2021-01-01 PROCEDURE — 99072 ADDL SUPL MATRL&STAF TM PHE: CPT

## 2021-01-01 RX ORDER — ACETAMINOPHEN 500 MG
3 TABLET ORAL
Qty: 0 | Refills: 0 | DISCHARGE
Start: 2021-01-01

## 2021-01-01 RX ORDER — KETOROLAC TROMETHAMINE 30 MG/ML
15 SYRINGE (ML) INJECTION EVERY 6 HOURS
Refills: 0 | Status: DISCONTINUED | OUTPATIENT
Start: 2021-01-01 | End: 2021-01-01

## 2021-01-01 RX ORDER — ONDANSETRON 8 MG/1
8 TABLET ORAL EVERY 8 HOURS
Qty: 60 | Refills: 1 | Status: ACTIVE | COMMUNITY
Start: 2021-01-01 | End: 1900-01-01

## 2021-01-01 RX ORDER — SENNA PLUS 8.6 MG/1
2 TABLET ORAL AT BEDTIME
Refills: 0 | Status: DISCONTINUED | OUTPATIENT
Start: 2021-01-01 | End: 2021-01-01

## 2021-01-01 RX ORDER — LOSARTAN POTASSIUM 100 MG/1
50 TABLET, FILM COATED ORAL DAILY
Refills: 0 | Status: DISCONTINUED | OUTPATIENT
Start: 2021-01-01 | End: 2021-01-01

## 2021-01-01 RX ORDER — POLYETHYLENE GLYCOL 3350 17 G/17G
17 POWDER, FOR SOLUTION ORAL
Qty: 0 | Refills: 0 | DISCHARGE
Start: 2021-01-01

## 2021-01-01 RX ORDER — LOSARTAN/HYDROCHLOROTHIAZIDE 100MG-25MG
1 TABLET ORAL
Qty: 0 | Refills: 0 | DISCHARGE

## 2021-01-01 RX ORDER — AMLODIPINE BESYLATE 2.5 MG/1
5 TABLET ORAL DAILY
Refills: 0 | Status: DISCONTINUED | OUTPATIENT
Start: 2021-01-01 | End: 2021-01-01

## 2021-01-01 RX ORDER — ACETAMINOPHEN 500 MG
975 TABLET ORAL EVERY 6 HOURS
Refills: 0 | Status: DISCONTINUED | OUTPATIENT
Start: 2021-01-01 | End: 2021-01-01

## 2021-01-01 RX ORDER — OXYCODONE HYDROCHLORIDE 5 MG/1
5 TABLET ORAL EVERY 6 HOURS
Refills: 0 | Status: DISCONTINUED | OUTPATIENT
Start: 2021-01-01 | End: 2021-01-01

## 2021-01-01 RX ORDER — LANOLIN ALCOHOL/MO/W.PET/CERES
3 CREAM (GRAM) TOPICAL AT BEDTIME
Refills: 0 | Status: DISCONTINUED | OUTPATIENT
Start: 2021-01-01 | End: 2021-01-01

## 2021-01-01 RX ORDER — FENTANYL CITRATE 50 UG/ML
25 INJECTION INTRAVENOUS
Refills: 0 | Status: DISCONTINUED | OUTPATIENT
Start: 2021-01-01 | End: 2021-01-01

## 2021-01-01 RX ORDER — POLYETHYLENE GLYCOL 3350 17 G/17G
17 POWDER, FOR SOLUTION ORAL DAILY
Refills: 0 | Status: DISCONTINUED | OUTPATIENT
Start: 2021-01-01 | End: 2021-01-01

## 2021-01-01 RX ORDER — LIDOCAINE AND PRILOCAINE 25; 25 MG/G; MG/G
2.5-2.5 CREAM TOPICAL
Qty: 1 | Refills: 2 | Status: ACTIVE | COMMUNITY
Start: 2021-01-01 | End: 1900-01-01

## 2021-01-01 RX ORDER — HYDROMORPHONE HYDROCHLORIDE 2 MG/ML
0.5 INJECTION INTRAMUSCULAR; INTRAVENOUS; SUBCUTANEOUS
Refills: 0 | Status: DISCONTINUED | OUTPATIENT
Start: 2021-01-01 | End: 2021-01-01

## 2021-01-01 RX ORDER — SODIUM CHLORIDE 9 MG/ML
1000 INJECTION, SOLUTION INTRAVENOUS
Refills: 0 | Status: DISCONTINUED | OUTPATIENT
Start: 2021-01-01 | End: 2021-01-01

## 2021-01-01 RX ORDER — AMLODIPINE BESYLATE AND OLMESARTRAN MEDOXOMIL 10; 40 MG/1; MG/1
1 TABLET, FILM COATED ORAL
Qty: 0 | Refills: 0 | DISCHARGE

## 2021-01-01 RX ORDER — ONDANSETRON 8 MG/1
4 TABLET, FILM COATED ORAL ONCE
Refills: 0 | Status: DISCONTINUED | OUTPATIENT
Start: 2021-01-01 | End: 2021-01-01

## 2021-01-01 RX ORDER — IBANDRONATE SODIUM 150 MG/1
150 TABLET ORAL
Qty: 3 | Refills: 0 | Status: COMPLETED | COMMUNITY
Start: 2021-01-11 | End: 2021-01-01

## 2021-01-01 RX ORDER — SENNA PLUS 8.6 MG/1
2 TABLET ORAL
Qty: 0 | Refills: 0 | DISCHARGE
Start: 2021-01-01

## 2021-01-01 RX ORDER — IBUPROFEN 200 MG
2 TABLET ORAL
Qty: 0 | Refills: 0 | DISCHARGE

## 2021-01-01 RX ORDER — CHROMIUM 200 MCG
TABLET ORAL
Refills: 0 | Status: DISCONTINUED | COMMUNITY
End: 2021-01-01

## 2021-01-01 RX ORDER — ONDANSETRON 4 MG/1
4 TABLET ORAL
Qty: 90 | Refills: 0 | Status: DISCONTINUED | COMMUNITY
Start: 2021-01-01

## 2021-01-01 RX ORDER — HEPARIN SODIUM 5000 [USP'U]/ML
5000 INJECTION INTRAVENOUS; SUBCUTANEOUS EVERY 8 HOURS
Refills: 0 | Status: DISCONTINUED | OUTPATIENT
Start: 2021-01-01 | End: 2021-01-01

## 2021-01-01 RX ORDER — ACETAMINOPHEN AND CODEINE PHOSPHATE 120; 12 MG/5ML; MG/5ML
120-12 SOLUTION ORAL
Qty: 300 | Refills: 0 | Status: DISCONTINUED | COMMUNITY
Start: 2021-01-01

## 2021-01-01 RX ORDER — ACETAMINOPHEN AND CODEINE 300; 30 MG/1; MG/1
300-30 TABLET ORAL
Qty: 30 | Refills: 0 | Status: DISCONTINUED | COMMUNITY
Start: 2021-01-01

## 2021-01-01 RX ADMIN — HEPARIN SODIUM 5000 UNIT(S): 5000 INJECTION INTRAVENOUS; SUBCUTANEOUS at 14:24

## 2021-01-01 RX ADMIN — SODIUM CHLORIDE 125 MILLILITER(S): 9 INJECTION, SOLUTION INTRAVENOUS at 20:04

## 2021-01-01 RX ADMIN — Medication 975 MILLIGRAM(S): at 18:17

## 2021-01-01 RX ADMIN — AMLODIPINE BESYLATE 5 MILLIGRAM(S): 2.5 TABLET ORAL at 05:25

## 2021-01-01 RX ADMIN — Medication 975 MILLIGRAM(S): at 08:00

## 2021-01-01 RX ADMIN — HEPARIN SODIUM 5000 UNIT(S): 5000 INJECTION INTRAVENOUS; SUBCUTANEOUS at 21:58

## 2021-01-01 RX ADMIN — LOSARTAN POTASSIUM 50 MILLIGRAM(S): 100 TABLET, FILM COATED ORAL at 06:20

## 2021-01-01 RX ADMIN — AMLODIPINE BESYLATE 5 MILLIGRAM(S): 2.5 TABLET ORAL at 06:20

## 2021-01-01 RX ADMIN — Medication 975 MILLIGRAM(S): at 11:51

## 2021-01-01 RX ADMIN — Medication 975 MILLIGRAM(S): at 12:07

## 2021-01-01 RX ADMIN — Medication 975 MILLIGRAM(S): at 05:47

## 2021-01-01 RX ADMIN — HEPARIN SODIUM 5000 UNIT(S): 5000 INJECTION INTRAVENOUS; SUBCUTANEOUS at 14:31

## 2021-01-01 RX ADMIN — SODIUM CHLORIDE 75 MILLILITER(S): 9 INJECTION, SOLUTION INTRAVENOUS at 13:29

## 2021-01-01 RX ADMIN — HEPARIN SODIUM 5000 UNIT(S): 5000 INJECTION INTRAVENOUS; SUBCUTANEOUS at 21:29

## 2021-01-01 RX ADMIN — Medication 975 MILLIGRAM(S): at 18:47

## 2021-01-01 RX ADMIN — HEPARIN SODIUM 5000 UNIT(S): 5000 INJECTION INTRAVENOUS; SUBCUTANEOUS at 13:29

## 2021-01-01 RX ADMIN — HEPARIN SODIUM 5000 UNIT(S): 5000 INJECTION INTRAVENOUS; SUBCUTANEOUS at 21:41

## 2021-01-01 RX ADMIN — Medication 975 MILLIGRAM(S): at 19:02

## 2021-01-01 RX ADMIN — Medication 15 MILLIGRAM(S): at 21:57

## 2021-01-01 RX ADMIN — Medication 975 MILLIGRAM(S): at 13:29

## 2021-01-01 RX ADMIN — SENNA PLUS 2 TABLET(S): 8.6 TABLET ORAL at 02:16

## 2021-01-01 RX ADMIN — HEPARIN SODIUM 5000 UNIT(S): 5000 INJECTION INTRAVENOUS; SUBCUTANEOUS at 06:17

## 2021-01-01 RX ADMIN — Medication 15 MILLIGRAM(S): at 21:30

## 2021-01-01 RX ADMIN — HEPARIN SODIUM 5000 UNIT(S): 5000 INJECTION INTRAVENOUS; SUBCUTANEOUS at 05:47

## 2021-01-01 RX ADMIN — Medication 15 MILLIGRAM(S): at 22:30

## 2021-01-01 RX ADMIN — Medication 975 MILLIGRAM(S): at 05:24

## 2021-01-01 RX ADMIN — Medication 15 MILLIGRAM(S): at 05:48

## 2021-01-01 RX ADMIN — LOSARTAN POTASSIUM 50 MILLIGRAM(S): 100 TABLET, FILM COATED ORAL at 05:24

## 2021-01-01 RX ADMIN — Medication 975 MILLIGRAM(S): at 18:09

## 2021-01-01 RX ADMIN — HEPARIN SODIUM 5000 UNIT(S): 5000 INJECTION INTRAVENOUS; SUBCUTANEOUS at 05:25

## 2021-01-01 RX ADMIN — SODIUM CHLORIDE 125 MILLILITER(S): 9 INJECTION, SOLUTION INTRAVENOUS at 10:15

## 2021-01-01 RX ADMIN — POLYETHYLENE GLYCOL 3350 17 GRAM(S): 17 POWDER, FOR SOLUTION ORAL at 13:29

## 2021-01-01 RX ADMIN — POLYETHYLENE GLYCOL 3350 17 GRAM(S): 17 POWDER, FOR SOLUTION ORAL at 11:51

## 2021-01-01 RX ADMIN — Medication 975 MILLIGRAM(S): at 02:12

## 2021-03-09 PROBLEM — Z00.00 ENCOUNTER FOR PREVENTIVE HEALTH EXAMINATION: Status: ACTIVE | Noted: 2018-03-12

## 2021-04-30 NOTE — PHYSICAL EXAM
[Restricted in physically strenuous activity but ambulatory and able to carry out work of a light or sedentary nature] : Status 1- Restricted in physically strenuous activity but ambulatory and able to carry out work of a light or sedentary nature, e.g., light house work, office work [Obese] : obese [Normal] : affect appropriate [de-identified] : aniceteric

## 2021-04-30 NOTE — HISTORY OF PRESENT ILLNESS
[Disease: _____________________] : Disease: [unfilled] [T: ___] : T[unfilled] [N: ___] : N[unfilled] [M: ___] : M[unfilled] [AJCC Stage: ____] : AJCC Stage: [unfilled] [Home] : at home, [unfilled] , at the time of the visit. [Medical Office: ([facility])___] : at the medical office located in  [Family Member] : family member [Verbal consent obtained from patient] : the patient, [unfilled] [de-identified] : Ms. Fleming is a 69 F who presented with LEFT upper thigh mass near the groin in 1/2018 and underwent CT torso on 1/25/18 showing a superficial mass in the anterior psoas muscle measuring 10+ x 4.6 x 7.9 cm and FNA from 2/8/2020 was suspicious for malignancy.  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 Patient was delayed in follow up due to insurance issues and found this out on 5/14/2018.  She was referred to Dr Hankins, plastic sx and Rad Onc.  She had resection of L groin mass, had complication with healing, closely followed by Dr Hankins and Wound care team and is now doing better in healing. She did not have RT post operatively although was discussed by us and and Dr. Hankins; this was impossible to perform in a timely manner given the nature of poor healing over time. New PET/CT showed significant edema of the LLE, large inguinal hernia containing fat and large bowel extending to the level of the mid thigh and small lung nodule that was PET avid. She also has a ground glass lesion in the CAMACHO SUV1.3 that is persistent from 12/2018.  Her CT scan from 9/16/2019 showed a R apical solid/ground glass lesion with linear extension to the lateral pleural surface. Total size lesion 2.4 cm, solid component 1.5 x1.7 cm. Suspicious for primary lung cancer.  On 10/12/19, she was admitted with abd hernia, pain. Nonsurgical intervention given the size and the morbidity of the surgical procedure and without likelihood of long term benefit and deemed nonsurgical candidate. On 10/24/19 she underwent RUL nodule US guided Bx to r/o second malignancy, Lung ca by Dr. Toth.  There were concerns of increasingly dense appearing CAMACHO lung mass\par \par  [FreeTextEntry1] : observation  [de-identified] : As per patient's daughter she has been feeling tired otherwise offers no acute complaints at this time.\par

## 2021-05-14 NOTE — ASSESSMENT
[FreeTextEntry1] : Reviewed the CT scan abdomen/pelvis images 4/21/2021 via online access.\par Findings show a 6 cm mass in the lower aspect of the left kidney suspicious for neoplasm.  On review of the images there is an adjacent 4-5 cm retroperitoneal mass.  Review of the imaging characteristics or not consistent necessarily with RCC.  Given prior history of sarcoma, suspect possible recurrence.  The findings are new as compared to a previous CAT scan that was done in October 2020.\par \par I discussed the findings with the patient.  I recommend that she undergo image guided biopsy to determine histopathology.  We will arrange for evaluation with interventional radiology.\par \par Further treatment recommendations following biopsy results.  Incidentally there appeared to be a new 4 mm nodule within the lung in the posterior right lower lobe.  She had previously undergone a biopsy of a lung lesion in 2020 which was benign.

## 2021-05-14 NOTE — LETTER BODY
[Dear  ___] : Dear  [unfilled], [FreeTextEntry1] : I had the pleasure of seeing your patient, JULIA UMANA, in the office today.  \par \par Please see my office note below.\par \par Thank you for allowing me to participate in his care and please do not hesitate to contact me with any questions or concerns regarding her care.\par \par Sincerely,\par \par Matthew Aquino MD\par Chief of Urology, [facility]\par  of Urology\par [address] [place] [address]\[place] [address]\par PH:      [phone]\par Email:  [email or web address]

## 2021-05-14 NOTE — PHYSICAL EXAM
[General Appearance - Well Developed] : well developed [General Appearance - Well Nourished] : well nourished [Normal Appearance] : normal appearance [Well Groomed] : well groomed [General Appearance - In No Acute Distress] : no acute distress [] : no respiratory distress [Edema] : no peripheral edema [Abdomen Soft] : soft [Abdomen Tenderness] : non-tender [Costovertebral Angle Tenderness] : no ~M costovertebral angle tenderness [Skin Color & Pigmentation] : normal skin color and pigmentation [No Focal Deficits] : no focal deficits [No Palpable Adenopathy] : no palpable adenopathy

## 2021-05-14 NOTE — HISTORY OF PRESENT ILLNESS
[FreeTextEntry1] : Patient is a 70 year old woman presents to the office today for left renal mass. History of left groin/ thigh mass. S/P Resection of left thigh sarcoma 9/17/2018 with vascular reconstruction.\par Disease: Dedifferentiated liposarcoma \par TNM stage: T4, N0, M0 / MX (no recent scans) \par AJCC Stage: G3/3 stage IIIA \par \par On recent imaging she was noted to have a left renal mass.  She has an existing very large left lower abdominal hernia extending into the upper thigh.\par Denies any increased urgency or frequency. Denies any burning with urination. Denies any gross hematuria.  Denies any history of kidney stones. \par \par Non-smoker, used to work in a factory. Denies any family history of cancer

## 2021-05-22 NOTE — DISCHARGE NOTE PROVIDER - NSDCADMDATE_GEN_ALL_CORE_FT
PATIENT ARRIVED AMBULATORY TO ROOM C/O A SORE THROAT THAT STARTED YESTERDAY. PATIENT STATES SHE HAS HAD STREP BEFORE AND THIS FEELS THE SAME.  NO CONCERN FOR COVID
12-Oct-2019 17:05

## 2021-06-01 NOTE — REVIEW OF SYSTEMS
[Fever] : no fever [Chills] : no chills [Eyesight Problems] : no eyesight problems [Palpitations] : no palpitations [Chest Pain] : no chest pain [Easy Bleeding] : no tendency for easy bleeding [Shortness Of Breath] : no shortness of breath [Easy Bruising] : no tendency for easy bruising

## 2021-06-01 NOTE — REASON FOR VISIT
[Consultation] : a consultation visit [Home] : at home, [unfilled] , at the time of the visit. [Medical Office: ([facility])___] : at the medical office located in  [Verbal consent obtained from patient] : the patient, [unfilled] [[employer] ] : provided by [employer]   [Family Member] : family member [FreeTextEntry2] : Felice  [FreeTextEntry1] : [id number] [FreeTextEntry3] : pt. wants her daughter Nory to translate throughout the consult. This was confirmed using [employer]   [TWNoteComboBox1] : Jordanian

## 2021-06-01 NOTE — DATA REVIEWED
[FreeTextEntry1] : CT Chest/Abd/Pelvis reviewed and discussed with patient and daughter:\par 6 cm solid mass arising from the lower pole of the left kidney

## 2021-06-01 NOTE — HISTORY OF PRESENT ILLNESS
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 She had resection of L groin mass, had complication with healing. Did not receive RT due to poor healing post procedure. She had f/u scan done ground glass lesion in the CAMACHO SUV1.3 that is persistent from 12/2018. Her CT scan from 9/16/2019 showed a R apical solid/ground glass lesion with linear extension to the lateral pleural surface. Total size lesion 2.4 cm, solid component 1.5 x1.7 cm. Suspicious for primary lung cancer. Patient s/p bx of the right lung lesion on 10/24/19 path with atypical findings. Patient had f/u CT c/a/p done on 04/21/21 which demonstrated,:6cm solid mass arising from the lower pole of the left kidney consistent with RCC. “\par \par Patient was seen by Dr. Aquino and is being referred for consultation to discuss left renal mass bx. \par \par Denies any recent SOB, CP, fever, chills, n/v/d, gross hematuria, left flank pain\par \par Patient is vaccinated against COVID-19 on 4/20/21 (Moderna) \par pt denies taking Aspirin or NSAIDS.\par pt is aware to have blood work done and to make COVID 19 test appointment prior to the procedure  [de-identified] : 4/21/2021 CT Abd/Pelvis

## 2021-06-01 NOTE — CONSULT LETTER
[Dear  ___] : Dear  [unfilled], [Consult Letter:] : I had the pleasure of evaluating your patient, [unfilled]. [Please see my note below.] : Please see my note below. [Consult Closing:] : Thank you very much for allowing me to participate in the care of this patient.  If you have any questions, please do not hesitate to contact me. [Sincerely,] : Sincerely, [FreeTextEntry2] : Dr. Matthew Aquino  [FreeTextEntry3] : \par Renard Mendez MD, FSIR\par Interventional Radiologist\par , Interventional Radiology Residency\par Assistant Professor of Radiology, [employer] [facility]\par Vascular & Interventional Radiology, [facility]\par  \par [facility]\par P: [phone]\par P: [phone]\par F: [phone]\par [facility]\par P: [phone]\par F: [phone]\par \par \par

## 2021-06-01 NOTE — ASSESSMENT
[FreeTextEntry1] : 70 year old female with history of sarcoma s/p excision with chemo/RT and lung mass s/p biopsy with atypical findings. Pt presents with left renal mass suspicious for renal cell carcinoma but referred for biopsy given history above.\par \par Plan is for biopsy of left renal lesion seen on CT 4/21/2021 2: with sedation, provider not specified.\par \par All questions asked and answered to completion and patient satisfaction.\par Risks, benefits, and alternatives to procedure discussed with patient and informed consent obtained. [Other: _____] : [unfilled]

## 2021-06-04 NOTE — PROGRESS NOTE ADULT - SUBJECTIVE AND OBJECTIVE BOX
Mrs. Fleming has a history of sinus bradycardia. Today, she underwent and uncomplicated biopsy of a renal mass under CT guidance. The procedure was performed under mild sedation with fentanyl and propofol. Baseline blood pressure prior to induction of anesthesia was 53bpm. During the case her heart rate dropped to the low 40s with a stable blood pressure. At the completion of the case, she was awake and alert prior to transport to the recovery. En route to recovery her heart rate dropped below 40 for 10 minutes with a kimmy of 36bpm. In the recovery room her, her blood pressure was 97/60 with a heart rate of 39 bmp. She denied chest pain, shortness of breath, dizziness or nausea. EKG showed deepening of the TWI in the anterolateral leads with new biphasic T-waves in V4-6 not appreciated on previous EKG from 2018. She received atropine for symptomatic bradycardia given the relative hypotension and EKG changes. Her blood pressure improved to 130s/70s with heart rate 43-54 bpm. She remained stable without symptoms of ischemia or bradycardia throughout the remainder of her recovery room stay. I spoke with her cardiologist, Dr. Gustabo Harper MD of [facility] ([phone]). She is well known to him. He states she has a long history of sinus bradycardia with EKG findings similar to that of today. No history of ischemia on prior stress test. His office will reach out to her today to get an appointment to follow up as an outpatient in the next two weeks.

## 2021-06-16 NOTE — HISTORY OF PRESENT ILLNESS
[FreeTextEntry1] : Patient is a 70 year old woman presents to the office today for left renal mass. History of left groin/ thigh mass. S/P Resection of left thigh sarcoma 9/17/2018 with vascular reconstruction.\par Disease: Dedifferentiated liposarcoma \par TNM stage: T4, N0, M0 / MX (no recent scans) \par AJCC Stage: G3/3 stage IIIA \par \par On recent imaging she was noted to have a left renal mass with adjacent RP mass. She has an existing very large left lower abdominal hernia extending into the upper thigh.\par \par Underwent IR guided biopsy 6/4/2021.\par Path: sarcoma.\par \par Currently feels well.  Bothered by abdominal hernia.

## 2021-06-16 NOTE — ASSESSMENT
[FreeTextEntry1] : Pathology results reviewed.\par Case discussed with Dr. Kruse.\par Case discussed at  tumor board.  \par \par Images reviewed with the patient and family.\par Recommend to proceed with open radical nephrectomy, resection of RP mass.  Films reviewed with general surgery and agree to have concomitant hernia repair and abdominal wall reconstruction.\par Reviewed operative procedure, hospital stay and recovery time.  Risks of surgery discussed including risks of bleeding (both immediate and delayed), wound/abdominal infection, adjacent organ injury (bowel/vascular), conversion to open surgery, DVT/PE, decrease in renal function, and anesthetic complications.  \par All questions answered. \par \par Will have consultation with surgery. Patient agrees to proceed as recommended.\par

## 2021-07-08 NOTE — PLAN
[FreeTextEntry1] : Plan: Abdominal wall reconstruction after resection. I told the patient and the family that there is some chance that we may have difficulty putting all of the bowels back into the abdominal cavity. Abdominal wall reconstruction will occur with biologic prosthesis. All risk, benefit, alternatives were explained to the patient and the patient expressed full understanding.

## 2021-07-08 NOTE — PHYSICAL EXAM
[Normal Breath Sounds] : Normal breath sounds [Normal Heart Sounds] : normal heart sounds [Normal Rate and Rhythm] : normal rate and rhythm [de-identified] : well-appearing, no acute distress [de-identified] : large lower anterior abdominal wall hernia with loss of domain

## 2021-07-08 NOTE — ASSESSMENT
[FreeTextEntry1] : Clara Fleming is a 71 year female with PMH of hip sarcoma and kidney cancer who presents to the office for evaluation of abdominal wall hernia.

## 2021-07-08 NOTE — REASON FOR VISIT
[Consultation] : a consultation visit [Family Member] : family member [Other: ______] : provided by VALERY

## 2021-07-08 NOTE — HISTORY OF PRESENT ILLNESS
[de-identified] : Clara Fleming is a 71 year old Female with PMH of sarcoma and kidney cancer who presents to the office for evaluation of a hernia. Following removal of the sarcoma in 2018, she began experiencing episodes of nausea, vomiting, constipation, and debilitating pain following certain foods 1-2 times/month. One of her follow-up scans post 2018 revealed presence of an abdominal wall hernia.\par \par PMH: HTN\par PSH: sarcoma removal of L hip in 2018

## 2021-07-12 PROBLEM — N28.89 KIDNEY MASS: Status: ACTIVE | Noted: 2021-01-01

## 2021-07-20 PROBLEM — K43.9 HERNIA OF ANTERIOR ABDOMINAL WALL: Status: ACTIVE | Noted: 2019-04-08

## 2021-07-20 NOTE — PHYSICAL EXAM
[NI] : Normal [de-identified] : Left thigh incision c/d/i healing well no erythema no sign of infection  Abdominal incision c/d/i healing well no erythema no sign of infection  [de-identified] : left lower abd large hernia loss of domain

## 2021-07-20 NOTE — HISTORY OF PRESENT ILLNESS
[FreeTextEntry1] : 68-year-old female presents for left abdomianl hernia that has been present since last surgery. Patient now has left kidney masses and scheduled for left nephrectomy. Patient history: status post resection of the left groin sarcoma and vascular bypass by Dr. Kerr. Patient developed post-operative hernia. Patient now has all wounds healed with large left lower abd hernia

## 2021-07-29 NOTE — H&P PST ADULT - NEGATIVE NEUROLOGICAL SYMPTOMS
no weakness/no paresthesias/no generalized seizures/no focal seizures/no syncope/no tremors/no vertigo/no headache

## 2021-07-29 NOTE — H&P PST ADULT - NSICDXPASTMEDICALHX_GEN_ALL_CORE_FT
PAST MEDICAL HISTORY:  HTN (hypertension)     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, unspecified     Other specified disorders of kidney and ureter     Ventral incisional hernia without obstruction or gangrene      PAST MEDICAL HISTORY:  HTN (hypertension)     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, unspecified     Other specified disorders of kidney and ureter     Sarcoma left thigh    Ventral incisional hernia without obstruction or gangrene

## 2021-07-29 NOTE — H&P PST ADULT - NSICDXPROBLEM_GEN_ALL_CORE_FT
PROBLEM DIAGNOSES  Problem: Other specified disorders of kidney and ureter  Assessment and Plan: Patient scheduled for open left radical nephrectomy and resection of retroperitoneal mass complex abdominal wall hernia repair and reconstruction, abdominal wall reconstruction with phasix 5T [id number], abdominal component separation, local tissue rearrangement on 8/10/2021  Written & verbal preop instructions, gi prophylaxis & surgical soap given  Pt verbalized good understanding.  Teach back done on surgical soap instructions.   Patient is fully vaccinated, does not require preop COVID screen  Patient has preop medical evaluation with PCP, pending copy of report  Patient with Penicillin Allergy    Problem: Hypertension  Assessment and Plan: Patient instructed to take Amlodipine-Olmesartan on AM of surgery with small sip of water       PROBLEM DIAGNOSES  Problem: Other specified disorders of kidney and ureter  Assessment and Plan: Patient scheduled for open left radical nephrectomy and resection of retroperitoneal mass complex abdominal wall hernia repair and reconstruction, abdominal wall reconstruction with phasix 5T [id number], abdominal component separation, local tissue rearrangement on 8/10/2021  Written & verbal preop instructions, gi prophylaxis & surgical soap given  Pt verbalized good understanding.  Teach back done on surgical soap instructions.   Patient is fully vaccinated, does not require preop COVID screen  Patient has preop medical evaluation with PCP, pending copy of report  Patient with Penicillin Allergy    Problem: Hypertension  Assessment and Plan: Patient instructed to take Amlodipine-Olmesartan on AM of surgery with small sip of water  Patient with METS <4 requesting preop Cardiology evaluation, pending copy of report, most recent Echo and Stress test report

## 2021-07-29 NOTE — H&P PST ADULT - HISTORY OF PRESENT ILLNESS
69 yo female presents to [facility] for preop evaluation for open left radical nephrectomy and resection of retroperitoneal mass complex abdominal wall hernia repair and reconstruction, abdominal wall reconstruction with phasix 5T [id number], abdominal component separation, local tissue rearrangement.  Patient reports history of sarcoma in left hip s/p resection in 2018.  Per patient has routine diagnostic imaging every 6 months and most recent catscan showed mass on left kidney.  Patient also reports left sided abdominal wall hernia for several years that has increased in size.  Patient diagnosed with ventral hernia without obstruction or gangrene and other specified disorders of kidney and ureter. 71 yo female presents to [facility] for preop evaluation for open left radical nephrectomy and resection of retroperitoneal mass complex abdominal wall hernia repair and reconstruction, abdominal wall reconstruction with phasix 5T [id number], abdominal component separation, local tissue rearrangement.  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  Per patient has routine diagnostic imaging every 6 months and most recent catscan showed mass on left kidney.  Patient also reports left sided abdominal wall hernia for several years that has increased in size.  Patient diagnosed with ventral hernia without obstruction or gangrene and other specified disorders of kidney and ureter.

## 2021-07-29 NOTE — H&P PST ADULT - NEGATIVE GENERAL SYMPTOMS
EMERGENCY DEPARTMENT HISTORY AND PHYSICAL EXAM    5:06 PM      Date: 11/15/2019  Patient Name: Vannessa Martínez    History of Presenting Illness     Chief Complaint   Patient presents with    Yeast Infection         History Provided By: Patient    Chief Complaint: vaginal burning/itching  Duration: 2-3 Days  Timing:  Acute  Location:   Quality: Burning  Severity: Moderate  Modifying Factors: none  Associated Symptoms: denies any other associated signs or symptoms      Additional History (Context):Bailey Jackman is a 26 y.o. female who presents to the emergency department for evaluation of vaginal itching and burning x 2-3 days. Did not try anything for her symptoms. Admits to unprotected sexual intercourse. She reports she is no longer pregnant as she had an  after finding out she was pregnant last ED visit. She would like to be treated for STDs. No urinary symptoms. No other complaints at this time. PCP:  Juan Pablo Carreno MD      Current Outpatient Medications   Medication Sig Dispense Refill    cephALEXin (KEFLEX) 500 mg capsule Take 1 Cap by mouth two (2) times a day for 7 days. 14 Cap 0    metroNIDAZOLE (FLAGYL) 500 mg tablet Take 1 Tab by mouth two (2) times a day for 7 days. 14 Tab 0       Past History     Past Medical History:  Past Medical History:   Diagnosis Date    Urinary problem        Past Surgical History:  History reviewed. No pertinent surgical history. Family History:  Family History   Problem Relation Age of Onset    Hypertension Paternal Grandmother        Social History:  Social History     Tobacco Use    Smoking status: Passive Smoke Exposure - Never Smoker    Smokeless tobacco: Never Used   Substance Use Topics    Alcohol use: No    Drug use: No       Allergies:  No Known Allergies      Review of Systems       Review of Systems   Constitutional: Negative for chills and fever. HENT: Negative for congestion, rhinorrhea and sore throat.     Respiratory: Negative for cough and shortness of breath. Cardiovascular: Negative for chest pain. Gastrointestinal: Negative for abdominal pain, blood in stool, constipation, diarrhea, nausea and vomiting. Genitourinary: Positive for vaginal discharge and vaginal pain. Negative for dysuria, frequency and hematuria. Musculoskeletal: Negative for back pain and myalgias. Skin: Negative for rash and wound. Neurological: Negative for dizziness and headaches. All other systems reviewed and are negative. Physical Exam     Visit Vitals  /65   Pulse 62   Temp 99 °F (37.2 °C)   Resp 18   LMP 06/19/2019   SpO2 100%         Physical Exam   Constitutional: She is oriented to person, place, and time. She appears well-developed and well-nourished. No distress. HENT:   Head: Normocephalic and atraumatic. Eyes: Conjunctivae are normal.   Neck: Normal range of motion. Neck supple. Cardiovascular: Normal rate, regular rhythm and normal heart sounds. Pulmonary/Chest: Effort normal and breath sounds normal. No respiratory distress. She exhibits no tenderness. Abdominal: Soft. Bowel sounds are normal. She exhibits no distension. There is no tenderness. There is no rebound and no guarding. Genitourinary:   Genitourinary Comments: Pt self-swabbed   Musculoskeletal: She exhibits no edema or deformity. Neurological: She is alert and oriented to person, place, and time. She has normal reflexes. Skin: Skin is warm and dry. She is not diaphoretic. Psychiatric: She has a normal mood and affect. Nursing note and vitals reviewed.       Diagnostic Study Results     Labs -  Recent Results (from the past 12 hour(s))   URINALYSIS W/ RFLX MICROSCOPIC    Collection Time: 11/15/19  4:16 PM   Result Value Ref Range    Color YELLOW      Appearance CLOUDY      Specific gravity 1.026 1.005 - 1.030      pH (UA) 6.5 5.0 - 8.0      Protein TRACE (A) NEG mg/dL    Glucose NEGATIVE  NEG mg/dL    Ketone NEGATIVE  NEG mg/dL    Bilirubin NEGATIVE NEG      Blood NEGATIVE  NEG      Urobilinogen 1.0 0.2 - 1.0 EU/dL    Nitrites NEGATIVE  NEG      Leukocyte Esterase NEGATIVE  NEG     HCG URINE, QL    Collection Time: 11/15/19  4:16 PM   Result Value Ref Range    HCG urine, QL NEGATIVE  NEG     WET PREP    Collection Time: 11/15/19  4:16 PM   Result Value Ref Range    Special Requests: NO SPECIAL REQUESTS      Wet prep NO TRICHOMONAS SEEN      Wet prep NO FUNGAL ELEMENTS SEEN      Wet prep FEW  CLUE CELLS PRESENT       URINE MICROSCOPIC ONLY    Collection Time: 11/15/19  4:16 PM   Result Value Ref Range    WBC 4 to 10 0 - 4 /hpf    RBC 0 to 3 0 - 5 /hpf    Epithelial cells 4+ 0 - 5 /lpf    Bacteria 3+ (A) NEG /hpf    Mucus 3+ (A) NEG /lpf       Radiologic Studies -   No results found. Medical Decision Making   I am the first provider for this patient. I reviewed the vital signs, available nursing notes, past medical history, past surgical history, family history and social history. Vital Signs-Reviewed the patient's vital signs. Pulse Oximetry Analysis -  100% on room air (Interpretation)    Records Reviewed: Nursing Notes and Old Medical Records (Time of Review: 5:06 PM)    ED Course: Progress Notes, Reevaluation, and Consults:    Provider Notes (Medical Decision Making):   Differential Diagnosis:  Candidiasis, trichomoniasis, bacterial vaginitis, Gonorrhea/Chlamydia, pregnancy, urethritis/UTI, physiologic discharge    Plan: Pt presents ambulatory and in NAD, vitals wnl. Treated empirically for STDs. UA with infection. Wet prep with BV. Negative hcg. Will DC home with keflex and flagyl. At this time, patient is stable and appropriate for discharge home. Patient demonstrates understanding of current diagnoses and is in agreement with the treatment plan. They are advised that while the likelihood of serious underlying condition is low at this point given the evaluation performed today, we cannot fully rule it out.   They are advised to immediately return with any new symptoms or worsening of current condition. All questions have been answered. Patient is given educational material regarding their diagnoses, including danger symptoms and when to return to the ED. Diagnosis     Clinical Impression:   1. BV (bacterial vaginosis)    2. Acute cystitis without hematuria        Disposition: [place] HOME    Follow-up Information     Follow up With Specialties Details Why Contact Info    Dasha Jackson MD Pediatrics Call in 2 days  [address] [facility] EMERGENCY DEPT Emergency Medicine Go to As needed, If symptoms worsen [address]  [phone]           Discharge Medication List as of 11/15/2019  5:15 PM      START taking these medications    Details   cephALEXin (KEFLEX) 500 mg capsule Take 1 Cap by mouth two (2) times a day for 7 days. , Print, Disp-14 Cap, R-0      metroNIDAZOLE (FLAGYL) 500 mg tablet Take 1 Tab by mouth two (2) times a day for 7 days. , Print, Disp-14 Tab, R-0           _______________________________    This note was dictated utilizing voice recognition software which may lead to typographical errors. I apologize in advance if the situation occurs. If questions arise please do not hesitate to contact me or call our department.   Mauricio Palmer PA-C no fever/no chills/no sweating/no weight loss/no fatigue

## 2021-08-03 PROBLEM — C49.9 MALIGNANT NEOPLASM OF CONNECTIVE AND SOFT TISSUE, UNSPECIFIED: Chronic | Status: ACTIVE | Noted: 2021-01-01

## 2021-08-03 PROBLEM — N28.89 OTHER SPECIFIED DISORDERS OF KIDNEY AND URETER: Chronic | Status: ACTIVE | Noted: 2021-01-01

## 2021-08-03 PROBLEM — K43.2 INCISIONAL HERNIA WITHOUT OBSTRUCTION OR GANGRENE: Chronic | Status: ACTIVE | Noted: 2021-01-01

## 2021-08-10 NOTE — CONSULT NOTE ADULT - SUBJECTIVE AND OBJECTIVE BOX
Patient is a 70y old  Female who presents with a chief complaint of "I have a mass on my left kidney" (29 Jul 2021 12:38)      HPI:  69 yo female presents to [facility] for preop evaluation for open left radical nephrectomy and resection of retroperitoneal mass complex abdominal wall hernia repair and reconstruction, abdominal wall reconstruction with phasix 5T [id number], abdominal component separation, local tissue rearrangement.  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  Per patient has routine diagnostic imaging every 6 months and most recent catscan showed mass on left kidney.  Patient also reports left sided abdominal wall hernia for several years that has increased in size.  Patient diagnosed with ventral hernia without obstruction or gangrene and other specified disorders of kidney and ureter. (29 Jul 2021 12:38)      PAST MEDICAL & SURGICAL HISTORY:  HTN (hypertension)    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, unspecified    Ventral incisional hernia without obstruction or gangrene    Other specified disorders of kidney and ureter    Sarcoma  left thigh    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        MEDICATIONS  (STANDING):  lactated ringers. 1000 milliLiter(s) (30 mL/Hr) IV Continuous <Continuous>    MEDICATIONS  (PRN):      Allergies    penicillin (Hives)    Intolerances        FAMILY HISTORY:  FH: hypertension (Mother)    Vital Signs Last 24 Hrs  T(C): 36.8 (10 Aug 2021 11:07), Max: 36.8 (10 Aug 2021 11:07)  T(F): 98.2 (10 Aug 2021 11:07), Max: 98.2 (10 Aug 2021 11:07)  HR: 60 (10 Aug 2021 11:07) (60 - 60)  BP: 147/86 (10 Aug 2021 11:07) (147/86 - 147/86)  BP(mean): --  RR: 16 (10 Aug 2021 11:07) (16 - 16)  SpO2: 100% (10 Aug 2021 11:07) (100% - 100%)    Clinical examination completed during OR procedure after dental was consulted following displacement of patient's maxillary fixed partial denture (bridge) during intubation.     ASSESSMENT:  Fixed partial denture dislodged appears to be a provisional acrylic indirect restoration extending from 6-12, with abutments on #6, 7, 11, and 12. Bridge dislodged off of #6 and #7 and fractured at the mesial of #11. Patient is edentulous at sites #8-10. Core/tooth structure previously underneath bridge able to be visualized on mesial of #11 intraorally, with bridge still intact and covering the distal of #11 and tooth #12. #6 and 7 were fractured slightly above the gumline with the bridge. Post from tooth #7 present within the dislodged bridge. Tooth #13 appears to have a Porcelain fused to Metal crown and tooth #5 has a DO amalgam restoration that is clinically intact. Lower anterior dentition are natural teeth and do not appear to have any trauma. No mobility noted on remaining dentition assessed, including #5, 11, #12, #13 and lower anterior dentition #22-27.     RECOMMENDATIONS:  1) Dental F/U with outpatient dentist for comprehensive dental care.   2) If any difficulty swallowing/breathing, fever occur, ER visit.     Benjie Franco, DMD #[id number] Patient is a 70y old  Female who presents with a chief complaint of "I have a mass on my left kidney" (29 Jul 2021 12:38)      HPI:  69 yo female presents to [facility] for preop evaluation for open left radical nephrectomy and resection of retroperitoneal mass complex abdominal wall hernia repair and reconstruction, abdominal wall reconstruction with phasix 5T [id number], abdominal component separation, local tissue rearrangement.  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  Per patient has routine diagnostic imaging every 6 months and most recent catscan showed mass on left kidney.  Patient also reports left sided abdominal wall hernia for several years that has increased in size.  Patient diagnosed with ventral hernia without obstruction or gangrene and other specified disorders of kidney and ureter. (29 Jul 2021 12:38)      PAST MEDICAL & SURGICAL HISTORY:  HTN (hypertension)    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, unspecified    Ventral incisional hernia without obstruction or gangrene    Other specified disorders of kidney and ureter    Sarcoma  left thigh    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        MEDICATIONS  (STANDING):  lactated ringers. 1000 milliLiter(s) (30 mL/Hr) IV Continuous <Continuous>    MEDICATIONS  (PRN):      Allergies    penicillin (Hives)    Intolerances        FAMILY HISTORY:  FH: hypertension (Mother)    Vital Signs Last 24 Hrs  T(C): 36.8 (10 Aug 2021 11:07), Max: 36.8 (10 Aug 2021 11:07)  T(F): 98.2 (10 Aug 2021 11:07), Max: 98.2 (10 Aug 2021 11:07)  HR: 60 (10 Aug 2021 11:07) (60 - 60)  BP: 147/86 (10 Aug 2021 11:07) (147/86 - 147/86)  BP(mean): --  RR: 16 (10 Aug 2021 11:07) (16 - 16)  SpO2: 100% (10 Aug 2021 11:07) (100% - 100%)    Clinical examination completed during OR procedure after dental was consulted following displacement of patient's maxillary fixed partial denture (bridge) during intubation.     ASSESSMENT:  Fixed partial denture dislodged appears to be a provisional acrylic indirect restoration extending from 6-12, with abutments on #6, 7, 11, and 12. Bridge dislodged off of #6 and #7 and fractured at the mesial of midline tooth #11. Patient is edentulous at sites #8-10. Remaining core/tooth structure visible at site #11 intraorally, with bridge still intact and covering the distal of #11 and tooth #12. #6 and 7 were fractured slightly above the gumline with the bridge. Root tips #6 and #7 present. Post from tooth #7 present within the dislodged bridge. Tooth #13 appears to have a Porcelain fused to Metal crown and tooth #5 has a DO amalgam restoration that is clinically intact. Lower anterior dentition are natural teeth and do not appear to have any trauma. No mobility noted on remaining dentition assessed, including #5, 11, #12, #13 and lower anterior dentition #22-27.     RECOMMENDATIONS:  1) Dental F/U with outpatient dentist for comprehensive dental care.   2) If any difficulty swallowing/breathing, fever occur, ER visit advised.     Benjie Franco, DMD #[id number]

## 2021-08-10 NOTE — BRIEF OPERATIVE NOTE - OPERATION/FINDINGS
Exploratory laparotomy.  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 Exploratory laparotomy.  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

## 2021-08-10 NOTE — ASU PATIENT PROFILE, ADULT - NSICDXPASTMEDICALHX_GEN_ALL_CORE_FT
PAST MEDICAL HISTORY:  HTN (hypertension)     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, unspecified     Other specified disorders of kidney and ureter     Sarcoma left thigh    Ventral incisional hernia without obstruction or gangrene

## 2021-08-10 NOTE — ASU PREOP CHECKLIST - ANTIBIOTIC
Physical Therapy E-Visit     Patient verbally consented to and initiated e-visit.  This visit was completed via a telephone call.    SUBJECTIVE:   New or change in symptoms/reports since last e-visit: Patient reports her back pain is about the same. She has run out of the meloxicam and will not be prescribed more. Since finishing the meloxicam, she does note it takes a little bit longer to \"get going\" in the morning. Reports she has finished the paperwork and past medical history intake form for Pain Management. Has to formally drop off or send in paperwork in order to set up initial appointment.     Current pain/limitations: prolonged standing, prolonged activity    OBJECTIVE (per patient report):   Low back pain increases at work or with housework d/t bending and lifting    Treatment Recommendations:   Drop off paperwork to initiate Pain Management evaluation  Continue to increase activity as tolerated  Continue with daily HEP as given    HEP (Queplix access code): [email or web address]          URL: [email or web address]            Date: 04/02/2020   Prepared by: Jaycee Dozier      Exercises  Supine Transversus Abdominis Bracing - Hands on Stomach - 10 reps - 1 sets - 5 hold - 1x daily - 7x weekly  Hooklying Clamshell with Resistance - 10 reps - 1 sets - 1x daily - 7x weekly  Clamshell with Resistance - 10 reps - 1 sets - 1x daily - 7x weekly  Squat at Table - 10 reps - 1 sets - 1x daily - 7x weekly    ASSESSMENT (Impression of current status):   Patient continues with low back pain which is persistent in nature and minimal reports of relief over the last 2 months. Patient would benefit from return to in-person therapy appointments for dry needling and progressive exercise and endurance training as tolerated. Encouraged patient to call to schedule as our therapy schedules will be open for scheduling beginning next week, May 18th. Patient reports she will have to coordinate her schedule with her significant  other to arrange for childcare. Provided patient with phone number for scheduling and reinforced importance of calling prior to schedule filling up with new patients. Also, educated patient on [facility] and the virtual check-in as well as screening and masking that will take place upon her return to therapy. Patient verbalizes understanding.     PLAN:   Patient to call to schedule in-person follow-up visit.     Time spent in E-visit with patient: 25 minutes   (conducted e-visit 21 or more minutes)    no (get order)

## 2021-08-11 NOTE — CONSULT NOTE ADULT - ASSESSMENT
71 yo female h/o HTN, ventral hernia, sarcoma of L thigh s/p resection w/ vascular reconstruction in 2018, CAMACHO lung mass and retroperitoneal mass, and L renal mass on recent imaging, admitted for open left radical nephrectomy w/ resection of retroperitoneal mass and complex abdominal wall hernia repair and reconstruction.

## 2021-08-11 NOTE — CONSULT NOTE ADULT - PROBLEM SELECTOR PROBLEM 3
· Acute decompensation seems most c/w acute pulmonary edema related to this  · More stable with regards to this this AM   Hypertension Lung mass

## 2021-08-11 NOTE — CONSULT NOTE ADULT - SUBJECTIVE AND OBJECTIVE BOX
HPI:  71 yo female h/o HTN, ventral hernia, sarcoma of L thigh s/p resection w/ vascular reconstruction in 2018, CAMACHO lung mass and retroperitoneal mass, and L renal mass on recent imaging, admitted for open left radical nephrectomy w/ resection of retroperitoneal mass and complex abdominal wall hernia repair and reconstruction. 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 She has been getting routine diagnostic imaging every 6 months and most recent CT scan showed mass on left kidney. Patient also reports left sided abdominal wall hernia for several years that has increased in size. Surgery had ex-lap yesterday but surgery had to be aborted after liposarcoma was noted to have enlarged and deemed unresectable. Surgery also complicated by dislodged denture during OR. Currently, patient is c/o mild abdominal pain around incision site since the surgery. Pain is constant, non-radiating, 3 out of 10 on pain scale, dull, worse w/ movement and palpation, controlled w/ Tylenol.       PAST MEDICAL & SURGICAL HISTORY:  HTN (hypertension)  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, unspecified  Ventral incisional hernia without obstruction or gangrene  Other specified disorders of kidney and ureter  Sarcoma of left thigh s/p resection       Review of Systems:   CONSTITUTIONAL: No fever, weight loss, or fatigue  EYES: No eye pain, visual disturbances, or discharge  ENMT:  No difficulty hearing, tinnitus, vertigo; No sinus or throat pain  NECK: No pain or stiffness  BREASTS: No pain, masses, or nipple discharge  RESPIRATORY: No cough, wheezing, chills or hemoptysis; No shortness of breath  CARDIOVASCULAR: No chest pain, palpitations, dizziness, or leg swelling  GASTROINTESTINAL: +abdominal pain. No nausea, vomiting, or hematemesis; No diarrhea or constipation. No melena or hematochezia.  GENITOURINARY: No dysuria, frequency, hematuria, or incontinence  NEUROLOGICAL: No headaches, memory loss, loss of strength, numbness, or tremors  SKIN: No itching, burning, rashes, or lesions   LYMPH NODES: No enlarged glands  ENDOCRINE: No heat or cold intolerance; No hair loss  MUSCULOSKELETAL: No joint pain or swelling; No muscle, back, or extremity pain  PSYCHIATRIC: No depression, anxiety, mood swings, or difficulty sleeping  HEME/LYMPH: No easy bruising, or bleeding gums  ALLERY AND IMMUNOLOGIC: No hives or eczema    Allergies    penicillin (Hives)    Intolerances      Social History:   Never smoked   No alcohol     FAMILY HISTORY:  FH: hypertension (Mother)        MEDICATIONS  (STANDING):  acetaminophen   Tablet .. 975 milliGRAM(s) Oral every 6 hours  amLODIPine   Tablet 5 milliGRAM(s) Oral daily  heparin   Injectable 5000 Unit(s) SubCutaneous every 8 hours  lactated ringers. 1000 milliLiter(s) (125 mL/Hr) IV Continuous <Continuous>  losartan 50 milliGRAM(s) Oral daily    MEDICATIONS  (PRN):  ketorolac   Injectable 15 milliGRAM(s) IV Push every 6 hours PRN Moderate Pain (4 - 6)  oxyCODONE    IR 5 milliGRAM(s) Oral every 6 hours PRN Severe Pain (7 - 10)  senna 2 Tablet(s) Oral at bedtime PRN Constipation      Vital Signs Last 24 Hrs  T(C): 36.7 (11 Aug 2021 09:17), Max: 37.1 (11 Aug 2021 05:41)  T(F): 98 (11 Aug 2021 09:17), Max: 98.7 (11 Aug 2021 05:41)  HR: 57 (11 Aug 2021 09:17) (51 - 65)  BP: 121/68 (11 Aug 2021 09:17) (90/49 - 141/76)  BP(mean): 66 (10 Aug 2021 19:00) (59 - 73)  RR: 18 (11 Aug 2021 09:17) (11 - 20)  SpO2: 97% (11 Aug 2021 09:17) (94% - 100%)  CAPILLARY BLOOD GLUCOSE        I&O's Summary    10 Aug 2021 07:01  -  11 Aug 2021 07:00  --------------------------------------------------------  IN: 250 mL / OUT: 1400 mL / NET: -1150 mL    11 Aug 2021 07:01  -  11 Aug 2021 13:06  --------------------------------------------------------  IN: 480 mL / OUT: 200 mL / NET: 280 mL        PHYSICAL EXAM:  GENERAL: NAD  EYES: EOMI, PERRLA, conjunctiva and sclera clear  NECK: Supple, No JVD  CHEST/LUNG: Clear to auscultation bilaterally; No wheeze  HEART: Regular rate and rhythm; No murmurs, rubs, or gallops  ABDOMEN: Soft, appropriately tender, large ventral hernia; Bowel sounds present  EXTREMITIES:  2+ Peripheral Pulses, No clubbing, cyanosis, or edema  PSYCH: AAOx3  NEUROLOGY: non-focal  SKIN: No rashes or lesions    LABS:    08-11    139  |  100  |  10  ----------------------------<  125<H>  4.0   |  23  |  0.71    Ca    9.5      11 Aug 2021 11:48                RADIOLOGY & ADDITIONAL TESTS:    Imaging Personally Reviewed:  EKG tracing from June 2021 reviewed: Sinus felix, prolonged QT     Consultant(s) Notes Reviewed:      Care Discussed with Consultants/Other Providers:

## 2021-08-11 NOTE — CONSULT NOTE ADULT - PROBLEM SELECTOR RECOMMENDATION 4
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

## 2021-08-11 NOTE — CONSULT NOTE ADULT - PROBLEM SELECTOR RECOMMENDATION 2
Large abdominal wall hernia, no obstruction   Abdominal wall hernia repair and reconstruction was aborted

## 2021-08-11 NOTE — CONSULT NOTE ADULT - CONSULT REASON
Dental was consulted following displacement of patient's maxillary fixed partial denture (bridge) during intubation
Co-management of HTN

## 2021-08-11 NOTE — CONSULT NOTE ADULT - PROBLEM SELECTOR RECOMMENDATION 3
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

## 2021-08-12 NOTE — PROVIDER CONTACT NOTE (OTHER) - ASSESSMENT
service used during assessment. ( Memo ID # [id number]) Pt states she has a little bit of chest pain. She rates the pain 3/10 but states it feels like a little pressure. Pt thinks the pain is from the breathing tube used during her surgery. Pt received Tylenol at 8AM and states that she thinks the pain will get better. Provider notified. Vitals taken as HR 59, /80. Provider to come to bedside to assess pt.

## 2021-08-13 NOTE — DISCHARGE NOTE PROVIDER - NSDCMRMEDTOKEN_GEN_ALL_CORE_FT
acetaminophen 325 mg oral tablet: 3 tab(s) orally every 6 hours as needed for mild to moderate pain  amlodipine-olmesartan 5 mg-20 mg oral tablet: 1 tab(s) orally once a day  ibuprofen 200 mg oral tablet: 2 tab(s) orally every 6 hours as needed for severe pain, take with food  polyethylene glycol 3350 oral powder for reconstitution: 17 gram(s) orally once a day  senna oral tablet: 2 tab(s) orally once a day (at bedtime), As needed, Constipation

## 2021-08-13 NOTE — DISCHARGE NOTE PROVIDER - CARE PROVIDERS DIRECT ADDRESSES
,[email or web address]

## 2021-08-13 NOTE — PROGRESS NOTE ADULT - PROBLEM SELECTOR PLAN 3
Patient w/ known RUL nodule s/p CT guided FNA in October 2019   Path w/ atypical findings, limited by paucicellularity   Repeat CT chest w/ re-demonstration of R upper lobe spiculated nodule suspicious for primary lung neoplasm   Outpatient Onc f/up
Patient w/ known CAMACHO nodule s/p CT guided FNA in October 2019   Path w/ atypical findings, limited by paucicellularity   F/up repeat CT chest.

## 2021-08-13 NOTE — PROGRESS NOTE ADULT - SUBJECTIVE AND OBJECTIVE BOX
POD #1  Afeb 100/57 59 99%RA    Pt has no c/o ( phone used); resident explained what happened in surgery yesterday, how her teeth were dislodged, and          questions about the unresectable tumor  Abd- soft NT ND; + flatus      dressing over incision with mod drainage  Hernia noted  Engel 1250
Subjective  Patient is complaining of incisional pain, no nausea, no vomiting    Objective    Vital signs  T(F): , Max: 98.4 (08-10-21 @ 19:40)  HR: 55 (08-10-21 @ 19:40)  BP: 141/76 (08-10-21 @ 19:40)  SpO2: 96% (08-10-21 @ 19:40)  Wt(kg): --    Output     08-10 @ 07:01  -  08-10 @ 19:52  --------------------------------------------------------  IN: 250 mL / OUT: 150 mL / NET: 100 mL        Gen: No distress observed  Abd: soft, appropriately tender, + left subcostal dressing mildly saturated.. + bulging area in LLQ,  old healed scar  : + belcher with clear urine    Labs        Urine Cx: ?  Blood Cx: ?    Imaging
Overnight events:  None    Subjective:  Pt states pain controlled, + flatus, no BM, no N/V    Objective:    Vital signs  T(C): , Max: 37.1 (08-12-21 @ 13:27)  HR: 60 (08-13-21 @ 05:22)  BP: 146/83 (08-13-21 @ 05:22)  SpO2: 98% (08-13-21 @ 05:22)  Wt(kg): --    Output   Void: 800  08-12 @ 07:01  -  08-13 @ 07:00  --------------------------------------------------------  IN: 425 mL / OUT: 2400 mL / NET: -1975 mL        Gen: NAD  Abd: steris c/d/i, soft, nontender, large hernia nontender  : primafit in place    Labs: none today    11 Aug 2021 11:48    139    |  100    |  10     ----------------------------<  125    4.0     |  23     |  0.71     Ca    9.5        11 Aug 2021 11:48        Urine Cx:   Blood Cx:     Imaging
Overnight events:  None    Subjective:  With  services: Pt concerned that she is not having surgery, state pain is better, tolerating diet    Objective:    Vital signs  T(C): , Max: 37.1 (08-12-21 @ 06:14)  HR: 60 (08-12-21 @ 06:14)  BP: 149/87 (08-12-21 @ 06:14)  SpO2: 99% (08-12-21 @ 06:14)  Wt(kg): --    Output   Void: 1299  08-11 @ 07:01  -  08-12 @ 07:00  --------------------------------------------------------  IN: 1440 mL / OUT: 2624 mL / NET: -1184 mL        Gen: NAD  Abd: steris applied to incision, soft, nontender, nondistended      Labs: none today    11 Aug 2021 11:48    139    |  100    |  10     ----------------------------<  125    4.0     |  23     |  0.71     Ca    9.5        11 Aug 2021 11:48        
PROGRESS NOTE:     Patient is a 70y old  Female who presents with a chief complaint of Exploratory laparotomy  (13 Aug 2021 08:14)      SUBJECTIVE / OVERNIGHT EVENTS: No acute events.     ADDITIONAL REVIEW OF SYSTEMS:    MEDICATIONS  (STANDING):  acetaminophen   Tablet .. 975 milliGRAM(s) Oral every 6 hours  amLODIPine   Tablet 5 milliGRAM(s) Oral daily  heparin   Injectable 5000 Unit(s) SubCutaneous every 8 hours  losartan 50 milliGRAM(s) Oral daily  polyethylene glycol 3350 17 Gram(s) Oral daily    MEDICATIONS  (PRN):  ketorolac   Injectable 15 milliGRAM(s) IV Push every 6 hours PRN Moderate Pain (4 - 6)  melatonin 3 milliGRAM(s) Oral at bedtime PRN Insomnia  oxyCODONE    IR 5 milliGRAM(s) Oral every 6 hours PRN Severe Pain (7 - 10)  senna 2 Tablet(s) Oral at bedtime PRN Constipation      CAPILLARY BLOOD GLUCOSE        I&O's Summary    12 Aug 2021 07:01  -  13 Aug 2021 07:00  --------------------------------------------------------  IN: 425 mL / OUT: 2400 mL / NET: -1975 mL        PHYSICAL EXAM:  Vital Signs Last 24 Hrs  T(C): 37.1 (13 Aug 2021 10:25), Max: 37.1 (12 Aug 2021 13:27)  T(F): 98.7 (13 Aug 2021 10:25), Max: 98.8 (12 Aug 2021 13:27)  HR: 69 (13 Aug 2021 10:25) (57 - 69)  BP: 102/73 (13 Aug 2021 10:25) (102/73 - 146/83)  BP(mean): --  RR: 17 (13 Aug 2021 10:25) (17 - 18)  SpO2: 97% (13 Aug 2021 10:25) (97% - 100%)    GENERAL: NAD  EYES: EOMI, PERRLA, conjunctiva and sclera clear  NECK: Supple, No JVD  CHEST/LUNG: Clear to auscultation bilaterally; No wheeze  HEART: Regular rate and rhythm; No murmurs, rubs, or gallops  ABDOMEN: Soft, appropriately tender, large ventral hernia; Bowel sounds present  EXTREMITIES:  2+ Peripheral Pulses, No clubbing, cyanosis, or edema  PSYCH: AAOx3  NEUROLOGY: non-focal  SKIN: No rashes or lesions    LABS:                      RADIOLOGY & ADDITIONAL TESTS:  Results Reviewed:   Imaging Personally Reviewed:  CT C/A/P:   Pelvic mass compatible with stated history of sarcoma.    No significant change in left pararenal retroperitoneal mass,, likely representing metastatic disease, however a new satellite nodule is now present.    Mild left hydronephrosis due to ureteral compression by the pelvic mass.    Right upper lobe spiculated nodule is more solid on the current study, raising the possibility of primary lung neoplasm.    Electrocardiogram Personally Reviewed:    COORDINATION OF CARE:  Care Discussed with Consultants/Other Providers [Y/N]:  Prior or Outpatient Records Reviewed [Y/N]:  
PROGRESS NOTE:     Patient is a 70y old  Female who presents with a chief complaint of "I have a mass on my left kidney" (29 Jul 2021 12:38)      SUBJECTIVE / OVERNIGHT EVENTS: No new complaints.     ADDITIONAL REVIEW OF SYSTEMS:    MEDICATIONS  (STANDING):  acetaminophen   Tablet .. 975 milliGRAM(s) Oral every 6 hours  amLODIPine   Tablet 5 milliGRAM(s) Oral daily  dextrose 5% + sodium chloride 0.45%. 1000 milliLiter(s) (75 mL/Hr) IV Continuous <Continuous>  heparin   Injectable 5000 Unit(s) SubCutaneous every 8 hours  losartan 50 milliGRAM(s) Oral daily  polyethylene glycol 3350 17 Gram(s) Oral daily    MEDICATIONS  (PRN):  ketorolac   Injectable 15 milliGRAM(s) IV Push every 6 hours PRN Moderate Pain (4 - 6)  oxyCODONE    IR 5 milliGRAM(s) Oral every 6 hours PRN Severe Pain (7 - 10)  senna 2 Tablet(s) Oral at bedtime PRN Constipation      CAPILLARY BLOOD GLUCOSE        I&O's Summary    11 Aug 2021 07:01  -  12 Aug 2021 07:00  --------------------------------------------------------  IN: 1440 mL / OUT: 2624 mL / NET: -1184 mL    12 Aug 2021 07:01  -  12 Aug 2021 13:32  --------------------------------------------------------  IN: 0 mL / OUT: 200 mL / NET: -200 mL        PHYSICAL EXAM:  Vital Signs Last 24 Hrs  T(C): 37.1 (12 Aug 2021 13:27), Max: 37.1 (12 Aug 2021 06:14)  T(F): 98.8 (12 Aug 2021 13:27), Max: 98.8 (12 Aug 2021 13:27)  HR: 64 (12 Aug 2021 13:27) (56 - 65)  BP: 136/79 (12 Aug 2021 13:27) (123/63 - 151/87)  BP(mean): --  RR: 18 (12 Aug 2021 13:27) (18 - 18)  SpO2: 98% (12 Aug 2021 13:27) (96% - 100%)    GENERAL: NAD  EYES: EOMI, PERRLA, conjunctiva and sclera clear  NECK: Supple, No JVD  CHEST/LUNG: Clear to auscultation bilaterally; No wheeze  HEART: Regular rate and rhythm; No murmurs, rubs, or gallops  ABDOMEN: Soft, appropriately tender, large ventral hernia; Bowel sounds present  EXTREMITIES:  2+ Peripheral Pulses, No clubbing, cyanosis, or edema  PSYCH: AAOx3  NEUROLOGY: non-focal  SKIN: No rashes or lesions      LABS:    08-11    139  |  100  |  10  ----------------------------<  125<H>  4.0   |  23  |  0.71    Ca    9.5      11 Aug 2021 11:48                  RADIOLOGY & ADDITIONAL TESTS:  Results Reviewed:   Imaging Personally Reviewed:  Electrocardiogram Personally Reviewed:    COORDINATION OF CARE:  Care Discussed with Consultants/Other Providers [Y/N]:  Prior or Outpatient Records Reviewed [Y/N]:

## 2021-08-13 NOTE — DISCHARGE NOTE NURSING/CASE MANAGEMENT/SOCIAL WORK - NSDPDISTO_GEN_ALL_CORE
Pt  with abdominal incision DSD CDI , VS stable Afebrile. pt with positive bowel sounds maite po diet. Voiding. Sen by MD and cleared for Dc to home as per safe DC plan to follow-up with PMD/Onc/Rad/Home

## 2021-08-13 NOTE — DISCHARGE NOTE PROVIDER - NSDCCPCAREPLAN_GEN_ALL_CORE_FT
PRINCIPAL DISCHARGE DIAGNOSIS  Diagnosis: Liposarcoma  Assessment and Plan of Treatment: Drink plenty of fluids.  No heavy lifting (greater than 10 pounds) or straining for 4 to 6 weeks.  You may shower, just pat white strips dry, they will fall off in a few weeks.   Call Dr. Aquino's office next week to schedule a follow up appointment for further management.  Call the office if you have fever greater than 101, difficulty urinating, pain not relieved with pain medication, nausea/vomiting.        SECONDARY DISCHARGE DIAGNOSES  Diagnosis: Abdominal wall hernia  Assessment and Plan of Treatment: Follow up with Dr. Coley for further management    Diagnosis: Hypertension  Assessment and Plan of Treatment: Continue current home medications and follow up with your primary care provider      Diagnosis: Dental bridge present  Assessment and Plan of Treatment: Follow up with your dentist     PRINCIPAL DISCHARGE DIAGNOSIS  Diagnosis: Liposarcoma  Assessment and Plan of Treatment: Drink plenty of fluids.  No heavy lifting (greater than 10 pounds) or straining for 4 to 6 weeks.  You may shower, just pat white strips dry, they will fall off in a few weeks.   Keep your appointment as scheduled with oncology on 8/24  Call Dr. Aquino's office next week to schedule a follow up appointment for further management.  Call the office if you have fever greater than 101, difficulty urinating, pain not relieved with pain medication, nausea/vomiting.        SECONDARY DISCHARGE DIAGNOSES  Diagnosis: Abdominal wall hernia  Assessment and Plan of Treatment: Follow up with Dr. Coley for further management    Diagnosis: Hypertension  Assessment and Plan of Treatment: Continue current home medications and follow up with your primary care provider      Diagnosis: Dental bridge present  Assessment and Plan of Treatment: Follow up with your dentist     PRINCIPAL DISCHARGE DIAGNOSIS  Diagnosis: Liposarcoma  Assessment and Plan of Treatment: Drink plenty of fluids.  No heavy lifting (greater than 10 pounds) or straining for 4 to 6 weeks.  You may shower, just pat white strips dry, they will fall off in a few weeks.   Keep your appointment as scheduled with oncology on 8/24 at 9:30  Call Dr. Aquino's office next week to schedule a follow up appointment for further management.  Call the office if you have fever greater than 101, difficulty urinating, pain not relieved with pain medication, nausea/vomiting.        SECONDARY DISCHARGE DIAGNOSES  Diagnosis: Abdominal wall hernia  Assessment and Plan of Treatment: Follow up with Dr. Coley for further management    Diagnosis: Hypertension  Assessment and Plan of Treatment: Continue current home medications and follow up with your primary care provider      Diagnosis: Dental bridge present  Assessment and Plan of Treatment: Follow up with your dentist

## 2021-08-13 NOTE — PROGRESS NOTE ADULT - PROBLEM SELECTOR PLAN 1
L renal mass s/p CT guided core biopsy in June 2021   Path consistent w/ dedifferentiated Liposarcoma  S/p ex-lap on 8/10, resection of L renal mass and RP mass aborted after mass found to be enlarged and deemed non-resectable  F/up repeat CT A/P  Pain control.
L renal mass s/p CT guided core biopsy in June 2021   Path consistent w/ dedifferentiated Liposarcoma  S/p ex-lap on 8/10, resection of L renal mass and RP mass aborted after mass found to be enlarged and deemed non-resectable  Repeat CT A/P shows pelvic mass compatible w/ history of sarcoma, left pararenal retroperitoneal mass likely representing metastatic disease, mild L hydro, and RUL spiculated nodule   Pain control  Outpatient Onc and Rad-Onc f/up

## 2021-08-13 NOTE — DISCHARGE NOTE NURSING/CASE MANAGEMENT/SOCIAL WORK - NSDCVIVACCINE_GEN_ALL_CORE_FT
influenza, injectable, quadrivalent, preservative free; 23-Oct-2018 12:20; Julissa Higginbotham (RN); [employer]; [id number] (Exp. Date: 30-Jun-2019); IntraMuscular; Deltoid Left.; 0.5 milliLiter(s); VIS (VIS Published: 07-Aug-2015, VIS Presented: 23-Oct-2018);   Tdap; 02-Nov-2017 22:17; Dianne Redmond); [employer]; [id number]; IntraMuscular; Deltoid Right.; 0.5 milliLiter(s); VIS (VIS Published: 09-May-2013, VIS Presented: 02-Nov-2017);

## 2021-08-13 NOTE — DISCHARGE NOTE NURSING/CASE MANAGEMENT/SOCIAL WORK - PATIENT PORTAL LINK FT
You can access the FollowMyHealth Patient Portal offered by [facility] by registering at the following website: [email or web address]. By joining [employer]’s FollowMyHealth portal, you will also be able to view your health information using other applications (apps) compatible with our system.

## 2021-08-13 NOTE — DISCHARGE NOTE NURSING/CASE MANAGEMENT/SOCIAL WORK - NSDCPNINST_GEN_ALL_CORE
Maintain incision clean and dry, call MD with any signs of infection such as fever, redness or drainage from site.  Avoid heavy lifting and strenuous activity, as well as constipation which may be a side effect from taking narcotic pain medication. Continue to drink plenty of fluids to hydrate. Follow-up with surgeon in the office as instructed as well as with PMD for continuity of care.

## 2021-08-13 NOTE — DISCHARGE NOTE PROVIDER - PROVIDER TOKENS
PROVIDER:[TOKEN:[[id number]]],PROVIDER:[TOKEN:[[id number]]] PROVIDER:[TOKEN:[[id number]]],PROVIDER:[TOKEN:[[id number]]],PROVIDER:[TOKEN:[[id number]]]

## 2021-08-13 NOTE — PROGRESS NOTE ADULT - ASSESSMENT
69 yo F s/p Ex_lap, aborted left nephrectomy/aborted resection of RP mass, hernia repair, has dislodgement of teeth from bridge during intubation, 8/10/2021, dental consulted, f/u as output    POD#1 - no events, + flatus, diet advance  POD #2 - tolerating diet, voiding well, pain controlled, no BM yet, minimal ambulation, awaiting CT chest/abd/pelvis, per RN pt c/o CP, when I used  phone #[phone], pt denied CP, SOB, palpitations, only c/o pain at surgical site but improved  POD #3 - pain controlled, + flatus, no BM, miralax added yesterday, senna given at 2am today, CT performed    Plan:  - f/u CT chest/abd/pelvis final report  - Pain management  - bowel regimen  - f/u medicine  - continue reg diet, monitor GI function  - encourage OOB  - DVT prophy, IS  - d/c home to f/u with Dr. Aquino   
71 yo F s/p Ex_lap, aborted left nephrectomy/aborted resection of RP mass, hernia repair, has dislodgement of teeth from bridge during intubation, 8/10/2021, dental consulted, f/u as output    POD#1 - no events, + flatus, diet advance  POD #2 - tolerating diet, voiding well, pain controlled, no BM yet, minimal ambulation, awaiting CT chest/abd/pelvis    Plan:  - f/u CT chest/abd/pelvis  - Pain management  - bowel regimen  - f/u medicine  - continue reg diet, monitor GI function  - encourage OOB  - DVT prophy, IS  
s/p Ex_lap, aborted left nephrectomy/aborted res RP mass;  dislodged teeth from intubation    POD#1 - no events  Plan:  - d/c belcher  - CT chest/abd/pelvis  -Pain management  -reg diet  -OOB
s/p Ex_lap, aborted left nephrectomy  -Pain management  -clear liquid diet  -DVT prophylaxis
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

## 2021-08-13 NOTE — DISCHARGE NOTE PROVIDER - CARE PROVIDER_API CALL
Matthew Aquino)  Urology  [address] [place] [address], [address]  [place] [address]  Phone: [phone]  Fax: [phone]  Follow Up Time:     Adair Coley  PLASTIC SURGERY  [address] [place] [address]  Phone: [phone]  Fax: [phone]  Follow Up Time:    Matthew Aquino)  Urology  [address] [place] [address], [address]  [place] [address]  Phone: [phone]  Fax: [phone]  Follow Up Time:     Adair Coley  PLASTIC SURGERY  [address] [place] [address]  Phone: [phone]  Fax: [phone]  Follow Up Time:     Marin Kruse)  Hematology; Medical Oncology  [address] [place] [address]  Phone: [phone]  Fax: [phone]  Follow Up Time:

## 2021-08-13 NOTE — DISCHARGE NOTE PROVIDER - HOSPITAL COURSE
69 yo F s/p exploratory_lap, aborted left nephrectomy/aborted resection of RP mass, hernia repair, has dislodgement of teeth from bridge during intubation, 8/10/2021, dental consulted, f/u as output    POD#1 - no events, + flatus, diet advance  POD #2 - tolerating diet, voiding well, pain controlled, no BM yet, minimal ambulation, awaiting CT chest/abd/pelvis, per RN pt c/o CP, when I used  phone #[phone], pt denied CP, SOB, palpitations, only c/o pain at surgical site but improved  POD #3 - pain controlled, + flatus, no BM, miralax added yesterday, senna given at 2am today, CT performed, pt d/c to f/u with Dr. Aquino for further management, chemo/rad 69 yo F s/p exploratory lap, aborted left nephrectomy/aborted resection of RP mass, hernia repair, had dislodgement of teeth from bridge during intubation, 8/10/2021, dental consulted no intervention required, f/u as output    POD#1 - no events, + flatus, diet advance  POD #2 - tolerating diet, voiding well, pain controlled, no BM yet, minimal ambulation, awaiting CT chest/abd/pelvis, per RN pt c/o CP, when I used  phone #[phone], pt denied CP, SOB, palpitations, only c/o pain at surgical site but improved  POD #3 - pain controlled, + flatus, no BM, miralax added yesterday, senna given at 2am today, CT performed, pt d/c to f/u with Dr. Aquino for further management, chemo/rad

## 2021-08-13 NOTE — DISCHARGE NOTE NURSING/CASE MANAGEMENT/SOCIAL WORK - NSDCPECAREGIVERED_GEN_ALL_CORE
Medline and carenotes for surgical procedure Exp lap as well as DC Medications and side effects literature for patient reference./Yes

## 2021-08-24 NOTE — PHYSICAL EXAM
[Restricted in physically strenuous activity but ambulatory and able to carry out work of a light or sedentary nature] : Status 1- Restricted in physically strenuous activity but ambulatory and able to carry out work of a light or sedentary nature, e.g., light house work, office work [Obese] : obese [Normal] : affect appropriate [de-identified] : aniceteric [de-identified] : normal respiratory effort, no audible wheeze [de-identified] : reg rate  [de-identified] : healing, dry and intact surgical wounds to mid and left abdomen; very large, soft, non-reducible left groin/LLQ abdominal hernia

## 2021-08-24 NOTE — RESULTS/DATA
[FreeTextEntry1] : EXAM: CT ABDOMEN AND PELVIS IC\par \par EXAM: CT CHEST IC\par \par \par PROCEDURE DATE: Aug 12 2021\par \par \par \par INTERPRETATION: CLINICAL INFORMATION: Unresectable abdominal sarcoma, status post recent exploratory laparotomy.. Rule out metastatic disease.\par \par COMPARISON: April 21, 2021, October 19, 2020\par \par CONTRAST/COMPLICATIONS:\par IV Contrast: IV contrast documented in associated exam (accession [id number]), Omnipaque 350 (accession [id number]) 90 cc administered 10 cc discarded\par Oral Contrast: NONE\par Complications: None reported at time of study completion\par \par PROCEDURE:\par CT of the Chest, Abdomen and Pelvis was performed.\par Sagittal and coronal reformats were performed.\par \par FINDINGS:\par CHEST:\par LUNGS AND LARGE AIRWAYS: Patent central airways. 2.5 x 1.2 cm spiculated nodule at the right apex is more solid than on prior examinations although not significantly increased in size. 3 mm right upper lobe nodule (2; 16), unchanged.\par PLEURA: Trace bilateral pleural effusions.\par VESSELS: Dilated ascending aorta measuring 4.5 cm, not significantly changed. Coarse calcifications.\par HEART: Cardiomegaly. No pericardial effusion.\par MEDIASTINUM AND TIRSO: No lymphadenopathy.\par CHEST WALL AND LOWER NECK: Stable left thyroid calcification.\par \par ABDOMEN AND PELVIS:\par LIVER: Stable cysts and scattered low-density lesions which are too small to characterize.\par BILE DUCTS: Normal caliber.\par GALLBLADDER: Cholelithiasis.\par SPLEEN: Within normal limits.\par PANCREAS: Within normal limits.\par ADRENALS: Within normal limits.\par KIDNEYS/URETERS: Mild left hydronephrosis with compression of the proximal ureter by the patient's large pelvic mass.\par \par BLADDER: Within normal limits.\par REPRODUCTIVE ORGANS: Uterus and adnexa within normal limits.\par \par BOWEL: No bowel obstruction. Appendix is normal.\par PERITONEUM: No ascites. Small amount of postoperative pneumoperitoneum.\par VESSELS: Atherosclerotic changes. Patent left external iliac arterial bypass graft extending into the IVC.\par RETROPERITONEUM/LYMPH NODES: New solid upper pelvic mass measures 15.3 x 15.6 x 10.6 cm (TR, CC, AP). Mass adjacent to the lower pole of the left kidney measures 6.3 x 5.4 cm, not significantly changed. A new satellite nodule anterior to this mass measures 2 cm. Small amount of postoperative pneumoretroperitoneum around the right kidney.\par ABDOMINAL WALL: Surgical clips in the left thigh. Low-attenuation in the left adductor muscles compatible with edema. Massive left lower quadrant hernia containing small and large bowel, and a small amount of fluid. Pockets of gas in the abdominal wall compatible with recent surgery.\par BONES: No lytic or blastic bony lesion.\par \par IMPRESSION:\par \par Pelvic mass compatible with stated history of sarcoma.\par \par No significant change in left pararenal retroperitoneal mass,, likely representing metastatic disease, however a new satellite nodule is now present.\par \par Mild left hydronephrosis due to ureteral compression by the pelvic mass.\par \par Right upper lobe spiculated nodule is more solid on the current study, raising the possibility of primary lung neoplasm.\par \par --- End of Report ---\par

## 2021-08-24 NOTE — REVIEW OF SYSTEMS
[Negative] : Allergic/Immunologic [Abdominal Pain] : no abdominal pain [Vomiting] : no vomiting [Diarrhea] : no diarrhea [FreeTextEntry9] :  leg swelling

## 2021-08-24 NOTE — HISTORY OF PRESENT ILLNESS
[Disease: _____________________] : Disease: [unfilled] [T: ___] : T[unfilled] [N: ___] : N[unfilled] [M: ___] : M[unfilled] [AJCC Stage: ____] : AJCC Stage: [unfilled] [de-identified] : Ms. Fleming is a 69 F who presented with LEFT upper thigh mass near the groin in 1/2018 and underwent CT torso on 1/25/18 showing a superficial mass in the anterior psoas muscle measuring 10+ x 4.6 x 7.9 cm and FNA from 2/8/2020 was suspicious for malignancy.  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 Patient was delayed in follow up due to insurance issues and found this out on 5/14/2018.  She was referred to Dr Hankins, plastic sx and Rad Onc.  She had resection of L groin mass, had complication with healing, closely followed by Dr Hankins and Wound care team and is now doing better in healing. She did not have RT post operatively although was discussed by us and and Dr. Hankins; this was impossible to perform in a timely manner given the nature of poor healing over time. New PET/CT showed significant edema of the LLE, large inguinal hernia containing fat and large bowel extending to the level of the mid thigh and small lung nodule that was PET avid. She also has a ground glass lesion in the CAMACHO SUV1.3 that is persistent from 12/2018.  Her CT scan from 9/16/2019 showed a R apical solid/ground glass lesion with linear extension to the lateral pleural surface. Total size lesion 2.4 cm, solid component 1.5 x1.7 cm. Suspicious for primary lung cancer.  On 10/12/19, she was admitted with abd hernia, pain. Nonsurgical intervention given the size and the morbidity of the surgical procedure and without likelihood of long term benefit and deemed nonsurgical candidate. On 10/24/19 she underwent RUL nodule US guided Bx to r/o second malignancy, Lung ca by Dr. Toth.  There were concerns of increasingly dense appearing CAMACHO lung mass\par \par April 2021 perinephric mass noted \par August 2021 new large mass adjacent to Kidney  [FreeTextEntry1] : observation  [de-identified] : Patient presents today with her daughter for follow up after a recent hospitalization at [facility] 8/10-8/13.  Patient was supposed to have a left nephrectomy with RP mass resection and hernia repair but this plan was aborted while in the OR and converted into an exploratory laparotomy when the RP mass was found to be significantly larger and more complicated than seen on previous scans from 4/2021.  Patient reports that since she has been home she has been feeling well.  Denies significant pain other than her chronic discomfort from her large left groin hernia.  Patient reports some constipation but is having BM's.  Denies fever, HA, CP, SOB, vomiting, diarrhea. \par

## 2021-09-21 NOTE — REVIEW OF SYSTEMS
[Negative] : Allergic/Immunologic [Abdominal Pain] : no abdominal pain [Vomiting] : no vomiting [Diarrhea] : no diarrhea

## 2021-09-21 NOTE — HISTORY OF PRESENT ILLNESS
[Disease: _____________________] : Disease: [unfilled] [T: ___] : T[unfilled] [N: ___] : N[unfilled] [M: ___] : M[unfilled] [AJCC Stage: ____] : AJCC Stage: [unfilled] [de-identified] : Ms. Fleming is a 69 F who presented with LEFT upper thigh mass near the groin in 1/2018 and underwent CT torso on 1/25/18 showing a superficial mass in the anterior psoas muscle measuring 10+ x 4.6 x 7.9 cm and FNA from 2/8/2020 was suspicious for malignancy.  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 Patient was delayed in follow up due to insurance issues and found this out on 5/14/2018.  She was referred to Dr Hankins, plastic sx and Rad Onc.  She had resection of L groin mass, had complication with healing, closely followed by Dr Hankins and Wound care team and is now doing better in healing. She did not have RT post operatively although was discussed by us and and Dr. Hankins; this was impossible to perform in a timely manner given the nature of poor healing over time. New PET/CT showed significant edema of the LLE, large inguinal hernia containing fat and large bowel extending to the level of the mid thigh and small lung nodule that was PET avid. She also has a ground glass lesion in the CAMACHO SUV1.3 that is persistent from 12/2018.  Her CT scan from 9/16/2019 showed a R apical solid/ground glass lesion with linear extension to the lateral pleural surface. Total size lesion 2.4 cm, solid component 1.5 x1.7 cm. Suspicious for primary lung cancer.  On 10/12/19, she was admitted with abd hernia, pain. Nonsurgical intervention given the size and the morbidity of the surgical procedure and without likelihood of long term benefit and deemed nonsurgical candidate. On 10/24/19 she underwent RUL nodule US guided Bx to r/o second malignancy, Lung ca by Dr. Toth.  There were concerns of increasingly dense appearing CAMACHO lung mass\par \par April 2021 perinephric mass noted \par August 2021 new large mass adjacent to Kidney  [FreeTextEntry1] : Doxil start 8/31/21 [de-identified] : presents with daughter\par s/p 1st doxil , had infusion reaction but able to complete after slowed infusion and treated with more steroid and diphenhydramine \par since then no acute issues, feels like urination has improved since then

## 2021-09-21 NOTE — PHYSICAL EXAM
[Restricted in physically strenuous activity but ambulatory and able to carry out work of a light or sedentary nature] : Status 1- Restricted in physically strenuous activity but ambulatory and able to carry out work of a light or sedentary nature, e.g., light house work, office work [Obese] : obese [Normal] : affect appropriate [de-identified] : aniceteric [de-identified] : normal respiratory effort, no audible wheeze [de-identified] : reg rate  [de-identified] :  very large, soft,  left groin/LLQ abdominal hernia

## 2021-10-04 PROBLEM — T78.40XA DRUG-INDUCED HYPERSENSITIVITY REACTION: Status: ACTIVE | Noted: 2021-01-01

## 2021-10-07 NOTE — HISTORY OF PRESENT ILLNESS
[FreeTextEntry1] : accompanied by daughter Nory / as per patient \par covid not detected 10/4/2021\par fully covid vaccinated\par alert and orientated x 3\par took Amlodipine this morning \par \par \par  [FreeTextEntry5] : yesterday 8pm [FreeTextEntry6] : Dr. Barreto

## 2021-10-07 NOTE — PROCEDURE
[D/C IV on discharge] : D/C IV on discharge [Resume diet] : resume diet [Site check for bleeding/hematoma] : Site check for bleeding/hematoma [Vital signs on admission the q 15 mins x2] : Vital signs on admission the q 15 mins x2 [FreeTextEntry1] : mediport insertion

## 2021-10-07 NOTE — ASSESSMENT
[FreeTextEntry1] : Pt seen and assessed for placement of port for venous access.  Chart reviewed, imaging and labs evaluated and acceptable for intervention. Consent obtained with risks, benefits explained.  Will place port with sedation.\par \par 6 Indonesian powerport placed using US and fluoro guidance.  Tip in SVC.  May use immediately.  Full report to follow.\par

## 2021-10-07 NOTE — PAST MEDICAL HISTORY
[Increasing age ( >40 years old)] : Increasing age ( >40 years old) [Malignancy] : Malignancy [No therapy indicated for cases scheduled for less than one hour] : No therapy indicated for cases scheduled for less than one hour. [FreeTextEntry1] : Malignant Hyperthermia Screening Tool and Risk of Bleeding Assessment\par \par Ms. JULIA UMANA denies family history of unexpected death following Anesthesia or Exercise.\par Denies Family history of Malignant Hyperthermia, Muscle or Neuromuscular disorder and High Temperature following exercise.\par \par Ms. JULIA UMANA denies history of Muscle Spasm, Dark or Chocolate - Colored urine and Unanticipated fever immediately following anesthesia or serious exercise. \par Ms. UMANA also denies bleeding tendencies/ Risks of Bleeding.\par

## 2021-10-25 PROBLEM — R23.2 FACIAL FLUSHING: Status: RESOLVED | Noted: 2021-01-01 | Resolved: 2021-01-01

## 2021-10-25 PROBLEM — M67.479 GANGLION CYST OF FOOT: Status: ACTIVE | Noted: 2018-05-11

## 2021-10-25 PROBLEM — Z79.899 HIGH RISK MEDICATION USE: Status: RESOLVED | Noted: 2021-01-01 | Resolved: 2021-01-01

## 2021-10-25 PROBLEM — R26.9 ALTERED GAIT: Status: ACTIVE | Noted: 2018-12-10

## 2021-10-25 PROBLEM — R07.89 CHEST PRESSURE: Status: RESOLVED | Noted: 2021-01-01 | Resolved: 2021-01-01

## 2021-10-25 PROBLEM — Z01.818 PREOP TESTING: Status: RESOLVED | Noted: 2021-01-01 | Resolved: 2021-01-01

## 2021-10-25 PROBLEM — M54.9 ACUTE BACK PAIN: Status: RESOLVED | Noted: 2021-01-01 | Resolved: 2021-01-01

## 2021-10-25 NOTE — HISTORY OF PRESENT ILLNESS
[Disease: _____________________] : Disease: [unfilled] [T: ___] : T[unfilled] [N: ___] : N[unfilled] [M: ___] : M[unfilled] [AJCC Stage: ____] : AJCC Stage: [unfilled] [de-identified] : Ms. Fleming is a 69 F who presented with LEFT upper thigh mass near the groin in 1/2018 and underwent CT torso on 1/25/18 showing a superficial mass in the anterior psoas muscle measuring 10+ x 4.6 x 7.9 cm and FNA from 2/8/2020 was suspicious for malignancy.  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 Patient was delayed in follow up due to insurance issues and found this out on 5/14/2018.  She was referred to Dr Hankins, plastic sx and Rad Onc.  She had resection of L groin mass, had complication with healing, closely followed by Dr Hankins and Wound care team and is now doing better in healing. She did not have RT post operatively although was discussed by us and and Dr. Hankins; this was impossible to perform in a timely manner given the nature of poor healing over time. New PET/CT showed significant edema of the LLE, large inguinal hernia containing fat and large bowel extending to the level of the mid thigh and small lung nodule that was PET avid. She also has a ground glass lesion in the CAMACHO SUV1.3 that is persistent from 12/2018.  Her CT scan from 9/16/2019 showed a R apical solid/ground glass lesion with linear extension to the lateral pleural surface. Total size lesion 2.4 cm, solid component 1.5 x1.7 cm. Suspicious for primary lung cancer.  On 10/12/19, she was admitted with abd hernia, pain. Nonsurgical intervention given the size and the morbidity of the surgical procedure and without likelihood of long term benefit and deemed nonsurgical candidate. On 10/24/19 she underwent RUL nodule US guided Bx to r/o second malignancy, Lung ca by Dr. Toth.  There were concerns of increasingly dense appearing CAMACHO lung mass\par \par April 2021 perinephric mass noted \par August 2021 new large mass adjacent to Kidney  [FreeTextEntry1] : Doxil start 8/31/21 [de-identified] : comes for follow up \par intermittent abdominal pain \par using tylenol #3 prn pain \par using zofran as needed for nausea \par + fatigue \par better this morning

## 2021-10-25 NOTE — PHYSICAL EXAM
[Restricted in physically strenuous activity but ambulatory and able to carry out work of a light or sedentary nature] : Status 1- Restricted in physically strenuous activity but ambulatory and able to carry out work of a light or sedentary nature, e.g., light house work, office work [Obese] : obese [Normal] : affect appropriate [de-identified] : aniceteric [de-identified] : normal respiratory effort, no audible wheeze [de-identified] : reg rate  [de-identified] :  very large, soft,  left groin/LLQ abdominal hernia [de-identified] : in wheelchair

## 2021-10-25 NOTE — REVIEW OF SYSTEMS
[Negative] : Allergic/Immunologic [Fatigue] : fatigue [Abdominal Pain] : abdominal pain [Vomiting] : no vomiting [Diarrhea] : no diarrhea [FreeTextEntry7] : nausea

## 2021-11-12 NOTE — HISTORY OF PRESENT ILLNESS
[Disease: _____________________] : Disease: [unfilled] [T: ___] : T[unfilled] [N: ___] : N[unfilled] [M: ___] : M[unfilled] [AJCC Stage: ____] : AJCC Stage: [unfilled] [de-identified] : Ms. Fleming is a 69 F who presented with LEFT upper thigh mass near the groin in 1/2018 and underwent CT torso on 1/25/18 showing a superficial mass in the anterior psoas muscle measuring 10+ x 4.6 x 7.9 cm and FNA from 2/8/2020 was suspicious for malignancy.  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 Patient was delayed in follow up due to insurance issues and found this out on 5/14/2018.  She was referred to Dr Hankins, plastic sx and Rad Onc.  She had resection of L groin mass, had complication with healing, closely followed by Dr Hankins and Wound care team and is now doing better in healing. She did not have RT post operatively although was discussed by us and and Dr. Hankins; this was impossible to perform in a timely manner given the nature of poor healing over time. New PET/CT showed significant edema of the LLE, large inguinal hernia containing fat and large bowel extending to the level of the mid thigh and small lung nodule that was PET avid. She also has a ground glass lesion in the CAMACHO SUV1.3 that is persistent from 12/2018.  Her CT scan from 9/16/2019 showed a R apical solid/ground glass lesion with linear extension to the lateral pleural surface. Total size lesion 2.4 cm, solid component 1.5 x1.7 cm. Suspicious for primary lung cancer.  On 10/12/19, she was admitted with abd hernia, pain. Nonsurgical intervention given the size and the morbidity of the surgical procedure and without likelihood of long term benefit and deemed nonsurgical candidate. On 10/24/19 she underwent RUL nodule US guided Bx to r/o second malignancy, Lung ca by Dr. Toth.  There were concerns of increasingly dense appearing CAMACHO lung mass\par \par April 2021 perinephric mass noted \par August 2021 new large RP mass adjacent to Kidney \par \par 8/31/21 started Liposomal doxorubicin [de-identified] : PDGFRA amplification\par ARAF amplification\par MDM2 amplification\par LUKAS amplification\par KDM4C R220Q  [FreeTextEntry1] : Doxil start 8/31/21 s/p cycle #3 [de-identified] : recent scans reviewed with radiology since most recent scans were done outside [facility] due to insurance issues \par larger pelvic mass showing decrease in size from August when compared to [facility] imaging however the left kidney mass is larger on doxil \par has non-descript abdominal discomfort that is midline and chronic , unclear if related to hernia vs RP mass\par

## 2021-11-12 NOTE — REVIEW OF SYSTEMS
[Fatigue] : fatigue [Abdominal Pain] : abdominal pain [Negative] : Allergic/Immunologic [Vomiting] : no vomiting [Diarrhea] : no diarrhea [FreeTextEntry7] : nausea

## 2021-11-12 NOTE — PHYSICAL EXAM
[Restricted in physically strenuous activity but ambulatory and able to carry out work of a light or sedentary nature] : Status 1- Restricted in physically strenuous activity but ambulatory and able to carry out work of a light or sedentary nature, e.g., light house work, office work [Obese] : obese [Normal] : affect appropriate [de-identified] : normal respiratory effort, no audible wheeze [de-identified] : aniceteric [de-identified] : reg rate  [de-identified] :  very large, soft,  left groin/LLQ abdominal hernia [de-identified] : in wheelchair

## 2021-11-23 NOTE — VITALS
[Maximal Pain Intensity: 10/10] : 10/10 [Least Pain Intensity: 0/10] : 0/10 [Pain Description/Quality: ___] : Pain description/quality: [unfilled] [Pain Duration: ___] : Pain duration: [unfilled] [Pain Location: ___] : Pain Location: [unfilled] [70: Cares for self; unalbe to carry on normal activity or do active work.] : 70: Cares for self; unable to carry on normal activity or do active work. [Date: ____________] : Patient's last distress assessment performed on [unfilled]. [Patient Refusal] : Patient refused psychosocial distress assessment

## 2021-11-23 NOTE — REASON FOR VISIT
[Other: ___] : [unfilled] [Consideration for Non-Curative Therapy] : consideration for non-curative therapy for [Family Member] : family member

## 2021-11-23 NOTE — PHYSICAL EXAM
[Sclera] : the sclera and conjunctiva were normal [Extraocular Movements] : extraocular movements were intact [Outer Ear] : the ears and nose were normal in appearance [Normal Oral Cavity] : oral cavity was normal [Oropharynx] : The oropharynx was normal [Normal] : normal skin color and pigmentation and no rash [de-identified] : tenderness to palpation along left hypochondriac/lumbar regions, anterior>posterior.  Large hernia noted in left inguinal region extending down to LLE [de-identified] : gait assisted with cane

## 2021-11-23 NOTE — DISEASE MANAGEMENT
[Clinical] : TNM Stage: c [IV] : IV [FreeTextEntry4] : de-differentiated liposarcoma [TTNM] : 4 [NTNM] : 0 [MTNM] : 1

## 2021-11-23 NOTE — HISTORY OF PRESENT ILLNESS
[FreeTextEntry1] : 71 year old woman presents today for consultation regarding radiation therapy for sarcoma.\par \par She was initially diagnosed with a left upper thigh mass in 1/2018, and underwent incisional biopsy of the L groin mass on 2/16/2018.  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 \par \par She met with Dr Mina Hankins and underwent resection of the left groin sarcoma on 9/17/18.  Post-operative course was complicated by wound dehiscence, and post-operative RT was deferred.  She developed a large inguinal hernia containing fat and large bowel, extending to the level of the mid-thigh.  \par \par 12/22/18 PET/CT noted a minimally FDG-avid RUL lung nodule, and an indeterminate 0.4 cm RUL lung nodule.\par \par 4/2/19 PET/CT showed FDG-avid RUL groundglass opacity, and subcentimeter RUL nodule.\par \par Follow up CT chest 9/16/19 showed a R apical solid/ground glass lesion with linear extension to the lateral pleural surface, measuring 2.4 cm with solid component 1.5 x1.7 cm, suspicious for primary lung cancer. \par \par 10/24/19 CT-guided biopsy of the RUL showed atypical cells, but diagnosis limited by paucicellularity.\par \par 4/21/21 CT CAP showed a new 6 cm mass adjacent to the lower pole of the left kidney, and relatively stable 2.4 cm RUL groundglass lesion, with other subcentimeter nodular pulmonary densities.\par \par 6/4/21 CT-guided biopsy of the left kidney area showed sarcoma, with positive MDM2 amplification and similar morphologic features to prior, compatible with de-differentiated liposarcoma.\par \par She was evaluated and considered for resection of the retroperitoneal mass.  She was taken to the OR on 8/10/21, and exploratory laparotomy noted that the mass had grown significantly since her last evaluation, and extended below the bifurcation down into the left lower abdomen, approximately 10-12 cm, and fixed to the retroperitoneum.  Decision was made to abort the resection.\par \par 8/13/21 CT CAP showed a new solid upper pelvic mass measuring 15.3 x 15.6 x 10.6 cm (TR, CC, AP). The mass adjacent to the lower pole of the left kidney measured 6.3 x 5.4 cm.  The RUL apex lesion measured 2.5 x 1.2 cm, with other unchanged subcentimeter pulmonary nodules.\par \par She began Doxil on 8/31/21.\par \par 11/2/21 CT CAP measured the left para-renal masses as 8.2 x 8.2 x 6.6 cm and 5.4 x 4.0 cm.  The lower retroperitoneal/pelvic mass with necrosis measured 13.2 x 10.1 x 12.3 cm, and there was a 3.0 x 2.0 groundglass RUL pulmonary lesion.\par \par She continues on Doxil, most recently yesterday.\par \par She notes significant bother in the abdominal left upper quadrant, with pain ranging from 0-10/10.  She has experienced weight loss of ~30 pounds over the past year.  She is also uncomfortable with her large hernia, and notes chronic constipation.

## 2021-11-23 NOTE — LETTER CLOSING
[Consult Closing:] : Thank you for allowing me to participate in the care of this patient.  If you have any questions, please do not hesitate to contact me. [Sincerely yours,] : Sincerely yours, [FreeTextEntry3] : Benjie Maddox MD\par  of Radiation Medicine, Quality and Safety\par  of Radiation Medicine\par [facility] [employer] [facility] \[facility]\""/[facility]\par [facility]\par [facility]\par

## 2021-11-23 NOTE — OB/GYN HISTORY
[Currently In Menopause] : currently in menopause [Menopause Age: ____] : patient was [unfilled] years old at menopause [___] : Total Pregnancies: [unfilled] [Experiencing Menopausal Sxs] : not experiencing menopausal symptoms

## 2021-11-23 NOTE — REVIEW OF SYSTEMS
[Recent Change In Weight] : ~T recent weight change [Abdominal Pain] : abdominal pain [Constipation] : constipation [Negative] : Allergic/Immunologic [Constipation: Grade 0] : Constipation: Grade 0 [Edema Limbs: Grade 1-  5 - 10% inter-limb discrepancy in volume or circumference at point of greatest visible difference; swelling or obscuration of anatomic architecture on close inspection] : Edema Limbs: Grade 1-  5 - 10% inter-limb discrepancy in volume or circumference at point of greatest visible difference; swelling or obscuration of anatomic architecture on close inspection [Fatigue: Grade 0] : Fatigue: Grade 0 [Localized Edema: Grade 1 - Localized to dependent areas, no disability or functional impairment] : Localized Edema: Grade 1 - Localized to dependent areas, no disability or functional impairment [Neck Edema: Grade 0] : Neck Edema: Grade 0

## 2021-12-20 NOTE — HISTORY OF PRESENT ILLNESS
[Disease: _____________________] : Disease: [unfilled] [T: ___] : T[unfilled] [N: ___] : N[unfilled] [M: ___] : M[unfilled] [AJCC Stage: ____] : AJCC Stage: [unfilled] [de-identified] : Ms. Fleming is a 69 F who presented with LEFT upper thigh mass near the groin in 1/2018 and underwent CT torso on 1/25/18 showing a superficial mass in the anterior psoas muscle measuring 10+ x 4.6 x 7.9 cm and FNA from 2/8/2020 was suspicious for malignancy.  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 Patient was delayed in follow up due to insurance issues and found this out on 5/14/2018.  She was referred to Dr Hankins, plastic sx and Rad Onc.  She had resection of L groin mass, had complication with healing, closely followed by Dr Hankins and Wound care team and is now doing better in healing. She did not have RT post operatively although was discussed by us and and Dr. Hankins; this was impossible to perform in a timely manner given the nature of poor healing over time. New PET/CT showed significant edema of the LLE, large inguinal hernia containing fat and large bowel extending to the level of the mid thigh and small lung nodule that was PET avid. She also has a ground glass lesion in the CAMACHO SUV1.3 that is persistent from 12/2018.  Her CT scan from 9/16/2019 showed a R apical solid/ground glass lesion with linear extension to the lateral pleural surface. Total size lesion 2.4 cm, solid component 1.5 x1.7 cm. Suspicious for primary lung cancer.  On 10/12/19, she was admitted with abd hernia, pain. Nonsurgical intervention given the size and the morbidity of the surgical procedure and without likelihood of long term benefit and deemed nonsurgical candidate. On 10/24/19 she underwent RUL nodule US guided Bx to r/o second malignancy, Lung ca by Dr. Toth.  There were concerns of increasingly dense appearing CAMACHO lung mass\par \par April 2021 perinephric mass noted \par August 2021 new large RP mass adjacent to Kidney \par \par 8/31/21 started Liposomal doxorubicin [de-identified] : PDGFRA amplification\par ARAF amplification\par MDM2 amplification\par LUKAS amplification\par KDM4C R220Q  [FreeTextEntry1] : Doxil start 8/31/21 s/p cycle #4 [de-identified] : presents with daughter in follow up\par plans to start RT next week for 2 weeks \par didn't take Bp meds today \par \par

## 2021-12-20 NOTE — PHYSICAL EXAM
[Restricted in physically strenuous activity but ambulatory and able to carry out work of a light or sedentary nature] : Status 1- Restricted in physically strenuous activity but ambulatory and able to carry out work of a light or sedentary nature, e.g., light house work, office work [Obese] : obese [Normal] : affect appropriate [de-identified] : aniceteric [de-identified] : normal respiratory effort, no audible wheeze [de-identified] : reg rate  [de-identified] :  very large, soft,  left groin/LLQ abdominal hernia [de-identified] : in wheelchair

## 2022-01-01 ENCOUNTER — NON-APPOINTMENT (OUTPATIENT)
Age: 72
End: 2022-01-01

## 2022-01-01 ENCOUNTER — APPOINTMENT (OUTPATIENT)
Dept: HEMATOLOGY ONCOLOGY | Facility: CLINIC | Age: 72
End: 2022-01-01

## 2022-01-01 ENCOUNTER — INPATIENT (INPATIENT)
Facility: HOSPITAL | Age: 72
LOS: 0 days | DRG: 871 | End: 2022-01-26
Attending: INTERNAL MEDICINE | Admitting: FAMILY MEDICINE
Payer: COMMERCIAL

## 2022-01-01 ENCOUNTER — APPOINTMENT (OUTPATIENT)
Dept: HEMATOLOGY ONCOLOGY | Facility: CLINIC | Age: 72
End: 2022-01-01
Payer: MEDICARE

## 2022-01-01 ENCOUNTER — TRANSCRIPTION ENCOUNTER (OUTPATIENT)
Age: 72
End: 2022-01-01

## 2022-01-01 ENCOUNTER — OUTPATIENT (OUTPATIENT)
Dept: OUTPATIENT SERVICES | Facility: HOSPITAL | Age: 72
LOS: 1 days | Discharge: HOME | End: 2022-01-01

## 2022-01-01 ENCOUNTER — APPOINTMENT (OUTPATIENT)
Dept: INFUSION THERAPY | Facility: HOSPITAL | Age: 72
End: 2022-01-01

## 2022-01-01 ENCOUNTER — OUTPATIENT (OUTPATIENT)
Dept: OUTPATIENT SERVICES | Facility: HOSPITAL | Age: 72
LOS: 1 days | End: 2022-01-01
Payer: MEDICARE

## 2022-01-01 ENCOUNTER — INPATIENT (INPATIENT)
Facility: HOSPITAL | Age: 72
LOS: 9 days | Discharge: HOME | DRG: 393 | End: 2022-01-22
Attending: SURGERY | Admitting: SURGERY
Payer: COMMERCIAL

## 2022-01-01 VITALS
SYSTOLIC BLOOD PRESSURE: 134 MMHG | WEIGHT: 111 LBS | HEART RATE: 75 BPM | RESPIRATION RATE: 17 BRPM | OXYGEN SATURATION: 99 % | BODY MASS INDEX: 21.68 KG/M2 | DIASTOLIC BLOOD PRESSURE: 87 MMHG

## 2022-01-01 VITALS — HEIGHT: 60 IN

## 2022-01-01 VITALS
BODY MASS INDEX: 22.26 KG/M2 | SYSTOLIC BLOOD PRESSURE: 150 MMHG | RESPIRATION RATE: 19 BRPM | OXYGEN SATURATION: 99 % | HEART RATE: 78 BPM | WEIGHT: 114 LBS | DIASTOLIC BLOOD PRESSURE: 95 MMHG

## 2022-01-01 VITALS
DIASTOLIC BLOOD PRESSURE: 78 MMHG | HEART RATE: 67 BPM | SYSTOLIC BLOOD PRESSURE: 121 MMHG | RESPIRATION RATE: 18 BRPM | TEMPERATURE: 98 F | OXYGEN SATURATION: 96 %

## 2022-01-01 VITALS
WEIGHT: 110.89 LBS | TEMPERATURE: 99 F | OXYGEN SATURATION: 98 % | DIASTOLIC BLOOD PRESSURE: 83 MMHG | HEIGHT: 60 IN | RESPIRATION RATE: 16 BRPM | HEART RATE: 86 BPM | SYSTOLIC BLOOD PRESSURE: 113 MMHG

## 2022-01-01 VITALS
TEMPERATURE: 98 F | OXYGEN SATURATION: 95 % | RESPIRATION RATE: 18 BRPM | HEART RATE: 78 BPM | SYSTOLIC BLOOD PRESSURE: 101 MMHG | DIASTOLIC BLOOD PRESSURE: 60 MMHG

## 2022-01-01 DIAGNOSIS — K59.00 CONSTIPATION, UNSPECIFIED: ICD-10-CM

## 2022-01-01 DIAGNOSIS — R10.9 UNSPECIFIED ABDOMINAL PAIN: ICD-10-CM

## 2022-01-01 DIAGNOSIS — C49.9 MALIGNANT NEOPLASM OF CONNECTIVE AND SOFT TISSUE, UNSPECIFIED: ICD-10-CM

## 2022-01-01 DIAGNOSIS — C49.9 MALIGNANT NEOPLASM OF CONNECTIVE AND SOFT TISSUE, UNSPECIFIED: Chronic | ICD-10-CM

## 2022-01-01 DIAGNOSIS — K56.609 UNSPECIFIED INTESTINAL OBSTRUCTION, UNSPECIFIED AS TO PARTIAL VERSUS COMPLETE OBSTRUCTION: ICD-10-CM

## 2022-01-01 DIAGNOSIS — K63.1 PERFORATION OF INTESTINE (NONTRAUMATIC): ICD-10-CM

## 2022-01-01 DIAGNOSIS — R52 PAIN, UNSPECIFIED: ICD-10-CM

## 2022-01-01 LAB
ALBUMIN SERPL ELPH-MCNC: 2.4 G/DL — LOW (ref 3.3–5.2)
ALBUMIN SERPL ELPH-MCNC: 3.1 G/DL — LOW (ref 3.3–5.2)
ALP SERPL-CCNC: 66 U/L — SIGNIFICANT CHANGE UP (ref 40–120)
ALP SERPL-CCNC: 78 U/L — SIGNIFICANT CHANGE UP (ref 40–120)
ALT FLD-CCNC: 25 U/L — SIGNIFICANT CHANGE UP
ALT FLD-CCNC: <5 U/L — SIGNIFICANT CHANGE UP
ANION GAP SERPL CALC-SCNC: 10 MMOL/L — SIGNIFICANT CHANGE UP (ref 5–17)
ANION GAP SERPL CALC-SCNC: 11 MMOL/L — SIGNIFICANT CHANGE UP (ref 5–17)
ANION GAP SERPL CALC-SCNC: 13 MMOL/L — SIGNIFICANT CHANGE UP (ref 5–17)
ANION GAP SERPL CALC-SCNC: 13 MMOL/L — SIGNIFICANT CHANGE UP (ref 5–17)
ANION GAP SERPL CALC-SCNC: 15 MMOL/L — SIGNIFICANT CHANGE UP (ref 5–17)
ANION GAP SERPL CALC-SCNC: 16 MMOL/L — SIGNIFICANT CHANGE UP (ref 5–17)
ANION GAP SERPL CALC-SCNC: 22 MMOL/L — HIGH (ref 5–17)
ANISOCYTOSIS BLD QL: SLIGHT — SIGNIFICANT CHANGE UP
APTT BLD: 24.9 SEC — LOW (ref 27.5–35.5)
APTT BLD: 32.8 SEC — SIGNIFICANT CHANGE UP (ref 27.5–35.5)
AST SERPL-CCNC: 10 U/L — SIGNIFICANT CHANGE UP
AST SERPL-CCNC: 72 U/L — HIGH
BASE EXCESS BLDV CALC-SCNC: -13 MMOL/L — LOW (ref -2–3)
BASOPHILS # BLD AUTO: 0 K/UL — SIGNIFICANT CHANGE UP (ref 0–0.2)
BASOPHILS # BLD AUTO: 0 K/UL — SIGNIFICANT CHANGE UP (ref 0–0.2)
BASOPHILS # BLD AUTO: 0.03 K/UL — SIGNIFICANT CHANGE UP (ref 0–0.2)
BASOPHILS # BLD AUTO: 0.04 K/UL — SIGNIFICANT CHANGE UP (ref 0–0.2)
BASOPHILS # BLD AUTO: 0.04 K/UL — SIGNIFICANT CHANGE UP (ref 0–0.2)
BASOPHILS # BLD AUTO: 0.07 K/UL — SIGNIFICANT CHANGE UP (ref 0–0.2)
BASOPHILS NFR BLD AUTO: 0 % — SIGNIFICANT CHANGE UP (ref 0–2)
BASOPHILS NFR BLD AUTO: 0 % — SIGNIFICANT CHANGE UP (ref 0–2)
BASOPHILS NFR BLD AUTO: 0.4 % — SIGNIFICANT CHANGE UP (ref 0–2)
BASOPHILS NFR BLD AUTO: 0.5 % — SIGNIFICANT CHANGE UP (ref 0–2)
BASOPHILS NFR BLD AUTO: 0.6 % — SIGNIFICANT CHANGE UP (ref 0–2)
BASOPHILS NFR BLD AUTO: 0.9 % — SIGNIFICANT CHANGE UP (ref 0–2)
BILIRUB SERPL-MCNC: 0.3 MG/DL — LOW (ref 0.4–2)
BILIRUB SERPL-MCNC: 0.3 MG/DL — LOW (ref 0.4–2)
BLD GP AB SCN SERPL QL: SIGNIFICANT CHANGE UP
BUN SERPL-MCNC: 16.9 MG/DL — SIGNIFICANT CHANGE UP (ref 8–20)
BUN SERPL-MCNC: 16.9 MG/DL — SIGNIFICANT CHANGE UP (ref 8–20)
BUN SERPL-MCNC: 18.8 MG/DL — SIGNIFICANT CHANGE UP (ref 8–20)
BUN SERPL-MCNC: 19.4 MG/DL — SIGNIFICANT CHANGE UP (ref 8–20)
BUN SERPL-MCNC: 20.9 MG/DL — HIGH (ref 8–20)
BUN SERPL-MCNC: 27 MG/DL — HIGH (ref 8–20)
BUN SERPL-MCNC: 30.8 MG/DL — HIGH (ref 8–20)
BUN SERPL-MCNC: 33.2 MG/DL — HIGH (ref 8–20)
BUN SERPL-MCNC: 35.2 MG/DL — HIGH (ref 8–20)
BURR CELLS BLD QL SMEAR: PRESENT — SIGNIFICANT CHANGE UP
CA-I SERPL-SCNC: 1.14 MMOL/L — LOW (ref 1.15–1.33)
CALCIUM SERPL-MCNC: 7.9 MG/DL — LOW (ref 8.6–10.2)
CALCIUM SERPL-MCNC: 8 MG/DL — LOW (ref 8.6–10.2)
CALCIUM SERPL-MCNC: 8.1 MG/DL — LOW (ref 8.6–10.2)
CALCIUM SERPL-MCNC: 8.1 MG/DL — LOW (ref 8.6–10.2)
CALCIUM SERPL-MCNC: 8.7 MG/DL — SIGNIFICANT CHANGE UP (ref 8.6–10.2)
CALCIUM SERPL-MCNC: 9 MG/DL — SIGNIFICANT CHANGE UP (ref 8.6–10.2)
CALCIUM SERPL-MCNC: 9.1 MG/DL — SIGNIFICANT CHANGE UP (ref 8.6–10.2)
CALCIUM SERPL-MCNC: 9.2 MG/DL — SIGNIFICANT CHANGE UP (ref 8.6–10.2)
CALCIUM SERPL-MCNC: 9.4 MG/DL — SIGNIFICANT CHANGE UP (ref 8.6–10.2)
CHLORIDE BLDV-SCNC: 98 MMOL/L — SIGNIFICANT CHANGE UP (ref 98–107)
CHLORIDE SERPL-SCNC: 100 MMOL/L — SIGNIFICANT CHANGE UP (ref 98–107)
CHLORIDE SERPL-SCNC: 102 MMOL/L — SIGNIFICANT CHANGE UP (ref 98–107)
CHLORIDE SERPL-SCNC: 102 MMOL/L — SIGNIFICANT CHANGE UP (ref 98–107)
CHLORIDE SERPL-SCNC: 103 MMOL/L — SIGNIFICANT CHANGE UP (ref 98–107)
CHLORIDE SERPL-SCNC: 94 MMOL/L — LOW (ref 98–107)
CHLORIDE SERPL-SCNC: 98 MMOL/L — SIGNIFICANT CHANGE UP (ref 98–107)
CHLORIDE SERPL-SCNC: 98 MMOL/L — SIGNIFICANT CHANGE UP (ref 98–107)
CO2 SERPL-SCNC: 16 MMOL/L — LOW (ref 22–29)
CO2 SERPL-SCNC: 19 MMOL/L — LOW (ref 22–29)
CO2 SERPL-SCNC: 20 MMOL/L — LOW (ref 22–29)
CO2 SERPL-SCNC: 23 MMOL/L — SIGNIFICANT CHANGE UP (ref 22–29)
CO2 SERPL-SCNC: 24 MMOL/L — SIGNIFICANT CHANGE UP (ref 22–29)
CO2 SERPL-SCNC: 26 MMOL/L — SIGNIFICANT CHANGE UP (ref 22–29)
CO2 SERPL-SCNC: 30 MMOL/L — HIGH (ref 22–29)
CREAT SERPL-MCNC: 0.44 MG/DL — LOW (ref 0.5–1.3)
CREAT SERPL-MCNC: 0.46 MG/DL — LOW (ref 0.5–1.3)
CREAT SERPL-MCNC: 0.51 MG/DL — SIGNIFICANT CHANGE UP (ref 0.5–1.3)
CREAT SERPL-MCNC: 0.55 MG/DL — SIGNIFICANT CHANGE UP (ref 0.5–1.3)
CREAT SERPL-MCNC: 0.62 MG/DL — SIGNIFICANT CHANGE UP (ref 0.5–1.3)
CREAT SERPL-MCNC: 0.79 MG/DL — SIGNIFICANT CHANGE UP (ref 0.5–1.3)
CREAT SERPL-MCNC: 0.9 MG/DL — SIGNIFICANT CHANGE UP (ref 0.5–1.3)
CREAT SERPL-MCNC: 0.94 MG/DL — SIGNIFICANT CHANGE UP (ref 0.5–1.3)
CREAT SERPL-MCNC: 2.24 MG/DL — HIGH (ref 0.5–1.3)
EOSINOPHIL # BLD AUTO: 0 K/UL — SIGNIFICANT CHANGE UP (ref 0–0.5)
EOSINOPHIL # BLD AUTO: 0.01 K/UL — SIGNIFICANT CHANGE UP (ref 0–0.5)
EOSINOPHIL # BLD AUTO: 0.1 K/UL — SIGNIFICANT CHANGE UP (ref 0–0.5)
EOSINOPHIL # BLD AUTO: 0.15 K/UL — SIGNIFICANT CHANGE UP (ref 0–0.5)
EOSINOPHIL NFR BLD AUTO: 0 % — SIGNIFICANT CHANGE UP (ref 0–6)
EOSINOPHIL NFR BLD AUTO: 0.1 % — SIGNIFICANT CHANGE UP (ref 0–6)
EOSINOPHIL NFR BLD AUTO: 1.4 % — SIGNIFICANT CHANGE UP (ref 0–6)
EOSINOPHIL NFR BLD AUTO: 1.9 % — SIGNIFICANT CHANGE UP (ref 0–6)
GAS PNL BLDV: 127 MMOL/L — LOW (ref 136–145)
GAS PNL BLDV: SIGNIFICANT CHANGE UP
GIANT PLATELETS BLD QL SMEAR: PRESENT — SIGNIFICANT CHANGE UP
GLUCOSE BLDV-MCNC: 101 MG/DL — HIGH (ref 70–99)
GLUCOSE SERPL-MCNC: 103 MG/DL — HIGH (ref 70–99)
GLUCOSE SERPL-MCNC: 103 MG/DL — HIGH (ref 70–99)
GLUCOSE SERPL-MCNC: 105 MG/DL — HIGH (ref 70–99)
GLUCOSE SERPL-MCNC: 55 MG/DL — LOW (ref 70–99)
GLUCOSE SERPL-MCNC: 70 MG/DL — SIGNIFICANT CHANGE UP (ref 70–99)
GLUCOSE SERPL-MCNC: 77 MG/DL — SIGNIFICANT CHANGE UP (ref 70–99)
GLUCOSE SERPL-MCNC: 86 MG/DL — SIGNIFICANT CHANGE UP (ref 70–99)
GLUCOSE SERPL-MCNC: 88 MG/DL — SIGNIFICANT CHANGE UP (ref 70–99)
GLUCOSE SERPL-MCNC: 99 MG/DL — SIGNIFICANT CHANGE UP (ref 70–99)
HCO3 BLDV-SCNC: 16 MMOL/L — LOW (ref 22–29)
HCT VFR BLD CALC: 36.5 % — SIGNIFICANT CHANGE UP (ref 34.5–45)
HCT VFR BLD CALC: 38 % — SIGNIFICANT CHANGE UP (ref 34.5–45)
HCT VFR BLD CALC: 38.6 % — SIGNIFICANT CHANGE UP (ref 34.5–45)
HCT VFR BLD CALC: 39.8 % — SIGNIFICANT CHANGE UP (ref 34.5–45)
HCT VFR BLD CALC: 39.9 % — SIGNIFICANT CHANGE UP (ref 34.5–45)
HCT VFR BLD CALC: 40.8 % — SIGNIFICANT CHANGE UP (ref 34.5–45)
HCT VFR BLD CALC: 47.1 % — HIGH (ref 34.5–45)
HCT VFR BLDA CALC: 46 % — SIGNIFICANT CHANGE UP (ref 34–46)
HGB BLD CALC-MCNC: 15.2 G/DL — SIGNIFICANT CHANGE UP (ref 11.7–16.1)
HGB BLD-MCNC: 11.9 G/DL — SIGNIFICANT CHANGE UP (ref 11.5–15.5)
HGB BLD-MCNC: 12.6 G/DL — SIGNIFICANT CHANGE UP (ref 11.5–15.5)
HGB BLD-MCNC: 12.8 G/DL — SIGNIFICANT CHANGE UP (ref 11.5–15.5)
HGB BLD-MCNC: 13 G/DL — SIGNIFICANT CHANGE UP (ref 11.5–15.5)
HGB BLD-MCNC: 13.2 G/DL — SIGNIFICANT CHANGE UP (ref 11.5–15.5)
HGB BLD-MCNC: 13.7 G/DL — SIGNIFICANT CHANGE UP (ref 11.5–15.5)
HGB BLD-MCNC: 15 G/DL — SIGNIFICANT CHANGE UP (ref 11.5–15.5)
IMM GRANULOCYTES NFR BLD AUTO: 0.4 % — SIGNIFICANT CHANGE UP (ref 0–1.5)
IMM GRANULOCYTES NFR BLD AUTO: 0.4 % — SIGNIFICANT CHANGE UP (ref 0–1.5)
IMM GRANULOCYTES NFR BLD AUTO: 0.6 % — SIGNIFICANT CHANGE UP (ref 0–1.5)
INR BLD: 1.01 RATIO — SIGNIFICANT CHANGE UP (ref 0.88–1.16)
INR BLD: 1.15 RATIO — SIGNIFICANT CHANGE UP (ref 0.88–1.16)
LACTATE BLDV-MCNC: 1.7 MMOL/L — SIGNIFICANT CHANGE UP (ref 0.5–2)
LACTATE BLDV-MCNC: 11 MMOL/L — CRITICAL HIGH (ref 0.5–2)
LACTATE SERPL-SCNC: 1.5 MMOL/L — SIGNIFICANT CHANGE UP (ref 0.5–2)
LYMPHOCYTES # BLD AUTO: 0.21 K/UL — LOW (ref 1–3.3)
LYMPHOCYTES # BLD AUTO: 0.33 K/UL — LOW (ref 1–3.3)
LYMPHOCYTES # BLD AUTO: 0.36 K/UL — LOW (ref 1–3.3)
LYMPHOCYTES # BLD AUTO: 0.37 K/UL — LOW (ref 1–3.3)
LYMPHOCYTES # BLD AUTO: 0.45 K/UL — LOW (ref 1–3.3)
LYMPHOCYTES # BLD AUTO: 0.52 K/UL — LOW (ref 1–3.3)
LYMPHOCYTES # BLD AUTO: 2.6 % — LOW (ref 13–44)
LYMPHOCYTES # BLD AUTO: 4 % — LOW (ref 13–44)
LYMPHOCYTES # BLD AUTO: 4.4 % — LOW (ref 13–44)
LYMPHOCYTES # BLD AUTO: 5.1 % — LOW (ref 13–44)
LYMPHOCYTES # BLD AUTO: 6.1 % — LOW (ref 13–44)
LYMPHOCYTES # BLD AUTO: 6.6 % — LOW (ref 13–44)
MACROCYTES BLD QL: SLIGHT — SIGNIFICANT CHANGE UP
MAGNESIUM SERPL-MCNC: 1.8 MG/DL — SIGNIFICANT CHANGE UP (ref 1.6–2.6)
MAGNESIUM SERPL-MCNC: 2 MG/DL — SIGNIFICANT CHANGE UP (ref 1.6–2.6)
MAGNESIUM SERPL-MCNC: 2.1 MG/DL — SIGNIFICANT CHANGE UP (ref 1.6–2.6)
MAGNESIUM SERPL-MCNC: 2.2 MG/DL — SIGNIFICANT CHANGE UP (ref 1.6–2.6)
MAGNESIUM SERPL-MCNC: 2.3 MG/DL — SIGNIFICANT CHANGE UP (ref 1.8–2.6)
MAGNESIUM SERPL-MCNC: 2.4 MG/DL — SIGNIFICANT CHANGE UP (ref 1.6–2.6)
MAGNESIUM SERPL-MCNC: 2.4 MG/DL — SIGNIFICANT CHANGE UP (ref 1.8–2.6)
MANUAL SMEAR VERIFICATION: SIGNIFICANT CHANGE UP
MANUAL SMEAR VERIFICATION: SIGNIFICANT CHANGE UP
MCHC RBC-ENTMCNC: 31.8 GM/DL — LOW (ref 32–36)
MCHC RBC-ENTMCNC: 32.6 GM/DL — SIGNIFICANT CHANGE UP (ref 32–36)
MCHC RBC-ENTMCNC: 32.6 GM/DL — SIGNIFICANT CHANGE UP (ref 32–36)
MCHC RBC-ENTMCNC: 32.6 PG — SIGNIFICANT CHANGE UP (ref 27–34)
MCHC RBC-ENTMCNC: 32.7 GM/DL — SIGNIFICANT CHANGE UP (ref 32–36)
MCHC RBC-ENTMCNC: 32.8 PG — SIGNIFICANT CHANGE UP (ref 27–34)
MCHC RBC-ENTMCNC: 32.8 PG — SIGNIFICANT CHANGE UP (ref 27–34)
MCHC RBC-ENTMCNC: 32.9 PG — SIGNIFICANT CHANGE UP (ref 27–34)
MCHC RBC-ENTMCNC: 33.1 GM/DL — SIGNIFICANT CHANGE UP (ref 32–36)
MCHC RBC-ENTMCNC: 33.2 PG — SIGNIFICANT CHANGE UP (ref 27–34)
MCHC RBC-ENTMCNC: 33.2 PG — SIGNIFICANT CHANGE UP (ref 27–34)
MCHC RBC-ENTMCNC: 33.5 PG — SIGNIFICANT CHANGE UP (ref 27–34)
MCHC RBC-ENTMCNC: 33.6 GM/DL — SIGNIFICANT CHANGE UP (ref 32–36)
MCHC RBC-ENTMCNC: 33.7 GM/DL — SIGNIFICANT CHANGE UP (ref 32–36)
MCV RBC AUTO: 100.6 FL — HIGH (ref 80–100)
MCV RBC AUTO: 101.6 FL — HIGH (ref 80–100)
MCV RBC AUTO: 102.4 FL — HIGH (ref 80–100)
MCV RBC AUTO: 102.6 FL — HIGH (ref 80–100)
MCV RBC AUTO: 97.7 FL — SIGNIFICANT CHANGE UP (ref 80–100)
MCV RBC AUTO: 98.8 FL — SIGNIFICANT CHANGE UP (ref 80–100)
MCV RBC AUTO: 99.3 FL — SIGNIFICANT CHANGE UP (ref 80–100)
METAMYELOCYTES # FLD: 2 % — HIGH (ref 0–0)
MONOCYTES # BLD AUTO: 0.41 K/UL — SIGNIFICANT CHANGE UP (ref 0–0.9)
MONOCYTES # BLD AUTO: 0.56 K/UL — SIGNIFICANT CHANGE UP (ref 0–0.9)
MONOCYTES # BLD AUTO: 0.73 K/UL — SIGNIFICANT CHANGE UP (ref 0–0.9)
MONOCYTES # BLD AUTO: 0.77 K/UL — SIGNIFICANT CHANGE UP (ref 0–0.9)
MONOCYTES # BLD AUTO: 0.78 K/UL — SIGNIFICANT CHANGE UP (ref 0–0.9)
MONOCYTES # BLD AUTO: 0.91 K/UL — HIGH (ref 0–0.9)
MONOCYTES NFR BLD AUTO: 12.2 % — SIGNIFICANT CHANGE UP (ref 2–14)
MONOCYTES NFR BLD AUTO: 5 % — SIGNIFICANT CHANGE UP (ref 2–14)
MONOCYTES NFR BLD AUTO: 7.7 % — SIGNIFICANT CHANGE UP (ref 2–14)
MONOCYTES NFR BLD AUTO: 9.2 % — SIGNIFICANT CHANGE UP (ref 2–14)
MONOCYTES NFR BLD AUTO: 9.6 % — SIGNIFICANT CHANGE UP (ref 2–14)
MONOCYTES NFR BLD AUTO: 9.7 % — SIGNIFICANT CHANGE UP (ref 2–14)
MYELOCYTES NFR BLD: 1.5 % — HIGH (ref 0–0)
NEUTROPHILS # BLD AUTO: 6.07 K/UL — SIGNIFICANT CHANGE UP (ref 1.8–7.4)
NEUTROPHILS # BLD AUTO: 6.16 K/UL — SIGNIFICANT CHANGE UP (ref 1.8–7.4)
NEUTROPHILS # BLD AUTO: 6.43 K/UL — SIGNIFICANT CHANGE UP (ref 1.8–7.4)
NEUTROPHILS # BLD AUTO: 6.91 K/UL — SIGNIFICANT CHANGE UP (ref 1.8–7.4)
NEUTROPHILS # BLD AUTO: 6.91 K/UL — SIGNIFICANT CHANGE UP (ref 1.8–7.4)
NEUTROPHILS # BLD AUTO: 7.24 K/UL — SIGNIFICANT CHANGE UP (ref 1.8–7.4)
NEUTROPHILS NFR BLD AUTO: 63.5 % — SIGNIFICANT CHANGE UP (ref 43–77)
NEUTROPHILS NFR BLD AUTO: 81.4 % — HIGH (ref 43–77)
NEUTROPHILS NFR BLD AUTO: 81.7 % — HIGH (ref 43–77)
NEUTROPHILS NFR BLD AUTO: 84.6 % — HIGH (ref 43–77)
NEUTROPHILS NFR BLD AUTO: 85.1 % — HIGH (ref 43–77)
NEUTROPHILS NFR BLD AUTO: 86.8 % — HIGH (ref 43–77)
NEUTS BAND # BLD: 24 % — HIGH (ref 0–8)
NRBC # BLD: 0 /100 — SIGNIFICANT CHANGE UP (ref 0–0)
OVALOCYTES BLD QL SMEAR: SLIGHT — SIGNIFICANT CHANGE UP
PCO2 BLDV: 44 MMHG — HIGH (ref 39–42)
PH BLDV: 7.16 — CRITICAL LOW (ref 7.32–7.43)
PHOSPHATE SERPL-MCNC: 1.8 MG/DL — LOW (ref 2.4–4.7)
PHOSPHATE SERPL-MCNC: 2 MG/DL — LOW (ref 2.4–4.7)
PHOSPHATE SERPL-MCNC: 2.4 MG/DL — SIGNIFICANT CHANGE UP (ref 2.4–4.7)
PHOSPHATE SERPL-MCNC: 2.8 MG/DL — SIGNIFICANT CHANGE UP (ref 2.4–4.7)
PHOSPHATE SERPL-MCNC: 4.2 MG/DL — SIGNIFICANT CHANGE UP (ref 2.4–4.7)
PHOSPHATE SERPL-MCNC: 4.3 MG/DL — SIGNIFICANT CHANGE UP (ref 2.4–4.7)
PLAT MORPH BLD: NORMAL — SIGNIFICANT CHANGE UP
PLAT MORPH BLD: NORMAL — SIGNIFICANT CHANGE UP
PLATELET # BLD AUTO: 210 K/UL — SIGNIFICANT CHANGE UP (ref 150–400)
PLATELET # BLD AUTO: 222 K/UL — SIGNIFICANT CHANGE UP (ref 150–400)
PLATELET # BLD AUTO: 228 K/UL — SIGNIFICANT CHANGE UP (ref 150–400)
PLATELET # BLD AUTO: 247 K/UL — SIGNIFICANT CHANGE UP (ref 150–400)
PLATELET # BLD AUTO: 255 K/UL — SIGNIFICANT CHANGE UP (ref 150–400)
PLATELET # BLD AUTO: 313 K/UL — SIGNIFICANT CHANGE UP (ref 150–400)
PLATELET # BLD AUTO: 330 K/UL — SIGNIFICANT CHANGE UP (ref 150–400)
PO2 BLDV: <42 MMHG — SIGNIFICANT CHANGE UP (ref 25–45)
POTASSIUM BLDV-SCNC: 5.7 MMOL/L — HIGH (ref 3.5–5.1)
POTASSIUM SERPL-MCNC: 3.6 MMOL/L — SIGNIFICANT CHANGE UP (ref 3.5–5.3)
POTASSIUM SERPL-MCNC: 3.7 MMOL/L — SIGNIFICANT CHANGE UP (ref 3.5–5.3)
POTASSIUM SERPL-MCNC: 3.8 MMOL/L — SIGNIFICANT CHANGE UP (ref 3.5–5.3)
POTASSIUM SERPL-MCNC: 4 MMOL/L — SIGNIFICANT CHANGE UP (ref 3.5–5.3)
POTASSIUM SERPL-MCNC: 4.1 MMOL/L — SIGNIFICANT CHANGE UP (ref 3.5–5.3)
POTASSIUM SERPL-MCNC: 4.2 MMOL/L — SIGNIFICANT CHANGE UP (ref 3.5–5.3)
POTASSIUM SERPL-MCNC: 4.3 MMOL/L — SIGNIFICANT CHANGE UP (ref 3.5–5.3)
POTASSIUM SERPL-MCNC: 4.3 MMOL/L — SIGNIFICANT CHANGE UP (ref 3.5–5.3)
POTASSIUM SERPL-MCNC: 5.8 MMOL/L — HIGH (ref 3.5–5.3)
POTASSIUM SERPL-MCNC: 6.1 MMOL/L — CRITICAL HIGH (ref 3.5–5.3)
POTASSIUM SERPL-SCNC: 3.6 MMOL/L — SIGNIFICANT CHANGE UP (ref 3.5–5.3)
POTASSIUM SERPL-SCNC: 3.7 MMOL/L — SIGNIFICANT CHANGE UP (ref 3.5–5.3)
POTASSIUM SERPL-SCNC: 3.8 MMOL/L — SIGNIFICANT CHANGE UP (ref 3.5–5.3)
POTASSIUM SERPL-SCNC: 4 MMOL/L — SIGNIFICANT CHANGE UP (ref 3.5–5.3)
POTASSIUM SERPL-SCNC: 4.1 MMOL/L — SIGNIFICANT CHANGE UP (ref 3.5–5.3)
POTASSIUM SERPL-SCNC: 4.2 MMOL/L — SIGNIFICANT CHANGE UP (ref 3.5–5.3)
POTASSIUM SERPL-SCNC: 4.3 MMOL/L — SIGNIFICANT CHANGE UP (ref 3.5–5.3)
POTASSIUM SERPL-SCNC: 4.3 MMOL/L — SIGNIFICANT CHANGE UP (ref 3.5–5.3)
POTASSIUM SERPL-SCNC: 5.8 MMOL/L — HIGH (ref 3.5–5.3)
POTASSIUM SERPL-SCNC: 6.1 MMOL/L — CRITICAL HIGH (ref 3.5–5.3)
PROT SERPL-MCNC: 5.6 G/DL — LOW (ref 6.6–8.7)
PROT SERPL-MCNC: 6 G/DL — LOW (ref 6.6–8.7)
PROTHROM AB SERPL-ACNC: 11.7 SEC — SIGNIFICANT CHANGE UP (ref 10.6–13.6)
PROTHROM AB SERPL-ACNC: 13.2 SEC — SIGNIFICANT CHANGE UP (ref 10.6–13.6)
RBC # BLD: 3.63 M/UL — LOW (ref 3.8–5.2)
RBC # BLD: 3.8 M/UL — SIGNIFICANT CHANGE UP (ref 3.8–5.2)
RBC # BLD: 3.88 M/UL — SIGNIFICANT CHANGE UP (ref 3.8–5.2)
RBC # BLD: 3.89 M/UL — SIGNIFICANT CHANGE UP (ref 3.8–5.2)
RBC # BLD: 4.02 M/UL — SIGNIFICANT CHANGE UP (ref 3.8–5.2)
RBC # BLD: 4.13 M/UL — SIGNIFICANT CHANGE UP (ref 3.8–5.2)
RBC # BLD: 4.6 M/UL — SIGNIFICANT CHANGE UP (ref 3.8–5.2)
RBC # FLD: 12.5 % — SIGNIFICANT CHANGE UP (ref 10.3–14.5)
RBC # FLD: 12.6 % — SIGNIFICANT CHANGE UP (ref 10.3–14.5)
RBC # FLD: 12.7 % — SIGNIFICANT CHANGE UP (ref 10.3–14.5)
RBC # FLD: 13 % — SIGNIFICANT CHANGE UP (ref 10.3–14.5)
RBC # FLD: 13.1 % — SIGNIFICANT CHANGE UP (ref 10.3–14.5)
RBC # FLD: 13.1 % — SIGNIFICANT CHANGE UP (ref 10.3–14.5)
RBC # FLD: 13.3 % — SIGNIFICANT CHANGE UP (ref 10.3–14.5)
RBC BLD AUTO: ABNORMAL
RBC BLD AUTO: NORMAL — SIGNIFICANT CHANGE UP
SAO2 % BLDV: 62.5 % — SIGNIFICANT CHANGE UP
SARS-COV-2 RNA SPEC QL NAA+PROBE: SIGNIFICANT CHANGE UP
SMUDGE CELLS # BLD: PRESENT — SIGNIFICANT CHANGE UP
SODIUM SERPL-SCNC: 132 MMOL/L — LOW (ref 135–145)
SODIUM SERPL-SCNC: 136 MMOL/L — SIGNIFICANT CHANGE UP (ref 135–145)
SODIUM SERPL-SCNC: 136 MMOL/L — SIGNIFICANT CHANGE UP (ref 135–145)
SODIUM SERPL-SCNC: 137 MMOL/L — SIGNIFICANT CHANGE UP (ref 135–145)
SODIUM SERPL-SCNC: 139 MMOL/L — SIGNIFICANT CHANGE UP (ref 135–145)
SODIUM SERPL-SCNC: 142 MMOL/L — SIGNIFICANT CHANGE UP (ref 135–145)
SODIUM SERPL-SCNC: 143 MMOL/L — SIGNIFICANT CHANGE UP (ref 135–145)
WBC # BLD: 7.04 K/UL — SIGNIFICANT CHANGE UP (ref 3.8–10.5)
WBC # BLD: 7.27 K/UL — SIGNIFICANT CHANGE UP (ref 3.8–10.5)
WBC # BLD: 7.43 K/UL — SIGNIFICANT CHANGE UP (ref 3.8–10.5)
WBC # BLD: 7.9 K/UL — SIGNIFICANT CHANGE UP (ref 3.8–10.5)
WBC # BLD: 7.96 K/UL — SIGNIFICANT CHANGE UP (ref 3.8–10.5)
WBC # BLD: 8.12 K/UL — SIGNIFICANT CHANGE UP (ref 3.8–10.5)
WBC # BLD: 8.27 K/UL — SIGNIFICANT CHANGE UP (ref 3.8–10.5)
WBC # FLD AUTO: 7.04 K/UL — SIGNIFICANT CHANGE UP (ref 3.8–10.5)
WBC # FLD AUTO: 7.27 K/UL — SIGNIFICANT CHANGE UP (ref 3.8–10.5)
WBC # FLD AUTO: 7.43 K/UL — SIGNIFICANT CHANGE UP (ref 3.8–10.5)
WBC # FLD AUTO: 7.9 K/UL — SIGNIFICANT CHANGE UP (ref 3.8–10.5)
WBC # FLD AUTO: 7.96 K/UL — SIGNIFICANT CHANGE UP (ref 3.8–10.5)
WBC # FLD AUTO: 8.12 K/UL — SIGNIFICANT CHANGE UP (ref 3.8–10.5)
WBC # FLD AUTO: 8.27 K/UL — SIGNIFICANT CHANGE UP (ref 3.8–10.5)

## 2022-01-01 PROCEDURE — 93005 ELECTROCARDIOGRAM TRACING: CPT

## 2022-01-01 PROCEDURE — 85610 PROTHROMBIN TIME: CPT

## 2022-01-01 PROCEDURE — U0003: CPT

## 2022-01-01 PROCEDURE — 96365 THER/PROPH/DIAG IV INF INIT: CPT

## 2022-01-01 PROCEDURE — 99231 SBSQ HOSP IP/OBS SF/LOW 25: CPT

## 2022-01-01 PROCEDURE — 85730 THROMBOPLASTIN TIME PARTIAL: CPT

## 2022-01-01 PROCEDURE — 80053 COMPREHEN METABOLIC PANEL: CPT

## 2022-01-01 PROCEDURE — 96374 THER/PROPH/DIAG INJ IV PUSH: CPT | Mod: XU

## 2022-01-01 PROCEDURE — 99442: CPT

## 2022-01-01 PROCEDURE — 82947 ASSAY GLUCOSE BLOOD QUANT: CPT

## 2022-01-01 PROCEDURE — 99239 HOSP IP/OBS DSCHRG MGMT >30: CPT

## 2022-01-01 PROCEDURE — 82803 BLOOD GASES ANY COMBINATION: CPT

## 2022-01-01 PROCEDURE — 99291 CRITICAL CARE FIRST HOUR: CPT

## 2022-01-01 PROCEDURE — 71045 X-RAY EXAM CHEST 1 VIEW: CPT | Mod: 26

## 2022-01-01 PROCEDURE — 85027 COMPLETE CBC AUTOMATED: CPT

## 2022-01-01 PROCEDURE — U0005: CPT

## 2022-01-01 PROCEDURE — 86901 BLOOD TYPING SEROLOGIC RH(D): CPT

## 2022-01-01 PROCEDURE — 84132 ASSAY OF SERUM POTASSIUM: CPT

## 2022-01-01 PROCEDURE — 74177 CT ABD & PELVIS W/CONTRAST: CPT | Mod: MA

## 2022-01-01 PROCEDURE — 85025 COMPLETE CBC W/AUTO DIFF WBC: CPT

## 2022-01-01 PROCEDURE — 74174 CTA ABD&PLVS W/CONTRAST: CPT | Mod: 26,MA

## 2022-01-01 PROCEDURE — 77427 RADIATION TX MANAGEMENT X5: CPT

## 2022-01-01 PROCEDURE — 86850 RBC ANTIBODY SCREEN: CPT

## 2022-01-01 PROCEDURE — 96376 TX/PRO/DX INJ SAME DRUG ADON: CPT

## 2022-01-01 PROCEDURE — 99285 EMERGENCY DEPT VISIT HI MDM: CPT

## 2022-01-01 PROCEDURE — 99223 1ST HOSP IP/OBS HIGH 75: CPT

## 2022-01-01 PROCEDURE — 96375 TX/PRO/DX INJ NEW DRUG ADDON: CPT

## 2022-01-01 PROCEDURE — 74018 RADEX ABDOMEN 1 VIEW: CPT

## 2022-01-01 PROCEDURE — 74174 CTA ABD&PLVS W/CONTRAST: CPT | Mod: MA

## 2022-01-01 PROCEDURE — 74177 CT ABD & PELVIS W/CONTRAST: CPT | Mod: 26,MA

## 2022-01-01 PROCEDURE — 99221 1ST HOSP IP/OBS SF/LOW 40: CPT

## 2022-01-01 PROCEDURE — 83735 ASSAY OF MAGNESIUM: CPT

## 2022-01-01 PROCEDURE — 85018 HEMOGLOBIN: CPT

## 2022-01-01 PROCEDURE — 86900 BLOOD TYPING SEROLOGIC ABO: CPT

## 2022-01-01 PROCEDURE — 71275 CT ANGIOGRAPHY CHEST: CPT | Mod: 26,MA

## 2022-01-01 PROCEDURE — 36415 COLL VENOUS BLD VENIPUNCTURE: CPT

## 2022-01-01 PROCEDURE — 0225U NFCT DS DNA&RNA 21 SARSCOV2: CPT

## 2022-01-01 PROCEDURE — 83605 ASSAY OF LACTIC ACID: CPT

## 2022-01-01 PROCEDURE — 71045 X-RAY EXAM CHEST 1 VIEW: CPT

## 2022-01-01 PROCEDURE — 71275 CT ANGIOGRAPHY CHEST: CPT | Mod: MA

## 2022-01-01 PROCEDURE — L8699: CPT

## 2022-01-01 PROCEDURE — 80048 BASIC METABOLIC PNL TOTAL CA: CPT

## 2022-01-01 PROCEDURE — 99285 EMERGENCY DEPT VISIT HI MDM: CPT | Mod: 25

## 2022-01-01 PROCEDURE — 85014 HEMATOCRIT: CPT

## 2022-01-01 PROCEDURE — 84295 ASSAY OF SERUM SODIUM: CPT

## 2022-01-01 PROCEDURE — 93010 ELECTROCARDIOGRAM REPORT: CPT

## 2022-01-01 PROCEDURE — C9399: CPT

## 2022-01-01 PROCEDURE — 84100 ASSAY OF PHOSPHORUS: CPT

## 2022-01-01 PROCEDURE — 74019 RADEX ABDOMEN 2 VIEWS: CPT | Mod: 26

## 2022-01-01 PROCEDURE — 74018 RADEX ABDOMEN 1 VIEW: CPT | Mod: 26

## 2022-01-01 PROCEDURE — 96367 TX/PROPH/DG ADDL SEQ IV INF: CPT

## 2022-01-01 PROCEDURE — 82435 ASSAY OF BLOOD CHLORIDE: CPT

## 2022-01-01 PROCEDURE — 82330 ASSAY OF CALCIUM: CPT

## 2022-01-01 DEVICE — IMPLANTABLE DEVICE: Type: IMPLANTABLE DEVICE | Status: FUNCTIONAL

## 2022-01-01 RX ORDER — ONDANSETRON 8 MG/1
4 TABLET, FILM COATED ORAL EVERY 6 HOURS
Refills: 0 | Status: DISCONTINUED | OUTPATIENT
Start: 2022-01-01 | End: 2022-01-01

## 2022-01-01 RX ORDER — METRONIDAZOLE 500 MG
500 TABLET ORAL EVERY 8 HOURS
Refills: 0 | Status: DISCONTINUED | OUTPATIENT
Start: 2022-01-01 | End: 2022-01-01

## 2022-01-01 RX ORDER — SODIUM CHLORIDE 9 MG/ML
1000 INJECTION INTRAMUSCULAR; INTRAVENOUS; SUBCUTANEOUS ONCE
Refills: 0 | Status: COMPLETED | OUTPATIENT
Start: 2022-01-01 | End: 2022-01-01

## 2022-01-01 RX ORDER — MORPHINE SULFATE 50 MG/1
2 CAPSULE, EXTENDED RELEASE ORAL ONCE
Refills: 0 | Status: DISCONTINUED | OUTPATIENT
Start: 2022-01-01 | End: 2022-01-01

## 2022-01-01 RX ORDER — IOHEXOL 300 MG/ML
90 INJECTION, SOLUTION INTRAVENOUS ONCE
Refills: 0 | Status: DISCONTINUED | OUTPATIENT
Start: 2022-01-01 | End: 2022-01-01

## 2022-01-01 RX ORDER — IOHEXOL 300 MG/ML
30 INJECTION, SOLUTION INTRAVENOUS ONCE
Refills: 0 | Status: COMPLETED | OUTPATIENT
Start: 2022-01-01 | End: 2022-01-01

## 2022-01-01 RX ORDER — SODIUM CHLORIDE 9 MG/ML
1000 INJECTION INTRAMUSCULAR; INTRAVENOUS; SUBCUTANEOUS
Refills: 0 | Status: DISCONTINUED | OUTPATIENT
Start: 2022-01-01 | End: 2022-01-01

## 2022-01-01 RX ORDER — METRONIDAZOLE 500 MG
500 TABLET ORAL ONCE
Refills: 0 | Status: COMPLETED | OUTPATIENT
Start: 2022-01-01 | End: 2022-01-01

## 2022-01-01 RX ORDER — FENTANYL CITRATE 50 UG/ML
25 INJECTION INTRAVENOUS
Refills: 0 | Status: DISCONTINUED | OUTPATIENT
Start: 2022-01-01 | End: 2022-01-01

## 2022-01-01 RX ORDER — SODIUM CHLORIDE 9 MG/ML
1000 INJECTION, SOLUTION INTRAVENOUS
Refills: 0 | Status: DISCONTINUED | OUTPATIENT
Start: 2022-01-01 | End: 2022-01-01

## 2022-01-01 RX ORDER — AMLODIPINE BESYLATE 2.5 MG/1
5 TABLET ORAL DAILY
Refills: 0 | Status: DISCONTINUED | OUTPATIENT
Start: 2022-01-01 | End: 2022-01-01

## 2022-01-01 RX ORDER — ENOXAPARIN SODIUM 100 MG/ML
30 INJECTION SUBCUTANEOUS DAILY
Refills: 0 | Status: DISCONTINUED | OUTPATIENT
Start: 2022-01-01 | End: 2022-01-01

## 2022-01-01 RX ORDER — ENOXAPARIN SODIUM 100 MG/ML
40 INJECTION SUBCUTANEOUS DAILY
Refills: 0 | Status: DISCONTINUED | OUTPATIENT
Start: 2022-01-01 | End: 2022-01-01

## 2022-01-01 RX ORDER — INSULIN HUMAN 100 [IU]/ML
6 INJECTION, SOLUTION SUBCUTANEOUS ONCE
Refills: 0 | Status: COMPLETED | OUTPATIENT
Start: 2022-01-01 | End: 2022-01-01

## 2022-01-01 RX ORDER — ACETAMINOPHEN 500 MG
1000 TABLET ORAL EVERY 6 HOURS
Refills: 0 | Status: COMPLETED | OUTPATIENT
Start: 2022-01-01 | End: 2022-01-01

## 2022-01-01 RX ORDER — SODIUM BICARBONATE 1 MEQ/ML
50 SYRINGE (ML) INTRAVENOUS ONCE
Refills: 0 | Status: COMPLETED | OUTPATIENT
Start: 2022-01-01 | End: 2022-01-01

## 2022-01-01 RX ORDER — SODIUM,POTASSIUM PHOSPHATES 278-250MG
1 POWDER IN PACKET (EA) ORAL ONCE
Refills: 0 | Status: COMPLETED | OUTPATIENT
Start: 2022-01-01 | End: 2022-01-01

## 2022-01-01 RX ORDER — DIATRIZOATE MEGLUMINE 180 MG/ML
90 INJECTION, SOLUTION INTRAVESICAL ONCE
Refills: 0 | Status: DISCONTINUED | OUTPATIENT
Start: 2022-01-01 | End: 2022-01-01

## 2022-01-01 RX ORDER — NOREPINEPHRINE BITARTRATE/D5W 8 MG/250ML
0.05 PLASTIC BAG, INJECTION (ML) INTRAVENOUS
Qty: 8 | Refills: 0 | Status: DISCONTINUED | OUTPATIENT
Start: 2022-01-01 | End: 2022-01-01

## 2022-01-01 RX ORDER — ACETAMINOPHEN 500 MG
650 TABLET ORAL EVERY 6 HOURS
Refills: 0 | Status: DISCONTINUED | OUTPATIENT
Start: 2022-01-01 | End: 2022-01-01

## 2022-01-01 RX ORDER — CEFEPIME 1 G/1
1000 INJECTION, POWDER, FOR SOLUTION INTRAMUSCULAR; INTRAVENOUS EVERY 24 HOURS
Refills: 0 | Status: DISCONTINUED | OUTPATIENT
Start: 2022-01-01 | End: 2022-01-01

## 2022-01-01 RX ORDER — METOPROLOL TARTRATE 50 MG
5 TABLET ORAL EVERY 6 HOURS
Refills: 0 | Status: DISCONTINUED | OUTPATIENT
Start: 2022-01-01 | End: 2022-01-01

## 2022-01-01 RX ORDER — POTASSIUM CHLORIDE 20 MEQ
10 PACKET (EA) ORAL
Refills: 0 | Status: COMPLETED | OUTPATIENT
Start: 2022-01-01 | End: 2022-01-01

## 2022-01-01 RX ORDER — POTASSIUM PHOSPHATE, MONOBASIC POTASSIUM PHOSPHATE, DIBASIC 236; 224 MG/ML; MG/ML
30 INJECTION, SOLUTION INTRAVENOUS ONCE
Refills: 0 | Status: COMPLETED | OUTPATIENT
Start: 2022-01-01 | End: 2022-01-01

## 2022-01-01 RX ORDER — CEFEPIME 1 G/1
1000 INJECTION, POWDER, FOR SOLUTION INTRAMUSCULAR; INTRAVENOUS ONCE
Refills: 0 | Status: COMPLETED | OUTPATIENT
Start: 2022-01-01 | End: 2022-01-01

## 2022-01-01 RX ORDER — MORPHINE SULFATE 50 MG/1
2 CAPSULE, EXTENDED RELEASE ORAL EVERY 4 HOURS
Refills: 0 | Status: DISCONTINUED | OUTPATIENT
Start: 2022-01-01 | End: 2022-01-01

## 2022-01-01 RX ORDER — DEXTROSE 50 % IN WATER 50 %
50 SYRINGE (ML) INTRAVENOUS ONCE
Refills: 0 | Status: COMPLETED | OUTPATIENT
Start: 2022-01-01 | End: 2022-01-01

## 2022-01-01 RX ORDER — SODIUM,POTASSIUM PHOSPHATES 278-250MG
2 POWDER IN PACKET (EA) ORAL EVERY 4 HOURS
Refills: 0 | Status: COMPLETED | OUTPATIENT
Start: 2022-01-01 | End: 2022-01-01

## 2022-01-01 RX ORDER — ACETAMINOPHEN 500 MG
975 TABLET ORAL EVERY 6 HOURS
Refills: 0 | Status: DISCONTINUED | OUTPATIENT
Start: 2022-01-01 | End: 2022-01-01

## 2022-01-01 RX ORDER — ONDANSETRON 8 MG/1
4 TABLET, FILM COATED ORAL ONCE
Refills: 0 | Status: DISCONTINUED | OUTPATIENT
Start: 2022-01-01 | End: 2022-01-01

## 2022-01-01 RX ORDER — FENTANYL CITRATE 50 UG/ML
25 INJECTION INTRAVENOUS ONCE
Refills: 0 | Status: DISCONTINUED | OUTPATIENT
Start: 2022-01-01 | End: 2022-01-01

## 2022-01-01 RX ORDER — OXYCODONE 5 MG/1
5 TABLET ORAL
Qty: 30 | Refills: 0 | Status: ACTIVE | COMMUNITY
Start: 2022-01-01 | End: 1900-01-01

## 2022-01-01 RX ORDER — KETOROLAC TROMETHAMINE 30 MG/ML
15 SYRINGE (ML) INJECTION EVERY 6 HOURS
Refills: 0 | Status: DISCONTINUED | OUTPATIENT
Start: 2022-01-01 | End: 2022-01-01

## 2022-01-01 RX ORDER — LOSARTAN POTASSIUM 100 MG/1
50 TABLET, FILM COATED ORAL DAILY
Refills: 0 | Status: DISCONTINUED | OUTPATIENT
Start: 2022-01-01 | End: 2022-01-01

## 2022-01-01 RX ORDER — ONDANSETRON 8 MG/1
4 TABLET, FILM COATED ORAL ONCE
Refills: 0 | Status: COMPLETED | OUTPATIENT
Start: 2022-01-01 | End: 2022-01-01

## 2022-01-01 RX ADMIN — FENTANYL CITRATE 25 MICROGRAM(S): 50 INJECTION INTRAVENOUS at 14:10

## 2022-01-01 RX ADMIN — Medication 15 MILLIGRAM(S): at 05:32

## 2022-01-01 RX ADMIN — MORPHINE SULFATE 2 MILLIGRAM(S): 50 CAPSULE, EXTENDED RELEASE ORAL at 17:21

## 2022-01-01 RX ADMIN — ENOXAPARIN SODIUM 40 MILLIGRAM(S): 100 INJECTION SUBCUTANEOUS at 18:16

## 2022-01-01 RX ADMIN — IOHEXOL 30 MILLILITER(S): 300 INJECTION, SOLUTION INTRAVENOUS at 17:40

## 2022-01-01 RX ADMIN — Medication 100 MILLIEQUIVALENT(S): at 14:36

## 2022-01-01 RX ADMIN — ONDANSETRON 4 MILLIGRAM(S): 8 TABLET, FILM COATED ORAL at 16:23

## 2022-01-01 RX ADMIN — FENTANYL CITRATE 25 MICROGRAM(S): 50 INJECTION INTRAVENOUS at 19:35

## 2022-01-01 RX ADMIN — Medication 975 MILLIGRAM(S): at 16:33

## 2022-01-01 RX ADMIN — SODIUM CHLORIDE 60 MILLILITER(S): 9 INJECTION, SOLUTION INTRAVENOUS at 05:33

## 2022-01-01 RX ADMIN — AMLODIPINE BESYLATE 5 MILLIGRAM(S): 2.5 TABLET ORAL at 06:09

## 2022-01-01 RX ADMIN — POTASSIUM PHOSPHATE, MONOBASIC POTASSIUM PHOSPHATE, DIBASIC 83.33 MILLIMOLE(S): 236; 224 INJECTION, SOLUTION INTRAVENOUS at 16:35

## 2022-01-01 RX ADMIN — Medication 15 MILLIGRAM(S): at 21:00

## 2022-01-01 RX ADMIN — SODIUM CHLORIDE 75 MILLILITER(S): 9 INJECTION INTRAMUSCULAR; INTRAVENOUS; SUBCUTANEOUS at 18:19

## 2022-01-01 RX ADMIN — Medication 15 MILLIGRAM(S): at 20:20

## 2022-01-01 RX ADMIN — Medication 5 MILLIGRAM(S): at 23:51

## 2022-01-01 RX ADMIN — ONDANSETRON 4 MILLIGRAM(S): 8 TABLET, FILM COATED ORAL at 10:09

## 2022-01-01 RX ADMIN — MORPHINE SULFATE 2 MILLIGRAM(S): 50 CAPSULE, EXTENDED RELEASE ORAL at 20:59

## 2022-01-01 RX ADMIN — MORPHINE SULFATE 2 MILLIGRAM(S): 50 CAPSULE, EXTENDED RELEASE ORAL at 19:00

## 2022-01-01 RX ADMIN — Medication 15 MILLIGRAM(S): at 22:18

## 2022-01-01 RX ADMIN — Medication 5 MILLIGRAM(S): at 04:47

## 2022-01-01 RX ADMIN — Medication 5 MILLIGRAM(S): at 00:22

## 2022-01-01 RX ADMIN — MORPHINE SULFATE 2 MILLIGRAM(S): 50 CAPSULE, EXTENDED RELEASE ORAL at 02:10

## 2022-01-01 RX ADMIN — INSULIN HUMAN 6 UNIT(S): 100 INJECTION, SOLUTION SUBCUTANEOUS at 16:43

## 2022-01-01 RX ADMIN — FENTANYL CITRATE 25 MICROGRAM(S): 50 INJECTION INTRAVENOUS at 19:36

## 2022-01-01 RX ADMIN — Medication 1000 MILLIGRAM(S): at 17:45

## 2022-01-01 RX ADMIN — Medication 5 MILLIGRAM(S): at 00:00

## 2022-01-01 RX ADMIN — ONDANSETRON 4 MILLIGRAM(S): 8 TABLET, FILM COATED ORAL at 05:33

## 2022-01-01 RX ADMIN — ENOXAPARIN SODIUM 40 MILLIGRAM(S): 100 INJECTION SUBCUTANEOUS at 12:55

## 2022-01-01 RX ADMIN — Medication 1000 MILLIGRAM(S): at 00:51

## 2022-01-01 RX ADMIN — Medication 5 MILLIGRAM(S): at 23:55

## 2022-01-01 RX ADMIN — AMLODIPINE BESYLATE 5 MILLIGRAM(S): 2.5 TABLET ORAL at 05:33

## 2022-01-01 RX ADMIN — SODIUM CHLORIDE 1000 MILLILITER(S): 9 INJECTION INTRAMUSCULAR; INTRAVENOUS; SUBCUTANEOUS at 16:44

## 2022-01-01 RX ADMIN — Medication 500 MILLIGRAM(S): at 16:44

## 2022-01-01 RX ADMIN — Medication 50 MILLIEQUIVALENT(S): at 19:35

## 2022-01-01 RX ADMIN — Medication 50 MILLILITER(S): at 16:43

## 2022-01-01 RX ADMIN — FENTANYL CITRATE 25 MICROGRAM(S): 50 INJECTION INTRAVENOUS at 16:18

## 2022-01-01 RX ADMIN — ENOXAPARIN SODIUM 30 MILLIGRAM(S): 100 INJECTION SUBCUTANEOUS at 14:32

## 2022-01-01 RX ADMIN — Medication 975 MILLIGRAM(S): at 15:45

## 2022-01-01 RX ADMIN — LOSARTAN POTASSIUM 50 MILLIGRAM(S): 100 TABLET, FILM COATED ORAL at 05:56

## 2022-01-01 RX ADMIN — Medication 5 MILLIGRAM(S): at 18:18

## 2022-01-01 RX ADMIN — Medication 100 MILLIGRAM(S): at 00:39

## 2022-01-01 RX ADMIN — MORPHINE SULFATE 2 MILLIGRAM(S): 50 CAPSULE, EXTENDED RELEASE ORAL at 23:45

## 2022-01-01 RX ADMIN — Medication 1000 MILLIGRAM(S): at 05:59

## 2022-01-01 RX ADMIN — Medication 15 MILLIGRAM(S): at 06:17

## 2022-01-01 RX ADMIN — Medication 15 MILLIGRAM(S): at 22:17

## 2022-01-01 RX ADMIN — Medication 650 MILLIGRAM(S): at 05:58

## 2022-01-01 RX ADMIN — Medication 5 MILLIGRAM(S): at 13:44

## 2022-01-01 RX ADMIN — ENOXAPARIN SODIUM 40 MILLIGRAM(S): 100 INJECTION SUBCUTANEOUS at 11:03

## 2022-01-01 RX ADMIN — Medication 2 TABLET(S): at 13:07

## 2022-01-01 RX ADMIN — ENOXAPARIN SODIUM 30 MILLIGRAM(S): 100 INJECTION SUBCUTANEOUS at 11:53

## 2022-01-01 RX ADMIN — ONDANSETRON 4 MILLIGRAM(S): 8 TABLET, FILM COATED ORAL at 17:21

## 2022-01-01 RX ADMIN — AMLODIPINE BESYLATE 5 MILLIGRAM(S): 2.5 TABLET ORAL at 05:56

## 2022-01-01 RX ADMIN — Medication 400 MILLIGRAM(S): at 17:17

## 2022-01-01 RX ADMIN — Medication 1 TABLET(S): at 11:04

## 2022-01-01 RX ADMIN — CEFEPIME 100 MILLIGRAM(S): 1 INJECTION, POWDER, FOR SOLUTION INTRAMUSCULAR; INTRAVENOUS at 15:14

## 2022-01-01 RX ADMIN — ENOXAPARIN SODIUM 40 MILLIGRAM(S): 100 INJECTION SUBCUTANEOUS at 13:07

## 2022-01-01 RX ADMIN — SODIUM CHLORIDE 100 MILLILITER(S): 9 INJECTION, SOLUTION INTRAVENOUS at 17:17

## 2022-01-01 RX ADMIN — SODIUM CHLORIDE 100 MILLILITER(S): 9 INJECTION, SOLUTION INTRAVENOUS at 04:47

## 2022-01-01 RX ADMIN — Medication 1000 MILLIGRAM(S): at 12:50

## 2022-01-01 RX ADMIN — Medication 5 MILLIGRAM(S): at 11:53

## 2022-01-01 RX ADMIN — SODIUM CHLORIDE 1000 MILLILITER(S): 9 INJECTION INTRAMUSCULAR; INTRAVENOUS; SUBCUTANEOUS at 14:21

## 2022-01-01 RX ADMIN — LOSARTAN POTASSIUM 50 MILLIGRAM(S): 100 TABLET, FILM COATED ORAL at 06:09

## 2022-01-01 RX ADMIN — Medication 100 MILLIGRAM(S): at 16:03

## 2022-01-01 RX ADMIN — Medication 50 MILLIEQUIVALENT(S): at 14:25

## 2022-01-01 RX ADMIN — CEFEPIME 1000 MILLIGRAM(S): 1 INJECTION, POWDER, FOR SOLUTION INTRAMUSCULAR; INTRAVENOUS at 16:43

## 2022-01-01 RX ADMIN — SODIUM CHLORIDE 75 MILLILITER(S): 9 INJECTION INTRAMUSCULAR; INTRAVENOUS; SUBCUTANEOUS at 12:29

## 2022-01-01 RX ADMIN — Medication 2 TABLET(S): at 17:37

## 2022-01-01 RX ADMIN — ENOXAPARIN SODIUM 30 MILLIGRAM(S): 100 INJECTION SUBCUTANEOUS at 11:01

## 2022-01-01 RX ADMIN — Medication 15 MILLIGRAM(S): at 16:51

## 2022-01-01 RX ADMIN — Medication 400 MILLIGRAM(S): at 04:59

## 2022-01-01 RX ADMIN — Medication 15 MILLIGRAM(S): at 16:36

## 2022-01-01 RX ADMIN — Medication 5 MILLIGRAM(S): at 17:00

## 2022-01-01 RX ADMIN — Medication 100 MILLIEQUIVALENT(S): at 12:32

## 2022-01-01 RX ADMIN — LOSARTAN POTASSIUM 50 MILLIGRAM(S): 100 TABLET, FILM COATED ORAL at 05:33

## 2022-01-01 RX ADMIN — Medication 400 MILLIGRAM(S): at 00:22

## 2022-01-01 RX ADMIN — SODIUM CHLORIDE 1000 MILLILITER(S): 9 INJECTION INTRAMUSCULAR; INTRAVENOUS; SUBCUTANEOUS at 15:14

## 2022-01-01 RX ADMIN — SODIUM CHLORIDE 125 MILLILITER(S): 9 INJECTION INTRAMUSCULAR; INTRAVENOUS; SUBCUTANEOUS at 20:08

## 2022-01-01 RX ADMIN — Medication 100 MILLIEQUIVALENT(S): at 10:55

## 2022-01-01 RX ADMIN — Medication 400 MILLIGRAM(S): at 11:43

## 2022-01-01 RX ADMIN — Medication 5 MILLIGRAM(S): at 17:18

## 2022-01-01 RX ADMIN — Medication 5 MILLIGRAM(S): at 12:39

## 2022-01-01 RX ADMIN — MORPHINE SULFATE 2 MILLIGRAM(S): 50 CAPSULE, EXTENDED RELEASE ORAL at 22:30

## 2022-01-04 NOTE — PHYSICAL EXAM
[General Appearance - Alert] : alert [General Appearance - In No Acute Distress] : in no acute distress [Thin] : thin [Extraocular Movements] : extraocular movements were intact [Normal] : oriented to person, place and time, the affect was normal, the mood was normal and not anxious

## 2022-01-04 NOTE — REVIEW OF SYSTEMS
[Diarrhea: Grade 0] : Diarrhea: Grade 0 [Nausea: Grade 0] : Nausea: Grade 0 [Vomiting: Grade 0] : Vomiting: Grade 0 [Fatigue: Grade 0] : Fatigue: Grade 0 [Pruritus: Grade 0] : Pruritus: Grade 0 [Dermatitis Radiation: Grade 0] : Dermatitis Radiation: Grade 0

## 2022-01-04 NOTE — VITALS
[Least Pain Intensity: 0/10] : 0/10 [71: Cares for self; unalbe to carry on normal activity or do active work.] : 71: Cares for self; unable to carry on normal activity or do active work.

## 2022-01-04 NOTE — DISEASE MANAGEMENT
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

## 2022-01-11 PROBLEM — R52 PAIN: Status: ACTIVE | Noted: 2022-01-01

## 2022-01-11 NOTE — DISEASE MANAGEMENT
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

## 2022-01-11 NOTE — REVIEW OF SYSTEMS
[Diarrhea: Grade 0] : Diarrhea: Grade 0 [Nausea: Grade 0] : Nausea: Grade 0 [Vomiting: Grade 0] : Vomiting: Grade 0 [Fatigue: Grade 0] : Fatigue: Grade 0 [Pruritus: Grade 0] : Pruritus: Grade 0 [Dermatitis Radiation: Grade 0] : Dermatitis Radiation: Grade 0 [Constipation: Grade 2 - Persistent symptoms with regular use of laxatives or enemas; limiting instrumental ADL] : Constipation: Grade 2 - Persistent symptoms with regular use of laxatives or enemas; limiting instrumental ADL [Skin Hyperpigmentation: Grade 1 - Hyperpigmentation covering <10% BSA; no psychosocial impact] : Skin Hyperpigmentation: Grade 1 - Hyperpigmentation covering <10% BSA; no psychosocial impact

## 2022-01-11 NOTE — VITALS
[71: Cares for self; unalbe to carry on normal activity or do active work.] : 71: Cares for self; unable to carry on normal activity or do active work. [Maximal Pain Intensity: 10/10] : 10/10 [Least Pain Intensity: 9/10] : 9/10 [Pain Description/Quality: ___] : Pain description/quality: [unfilled] [Pain Duration: ___] : Pain duration: [unfilled] [Pain Location: ___] : Pain Location: [unfilled] [OTC] : OTC

## 2022-01-11 NOTE — PHYSICAL EXAM
[General Appearance - Alert] : alert [General Appearance - In No Acute Distress] : in no acute distress [Thin] : thin [Extraocular Movements] : extraocular movements were intact [Normal] : oriented to person, place and time, the affect was normal, the mood was normal and not anxious [Abdomen Soft] : soft [de-identified] : bowel sounds present

## 2022-01-12 PROBLEM — R10.9 ABDOMINAL PAIN: Status: ACTIVE | Noted: 2022-01-01

## 2022-01-12 PROBLEM — K59.00 CONSTIPATION: Status: ACTIVE | Noted: 2022-01-01

## 2022-01-12 NOTE — ED PROVIDER NOTE - CLINICAL SUMMARY MEDICAL DECISION MAKING FREE TEXT BOX
pt with constipation and Hx of SBO, concerning for recurrence. Abd soft and ot well appearing. Will check CT

## 2022-01-12 NOTE — ED PROVIDER NOTE - OBJECTIVE STATEMENT
71 year old female with PMH liposarcoma s/p resection with subsequent development of large LLQ hernia, SBO presents with constipation. Pt states that she has not had a BM in 1 week. She reports nausea and lower abd cramping pain. She was sent for outpt XR and was told to come to ED for CT to eval for SBO. NO fevers, chills, dysuria, chest pain.

## 2022-01-12 NOTE — ED PROVIDER NOTE - CROS ED ENMT ALL NEG
negative...
CXR negative - No infiltrates, No consolidation, No atelectasis seenCXR negative - No CHF, No cardiomegaly, No pleural effusions

## 2022-01-12 NOTE — ED ADULT TRIAGE NOTE - CHIEF COMPLAINT QUOTE
Pt states on chemo and radiation for sarcoma and states 6 days no BM and had xray today and told obstruction and to go to ER. Pt reports nausea and black vomiting.

## 2022-01-13 NOTE — H&P ADULT - HISTORY OF PRESENT ILLNESS
ACUTE CARE SURGERY CONSULT     HPI: 71 year old female with PMH liposarcoma s/p resection in 2018 currently on chemoradiation who was sent by her PCP to the ED after an outpt KUB demonstrated possible SOB. She has a one day history of nausea and vomiting with increases size of LLQ incisional hernia. She last passed gas yesterday morning and BM last week with is her norm. Patients last chemo was one month ago and last radiotherapy was yesterday. Patient is scheduled for radiation tomorrow and chemo on the 18th. She was sent for outpt XR and was told to come to ED for CT to eval for SBO. NO fevers, chills, dysuria, chest pain.    ROS: 10-system review is otherwise negative except HPI above.      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    FAMILY HISTORY:  FH: hypertension (Mother)    SOCIAL HISTORY:  Denies any toxic habits    ALLERGIES: NKA penicillin (Hives)      HOME MEDICATIONS:   acetaminophen 325 mg oral tablet: 3 tab(s) orally every 6 hours as needed for mild to moderate pain (22 Nov 2021 16:47)  amlodipine-olmesartan 5 mg-20 mg oral tablet: 1 tab(s) orally once a day (22 Nov 2021 16:47)  ondansetron 8 mg oral tablet: 1 tab(s) orally 3 times a day, As Needed (22 Nov 2021 16:47)  polyethylene glycol 3350 oral powder for reconstitution: 17 gram(s) orally once a day (22 Nov 2021 16:47)  senna oral tablet: 2 tab(s) orally once a day (at bedtime), As needed, Constipation (22 Nov 2021 16:47)  Vitamin D3 400 intl units (10 mcg) oral tablet: 1 tab(s) orally once a day (22 Nov 2021 16:47)      --------------------------------------------------------------------------------------------    PHYSICAL EXAM:   General: NAD, Lying in bed comfortably  Neuro: A+Ox3  HEENT: EOMI, PERRLA, MMM  Cardio: RRR  Resp: Non labored breathing on RA  GI/Abd: Soft, NT/ND, no rebound/guarding, no masses palpated. Large LLQ reducible incisional hernia that is not tender to palpation.   Vascular: All 4 extremities warm and well perfused.   Pelvis: stable  Musculoskeletal: All 4 extremities moving spontaneously, no limitations, no spinal tenderness.  --------------------------------------------------------------------------------------------    LABS                 13.7   7.43   )----------(  313       ( 12 Jan 2022 17:26 )               40.8      137    |  98     |  19.4   ----------------------------<  103        ( 12 Jan 2022 17:26 )  4.3     |  26.0   |  0.62     Ca    9.1        ( 12 Jan 2022 17:26 )    TPro  6.0    /  Alb  3.1    /  TBili  0.3    /  DBili  x      /  AST  10     /  ALT  <5     /  AlkPhos  66     ( 12 Jan 2022 17:26 )    LIVER FUNCTIONS - ( 12 Jan 2022 17:26 )  Alb: 3.1 g/dL / Pro: 6.0 g/dL / ALK PHOS: 66 U/L / ALT: <5 U/L / AST: 10 U/L / GGT: x               CAPILLARY BLOOD GLUCOSE            17:26 - VBG - pH:       | pCO2:       | pO2:       | Lactate: 1.70     --------------------------------------------------------------------------------------------  IMAGING  < from: CT Abdomen and Pelvis w/ Oral Cont and w/ IV Cont (01.12.22 @ 22:30) >  ACC: [id number] EXAM:  CT ABDOMEN AND PELVIS OC IC                          PROCEDURE DATE:  01/12/2022          INTERPRETATION:  CLINICAL INFORMATION: Abdominal pain. Constipation.   Hernia. History of unresectable abdominal sarcoma. Evaluate for bowel   obstruction.    COMPARISON: CT of the abdomen and pelvis from 11/2/2021    CONTRAST/COMPLICATIONS:  IV Contrast: Omnipaque 300  93 cc administered   7 cc discarded  Oral Contrast: Omnipaque 300 + Fruit 2o  Complications: None reported at time ofstudy completion    TECHNIQUE:  CT scan of the abdomen and pelvis with IV contrast.  Transaxial images were acquired from the domes of the diaphragm to the   symphysis pubis with intravenous contrast. Oral contrast was   administered. Coronal and sagittal images were also provided from the   transaxial source data.    FINDINGS:  The heart is not enlarged. Dilatation of the ascending thoracic aorta to   4.7 cm is unchanged. The lung bases are clear.    There are dilated proximal loops of small bowel which trace to a single   transition point in the in the left hemipelvis at the anterior opening of   a large left ventral abdominal wall hernia. Small bowel beyond this point   is collapsed to the terminal ileum. There is interloop mesenteric edema   and small volume ascites within the hernia sac around the dilated loops.   There is no free air, pneumatosis or abnormal bowel wall thickening.   Normal appendix is seen. There is no intraperitoneal collection.    The liver, spleen, pancreas and adrenal glands are unremarkable aside   from scattered liver cysts. Gallstones and tiny gallbladder polyp without   secondary signs of acute cholecystitis. The kidneys enhance symmetrically   without hydronephrosis.    The abdominal aorta is normal in caliber. There is been interval   enlargement of heterogeneous enhancing partially cystic pelvic mass which   now measures 13.0 x 8.5 x 14.7 cm (prior 2.5 x 8.3 x 12.6 cm). Interval   enlargement of the mass adjacent to and likely arising from the lower   pole of the kidney measuring 9.9 x 7.5 x 8.4 cm (6.3 x 7.3 x 8.2 cm).    The urinary bladder is decompressed which limits evaluation of the wall   thickness. The uterus and adnexal structures demonstrate no acute   abnormality.    The visualized osseous structures demonstrate no acute abnormality. There   is edema again present in the left upper thigh. Postsurgical changes in   left ventral abdominal wall.    IMPRESSION:  Small bowel obstruction secondary to the large left abdominal wall   hernia. Small volume ascites and mesenteric edema in the hernia sac which   could be soft signs of ischemia. No free air, pneumatosis or wall   thickening to suggest definite ischemia.    Interval enlargement of abdominal and pelvic mass compatible withknown   unresectable sarcoma.

## 2022-01-13 NOTE — PATIENT PROFILE ADULT - FALL HARM RISK - PATIENT NEEDS ASSISTANCE
Normal rate, regular rhythm, normal S1, S2 heart sounds heard. Standing/Walking/Toileting/Moving from bed to chair

## 2022-01-13 NOTE — H&P ADULT - ASSESSMENT
ASSESSMENT: Patient is a 71y old female with a reducible LLQ incisional hernia. CT scan demonstrated SBO with TP in hernia entrance. NGT was placed with 700mL output immediately. Hernia was reduced with no difficulty.     PLAN:    - Admit to [facility] floor under Dr. Barnhart  - NGT decompression   - Repeat lactate and labs 6 hours after reduction (8am)  - NPO/IVF  - pain control  - DVT ppx  - OOB/ambulate  - strict I/Os  - Plan discussed with Attending, Dr. Barnhart    Consult called at: 1am  Consult seen at: 1:30am

## 2022-01-14 NOTE — PROGRESS NOTE ADULT - SUBJECTIVE AND OBJECTIVE BOX
Acute Care Surgery  Progress Note:    No acute overnight events. Patient afebrile, and hemodynamically competent. Pain well controlled. NPO diet. Denies n/v/f/c/cp/sob. Persist with abdominal pain    Diet, NPO:   Except Medications     Special Instructions for Nursing:  Except Medications (01-13-22 @ 02:17)      Scheduled Medications:   acetaminophen   IVPB .. 1000 milliGRAM(s) IV Intermittent every 6 hours  enoxaparin Injectable 30 milliGRAM(s) SubCutaneous daily  lactated ringers. 1000 milliLiter(s) (100 mL/Hr) IV Continuous <Continuous>  metoprolol tartrate Injectable 5 milliGRAM(s) IV Push every 6 hours    PRN Medications:  ondansetron Injectable 4 milliGRAM(s) IV Push every 6 hours PRN Nausea      Objective:   T(F): 98.2 (01-14 @ 00:18), Max: 98.8 (01-13 @ 08:07)  HR: 73 (01-14 @ 00:18) (73 - 109)  BP: 103/68 (01-14 @ 00:18) (103/68 - 130/88)  BP(mean): --  ABP: --  ABP(mean): --  RR: 16 (01-14 @ 00:18) (16 - 18)  SpO2: 92% (01-14 @ 00:18) (92% - 97%)      Physical Exam:   GEN: patient resting comfortably in bed, in no acute distress  RESP: respirations are unlabored, no accessory muscle use, no conversational dyspnea  CVS: RRR  GI: Abdomen soft, non-tender, non-distended, no rebound tenderness / guarding    I&O's    01-12 @ 07:01  -  01-13 @ 07:00  --------------------------------------------------------  IN:    Lactated Ringers: 100 mL  Total IN: 100 mL    OUT:  Total OUT: 0 mL    Total NET: 100 mL      01-13 @ 07:01  -  01-14 @ 01:38  --------------------------------------------------------  IN:    Lactated Ringers: 1100 mL  Total IN: 1100 mL    OUT:    Nasogastric/Oral tube (mL): 1550 mL    Voided (mL): 150 mL  Total OUT: 1700 mL    Total NET: -600 mL    LABS:                        13.2   8.12  )-----------( 330      ( 13 Jan 2022 09:08 )             39.9     01-13    139  |  94<L>  |  20.9<H>  ----------------------------<  103<H>  4.3   |  30.0<H>  |  0.79    Ca    9.4      13 Jan 2022 09:08  Phos  4.2     01-13  Mg     2.4     01-13    TPro  6.0<L>  /  Alb  3.1<L>  /  TBili  0.3<L>  /  DBili  x   /  AST  10  /  ALT  <5  /  AlkPhos  66  01-12    MICROBIOLOGY: na  PATHOLOGY: na    A; Patient is a 71y old female with a reducible LLQ incisional hernia. CT scan demonstrated SBO with TP in hernia entrance. NGT in placed with  output in a previous shift. Hernia was reduced in the ED with no difficulty. Currently, monitoring NGT output, pain control, return of bowel function( patient refers having a week without BM, intermittent scant flatus) and gastrografin study pending for 1/14/22.    PLAN:      - NGT decompression and FU output  - NPO/IVF  - pain control  - DVT ppx  - OOB/ambulate  - strict I/Os  -FU labs         Acute Care Surgery  Progress Note:    No acute overnight events. Patient afebrile, and hemodynamically competent. Pain well controlled. NPO diet. Denies n/v/f/c/cp/sob. Persist with abdominal pain    Diet, NPO:   Except Medications     Special Instructions for Nursing:  Except Medications (01-13-22 @ 02:17)      Scheduled Medications:   acetaminophen   IVPB .. 1000 milliGRAM(s) IV Intermittent every 6 hours  enoxaparin Injectable 30 milliGRAM(s) SubCutaneous daily  lactated ringers. 1000 milliLiter(s) (100 mL/Hr) IV Continuous <Continuous>  metoprolol tartrate Injectable 5 milliGRAM(s) IV Push every 6 hours    PRN Medications:  ondansetron Injectable 4 milliGRAM(s) IV Push every 6 hours PRN Nausea      Objective:   T(F): 98.2 (01-14 @ 00:18), Max: 98.8 (01-13 @ 08:07)  HR: 73 (01-14 @ 00:18) (73 - 109)  BP: 103/68 (01-14 @ 00:18) (103/68 - 130/88)  BP(mean): --  ABP: --  ABP(mean): --  RR: 16 (01-14 @ 00:18) (16 - 18)  SpO2: 92% (01-14 @ 00:18) (92% - 97%)      Physical Exam:   GEN: patient resting comfortably in bed, in no acute distress  RESP: respirations are unlabored, no accessory muscle use, no conversational dyspnea  CVS: RRR  GI: Abdomen soft, non-tender, non-distended, no rebound tenderness / guarding    I&O's    01-12 @ 07:01  -  01-13 @ 07:00  --------------------------------------------------------  IN:    Lactated Ringers: 100 mL  Total IN: 100 mL    OUT:  Total OUT: 0 mL    Total NET: 100 mL      01-13 @ 07:01  -  01-14 @ 01:38  --------------------------------------------------------  IN:    Lactated Ringers: 1100 mL  Total IN: 1100 mL    OUT:    Nasogastric/Oral tube (mL): 1550 mL    Voided (mL): 150 mL  Total OUT: 1700 mL    Total NET: -600 mL    LABS:                        13.2   8.12  )-----------( 330      ( 13 Jan 2022 09:08 )             39.9     01-13    139  |  94<L>  |  20.9<H>  ----------------------------<  103<H>  4.3   |  30.0<H>  |  0.79    Ca    9.4      13 Jan 2022 09:08  Phos  4.2     01-13  Mg     2.4     01-13    TPro  6.0<L>  /  Alb  3.1<L>  /  TBili  0.3<L>  /  DBili  x   /  AST  10  /  ALT  <5  /  AlkPhos  66  01-12    MICROBIOLOGY: na  PATHOLOGY: na    A; Patient is a 71y old female with a reducible LLQ incisional hernia. CT scan demonstrated SBO with TP in hernia entrance. NGT in placed with  output in a previous shift of 550--bilious content. Hernia was reduced in the ED with no difficulty. Currently, monitoring NGT output, pain control, return of bowel function( patient refers having a week without BM, intermittent scant flatus) and gastrografin study pending for 1/14/22.    PLAN:      - NGT decompression and FU output  - NPO/IVF  - pain control  - DVT ppx  - OOB/ambulate  - strict I/Os  -FU labs

## 2022-01-15 NOTE — PROGRESS NOTE ADULT - SUBJECTIVE AND OBJECTIVE BOX
SUBJECTIVE/24 hour events:  Patient is a 71yFemale presenting with SBO, NG tube in place, had gastrografin challenge, chest xray at 10pm showing contrast starting to move. Patient with increased output from NG tube, no acute events overnight, no pain issues, afebrile. Patient with no requests or complaints at this time      Vital Signs Last 24 Hrs  T(C): 37.1 (14 Jan 2022 19:50), Max: 37.1 (14 Jan 2022 19:50)  T(F): 98.7 (14 Jan 2022 19:50), Max: 98.7 (14 Jan 2022 19:50)  HR: 73 (14 Jan 2022 19:50) (63 - 81)  BP: 101/73 (14 Jan 2022 19:50) (96/64 - 126/84)  BP(mean): --  RR: 18 (14 Jan 2022 19:50) (18 - 18)  SpO2: 93% (14 Jan 2022 19:50) (92% - 93%)  Drug Dosing Weight  Height (cm): 152.4 (12 Jan 2022 15:19)  Weight (kg): 50.3 (12 Jan 2022 15:19)  BMI (kg/m2): 21.7 (12 Jan 2022 15:19)  BSA (m2): 1.45 (12 Jan 2022 15:19)  I&O's Detail    13 Jan 2022 07:01  -  14 Jan 2022 07:00  --------------------------------------------------------  IN:    IV PiggyBack: 200 mL    Lactated Ringers: 2100 mL  Total IN: 2300 mL    OUT:    Nasogastric/Oral tube (mL): 1650 mL    Voided (mL): 150 mL  Total OUT: 1800 mL    Total NET: 500 mL      14 Jan 2022 07:01  -  15 Jamel 2022 00:39  --------------------------------------------------------  IN:  Total IN: 0 mL    OUT:    Emesis (mL): 100 mL    Nasogastric/Oral tube (mL): 850 mL  Total OUT: 950 mL    Total NET: -950 mL        Allergies    penicillin (Hives)    Intolerances                              13.2   8.12  )-----------( 330      ( 13 Jan 2022 09:08 )             39.9   01-14    142  |  94<L>  |  27.0<H>  ----------------------------<  77  3.8   |  26.0  |  0.90    Ca    9.0      14 Jan 2022 07:44  Phos  4.2     01-13  Mg     2.4     01-14        ROS:    PHYSICAL EXAM:  Constitutional: resting comfortably   Respiratory: no respiratory distress, no dyspnea, no supplemental o2 needed   Gastrointestinal: abdomen softly distended, no guarding, no peritonitis, NG tube well seated with copious dark gastric output  Genitourinary: voiding spontaneously   Neurological: A&OX3  Skin: warm, dry and no rashes           MEDICATIONS  (STANDING):  diatrizoate meglumine/diatrizoate sodium. 90 milliLiter(s) Oral once  enoxaparin Injectable 30 milliGRAM(s) SubCutaneous daily  metoprolol tartrate Injectable 5 milliGRAM(s) IV Push every 6 hours  sodium chloride 0.9%. 1000 milliLiter(s) (75 mL/Hr) IV Continuous <Continuous>    MEDICATIONS  (PRN):  ketorolac   Injectable 15 milliGRAM(s) IV Push every 6 hours PRN Moderate Pain (4 - 6)  ondansetron Injectable 4 milliGRAM(s) IV Push every 6 hours PRN Nausea      RADIOLOGY STUDIES:    CULTURES:

## 2022-01-16 NOTE — BRIEF OPERATIVE NOTE - OPERATION/FINDINGS
EGD with placement of PEG tube. Hemostasis obtained. No gastric ulcer identified. Duodenum intubated and no ulcers identified.

## 2022-01-16 NOTE — PROGRESS NOTE ADULT - SUBJECTIVE AND OBJECTIVE BOX
INTERVAL HPI/OVERNIGHT EVENTS:    No acute overnight events reported. Patient seen and evaluated at bedside with no major complaints nor issues reported. Patient denies nausea nor emesis, currently passing flatus and small volume liquid stool.      MEDICATIONS  (STANDING):  diatrizoate meglumine/diatrizoate sodium. 90 milliLiter(s) Oral once  metoprolol tartrate Injectable 5 milliGRAM(s) IV Push every 6 hours  sodium chloride 0.9%. 1000 milliLiter(s) (75 mL/Hr) IV Continuous <Continuous>    MEDICATIONS  (PRN):  ketorolac   Injectable 15 milliGRAM(s) IV Push every 6 hours PRN Moderate Pain (4 - 6)  ondansetron Injectable 4 milliGRAM(s) IV Push every 6 hours PRN Nausea      Vital Signs Last 24 Hrs  T(C): 36.7 (16 Jan 2022 04:48), Max: 36.9 (15 Jamel 2022 18:49)  T(F): 98 (16 Jan 2022 04:48), Max: 98.5 (15 Jamel 2022 18:49)  HR: 56 (16 Jan 2022 04:48) (56 - 75)  BP: 136/83 (16 Jan 2022 04:48) (90/55 - 136/83)  BP(mean): --  RR: 18 (16 Jan 2022 04:48) (18 - 18)  SpO2: 94% (16 Jan 2022 04:48) (92% - 96%)    PE  Constitutional: resting comfortably   Respiratory: no respiratory distress, no dyspnea, no supplemental o2 needed   Gastrointestinal: abdomen softly distended, no guarding, no peritonitis  Genitourinary: voiding spontaneously   Neurological: A&OX3  Skin: warm, dry and no rashes deficits       I&O's Detail    14 Jan 2022 07:01  -  15 Jamel 2022 07:00  --------------------------------------------------------  IN:    sodium chloride 0.9%: 975 mL  Total IN: 975 mL    OUT:    Emesis (mL): 100 mL    Nasogastric/Oral tube (mL): 2050 mL  Total OUT: 2150 mL    Total NET: -1175 mL      15 Jamel 2022 07:01  -  16 Jan 2022 06:51  --------------------------------------------------------  IN:    Oral Fluid: 880 mL    sodium chloride 0.9%: 1800 mL  Total IN: 2680 mL    OUT:    Nasogastric/Oral tube (mL): 200 mL    Voided (mL): 250 mL  Total OUT: 450 mL    Total NET: 2230 mL          LABS:    01-15    143  |  98  |  35.2<H>  ----------------------------<  70  3.6   |  23.0  |  0.94    Ca    8.7      15 Jamel 2022 06:45  Phos  4.3     01-15  Mg     2.3     01-15            RADIOLOGY & ADDITIONAL STUDIES:

## 2022-01-16 NOTE — BRIEF OPERATIVE NOTE - NSICDXBRIEFPREOP_GEN_ALL_CORE_FT
PRE-OP DIAGNOSIS:  Small bowel obstruction 16-Jan-2022 12:34:45  Chapin Avalos  Small bowel obstruction 16-Jan-2022 12:34:56  Chapin Avalos

## 2022-01-17 NOTE — PROGRESS NOTE ADULT - SUBJECTIVE AND OBJECTIVE BOX
HPI/OVERNIGHT EVENTS:    Patient is POD 1 s/p venting PEG. No gastric ulcers/duodenal ulcers seen on endoscopy. NAEON. Patient assessed at bedside this morning. Pain is well controlled. She denies any N&V, fever/chills. Sating well on RA. Tolerated meds via PEG.     MEDICATIONS  (STANDING):  diatrizoate meglumine/diatrizoate sodium. 90 milliLiter(s) Oral once  metoprolol tartrate Injectable 5 milliGRAM(s) IV Push every 6 hours  sodium chloride 0.9%. 1000 milliLiter(s) (75 mL/Hr) IV Continuous <Continuous>    MEDICATIONS  (PRN):  ketorolac   Injectable 15 milliGRAM(s) IV Push every 6 hours PRN Moderate Pain (4 - 6)  ondansetron Injectable 4 milliGRAM(s) IV Push every 6 hours PRN Nausea      Vital Signs Last 24 Hrs  T(C): 37.2 (16 Jan 2022 19:13), Max: 37.2 (16 Jan 2022 19:13)  T(F): 99 (16 Jan 2022 19:13), Max: 99 (16 Jan 2022 19:13)  HR: 69 (16 Jan 2022 19:13) (56 - 87)  BP: 117/80 (16 Jan 2022 19:13) (95/78 - 136/83)  BP(mean): --  RR: 18 (16 Jan 2022 19:13) (14 - 18)  SpO2: 93% (16 Jan 2022 19:13) (92% - 96%)    Constitutional: patient resting comfortably in bed, in no acute distress  Respiratory: respirations are unlabored, no accessory muscle use, no conversational dyspnea. RA   Cardiovascular: regular rate & rhythm  Gastrointestinal: Abdomen soft, appropriately tender, non-distended, no rebound tenderness / guarding, PEG in place, site c/d/i   Neurological: A&O x 3; no gross sensory / motor / coordination deficits  Musculoskeletal: No joint pain, swelling or deformity; no limitation of movement      I&O's Detail    15 Jamel 2022 07:01  -  16 Jan 2022 07:00  --------------------------------------------------------  IN:    Oral Fluid: 880 mL    sodium chloride 0.9%: 1800 mL  Total IN: 2680 mL    OUT:    Nasogastric/Oral tube (mL): 200 mL    Voided (mL): 250 mL  Total OUT: 450 mL    Total NET: 2230 mL          LABS:                        12.6   7.96  )-----------( 247      ( 16 Jan 2022 07:21 )             38.6     01-16    137  |  100  |  30.8<H>  ----------------------------<  88  3.7   |  26.0  |  0.55    Ca    8.1<L>      16 Jan 2022 07:21  Phos  2.0     01-16  Mg     2.1     01-16      PT/INR - ( 16 Jan 2022 07:21 )   PT: 11.7 sec;   INR: 1.01 ratio         PTT - ( 16 Jan 2022 07:21 )  PTT:24.9 sec

## 2022-01-18 NOTE — PROGRESS NOTE ADULT - SUBJECTIVE AND OBJECTIVE BOX
Acute Care Surgery/Trauma Surgery Progress Note:    No acute overnight events. Patient afebrile, and hemodynamically competent. Pain well controlled. Place on diet and monitoring tolerance and her apatite is back. Denies n/v/f/c/cp/sob.    PEG is venting gas. NGT in place an 200 cc in reservoir.    PRN Medications:  ketorolac   Injectable 15 milliGRAM(s) IV Push every 6 hours PRN Moderate Pain (4 - 6)  ondansetron Injectable 4 milliGRAM(s) IV Push every 6 hours PRN Nausea      Objective:   T(F): 98.2 (01-18 @ 12:30), Max: 98.8 (01-17 @ 23:03)  HR: 62 (01-18 @ 12:30) (62 - 76)  BP: 135/90 (01-18 @ 12:30) (128/79 - 144/94)  BP(mean): --  ABP: --  ABP(mean): --  RR: 18 (01-18 @ 12:30) (17 - 19)  SpO2: 96% (01-18 @ 12:30) (93% - 96%)      Physical Exam:   GEN: patient resting comfortably in bed, in no acute distress  RESP: respirations are unlabored, no accessory muscle use, no conversational dyspnea  CVS: RRR  GI: Abdomen soft, non-tender, non-distended, no rebound tenderness / guarding    I&O's    01-17 @ 07:01  -  01-18 @ 07:00  --------------------------------------------------------  IN:    sodium chloride 0.9%: 900 mL  Total IN: 900 mL    OUT:    PEG (Percutaneous Endoscopic Gastrostomy) Tube (mL): 200 mL  Total OUT: 200 mL    Total NET: 700 mL    LABS:                        13.0   7.27  )-----------( 228      ( 18 Jan 2022 08:30 )             39.8     01-18    137  |  102  |  16.9  ----------------------------<  55<L>  4.1   |  19.0<L>  |  0.46<L>    Ca    8.1<L>      18 Jan 2022 08:30  Phos  2.4     01-18  Mg     2.2     01-18    MICROBIOLOGY:   PATHOLOGY:    A; This is a  72 y/o female ith an lyposarcome of the abdomen receiving radio and chemotherapy. Presented with a SBO and a PEG was place for venting. Now with no abdominal distention, mild tenderness in the LLQ where her bowels were displaced by the abdominal tumor. Diet was activated and will b monitored for tolerance.    Plan:   FU oral tolerance.  OOB/Chair  FU Vitals signs.  FU eg functionality.  Discharge planning.       Acute Care Surgery/Trauma Surgery Progress Note:    No acute overnight events. Patient afebrile, and hemodynamically competent. Pain well controlled. Place on diet and monitoring tolerance and her appetite is back. Denies n/v/f/c/cp/sob.    PEG is venting gas. NGT in place an 200 cc in reservoir.    PRN Medications:  ketorolac   Injectable 15 milliGRAM(s) IV Push every 6 hours PRN Moderate Pain (4 - 6)  ondansetron Injectable 4 milliGRAM(s) IV Push every 6 hours PRN Nausea      Objective:   T(F): 98.2 (01-18 @ 12:30), Max: 98.8 (01-17 @ 23:03)  HR: 62 (01-18 @ 12:30) (62 - 76)  BP: 135/90 (01-18 @ 12:30) (128/79 - 144/94)  BP(mean): --  ABP: --  ABP(mean): --  RR: 18 (01-18 @ 12:30) (17 - 19)  SpO2: 96% (01-18 @ 12:30) (93% - 96%)      Physical Exam:   GEN: patient resting comfortably in bed, in no acute distress  RESP: respirations are unlabored, no accessory muscle use, no conversational dyspnea  CVS: RRR  GI: Abdomen soft, non-tender, non-distended, no rebound tenderness / guarding    I&O's    01-17 @ 07:01  -  01-18 @ 07:00  --------------------------------------------------------  IN:    sodium chloride 0.9%: 900 mL  Total IN: 900 mL    OUT:    PEG (Percutaneous Endoscopic Gastrostomy) Tube (mL): 200 mL  Total OUT: 200 mL    Total NET: 700 mL    LABS:                        13.0   7.27  )-----------( 228      ( 18 Jan 2022 08:30 )             39.8     01-18    137  |  102  |  16.9  ----------------------------<  55<L>  4.1   |  19.0<L>  |  0.46<L>    Ca    8.1<L>      18 Jan 2022 08:30  Phos  2.4     01-18  Mg     2.2     01-18    MICROBIOLOGY:   PATHOLOGY:    A; This is a  72 y/o female ith an lyposarcome of the abdomen receiving radio and chemotherapy. Presented with a SBO and a PEG was place for venting. Now with no abdominal distention, mild tenderness in the LLQ where her bowels were displaced by the abdominal tumor. Diet was activated and will b monitored for tolerance.    Plan:   FU oral tolerance.  OOB/Chair  FU Vitals signs.  FU eg functionality.  Discharge planning.

## 2022-01-19 NOTE — DISCHARGE NOTE PROVIDER - HOSPITAL COURSE
71 year old female with PMH liposarcoma s/p resection in 2018 currently on chemoradiation who was sent by her PCP to the ED on 1/12/2022 after an outpatient KUB demonstrated possible SOB. She has a one day history of nausea and vomiting with increases size of LLQ incisional hernia. She last passed gas yesterday morning and BM last week with is her norm. Patients last chemo was one month ago and last radiotherapy was yesterday. Patient is scheduled for radiation tomorrow and chemo on the 18th. NO fevers, chills, dysuria, chest pain.  Pt. was admitted with SBO.  NGT was placed and bowel function was monitored.  Gastrographin challenge was administered and slowly passed.  Pt. with bowel sounds and flatus.  She was taken to the OR on 1/16/2022 for EGD and PEG tube placement.  Bowel function was again observed and Pt. was started on a regular diet 1/18.  She is tolerating her diet well, is moving her bowel and her vitals are stable.  Her pain is well controlled and she is ambulating well.  She is stable for discharge and should follow with her oncologist as previously scheduled.

## 2022-01-19 NOTE — DISCHARGE NOTE PROVIDER - NSDCFUSCHEDAPPT_GEN_ALL_CORE_FT
JULIA UMANA ; 01/19/2022 ; [facility]  JULIA UMANA ; 02/23/2022 ; [facility]  JULIA UMANA ; 03/04/2022 ; NPJEANNA [facility] [address] JULIA UMANA ; 02/23/2022 ; [facility]  JULIA UMANA ; 03/04/2022 ; [facility]O [facility] [address] [facility]

## 2022-01-19 NOTE — CONSULT NOTE ADULT - SUBJECTIVE AND OBJECTIVE BOX
: [id number]     HPI: 71 year old female with PMH liposarcoma s/p resection in 2018 currently on treatment with radiation and doxil, who was sent by her PCP to the ED after an outpt KUB demonstrated possible SBO. Prior to admission She had one day history of nausea and vomiting with increases size of LLQ incisional hernia. Since admission, however, she states that her abdominal discomfort has improved. She is now tolerating solid food without nausea. She denies fevers, chills, chest pain, worsening SOB, lower extremity edema. Patient is passing flatus and having bowel movements. Patient had venting tube placed as palliation as well.     REVIEW OF SYSTEMS:   [X All ROS addressed below are non-contributory, except:  Neuro: [ ] Headache [  ]Back pain [ ] Numbness [ ] Weakness [ ] Ataxia [ ] Dizziness [ ] Aphasia [ ] Dysarthria [ ] Visual disturbance  Resp: [ ] Shortness of breath/dyspnea, [ ] Orthopnea [ ] Cough  CV: [ ] Chest pain [ ] Palpitation [ ] Lightheadedness [ ] Syncope  Renal: [ ] Thirst [ ] Edema [ ] hematuria   GI: [ ] Nausea [ ] Emesis [ ] Abdominal pain [ x] Constipation [ ] Diarrhea  Hem: [ ] Hematemesis [ ] bright red blood per rectum  ID: [ ] Fever [ ] Chills [ ] Dysuria  ENT: [ ] Rhinorrhea  Neuro [ ] Numbness [ ] tingling   Psych: [ ] confusion     PAST MEDICAL & SURGICAL HISTORY:  HTN (hypertension)    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, unspecified    Ventral incisional hernia without obstruction or gangrene    Other specified disorders of kidney and ureter    Sarcoma  left thigh    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    FAMILY HISTORY:  FH: hypertension (Mother)    SOCIAL HISTORY:    Allergies    penicillin (Hives)    Intolerances    MEDICATIONS  (STANDING):  amLODIPine   Tablet 5 milliGRAM(s) Oral daily  enoxaparin Injectable 40 milliGRAM(s) SubCutaneous daily  losartan 50 milliGRAM(s) Oral daily    MEDICATIONS  (PRN):  ketorolac   Injectable 15 milliGRAM(s) IV Push every 6 hours PRN Moderate Pain (4 - 6)  ondansetron Injectable 4 milliGRAM(s) IV Push every 6 hours PRN Nausea      Vital Signs Last 24 Hrs  T(C): 36.8 (19 Jan 2022 11:57), Max: 37.1 (18 Jan 2022 16:43)  T(F): 98.3 (19 Jan 2022 11:57), Max: 98.7 (18 Jan 2022 16:43)  HR: 78 (19 Jan 2022 11:57) (64 - 78)  BP: 139/88 (19 Jan 2022 11:57) (119/83 - 139/88)  BP(mean): --  RR: 18 (19 Jan 2022 11:57) (18 - 18)  SpO2: 94% (19 Jan 2022 11:57) (94% - 97%)    PHYSICAL EXAM:    GENERAL: NAD   HEAD:  Atraumatic, Normocephalic  EYES: EOMI, PERRLA, conjunctiva and sclera clear  ENMT: No tonsillar erythema, exudates, or enlargement; Moist mucous membranes, Good dentition, No lesions  NECK: Supple, No JVD, Normal thyroid  NERVOUS SYSTEM:  Alert & Oriented X3, Good concentration; Motor Strength 5/5 B/L upper and lower extremities; DTRs 2+ intact and symmetric  CHEST/LUNG: Clear to percussion bilaterally; No rales, rhonchi, wheezing, or rubs  HEART: Regular rate and rhythm; No murmurs, rubs, or gallops  ABDOMEN: Palpable abdominal hernia noted.   EXTREMITIES:  2+ Peripheral Pulses, No clubbing, cyanosis, or edema  SKIN: No rashes or lesions      LABS:                        12.8   7.90  )-----------( 255      ( 19 Jan 2022 07:03 )             38.0     01-19    136  |  103  |  16.9  ----------------------------<  105<H>  4.0   |  20.0<L>  |  0.51    Ca    8.0<L>      19 Jan 2022 07:03  Phos  1.8     01-19  Mg     1.8     01-19      RADIOLOGY & ADDITIONAL STUDIES:  1/12/22: CT abdomen/pelvis   MPRESSION:  Small bowel obstruction secondary to the large left abdominal wall   hernia. Small volume ascites and mesenteric edema in the hernia sac which   could be soft signs of ischemia. No free air, pneumatosis or wall   thickening to suggest definite ischemia.    Interval enlargement of abdominal and pelvic mass compatible with known   unresectable sarcoma.

## 2022-01-19 NOTE — DISCHARGE NOTE NURSING/CASE MANAGEMENT/SOCIAL WORK - PATIENT PORTAL LINK FT
You can access the FollowMyHealth Patient Portal offered by [facility] by registering at the following website: [email or web address]. By joining [facility]’s FollowMyHealth portal, you will also be able to view your health information using other applications (apps) compatible with our system.

## 2022-01-19 NOTE — DISCHARGE NOTE NURSING/CASE MANAGEMENT/SOCIAL WORK - NSDCPEFALRISK_GEN_ALL_CORE
For information on Fall & Injury Prevention, visit: [email or web address] OR  [email or web address] OR  https://www.cdc.gov/steadi/patient.html

## 2022-01-19 NOTE — DISCHARGE NOTE PROVIDER - NSDCCPCAREPLAN_GEN_ALL_CORE_FT
PRINCIPAL DISCHARGE DIAGNOSIS  Diagnosis: Small bowel obstruction  Assessment and Plan of Treatment: Follow up: Please call and make an appointment with the Acute Care Surgery Clinic 10-14 days after discharge. Also, please call and make an appointment with your primary care physician and oncologist as per your usual schedule.   Activity: May return to normal activities as tolerated, however refrain from heavy lifting > 10-15 lbs.  Diet: May continue regular diet.  Medications: Please take all medications listed on your discharge paperwork as prescribed.   Wound Care: Please, keep wound site clean and dry. You may shower, but do not bathe.  Patient is advised to RETURN TO THE EMERGENCY DEPARTMENT for any of the following - worsening pain, fever/chills, nausea/vomiting, altered mental status, chest pain, shortness of breath, or any other new / worsening symptom.         PRINCIPAL DISCHARGE DIAGNOSIS  Diagnosis: Small bowel obstruction  Assessment and Plan of Treatment: Follow up: Please call and make an appointment with the Acute Care Surgery Clinic 1 week after discharge. Also, please call and make an appointment with your primary care physician and oncologist as per your usual schedule.   Activity: May return to normal activities as tolerated, however refrain from heavy lifting > 10-15 lbs.  Diet: May continue regular diet.  Medications: Please take all medications listed on your discharge paperwork as prescribed.   Wound Care: Please, keep wound site clean and dry. You may shower, but do not bathe.  Patient is advised to RETURN TO THE EMERGENCY DEPARTMENT for any of the following - worsening pain, fever/chills, nausea/vomiting, altered mental status, chest pain, shortness of breath, or any other new / worsening symptom.

## 2022-01-19 NOTE — CONSULT NOTE ADULT - ASSESSMENT
The patient is a 71 year old woman with stage IV liposarcoma, currently on treatment with radiation and doxil, who presented with small bowel obstruction. She has been managed conservatively; her diet has advanced and she is tolerating solid food, passing flatus, and had a bowel movement. Case was discussed with her primary oncologist, who will follow up with her as outpatient.  Patient to be discharged today.     - follow up with Dr. Kruse.

## 2022-01-19 NOTE — DISCHARGE NOTE PROVIDER - NSDCMRMEDTOKEN_GEN_ALL_CORE_FT
acetaminophen 325 mg oral tablet: 3 tab(s) orally every 6 hours as needed for mild to moderate pain  amlodipine-olmesartan 5 mg-20 mg oral tablet: 1 tab(s) orally once a day  ondansetron 8 mg oral tablet: 1 tab(s) orally 3 times a day, As Needed  polyethylene glycol 3350 oral powder for reconstitution: 17 gram(s) orally once a day  senna oral tablet: 2 tab(s) orally once a day (at bedtime), As needed, Constipation  Vitamin D3 400 intl units (10 mcg) oral tablet: 1 tab(s) orally once a day

## 2022-01-19 NOTE — DISCHARGE NOTE PROVIDER - NSFOLLOWUPCLINICS_GEN_ALL_ED_FT
[facility] Acute Care Surgery  Acute Care Surgery  [address] [place] [address]  Phone: [phone]  Fax:   Follow Up Time: 2 weeks     [facility] Acute Care Surgery  Acute Care Surgery  [address] [place] [address]  Phone: [phone]  Fax:   Follow Up Time: 1 week

## 2022-01-19 NOTE — DISCHARGE NOTE NURSING/CASE MANAGEMENT/SOCIAL WORK - NSDCVIVACCINE_GEN_ALL_CORE_FT
influenza, injectable, quadrivalent, preservative free; 23-Oct-2018 12:20; Julissa Higginbotham (RN); [employer]; gy29l (Exp. Date: 30-Jun-2019); IntraMuscular; Deltoid Left.; 0.5 milliLiter(s); VIS (VIS Published: 07-Aug-2015, VIS Presented: 23-Oct-2018);   Tdap; 02-Nov-2017 22:17; Dianne Redmond); [employer]; [id number]; IntraMuscular; Deltoid Right.; 0.5 milliLiter(s); VIS (VIS Published: 09-May-2013, VIS Presented: 02-Nov-2017);

## 2022-01-19 NOTE — DISCHARGE NOTE PROVIDER - NSDCFUADDINST_GEN_ALL_CORE_FT
Follow up with [facility] acute care surgery clinic in two weeks for surgical site/PEG site check.  Keep site clean and dry.

## 2022-01-19 NOTE — PROGRESS NOTE ADULT - SUBJECTIVE AND OBJECTIVE BOX
SUBJECTIVE/24 hour events:  No acute overnight events. Patient afebrile, and hemodynamically competent. Pain well controlled. Place on diet and monitoring tolerance and her appetite is back. Denies n/v/f/c/cp/sob.    PEG is venting gas. Patient should be able to get discharged today       Vital Signs Last 24 Hrs  T(C): 36.8 (18 Jan 2022 20:03), Max: 37.1 (18 Jan 2022 16:43)  T(F): 98.3 (18 Jan 2022 20:03), Max: 98.7 (18 Jan 2022 16:43)  HR: 70 (18 Jan 2022 20:03) (62 - 73)  BP: 130/87 (18 Jan 2022 20:03) (119/83 - 135/90)  BP(mean): --  RR: 18 (18 Jan 2022 20:03) (18 - 18)  SpO2: 97% (18 Jan 2022 20:03) (95% - 97%)  Drug Dosing Weight  Height (cm): 152.4 (12 Jan 2022 15:19)  Weight (kg): 50.3 (12 Jan 2022 15:19)  BMI (kg/m2): 21.7 (12 Jan 2022 15:19)  BSA (m2): 1.45 (12 Jan 2022 15:19)  I&O's Detail    17 Jan 2022 07:01  -  18 Jan 2022 07:00  --------------------------------------------------------  IN:    sodium chloride 0.9%: 900 mL  Total IN: 900 mL    OUT:    PEG (Percutaneous Endoscopic Gastrostomy) Tube (mL): 200 mL  Total OUT: 200 mL    Total NET: 700 mL      18 Jan 2022 07:01  -  19 Jan 2022 02:29  --------------------------------------------------------  IN:  Total IN: 0 mL    OUT:    PEG (Percutaneous Endoscopic Gastrostomy) Tube (mL): 300 mL    Voided (mL): 250 mL  Total OUT: 550 mL    Total NET: -550 mL        Allergies    penicillin (Hives)    Intolerances                              13.0   7.27  )-----------( 228      ( 18 Jan 2022 08:30 )             39.8   01-18    137  |  102  |  16.9  ----------------------------<  55<L>  4.1   |  19.0<L>  |  0.46<L>    Ca    8.1<L>      18 Jan 2022 08:30  Phos  2.4     01-18  Mg     2.2     01-18        ROS:    PHYSICAL EXAM:  GEN: patient resting comfortably in bed, in no acute distress  RESP: respirations are unlabored, no accessory muscle use, no conversational dyspnea  CVS: RRR  GI: Abdomen soft, non-tender, non-distended, no rebound tenderness / guarding, venting peg tube well seated           MEDICATIONS  (STANDING):  amLODIPine   Tablet 5 milliGRAM(s) Oral daily  enoxaparin Injectable 40 milliGRAM(s) SubCutaneous daily  losartan 50 milliGRAM(s) Oral daily    MEDICATIONS  (PRN):  ketorolac   Injectable 15 milliGRAM(s) IV Push every 6 hours PRN Moderate Pain (4 - 6)  ondansetron Injectable 4 milliGRAM(s) IV Push every 6 hours PRN Nausea      RADIOLOGY STUDIES:    CULTURES:

## 2022-01-19 NOTE — DISCHARGE NOTE PROVIDER - DETAILS OF MALNUTRITION DIAGNOSIS/DIAGNOSES
This patient has been assessed with a concern for Malnutrition and was treated during this hospitalization for the following Nutrition diagnosis/diagnoses:     -  01/20/2022: Severe protein-calorie malnutrition

## 2022-01-20 NOTE — PROGRESS NOTE ADULT - SUBJECTIVE AND OBJECTIVE BOX
SUBJECTIVE/24 hour events:  Patient is a 71yFemale no acute overnight events. Patient afebrile, and hemodynamically competent. Pain well controlled. Place on diet and monitoring tolerance and her appetite is back. Denies n/v/f/c/cp/sob.  PEG is venting gas. Heme-onc consulted yesterday, patient to followup with Dr. Olivier. Patient should be able to get discharged today         Vital Signs Last 24 Hrs  T(C): 36.9 (19 Jan 2022 21:31), Max: 36.9 (19 Jan 2022 21:31)  T(F): 98.4 (19 Jan 2022 21:31), Max: 98.4 (19 Jan 2022 21:31)  HR: 73 (19 Jan 2022 21:31) (64 - 78)  BP: 132/76 (19 Jan 2022 21:31) (132/76 - 139/88)  BP(mean): --  RR: 18 (19 Jan 2022 21:31) (18 - 19)  SpO2: 94% (19 Jan 2022 21:31) (94% - 96%)  Drug Dosing Weight  Height (cm): 152.4 (12 Jan 2022 15:19)  Weight (kg): 50.3 (12 Jan 2022 15:19)  BMI (kg/m2): 21.7 (12 Jan 2022 15:19)  BSA (m2): 1.45 (12 Jan 2022 15:19)  I&O's Detail    18 Jan 2022 07:01  -  19 Jan 2022 07:00  --------------------------------------------------------  IN:  Total IN: 0 mL    OUT:    PEG (Percutaneous Endoscopic Gastrostomy) Tube (mL): 300 mL    Voided (mL): 250 mL  Total OUT: 550 mL    Total NET: -550 mL        Allergies    penicillin (Hives)    Intolerances                              12.8   7.90  )-----------( 255      ( 19 Jan 2022 07:03 )             38.0   01-19    136  |  103  |  16.9  ----------------------------<  105<H>  4.0   |  20.0<L>  |  0.51    Ca    8.0<L>      19 Jan 2022 07:03  Phos  1.8     01-19  Mg     1.8     01-19        ROS:    PHYSICAL EXAM:    GEN: patient resting comfortably in bed, in no acute distress  RESP: respirations are unlabored, no accessory muscle use, no conversational dyspnea  CVS: RRR  GI: Abdomen soft, non-tender, non-distended, no rebound tenderness / guarding, venting peg well seated    MEDICATIONS  (STANDING):  amLODIPine   Tablet 5 milliGRAM(s) Oral daily  enoxaparin Injectable 40 milliGRAM(s) SubCutaneous daily  losartan 50 milliGRAM(s) Oral daily    MEDICATIONS  (PRN):  aluminum hydroxide/magnesium hydroxide/simethicone Suspension 30 milliLiter(s) Oral every 4 hours PRN Dyspepsia  ondansetron Injectable 4 milliGRAM(s) IV Push every 6 hours PRN Nausea      RADIOLOGY STUDIES:    CULTURES:

## 2022-01-20 NOTE — DIETITIAN INITIAL EVALUATION ADULT. - OTHER INFO
71 year old female with an liposarcoma of the abdomen receiving radio and chemotherapy. Presented with a SBO and a PEG was place for venting. Now with no abdominal distention, mild tenderness in the LLQ where her bowels were displaced by the abdominal tumor. Pt was on a regular diet, now NPO. Pt reports poor appetite/po intake prior to admission. States her UBW was 160 lbs and has lost weight down to 110 lbs within the last year. Palliative consulted for GOC. RD to follow up.

## 2022-01-20 NOTE — DIETITIAN INITIAL EVALUATION ADULT. - PERTINENT MEDS FT
MEDICATIONS  (STANDING):  amLODIPine   Tablet 5 milliGRAM(s) Oral daily  diatrizoate meglumine/diatrizoate sodium. 90 milliLiter(s) Oral once  enoxaparin Injectable 40 milliGRAM(s) SubCutaneous daily  losartan 50 milliGRAM(s) Oral daily  multiple electrolytes Injection Type 1 1000 milliLiter(s) (60 mL/Hr) IV Continuous <Continuous>  potassium phosphate / sodium phosphate Tablet (K-PHOS No. 2) 2 Tablet(s) Oral every 4 hours    MEDICATIONS  (PRN):  aluminum hydroxide/magnesium hydroxide/simethicone Suspension 30 milliLiter(s) Oral every 4 hours PRN Dyspepsia  ondansetron Injectable 4 milliGRAM(s) IV Push every 6 hours PRN Nausea

## 2022-01-21 NOTE — PROGRESS NOTE ADULT - ATTENDING COMMENTS
Patient seen on rounds. Tolerating diet, PEG in place. Needs instruction on how to vent PEG, stable for d/c home.
Abdomen soft and decompressed  Lateral abdominal wall hernia easily compressible and non-tender  Passed gas  I believe the obstruction is relieved, but this is unfortunately a temporary improvement, for patient has a non-resectable liposarcoma, occupying most of the pelvis and retroperitoneum.  Gastrografin via NGT and if OK feed patient
I have seen and examined the patient during rounds form  events noted  tolerating diet, passing flatus  G tube clamped.  G tube education to open to gravity if obstructive symptoms develop.  NO radiation for at lest 3-4 weeks  Dispo planning  Needs to follow up with primary oncology team
I have seen and examined the patient during rounds form 8-9a  events noted  had solid yesterday and develop and distention, Gastrostomy opened and minimal air drained.  states still passing flatus, no BM I last 24 hrs  on exam no toxic  large left abdominal wall hernia with distended bowel no pain out of proportion.    Liposarcoma with SBO, now venting G tube  Gastrographin via G tube  Palliative care consult
Patient is a 71yFemale presenting with SBO, NG tube in place, had gastrografin challenge, chest xray at 10pm showing contrast starting to move. Patient with increased output from NG tube, no acute events overnight, no pain issues, afebrile. Patient with no requests or complaints at this time  Abdomen soft and decompressed  Active BS  Passed about 5 liquid BMs and GG  For all practical purposes decompressed and SBO resolved for the time being  Will start on liquids PO and see how she does  In bigger picture, patient may need vent gastrostomy if SBO recurs  Palliative care only  Surgery should be avoided at any cost unless absolutely life-saving and emergent
I have seen and examined the patient during rounds from 730-11a  States passing flatus, abd  distended tympanic, PEG to gravity  Start liquids, if distention cont venting G tube  OOB  Ambulate  Dispo planning
Patient seen and evaluated on am rounds. Stable for dc today. need to discuss with pt and her daughter, as this is a palliative PEG and they will need to vent it at home.

## 2022-01-21 NOTE — PROGRESS NOTE ADULT - SUBJECTIVE AND OBJECTIVE BOX
SUBJECTIVE/24 hour events:     Patient evaluated at bedside. No acute distress. No acute events over night. Remains hemodynamically stable and afebrile. Patient had GG challenge and had large BM, so diet was advanced. Pending palliative consult. Pain well controlled. Place on diet and monitoring tolerance and her appetite is back. Denies n/v/f/c/cp/sob.  PEG is venting gas. Heme-onc consulted yesterday, patient to followup with Dr. Olivier.         Vital Signs Last 24 Hrs  T(C): 36.9 (19 Jan 2022 21:31), Max: 36.9 (19 Jan 2022 21:31)  T(F): 98.4 (19 Jan 2022 21:31), Max: 98.4 (19 Jan 2022 21:31)  HR: 73 (19 Jan 2022 21:31) (64 - 78)  BP: 132/76 (19 Jan 2022 21:31) (132/76 - 139/88)  BP(mean): --  RR: 18 (19 Jan 2022 21:31) (18 - 19)  SpO2: 94% (19 Jan 2022 21:31) (94% - 96%)  Drug Dosing Weight  Height (cm): 152.4 (12 Jan 2022 15:19)  Weight (kg): 50.3 (12 Jan 2022 15:19)  BMI (kg/m2): 21.7 (12 Jan 2022 15:19)  BSA (m2): 1.45 (12 Jan 2022 15:19)  I&O's Detail    18 Jan 2022 07:01  -  19 Jan 2022 07:00  --------------------------------------------------------  IN:  Total IN: 0 mL    OUT:    PEG (Percutaneous Endoscopic Gastrostomy) Tube (mL): 300 mL    Voided (mL): 250 mL  Total OUT: 550 mL    Total NET: -550 mL        Allergies    penicillin (Hives)    Intolerances                              12.8   7.90  )-----------( 255      ( 19 Jan 2022 07:03 )             38.0   01-19    136  |  103  |  16.9  ----------------------------<  105<H>  4.0   |  20.0<L>  |  0.51    Ca    8.0<L>      19 Jan 2022 07:03  Phos  1.8     01-19  Mg     1.8     01-19        ROS:    PHYSICAL EXAM:    GEN: patient resting comfortably in bed, in no acute distress  RESP: respirations are unlabored, no accessory muscle use, no conversational dyspnea  CVS: RRR  GI: Abdomen soft, non-tender, distended, no rebound tenderness / guarding, venting peg well seated    MEDICATIONS  (STANDING):  amLODIPine   Tablet 5 milliGRAM(s) Oral daily  enoxaparin Injectable 40 milliGRAM(s) SubCutaneous daily  losartan 50 milliGRAM(s) Oral daily    MEDICATIONS  (PRN):  aluminum hydroxide/magnesium hydroxide/simethicone Suspension 30 milliLiter(s) Oral every 4 hours PRN Dyspepsia  ondansetron Injectable 4 milliGRAM(s) IV Push every 6 hours PRN Nausea      RADIOLOGY STUDIES:    CULTURES:

## 2022-01-21 NOTE — CHART NOTE - NSCHARTNOTEFT_GEN_A_CORE
Called patient's daughter Nory at [phone] on request of the patient to explain to her the proper use of the venting PEG. Nory was upset that there was a break in communication between the surgery team and herself. Patient with capacity and understanding of her surgical management during this hospitalization, able to freely communicate with her family as needed. Nory was told that patient's SBO has since been resolved and underwent PEG tube placement. Nory was told that the PEG tube will function as a venting tube as her intra-abdominal pathology associated with the mass may cause additional bouts of obstruction and releasing the PEG lock will allow GI decompression. Patient is clear to be discharged from a surgical stand point, no nausea, tolerating diet, passing flatus and had BM since admission.
Palliative care social work note.    SW met with patient earlier today to provide support in coping with dx. patient to be discharged home today and reports no symptom concerns. SW provided patient with literature regarding Hospice services as well as [employer] for community follow up. SW aware patient intention is to follow up with oncologist.  SW contacted patients daughter Nory to educate family regarding services available in the event that patient condition worsens or no further oncology options. daughter appreciative of information regarding hospice services as well as [facility]. daughter reports that at present family struggling with cost of trial medications. SW advised that [facility] may be able to assist with their grants and daughter plans to call as follow up. Daughter will be picking up patient later today.
Spoke to patient's daughter Nory at [phone] at 8:35PM to update her regarding the progress of her mother and upcoming plans. Nory informed that patient had some abdominal distension earlier in the day which prompted the gastrografin study via the PEG. As contrast was seen to have progressed into the colon, patient's diet was resumed. Patient subsequently had multiple large BMs. Nory explained that patient will be evaluated in AM and should be able to be discharged to home. Once again reiterated with Nory the instructions and purpose of the venting PEG and that they will need to follow up with her oncologist, Dr. Kruse regarding the next steps in her management. Nory agreed and understood all that was discussed.

## 2022-01-21 NOTE — PROGRESS NOTE ADULT - NUTRITIONAL ASSESSMENT
This patient has been assessed with a concern for Malnutrition and has been determined to have a diagnosis/diagnoses of Severe protein-calorie malnutrition.    This patient is being managed with:   Diet Regular-  Entered: Jan 20 2022  3:23PM

## 2022-01-21 NOTE — PROGRESS NOTE ADULT - ASSESSMENT
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 Presented with a SBO and a PEG was place for venting. Now with no abdominal distention, mild tenderness in the LLQ where her bowels were displaced by the abdominal tumor. Diet was activated and will monitored for tolerance.    Plan:   FU oral tolerance.  OOB/Chair  FU Vitals signs.  F/U Palliative consult  Discharge today and follow up with her heme-onc MD Dr. Olivier  
A: 71 year old female with recurrent liposarcoma and incisional hernia presenting with partial SBO. POD 1 s/p  venting PEG.     Plan:   - Okay to use PEG for meds   - Keep to gravity   - Plan for TF on today   - Antiemetics PRN   - Pain control MMD   -monitor for bowel function   -multimodal pain regimen   -monitor electrolytes . replete PRN   -encourage I/S and OOB ambulation   -DVT prophylaxis   
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 Presented with a SBO and a PEG was place for venting. Now with no abdominal distention, mild tenderness in the LLQ where her bowels were displaced by the abdominal tumor. Diet was activated and will b monitored for tolerance.    Plan:   FU oral tolerance.  OOB/Chair  FU Vitals signs.  Discharge today and follow up with her heme-onc MD Dr. Olivier
Patient with SBO, slowly passing gastrografin challenge,   plan:  monitor ng output  serial abdominal exams  IV hydration   repeat kub this am  f/u am labs  dvt ppx  encourage oob  incentive spirometer
71 year old female with recurrent liposarcoma and incisional hernia presenting with partial SBO, plan for venting PEG today.     -OR for venting PEG  -NPO/IVFs  -monitor for bowel function   -multimodal pain regimen   -monitor electrolytes   -encourage I/S and OOB ambulation   -DVT prophylaxis 
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 Presented with a SBO and a PEG was place for venting. Now with no abdominal distention, mild tenderness in the LLQ where her bowels were displaced by the abdominal tumor. Diet was activated and will b monitored for tolerance.    Plan:   FU oral tolerance.  OOB/Chair  FU Vitals signs.  Discharge today  
100% of the time/able to follow multistep instructions

## 2022-01-25 NOTE — CONSULT NOTE ADULT - ASSESSMENT
70 yo female with a PMH of HTN, liposarcoma (s/p resection in 2018) currently on chemoradiation who was recently hospitalized for a SBO and underwent a PEG tube placement (1/16/22), and discharged two days ago. She is presenting to the ED with a chief complaint of abdominal pain, nonbloody, nonbilious emesis, and obstipation. Patient's daughter states that she vented the G-tube yesterday, but there was no output. Patient also states that her hernia is larger than before. In the ED, labs were notable for a WBC of 8.27. Blood gas was notable for a 7.16/44/<42/16. CT Abdomen/pelvis was notable for a large amount of pneumoperitoneum sarwat spillage of stool into the right hemipelvis with an area of air and fluid. General surgery was consulted emergently for placement.     - After a long conversation with the family, it was decided that the patient would be best served with maximal medical management, rather than with operative intervention  - Patient will be DNR/DNI  - Recommend MICU admission with IV antibiotics    Thank you for allowing us to participate in the care of this patient.

## 2022-01-25 NOTE — ED ADULT NURSE REASSESSMENT NOTE - NS ED NURSE REASSESS COMMENT FT1
pt remains in NSR on cardiac monitor, family and surgical team at bedside discussing potential options for treatment. IV sites patent, NS bolus infusing as ordered. even and unlabored resps present, skin warm dry and otherwise intact. pt with levo gtt ordered for hypotension if needed. pt is easily arousable to verbal stimuli. EKG completed as ordered. awaiting further order for final dispo.

## 2022-01-25 NOTE — CONSULT NOTE ADULT - SUBJECTIVE AND OBJECTIVE BOX
ACUTE CARE SURGERY CONSULT    HPI:  72 yo female with a PMH of HTN, liposarcoma (s/p resection in 2018), SBO 1/12/22, who was discharged from the hospital s/p PEG tube placement two days ago    PAST MEDICAL HISTORY:  HTN (hypertension)    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, unspecified    Ventral incisional hernia without obstruction or gangrene    Other specified disorders of kidney and ureter    Sarcoma        PAST SURGICAL HISTORY:  No significant past surgical history    Sarcoma        ALLERGIES:  penicillin (Hives)      FAMILY HISTORY: Noncontributory    SOCIAL HISTORY: Denies tobacco, EtOH, illicit substance use.     HOME MEDICATIONS:    MEDICATIONS  (STANDING):  metroNIDAZOLE  IVPB 500 milliGRAM(s) IV Intermittent once  norepinephrine Infusion 0.05 MICROgram(s)/kG/Min (5.63 mL/Hr) IV Continuous <Continuous>    MEDICATIONS  (PRN):      VITALS & I/Os:  Vital Signs Last 24 Hrs  T(C): --  T(F): --  HR: 84 (25 Jan 2022 15:00) (68 - 89)  BP: 105/58 (25 Jan 2022 15:00) (87/51 - 115/55)  BP(mean): 64 (25 Jan 2022 14:35) (64 - 64)  RR: 18 (25 Jan 2022 15:00) (18 - 18)  SpO2: 100% (25 Jan 2022 15:00) (100% - 100%)  CAPILLARY BLOOD GLUCOSE          I&O's Summary      GENERAL: Alert, well developed, in no acute distress.  MENTAL STATUS: AAOx3. Appropriate affect.  HEENT: PERRLA. EOMI. MMM.  Trachea midline. No lymph node swelling or tenderness.  RESPIRATORY: CTAB. No wheezing, rales or rhonchi.  CARDIOVASCULAR: RRR. No audible murmurs, rubs or gallops.   GASTROINTESTINAL: Abdomen soft, NT, ND, -R/-G.  No pulsatile mass, no flank tenderness or suprapubic tenderness. No hepatosplenomegaly.  NEUROLOGIC: Cranial nerves II-XII grossly intact. No focal neurological deficits. Moves all extremities spontaneously. Sensation intact bilaterally.  INTEGUMENTARY: No overt rashes or lesions, petechia or purpura. Good turgor. No edema.  MUSCULOSKELETAL: No cyanosis or clubbing. No gross deformities.   LYMPHATIC: Palpation of neck reveals no swelling or tenderness of neck nodes. Palpation of groin reveals no swelling or tenderness of groin nodes.    LABS:                        15.0   8.27  )-----------( 210      ( 25 Jan 2022 13:44 )             47.1     01-25    132<L>  |  94<L>  |  33.2<H>  ----------------------------<  99  5.8<H>   |  16.0<L>  |  2.24<H>    Ca    9.2      25 Jan 2022 13:44  Mg     4.8     01-25    TPro  5.6<L>  /  Alb  2.4<L>  /  TBili  0.3<L>  /  DBili  x   /  AST  72<H>  /  ALT  25  /  AlkPhos  78  01-25    Lactate: metroNIDAZOLE  IVPB 500 milliGRAM(s) IV Intermittent once  norepinephrine Infusion 0.05 MICROgram(s)/kG/Min IV Continuous <Continuous>     PT/INR - ( 25 Jan 2022 13:44 )   PT: 13.2 sec;   INR: 1.15 ratio         PTT - ( 25 Jan 2022 13:44 )  PTT:32.8 sec        01-25 @ 13:46  11.00        IMAGING:   ACUTE CARE SURGERY CONSULT    HPI:  70 yo female with a PMH of HTN, liposarcoma (s/p resection in 2018) currently on chemoradiation who was recently hospitalized for a SBO and underwent a PEG tube placement (1/16/22), and discharged two days ago. She is presenting to the ED with a chief complaint of abdominal pain, nonbloody, nonbilious emesis, and obstipation. Patient's daughter states that she vented the G-tube yesterday, but there was no output. Patient also states that her hernia is larger than before.     In the ED, labs were notable for a WBC of 8.27. Blood gas was notable for a 7.16/44/<42/16. CT Abdomen/pelvis was notable for a large amount of pneumoperitoneum sarwat spillage of stool into the right hemipelvis with an area of air and fluid. General surgery was consulted emergently for placement.     PAST MEDICAL HISTORY:  HTN (hypertension)    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, unspecified    Ventral incisional hernia without obstruction or gangrene    Other specified disorders of kidney and ureter    Sarcoma        PAST SURGICAL HISTORY:  No significant past surgical history    Sarcoma        ALLERGIES:  penicillin (Hives)      FAMILY HISTORY: Noncontributory    SOCIAL HISTORY: Denies tobacco, EtOH, illicit substance use.     HOME MEDICATIONS:    MEDICATIONS  (STANDING):  metroNIDAZOLE  IVPB 500 milliGRAM(s) IV Intermittent once  norepinephrine Infusion 0.05 MICROgram(s)/kG/Min (5.63 mL/Hr) IV Continuous <Continuous>    MEDICATIONS  (PRN):      VITALS & I/Os:  Vital Signs Last 24 Hrs  T(C): --  T(F): --  HR: 84 (25 Jan 2022 15:00) (68 - 89)  BP: 105/58 (25 Jan 2022 15:00) (87/51 - 115/55)  BP(mean): 64 (25 Jan 2022 14:35) (64 - 64)  RR: 18 (25 Jan 2022 15:00) (18 - 18)  SpO2: 100% (25 Jan 2022 15:00) (100% - 100%)  CAPILLARY BLOOD GLUCOSE          I&O's Summary      GENERAL: Ill appearing, laying in bed  RESPIRATORY: Nonlabored on RA  CARDIOVASCULAR: RRR.   GASTROINTESTINAL: Distended, large LLQ hernia, tender to palpation diffusely, PEG tube in place with feculent material  NEUROLOGIC: Cranial nerves II-XII grossly intact. No focal neurological deficits. Moves all extremities spontaneously. Sensation intact bilaterally.  INTEGUMENTARY: No overt rashes or lesions, petechia or purpura. Good turgor. No edema.  MUSCULOSKELETAL: No cyanosis or clubbing. No gross deformities.   LYMPHATIC: Palpation of neck reveals no swelling or tenderness of neck nodes. Palpation of groin reveals no swelling or tenderness of groin nodes.    LABS:                        15.0   8.27  )-----------( 210      ( 25 Jan 2022 13:44 )             47.1     01-25    132<L>  |  94<L>  |  33.2<H>  ----------------------------<  99  5.8<H>   |  16.0<L>  |  2.24<H>    Ca    9.2      25 Jan 2022 13:44  Mg     4.8     01-25    TPro  5.6<L>  /  Alb  2.4<L>  /  TBili  0.3<L>  /  DBili  x   /  AST  72<H>  /  ALT  25  /  AlkPhos  78  01-25    Lactate: metroNIDAZOLE  IVPB 500 milliGRAM(s) IV Intermittent once  norepinephrine Infusion 0.05 MICROgram(s)/kG/Min IV Continuous <Continuous>     PT/INR - ( 25 Jan 2022 13:44 )   PT: 13.2 sec;   INR: 1.15 ratio         PTT - ( 25 Jan 2022 13:44 )  PTT:32.8 sec        01-25 @ 13:46  11.00        IMAGING:

## 2022-01-25 NOTE — ED ADULT NURSE REASSESSMENT NOTE - NS ED NURSE REASSESS COMMENT FT1
pt officially DNR/DNI, MOLST form signed at this time. pt comfortable appearing at this time, family at bedside. IV sites patent, NSR on cardiac monitor. hypotensive at this time, no pressors to be initiated at this time. awaiting hospitalist for admission.

## 2022-01-25 NOTE — ED PROVIDER NOTE - CLINICAL SUMMARY MEDICAL DECISION MAKING FREE TEXT BOX
70yo female with pmh of liposarcoma s/p resection in 2018, SBO 1/12/22, recently dc'd from here with PEG tube presents with abd pain/n/v. due to concerning presentation and physical exam priority CT was called on pt's arrival concern for bowel perf vs ischemia as well as dissection due to pt's decreased radial pulse, pt found to have pneumoperitoneum and perforated bowel, abx started, 70yo female with pmh of liposarcoma s/p resection in 2018, SBO 1/12/22, recently dc'd from here with PEG tube presents with abd pain/n/v. due to concerning presentation and physical exam priority CT was called on pt's arrival concern for bowel perf vs ischemia as well as dissection due to pt's decreased radial pulse, pt found to have pneumoperitoneum and perforated bowel, abx started, surgery team evaluated after goals of care convo with pt and family pt DNI/DNR no surgery and recommended MICU admission for medical management, MICU team eval and after conversation with family no pressors, limited medical intervention admit to medicine for futher management

## 2022-01-25 NOTE — ED ADULT NURSE NOTE - CHIEF COMPLAINT QUOTE
Patient arrived to ED today with c/o abdominal pains.  Patient had surgery here at [place] recently for PEG tube placement.  Hx cancer.  Unable to obtain blood pressure and pulse is faint and not reading on pulse ox.  unable also to obtain oral temperature.  Patient brought to critical care.

## 2022-01-25 NOTE — ED ADULT NURSE REASSESSMENT NOTE - NS ED NURSE REASSESS COMMENT FT1
pt hypotensive at this time, attempting subsequent IV access. surgical team contacted for consult s.p CT result. pt with 2LPM 02 via nasal cannula present for oxygenation needs. pt with NS bolus infusing as ordered. no resp distress.

## 2022-01-25 NOTE — H&P ADULT - ASSESSMENT
pt. is a 70 yo female with a PMH of HTN, liposarcoma (s/p resection in 2018) currently on chemoradiation who was recently hospitalized for a SBO and underwent a PEG tube placement (1/16/22), and discharged two days ago. She is presenting to the ED with a chief complaint of abdominal pain, nonbloody, nonbilious emesis, and obstipation. Patient's daughter states that she vented the G-tube yesterday, but there was no output. Patient also states that her hernia is larger than before.   In the ED, labs were notable for a WBC of 8.27. Blood gas was notable for a 7.16/44/<42/16. CT Abdomen/pelvis was notable for a large amount of pneumoperitoneum sarwat spillage of stool into the right hemipelvis with an area of air and fluid. pt. was seen by General surgery and MICU team before pt. given to me.    - perforated bowl. As mentioned above in HPI, pt's family wants to try, iv fluids and antibiotics, pt. is on cefepime and flagyl. surgery and micu in-put noted. spoke to pt's son and family in length. continue present care, palliative care consult is requested.     - hyperkalemia, got treated in the er. follow repeat.     - DAY[place], likely related to perforated bowl and low effective renal flow, dehydration. continue iv fluids.  pt. is a 70 yo female with a PMH of HTN, liposarcoma (s/p resection in 2018) currently on chemoradiation who was recently hospitalized for a SBO and underwent a PEG tube placement (1/16/22), and discharged two days ago. She is presenting to the ED with a chief complaint of abdominal pain, nonbloody, nonbilious emesis, and obstipation. Patient's daughter states that she vented the G-tube yesterday, but there was no output. Patient also states that her hernia is larger than before.   In the ED, labs were notable for a WBC of 8.27. Blood gas was notable for a 7.16/44/<42/16. CT Abdomen/pelvis was notable for a large amount of pneumoperitoneum sarwat spillage of stool into the right hemipelvis with an area of air and fluid. pt. was seen by General surgery and MICU team before pt. given to me.    - perforated bowl. As mentioned above in HPI, pt's family wants to try, iv fluids and antibiotics, pt. is on cefepime and flagyl. surgery and micu in-put noted. spoke to pt's son and family in length. continue present care, family wants comfort care with iv fluids and antibiotics. palliative care consult is requested.     - hyperkalemia, got treated in the er. follow repeat.     - [place], likely related to perforated bowl and low effective renal flow, dehydration. continue iv fluids.

## 2022-01-25 NOTE — ED PROVIDER NOTE - OBJECTIVE STATEMENT
72yo female with pmh of liposarcoma s/p resection in 2018, SBO 1/12/22, recently dc'd from here with PEG tube presents with abd pain/n/v. Pt states since yesterday with worsening abd pain and nausea and nbnb emesis. Pt states her hernia is also larger than before. Pt reports constipation and obstipation. Pt denies fevers/chills, ha, loc, focal neuro deficits, cp/sob/palp, cough, diarrhea, urinary symptoms, recent travel.

## 2022-01-25 NOTE — CHART NOTE - NSCHARTNOTEFT_GEN_A_CORE
RN called to report critical lab k 6.1.    Discussed with MD and family, who wants mother comfortable request no lab/ blood draws only fluid and ABO.     To discuss hospice and palliative for the AM.

## 2022-01-25 NOTE — ED ADULT TRIAGE NOTE - CHIEF COMPLAINT QUOTE
Patient arrived to ED today with c/o abdominal pains.  Patient had surgery here at [place] recently for PEG tube placement.  Hx cancer.  Unable to obtain blood pressure and pulse is faint and not reading on pulse ox.  unable also to obtain oral temperature. Patient arrived to ED today with c/o abdominal pains.  Patient had surgery here at [place] recently for PEG tube placement.  Hx cancer.  Unable to obtain blood pressure and pulse is faint and not reading on pulse ox.  unable also to obtain oral temperature.  Patient brought to critical care.

## 2022-01-25 NOTE — ED ADULT NURSE REASSESSMENT NOTE - NS ED NURSE REASSESS COMMENT FT1
pt family at bedside, pt moved to private room for comfort. awaiting hospitalist for orders. pt remains on 2LPM 02 via nasal cannula, IV site patent, no resp distress, even and unlabored resps present.

## 2022-01-25 NOTE — GOALS OF CARE CONVERSATION - ADVANCED CARE PLANNING - CONVERSATION DETAILS
Discussed with patient and family at baseline.  Patient does not want heroic measures or life support.  Family wants to try abx and fluids then if no improvement consider hospice.    DNR/DNI/No pressors/ No dialysis

## 2022-01-25 NOTE — H&P ADULT - HISTORY OF PRESENT ILLNESS
pt. is a 70 yo female with a PMH of HTN, liposarcoma (s/p resection in 2018) currently on chemoradiation who was recently hospitalized for a SBO and underwent a PEG tube placement (1/16/22), and discharged two days ago. She is presenting to the ED with a chief complaint of abdominal pain, nonbloody, nonbilious emesis, and obstipation. Patient's daughter states that she vented the G-tube yesterday, but there was no output. Patient also states that her hernia is larger than before.   In the ED, labs were notable for a WBC of 8.27. Blood gas was notable for a 7.16/44/<42/16. CT Abdomen/pelvis was notable for a large amount of pneumoperitoneum sarwat spillage of stool into the right hemipelvis with an area of air and fluid. pt. was seen by General surgery and MICU team before pt. given to me.  As per surgery team :   - After a long conversation with the family, it was decided that the patient would be best served with maximal medical management, rather than with operative intervention  - Patient will be DNR/DNI  - Recommend MICU admission with IV antibiotics.     - as per MICU :  Discussed with patient and family at baseline.  Patient does not want heroic measures or life support.  Family wants to try abx and fluids then if no improvement consider hospice.    DNR/DNI/No pressors/ No dialysis.

## 2022-01-25 NOTE — ED PROVIDER NOTE - PHYSICAL EXAMINATION
Const: Awake, alert and oriented. ill appearing, in distress  HEENT: NC/AT. Dry mucous membranes.  Eyes: No scleral icterus. EOMI.  Neck:. Soft and supple. Full ROM without pain.  Cardiac: Regular rate and regular rhythm. decreased radial pulses equally   Resp: Speaking in full sentences. No evidence of respiratory distress. No wheezes, rales or rhonchi.  Abd: distended with large tender hernia like mass at llq, no overlying skin changes, pt with PEG tube in place with some dark feculant like material in it   Back: Spine midline and non-tender. No CVAT.  Skin: No rashes, abrasions or lacerations  Neuro: Awake, alert & oriented x 3. Moves all extremities symmetrically. Const: Awake, alert and oriented. ill appearing, in distress  HEENT: NC/AT. Dry mucous membranes.  Eyes: No scleral icterus. EOMI.  Neck:. Soft and supple. Full ROM without pain.  Cardiac: Regular rate and regular rhythm. decreased radial pulses equally   Resp: Speaking in full sentences. No evidence of respiratory distress. No wheezes, rales or rhonchi.  Abd: distended with large tender hernia like mass at llq, no overlying skin changes, pt with PEG tube in place with some dark feculent like material in it   Back: Spine midline and non-tender. No CVAT.  Skin: No rashes, abrasions or lacerations  Neuro: Awake, alert & oriented x 3. Moves all extremities symmetrically.

## 2022-01-25 NOTE — ED ADULT NURSE NOTE - OBJECTIVE STATEMENT
pt BIBA from rehab with {PEG tube in place, pt noted to have very large outpouching of left lower abdominal area, pt reporting it is a hernia but is not usually this large. pt with 10/10 pain, unable to ambulate, pt pale in color. recently here and was intubated, unclear why on initial exam. pt with swelling and pitting edema to left lower extremity as well, which pt states is not normal for her. states she has been vomiting as well, with no BM recently. pt skin warm and dry, pt is alert to person place and time, minimal SOB noted. pt BIBA with severe sudden onset of abdominal pain and vomiting. patient with PEG tube in place which was recently inserted within the last week here at [facility] due to poor PO intake and cancer dx. pt noted to have very large outpouching of left lower abdominal area, pt reporting it is a hernia/tumor but is not usually this large. pt with 10/10 pain, unable to ambulate. pt pale in color. pt with swelling and pitting edema to left lower extremity as well, which pt states is not normal for her. states she has been vomiting as well, with no BM recently. pt skin warm and dry, pt is alert to person place and time, minimal SOB noted.

## 2022-01-25 NOTE — ED PROVIDER NOTE - NS ED ATTENDING STATEMENT MOD
I have personally provided the amount of critical care time documented below concurrently with the resident/fellow.  This time excludes time spent on separate procedures and time spent teaching. I have reviewed the resident’s / fellow’s documentation and I agree with the history, exam, and assessment and plan of care. I have personally provided the amount of critical care time documented below excluding time spent on separate procedures.

## 2022-01-25 NOTE — ED PROVIDER NOTE - PROGRESS NOTE DETAILS
Given the significant and immediate threats to this patient based on initial presentation, the benefits of emergency contrast-enhanced CT imaging without obtaining GFR/creatinine serum level results greatly outweigh the potential risk of harm due to contrast-induced nephropathy. Surgery team called on patient's arrival Surgery team called on patient's arrival due to concerning physical exam Surgery team called again with CT results Surgical team, Dr. Jean (attending), spoke with family regarding options, as per team family and patient would prefer non surgical management and stating pt is no DNI/DNR, recommending MICU admission, MICU consulted, MICU PA talking with family at this time regarding goals of care recommending to not start pressors at this time

## 2022-01-25 NOTE — H&P ADULT - NSHPPHYSICALEXAM_GEN_ALL_CORE
Vital Signs Last 24 Hrs  T(C): --  T(F): --  HR: 80 (25 Jan 2022 17:00) (68 - 89)  BP: 90/52 (25 Jan 2022 17:00) (85/56 - 115/55)  BP(mean): 64 (25 Jan 2022 14:35) (64 - 64)  RR: 18 (25 Jan 2022 17:00) (18 - 18)  SpO2: 96% (25 Jan 2022 17:00) (96% - 100%)    General: pt. in bed not in distress  HEENT: AT, NC. PERRL. intact EOM. no throat erythema or exudate.   Neck: supple. no JVD.   Chest: CTA bilaterally  Heart: S1,S2. RRR. no heart murmur. no edema.   Abdomen: soft. non-tender. non-distended. + BS. no hernia or palpable masses.  Ext: no calf tenderness. distal pulses intact.  Neuro: AAO x3. no focal weakness. no speech disorder. cns intact.   Skin: warm and dry.   psych : no agitation, calm and quite.

## 2022-01-26 NOTE — DISCHARGE NOTE FOR THE EXPIRED PATIENT - OTHER SIGNIFICANT FINDINGS
Called by RN around ___ for cease of spontaneous respirations. Pt seen and examined at bedside.    Physical Exam:  Pt unresponsive to voice, painful stimuli and sternal rub  Pupils fixed and unreactive to light. No corneal reflex   No palpable carotid, radial or pedal pulses  No cardiac sounds auscultated   No spontaneous respirations. No breath sounds auscultated    Pt pronounced  by PA on 2022 at 06:26am/    HCP who is at bedside, declines autopsy. Emotional support provided. All questions answered and concerns addressed.     MD made aware at 06:26am .

## 2022-01-26 NOTE — DISCHARGE NOTE FOR THE EXPIRED PATIENT - HOSPITAL COURSE
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 Blood gas showed pt was acidotic. Pts family opted for comfort care and at 6:26 pt was noted to have no spontaneous reaspiratory effort of pulse. 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 Blood gas showed pt was acidotic. Pts family opted for comfort care and at 6:26 pt was noted to have no spontaneous reaspiratory effort of pulse. Pt pronounced dead  Family at bedside, decline autopsy.

## 2022-01-27 ENCOUNTER — APPOINTMENT (OUTPATIENT)
Dept: TRAUMA SURGERY | Facility: CLINIC | Age: 72
End: 2022-01-27

## 2022-01-27 PROBLEM — C49.9 DEDIFFERENTIATED LIPOSARCOMA: Status: ACTIVE | Noted: 2018-05-11

## 2022-01-27 LAB
RAPID RVP RESULT: SIGNIFICANT CHANGE UP
SARS-COV-2 RNA SPEC QL NAA+PROBE: SIGNIFICANT CHANGE UP

## 2022-01-28 NOTE — HISTORY OF PRESENT ILLNESS
[de-identified] : Ms. Fleming is a 69 F who presented with LEFT upper thigh mass near the groin in 1/2018 and underwent CT torso on 1/25/18 showing a superficial mass in the anterior psoas muscle measuring 10+ x 4.6 x 7.9 cm and FNA from 2/8/2020 was suspicious for malignancy.  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 Patient was delayed in follow up due to insurance issues and found this out on 5/14/2018.  She was referred to Dr Hankins, plastic sx and Rad Onc.  She had resection of L groin mass, had complication with healing, closely followed by Dr Hankins and Wound care team and is now doing better in healing. She did not have RT post operatively although was discussed by us and and Dr. Hankins; this was impossible to perform in a timely manner given the nature of poor healing over time. New PET/CT showed significant edema of the LLE, large inguinal hernia containing fat and large bowel extending to the level of the mid thigh and small lung nodule that was PET avid. She also has a ground glass lesion in the CAMACHO SUV1.3 that is persistent from 12/2018.  Her CT scan from 9/16/2019 showed a R apical solid/ground glass lesion with linear extension to the lateral pleural surface. Total size lesion 2.4 cm, solid component 1.5 x1.7 cm. Suspicious for primary lung cancer.  On 10/12/19, she was admitted with abd hernia, pain. Nonsurgical intervention given the size and the morbidity of the surgical procedure and without likelihood of long term benefit and deemed nonsurgical candidate. On 10/24/19 she underwent RUL nodule US guided Bx to r/o second malignancy, Lung ca by Dr. Toth.  There were concerns of increasingly dense appearing CAMACHO lung mass\par \par April 2021 perinephric mass noted \par August 2021 new large RP mass adjacent to Kidney \par \par 8/31/21 started Liposomal doxorubicin [de-identified] : PDGFRA amplification\par ARAF amplification\par MDM2 amplification\par LUKAS amplification\par KDM4C R220Q  [FreeTextEntry1] : Doxil start 8/31/21 s/p cycle #4 [de-identified] : recently admitted at [facility] for SBO, had venting PEG placed\par imaging reviewed with radiology , larger mass stable in size however pernephric mass bigger but that is in the setting of RT so possible pseudoprogression \par no abdominal pain

## 2022-02-03 NOTE — CHART NOTE - NSCHARTNOTEFT_GEN_A_CORE
palliative care social work note.    Bereavement call made to patients daughter Nory. Support and resources offered.

## 2022-02-23 ENCOUNTER — APPOINTMENT (OUTPATIENT)
Dept: INFUSION THERAPY | Facility: HOSPITAL | Age: 72
End: 2022-02-23

## 2022-03-04 ENCOUNTER — APPOINTMENT (OUTPATIENT)
Dept: RADIATION ONCOLOGY | Facility: CLINIC | Age: 72
End: 2022-03-04

## 2022-04-25 NOTE — DISCHARGE NOTE ADULT - REASON FOR ADMISSION
worsening hand swelling SSKI Counseling:  I discussed with the patient the risks of SSKI including but not limited to thyroid abnormalities, metallic taste, GI upset, fever, headache, acne, arthralgias, paraesthesias, lymphadenopathy, easy bleeding, arrhythmias, and allergic reaction.

## 2022-07-19 ENCOUNTER — NON-APPOINTMENT (OUTPATIENT)
Age: 72
End: 2022-07-19

## 2022-08-02 NOTE — CONSULT NOTE ADULT - ASSESSMENT
Assessment/Plan: 67y Female s/p excision of left inguinal mass with fem-BK pop bypass with PTFE and a muscle flap reconstruction, now with a large abscess. Awaiting drainage with plastics surgery.     - No urgent intervention at this time for the herniation  - General surgery available if bowel compromise is encountered    Discussed with Dr. Verma  Pager [phone] None

## 2022-08-03 NOTE — PROGRESS NOTE ADULT - PROVIDER SPECIALTY LIST ADULT
Urology
Hospitalist
Urology
Hospitalist
as evidenced by BMI 36.3 status post bariatric surgery

## 2022-08-09 NOTE — DIETITIAN INITIAL EVALUATION ADULT. - PERTINENT MEDS FT
Detail Level: Detailed Depth Of Biopsy: dermis Was A Bandage Applied: Yes Size Of Lesion In Cm: 0 Biopsy Type: H and E Biopsy Method: Personna blade Anesthesia Type: 1% lidocaine with epinephrine Oxycodone, sodium chloride IV Continuous Anesthesia Volume In Cc (Will Not Render If 0): 0.5 Hemostasis: Aluminum Chloride Wound Care: Petrolatum Dressing: bandage Destruction After The Procedure: No Type Of Destruction Used: Curettage Curettage Text: The wound bed was treated with curettage after the biopsy was performed. Cryotherapy Text: The wound bed was treated with cryotherapy after the biopsy was performed. Electrodesiccation Text: The wound bed was treated with electrodesiccation after the biopsy was performed. Electrodesiccation And Curettage Text: The wound bed was treated with electrodesiccation and curettage after the biopsy was performed. Silver Nitrate Text: The wound bed was treated with silver nitrate after the biopsy was performed. Lab: 6 Lab Facility: 3 Consent: Written consent was obtained and risks were reviewed including but not limited to scarring, infection, bleeding, scabbing, incomplete removal, nerve damage and allergy to anesthesia. Post-Care Instructions: I reviewed with the patient in detail post-care instructions. Patient is to keep the biopsy site dry overnight, and then apply bacitracin twice daily until healed. Patient may apply hydrogen peroxide soaks to remove any crusting. Notification Instructions: Patient will be notified of biopsy results. However, patient instructed to call the office if not contacted within 2 weeks. Billing Type: Third-Party Bill Information: Selecting Yes will display possible errors in your note based on the variables you have selected. This validation is only offered as a suggestion for you. PLEASE NOTE THAT THE VALIDATION TEXT WILL BE REMOVED WHEN YOU FINALIZE YOUR NOTE. IF YOU WANT TO FAX A PRELIMINARY NOTE YOU WILL NEED TO TOGGLE THIS TO 'NO' IF YOU DO NOT WANT IT IN YOUR FAXED NOTE.

## 2023-01-26 NOTE — PROGRESS NOTE ADULT - PROBLEM SELECTOR PLAN 4
Comprehensive Nutrition Assessment    Type and Reason for Visit:  Initial, Consult    Nutrition Recommendations/Plan:   Continue no added salt diet   Resume Ensure BID - prefers vanilla   Encourage PO and protein intakes for healing   Monitor further diet education needs   Monitor nutrition adequacy, pertinent labs, bowel habits, wt changes, and clinical progress     Malnutrition Assessment:  Malnutrition Status: Moderate malnutrition (01/26/23 1539)    Context:  Acute Illness     Findings of the 6 clinical characteristics of malnutrition:  Energy Intake:  75% or less of estimated energy requirements for 7 or more days  Weight Loss:  Greater than 5% over 1 month (17% weight loss in 2 weeks per EMR, some r/t fluid)       Nutrition Assessment:    New ARU pt admitted with aortic dissection. Hx of Marfan's syndrome. S/p ascending aorta replacement on 1/15. S/p femoral bypass on 1/16. Pt nutritionally at risk AEB fair appetite and PO intakes of 26-75%. Pt reports appetite slowly improving. Currently ordered no added salt diet. Weights trending down per EMR, 50 lb weight loss in 2 weeks. Encouraged PO intakes as tolerated to minimize further weight loss and promote healing. Favorable to current ONS of Ensure BID, will continue. Long discussion on heart healthy diet with pt and family. Handouts provided. Will continue to monitor. Nutrition Related Findings:    BM on 1/25. Active BS. +2 generalized edema. Na 134. Wound Type: Surgical Incision       Current Nutrition Intake & Therapies:    Average Meal Intake: 26-50%, 51-75%  Average Supplements Intake: Unable to assess  No diet orders on file    Anthropometric Measures:  Height: 6' 4\" (193 cm)  Ideal Body Weight (IBW): 202 lbs (92 kg)    Admission Body Weight: 338 lb (153.3 kg) (bed scale)  Current Body Weight: 279 lb (126.6 kg), 138.1 % IBW.  Weight Source: Bed Scale  Current BMI (kg/m2): 34  Usual Body Weight:  (stated weight hx 330-340 lb)                       BMI Categories: Obese Class 1 (BMI 30.0-34. 9)    Estimated Daily Nutrient Needs:  Energy Requirements Based On: Kcal/kg (25-30)  Weight Used for Energy Requirements: Ideal  Energy (kcal/day): 1633-0702 kcal  Weight Used for Protein Requirements: Ideal (1.0-1.2 g/kg)  Protein (g/day):  g  Method Used for Fluid Requirements: 1 ml/kcal  Fluid (ml/day): 8907-3252 mL    Nutrition Diagnosis:   Inadequate oral intake related to inadequate protein-energy intake as evidenced by poor intake prior to admission, weight loss    Nutrition Interventions:   Food and/or Nutrient Delivery: Continue Current Diet, Start Oral Nutrition Supplement  Nutrition Education/Counseling: Education completed  Coordination of Nutrition Care: Continue to monitor while inpatient, Interdisciplinary Rounds       Goals:     Goals: PO intake 50% or greater, prior to discharge       Nutrition Monitoring and Evaluation:   Behavioral-Environmental Outcomes: Readiness for Change  Food/Nutrient Intake Outcomes: Food and Nutrient Intake, Supplement Intake  Physical Signs/Symptoms Outcomes: Biochemical Data, Nutrition Focused Physical Findings, Weight    Discharge Planning:    Continue current diet, Continue Oral Nutrition Supplement     Gustabo Delgado, MS, RD, LD  Contact: [phone] continue to hold home anti-hypertensive agenst.

## 2023-01-31 NOTE — ED ADULT NURSE NOTE - NSICDXPASTSURGICALHX_GEN_ALL_CORE_FT
PAST SURGICAL HISTORY:  Sarcoma left thigh resection with vascular bypass 9/2018    
The patient is a 56y Female complaining of seizures.

## 2023-08-28 NOTE — H&P PST ADULT - TEACHING/LEARNING DEVELOPMENTAL CONSIDERATIONS
[FreeTextEntry1] : EKG: June 9, 2023: Normal sinus rhythm, RSR in V1. Nuclear stress test July 7, 2023: Normal myocardial perfusion Echocardiogram July 28, 2023: LVEF 70 to 75%.  Mild to moderate mitral regurgitation.  Moderate tricuspid regurgitation.  Mild aortic regurgitation.  Normal RV size and function. Assessment: 1.  Chest pressure 2.  Diabetes mellitus 3.  Hypertension 4.  Hyperlipidemia 5.  Valvular heart disease: Mild to moderate mitral regurgitation, mild aortic regurgitation, moderate tricuspid regurgitation  Recommendations: Test results discussed with the patient and her daughter.  1.  Follow-up in 1 year
none

## 2024-08-08 NOTE — BEGINNING OF VISIT
[FreeTextEntry1] : 75 year old woman with hypertension, hypothyroidism, prediabetes and osteoporosis comes in for evaluation of cough.  She is a lifelong non smoker but has a strong FH of pulmonary fibrosis- 2 brothers and one sister were diagnosed.  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  INitially evaluated for cough thought likely related to UACS.  She is improved on fluticasone nasal spray.    ON PE today VSS She is afebrile.  Oxygen saturation is 98% RA Lungs are clear Cor RRR Ext without edema.  PFTs today are WNL Impression:  UACS, with normal PFTs Plan: - continue fluticasone nasal spray twice dailiy -Add Azelastine one spray twice daily.  F/U as needed. [Patient] : patient

## 2024-12-05 NOTE — END OF VISIT
[FreeTextEntry1] : Patient is a 14 yo F who presents to the [facility] for a sore throat that started yesterday after she ate grapes.  Patient reports any time she eats grapes she gets a sore throat.  Denies any shortness of breath, chest pain, abdominal pain, nausea, diarrhea, itching, rash, sick contacts, or recent travel  Ate a bagel with cream cheese and juice this morning, tolerated.   He has not taken any medication for her symptoms since onset.  No redness, swelling, exudate, sores noted to oropharynx on exam. Bilateral lung CTA bilaterally. Vitals WNL  Plan: -(2) Acetaminophen 325 mg given PO, tolerated -Educated on supportive care and recommended allergy testing for further evaluation since this occurs each time she eats grapes. -Educated patient on signs/symptoms of an allergic reaction and for new or worsening symptoms to RTC or seek UC and she verbalized understanding.  -Encouraged to verbalize any questions or concerns, all questions answered at this time. [Time Spent: ___ minutes] : I have spent [unfilled] minutes of time on the encounter.

## 2024-12-31 NOTE — DIETITIAN INITIAL EVALUATION ADULT. - ETIOLOGY
show
related to inability to meet sufficient protein-energy in setting of persistent poor appetite, liposarcoma of the abdomen on chemo/RT, SBO, s/p PEG for venting

## 2025-01-23 NOTE — ED ADULT NURSE NOTE - OBJECTIVE STATEMENT
n/a Patient presents to ER C/O abdominal pain, reports N/V and abdominal pain, patient recently had XR of abdomen shows possible obstruction, resp even/unlabored.

## 2025-04-08 NOTE — REASON FOR VISIT
-chronic, BP elevated at admission, some LE edema on physical exam  -most recent TTE 06/2024 with EF 40-50% mild pulmonary HTN with PASP 45  -holding IVF resuscitation as detailed above  -continue home losartan, metoprolol, and nifedipine with hold parameters   -holding home torsemide for now >>> resume when clinically appropriate  -dose/medication adjustment as appropriate   -strict I&Os   -trend labs, address/replete electrolytes as indicated     [Family Member] : family member [Post Op: _________] : a [unfilled] post op visit [FreeTextEntry1] : pt is here to discuss surgery

## (undated) DEVICE — PACK BASIC GOWN MAYO COVER

## (undated) DEVICE — SOL IRR POUR NS 0.9% 1000ML

## (undated) DEVICE — GLV 7.5 PROTEXIS

## (undated) DEVICE — BLANKET WARMER UPPER ADULT

## (undated) DEVICE — TRAP SPECIMEN SPUTUM 40CC

## (undated) DEVICE — PREP BETADINE SPONGE STICKS

## (undated) DEVICE — WRAP COMPRESSION CALF MED

## (undated) DEVICE — SUT SILK 2-0 18" PS

## (undated) DEVICE — SOL IRR POUR H2O 1000ML

## (undated) DEVICE — BLANKET WARMER LOWER ADULT